# Patient Record
Sex: MALE | Race: WHITE | HISPANIC OR LATINO | Employment: OTHER | URBAN - METROPOLITAN AREA
[De-identification: names, ages, dates, MRNs, and addresses within clinical notes are randomized per-mention and may not be internally consistent; named-entity substitution may affect disease eponyms.]

---

## 2019-09-09 ENCOUNTER — HOSPITAL ENCOUNTER (OUTPATIENT)
Facility: HOSPITAL | Age: 54
Discharge: HOME | End: 2019-09-10
Attending: EMERGENCY MEDICINE | Admitting: INTERNAL MEDICINE
Payer: MEDICAID

## 2019-09-09 DIAGNOSIS — I24.9 ACUTE CORONARY SYNDROME: Primary | ICD-10-CM

## 2019-09-09 DIAGNOSIS — R07.9 CHEST PAIN: ICD-10-CM

## 2019-09-09 DIAGNOSIS — I10 HYPERTENSION, UNSPECIFIED TYPE: ICD-10-CM

## 2019-09-09 LAB
ALBUMIN SERPL BCP-MCNC: 4.4 G/DL (ref 3.5–5.2)
ALP SERPL-CCNC: 74 U/L (ref 55–135)
ALT SERPL W/O P-5'-P-CCNC: 27 U/L (ref 10–44)
ANION GAP SERPL CALC-SCNC: 6 MMOL/L (ref 8–16)
AST SERPL-CCNC: 21 U/L (ref 10–40)
BASOPHILS # BLD AUTO: 0.03 K/UL (ref 0–0.2)
BASOPHILS NFR BLD: 0.5 % (ref 0–1.9)
BILIRUB SERPL-MCNC: 0.9 MG/DL (ref 0.1–1)
BNP SERPL-MCNC: 32 PG/ML (ref 0–99)
BUN SERPL-MCNC: 10 MG/DL (ref 6–20)
CALCIUM SERPL-MCNC: 9.2 MG/DL (ref 8.7–10.5)
CHLORIDE SERPL-SCNC: 104 MMOL/L (ref 95–110)
CO2 SERPL-SCNC: 30 MMOL/L (ref 23–29)
CREAT SERPL-MCNC: 0.8 MG/DL (ref 0.5–1.4)
DIFFERENTIAL METHOD: ABNORMAL
EOSINOPHIL # BLD AUTO: 0.1 K/UL (ref 0–0.5)
EOSINOPHIL NFR BLD: 1.8 % (ref 0–8)
ERYTHROCYTE [DISTWIDTH] IN BLOOD BY AUTOMATED COUNT: 13.2 % (ref 11.5–14.5)
EST. GFR  (AFRICAN AMERICAN): >60 ML/MIN/1.73 M^2
EST. GFR  (NON AFRICAN AMERICAN): >60 ML/MIN/1.73 M^2
GLUCOSE SERPL-MCNC: 88 MG/DL (ref 70–110)
HCT VFR BLD AUTO: 40.4 % (ref 40–54)
HGB BLD-MCNC: 13.7 G/DL (ref 14–18)
IMM GRANULOCYTES # BLD AUTO: 0.04 K/UL (ref 0–0.04)
IMM GRANULOCYTES NFR BLD AUTO: 0.6 % (ref 0–0.5)
LYMPHOCYTES # BLD AUTO: 1.6 K/UL (ref 1–4.8)
LYMPHOCYTES NFR BLD: 24.1 % (ref 18–48)
MCH RBC QN AUTO: 29.7 PG (ref 27–31)
MCHC RBC AUTO-ENTMCNC: 33.9 G/DL (ref 32–36)
MCV RBC AUTO: 87 FL (ref 82–98)
MONOCYTES # BLD AUTO: 0.6 K/UL (ref 0.3–1)
MONOCYTES NFR BLD: 8.7 % (ref 4–15)
NEUTROPHILS # BLD AUTO: 4.3 K/UL (ref 1.8–7.7)
NEUTROPHILS NFR BLD: 64.3 % (ref 38–73)
NRBC BLD-RTO: 0 /100 WBC
PLATELET # BLD AUTO: 324 K/UL (ref 150–350)
PMV BLD AUTO: 10.7 FL (ref 9.2–12.9)
POTASSIUM SERPL-SCNC: 3.6 MMOL/L (ref 3.5–5.1)
PROT SERPL-MCNC: 7.7 G/DL (ref 6–8.4)
RBC # BLD AUTO: 4.62 M/UL (ref 4.6–6.2)
SODIUM SERPL-SCNC: 140 MMOL/L (ref 136–145)
TROPONIN I SERPL DL<=0.01 NG/ML-MCNC: <0.03 NG/ML (ref 0.02–0.04)
TROPONIN I SERPL DL<=0.01 NG/ML-MCNC: <0.03 NG/ML (ref 0.02–0.04)
WBC # BLD AUTO: 6.63 K/UL (ref 3.9–12.7)

## 2019-09-09 PROCEDURE — 84484 ASSAY OF TROPONIN QUANT: CPT | Mod: 91

## 2019-09-09 PROCEDURE — G0378 HOSPITAL OBSERVATION PER HR: HCPCS

## 2019-09-09 PROCEDURE — 63600175 PHARM REV CODE 636 W HCPCS: Performed by: EMERGENCY MEDICINE

## 2019-09-09 PROCEDURE — 99285 EMERGENCY DEPT VISIT HI MDM: CPT | Mod: 25

## 2019-09-09 PROCEDURE — 93005 ELECTROCARDIOGRAM TRACING: CPT

## 2019-09-09 PROCEDURE — 25000003 PHARM REV CODE 250: Performed by: EMERGENCY MEDICINE

## 2019-09-09 PROCEDURE — 25000003 PHARM REV CODE 250: Performed by: INTERNAL MEDICINE

## 2019-09-09 PROCEDURE — 25000003 PHARM REV CODE 250: Performed by: NURSE PRACTITIONER

## 2019-09-09 PROCEDURE — 25000003 PHARM REV CODE 250: Performed by: PHYSICIAN ASSISTANT

## 2019-09-09 PROCEDURE — 36415 COLL VENOUS BLD VENIPUNCTURE: CPT

## 2019-09-09 PROCEDURE — 85025 COMPLETE CBC W/AUTO DIFF WBC: CPT

## 2019-09-09 PROCEDURE — 80053 COMPREHEN METABOLIC PANEL: CPT

## 2019-09-09 PROCEDURE — 96374 THER/PROPH/DIAG INJ IV PUSH: CPT

## 2019-09-09 PROCEDURE — 83880 ASSAY OF NATRIURETIC PEPTIDE: CPT

## 2019-09-09 RX ORDER — ATORVASTATIN CALCIUM 10 MG/1
10 TABLET, FILM COATED ORAL DAILY
Status: DISCONTINUED | OUTPATIENT
Start: 2019-09-10 | End: 2019-09-10 | Stop reason: HOSPADM

## 2019-09-09 RX ORDER — ASPIRIN 325 MG
325 TABLET ORAL
Status: COMPLETED | OUTPATIENT
Start: 2019-09-09 | End: 2019-09-09

## 2019-09-09 RX ORDER — TRAMADOL HYDROCHLORIDE 50 MG/1
50 TABLET ORAL
Status: COMPLETED | OUTPATIENT
Start: 2019-09-09 | End: 2019-09-09

## 2019-09-09 RX ORDER — SODIUM CHLORIDE 0.9 % (FLUSH) 0.9 %
10 SYRINGE (ML) INJECTION
Status: DISCONTINUED | OUTPATIENT
Start: 2019-09-09 | End: 2019-09-10 | Stop reason: HOSPADM

## 2019-09-09 RX ORDER — TRAZODONE HYDROCHLORIDE 50 MG/1
100 TABLET ORAL NIGHTLY
Status: DISCONTINUED | OUTPATIENT
Start: 2019-09-09 | End: 2019-09-10 | Stop reason: HOSPADM

## 2019-09-09 RX ORDER — TRAMADOL HYDROCHLORIDE 50 MG/1
50 TABLET ORAL EVERY 6 HOURS PRN
Status: DISCONTINUED | OUTPATIENT
Start: 2019-09-09 | End: 2019-09-10 | Stop reason: HOSPADM

## 2019-09-09 RX ORDER — ACETAMINOPHEN 325 MG/1
650 TABLET ORAL EVERY 4 HOURS PRN
Status: DISCONTINUED | OUTPATIENT
Start: 2019-09-09 | End: 2019-09-10 | Stop reason: HOSPADM

## 2019-09-09 RX ORDER — OXYMETAZOLINE HCL 0.05 %
2 SPRAY, NON-AEROSOL (ML) NASAL 4 TIMES DAILY
Status: ON HOLD | COMMUNITY
End: 2019-09-10 | Stop reason: HOSPADM

## 2019-09-09 RX ORDER — CLOPIDOGREL BISULFATE 75 MG/1
75 TABLET ORAL DAILY
Status: DISCONTINUED | OUTPATIENT
Start: 2019-09-10 | End: 2019-09-10 | Stop reason: HOSPADM

## 2019-09-09 RX ORDER — CLOPIDOGREL BISULFATE 75 MG/1
75 TABLET ORAL DAILY
COMMUNITY
End: 2024-01-30 | Stop reason: SDUPTHER

## 2019-09-09 RX ORDER — METOPROLOL TARTRATE 50 MG/1
50 TABLET ORAL 2 TIMES DAILY
COMMUNITY
End: 2024-01-30 | Stop reason: SDUPTHER

## 2019-09-09 RX ORDER — TRAZODONE HYDROCHLORIDE 100 MG/1
100 TABLET ORAL NIGHTLY
COMMUNITY
End: 2023-09-27 | Stop reason: ALTCHOICE

## 2019-09-09 RX ORDER — ASPIRIN 325 MG
325 TABLET ORAL
Status: DISCONTINUED | OUTPATIENT
Start: 2019-09-09 | End: 2019-09-09

## 2019-09-09 RX ORDER — ACETAMINOPHEN 500 MG
1000 TABLET ORAL
Status: COMPLETED | OUTPATIENT
Start: 2019-09-09 | End: 2019-09-09

## 2019-09-09 RX ORDER — LOSARTAN POTASSIUM 100 MG/1
100 TABLET ORAL DAILY
COMMUNITY
End: 2023-04-10

## 2019-09-09 RX ORDER — NITROGLYCERIN 0.4 MG/1
0.4 TABLET SUBLINGUAL EVERY 5 MIN PRN
Status: DISCONTINUED | OUTPATIENT
Start: 2019-09-09 | End: 2019-09-10 | Stop reason: HOSPADM

## 2019-09-09 RX ORDER — ONDANSETRON 2 MG/ML
4 INJECTION INTRAMUSCULAR; INTRAVENOUS EVERY 8 HOURS PRN
Status: DISCONTINUED | OUTPATIENT
Start: 2019-09-09 | End: 2019-09-10 | Stop reason: HOSPADM

## 2019-09-09 RX ORDER — METOPROLOL TARTRATE 50 MG/1
50 TABLET ORAL 2 TIMES DAILY
Status: DISCONTINUED | OUTPATIENT
Start: 2019-09-09 | End: 2019-09-10 | Stop reason: HOSPADM

## 2019-09-09 RX ORDER — ASPIRIN 81 MG/1
81 TABLET ORAL DAILY
COMMUNITY

## 2019-09-09 RX ORDER — HYDROCODONE BITARTRATE AND ACETAMINOPHEN 5; 325 MG/1; MG/1
1 TABLET ORAL EVERY 6 HOURS PRN
Status: DISCONTINUED | OUTPATIENT
Start: 2019-09-09 | End: 2019-09-10 | Stop reason: HOSPADM

## 2019-09-09 RX ORDER — ASPIRIN 81 MG/1
81 TABLET ORAL DAILY
Status: DISCONTINUED | OUTPATIENT
Start: 2019-09-10 | End: 2019-09-10 | Stop reason: HOSPADM

## 2019-09-09 RX ORDER — TRAMADOL HYDROCHLORIDE 50 MG/1
50 TABLET ORAL EVERY 12 HOURS PRN
COMMUNITY
End: 2023-09-27

## 2019-09-09 RX ORDER — HYDRALAZINE HYDROCHLORIDE 20 MG/ML
10 INJECTION INTRAMUSCULAR; INTRAVENOUS
Status: COMPLETED | OUTPATIENT
Start: 2019-09-09 | End: 2019-09-09

## 2019-09-09 RX ORDER — LOSARTAN POTASSIUM 50 MG/1
100 TABLET ORAL DAILY
Status: DISCONTINUED | OUTPATIENT
Start: 2019-09-10 | End: 2019-09-10 | Stop reason: HOSPADM

## 2019-09-09 RX ADMIN — METOPROLOL TARTRATE 50 MG: 50 TABLET ORAL at 09:09

## 2019-09-09 RX ADMIN — HYDRALAZINE HYDROCHLORIDE 10 MG: 20 INJECTION INTRAMUSCULAR; INTRAVENOUS at 05:09

## 2019-09-09 RX ADMIN — TRAMADOL HYDROCHLORIDE 50 MG: 50 TABLET, FILM COATED ORAL at 06:09

## 2019-09-09 RX ADMIN — ACETAMINOPHEN 1000 MG: 500 TABLET, FILM COATED ORAL at 05:09

## 2019-09-09 RX ADMIN — ASPIRIN 325 MG ORAL TABLET 325 MG: 325 PILL ORAL at 05:09

## 2019-09-09 RX ADMIN — HYDROCODONE BITARTRATE AND ACETAMINOPHEN 1 TABLET: 5; 325 TABLET ORAL at 07:09

## 2019-09-09 RX ADMIN — NITROGLYCERIN 1 INCH: 20 OINTMENT TOPICAL at 05:09

## 2019-09-09 NOTE — ED PROVIDER NOTES
Encounter Date: 9/9/2019       History     Chief Complaint   Patient presents with    Chest Pain     off and on for 1 month     54-year-old male presented emergency department complaining of chest pain since yesterday.  Patient also noted that he was hypertensive.  Patient describes this chest pain as sharp pain at times and pressure at times and pain is worse with exertion and better with rest.  Patient took nitroglycerin this morning and currently is pain-free.  Patient denies any weakness or numbness.  Patient had previous heart attacks and patient states this pain is similar to previous chest pains with a heart.  Patient denies that the pain radiates to any place.  Patient        Review of patient's allergies indicates:   Allergen Reactions    Levaquin [levofloxacin]      Past Medical History:   Diagnosis Date    Hypertension      History reviewed. No pertinent surgical history.  History reviewed. No pertinent family history.  Social History     Tobacco Use    Smoking status: Never Smoker   Substance Use Topics    Alcohol use: Not on file    Drug use: Not on file     Review of Systems   Constitutional: Negative.    HENT: Negative.    Eyes: Negative.    Respiratory: Negative.    Cardiovascular: Positive for chest pain.   Gastrointestinal: Negative.    Endocrine: Negative.    Genitourinary: Negative.    Musculoskeletal: Negative.    Skin: Negative.    Allergic/Immunologic: Negative.    Neurological: Negative.    Hematological: Negative.    Psychiatric/Behavioral: Negative.    All other systems reviewed and are negative.      Physical Exam     Initial Vitals [09/09/19 1127]   BP Pulse Resp Temp SpO2   (!) 195/101 63 18 98.2 °F (36.8 °C) 100 %      MAP       --         Physical Exam    Nursing note and vitals reviewed.  Constitutional: He appears well-developed and well-nourished.   HENT:   Head: Normocephalic and atraumatic.   Nose: Nose normal.   Eyes: Conjunctivae and EOM are normal.   Neck: Normal range of  motion. No tracheal deviation present.   Cardiovascular: Normal rate, regular rhythm, normal heart sounds and intact distal pulses. Exam reveals no friction rub.    No murmur heard.  Pulmonary/Chest: Breath sounds normal. No respiratory distress. He has no wheezes. He has no rales.   Abdominal: Soft. He exhibits no distension. There is no tenderness.   Musculoskeletal: Normal range of motion.   Neurological: He is alert and oriented to person, place, and time. He has normal strength.   Skin: Skin is warm and dry. Capillary refill takes less than 2 seconds.   Psychiatric: He has a normal mood and affect. Thought content normal.         ED Course   Procedures  Labs Reviewed   CBC W/ AUTO DIFFERENTIAL - Abnormal; Notable for the following components:       Result Value    Hemoglobin 13.7 (*)     Immature Granulocytes 0.6 (*)     All other components within normal limits   COMPREHENSIVE METABOLIC PANEL - Abnormal; Notable for the following components:    CO2 30 (*)     Anion Gap 6 (*)     All other components within normal limits   TROPONIN I   B-TYPE NATRIURETIC PEPTIDE   TROPONIN I   D DIMER, QUANTITATIVE     EKG Readings: (Independently Interpreted)   Rhythm: Normal Sinus Rhythm. Ectopy: No Ectopy. Conduction: Normal. ST Segments: Normal ST Segments. T Waves: Normal. Clinical Impression: Normal Sinus Rhythm     ECG Results          EKG 12-lead (In process)  Result time 09/09/19 13:14:59    In process by Interface, Lab In [facility] (09/09/19 13:14:59)                 Narrative:    Test Reason : R07.9,    Vent. Rate : 056 BPM     Atrial Rate : 056 BPM     P-R Int : 174 ms          QRS Dur : 084 ms      QT Int : 430 ms       P-R-T Axes : 048 039 045 degrees     QTc Int : 414 ms    Sinus bradycardia  Otherwise normal ECG  No previous ECGs available    Referred By: [email or web address]   SELF           Confirmed By:                             Imaging Results          X-Ray Chest PA And Lateral (Final result)  Result time 09/09/19  12:54:28    Final result by Darren Jacob MD (09/09/19 12:54:28)                 Impression:      1. No acute cardiopulmonary abnormality.  2. Vertebral body compression fractures as above.      Electronically signed by: Darren Jacob MD  Date:    09/09/2019  Time:    12:54             Narrative:    EXAMINATION:  XR CHEST PA AND LATERAL    CLINICAL HISTORY:  Chest Pain;    FINDINGS:  PA and lateral chest compared with 12/24/2015 shows normal cardiomediastinal silhouette.    Lungs are clear. Pulmonary vasculature is normal. Compression fracture of vertebral body near thoracolumbar junction, more likely L1 rather than T12, shows minimal progression since 01/05/2015.  There is suggestion of or superior thoracic vertebral body compression fracture, possibly T5, which appears unchanged since 2015.                              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  Presentation consistent with unstable angina and given patient's cardiac history, patient scheduled for a stress test in 10 days and given his cardiac risk factor screening cardiac workup done and blood pressure treated and Hospital Medicine consulted for evaluation for admission and further management.  Clinical Tests:   Lab Tests: Reviewed  Radiological Study: Reviewed  Medical Tests: Reviewed                   ED Course as of Sep 09 1720   Mon Sep 09, 2019   1129 54-year-old male with past cardiac history, 3 stents patient of Dr. alanis presents to the emergency department for evaluation of chest pain. Chest pain has been intermittent and ongoing since he saw the cardiologist last.  He has a stress test scheduled for late September.  Patient takes Plavix and aspirin.  Took 81 mg aspirin this morning    [BF]       ED Course User Index  [BF] NATALIO Vargas     Clinical Impression:       ICD-10-CM ICD-9-CM   1. Acute coronary syndrome I24.9 411.1   2. Chest pain R07.9 786.50                                Gregorio Beltran MD  09/09/19 3554

## 2019-09-10 ENCOUNTER — CLINICAL SUPPORT (OUTPATIENT)
Dept: CARDIOLOGY | Facility: HOSPITAL | Age: 54
End: 2019-09-10
Attending: EMERGENCY MEDICINE
Payer: MEDICAID

## 2019-09-10 ENCOUNTER — CLINICAL SUPPORT (OUTPATIENT)
Dept: CARDIOLOGY | Facility: HOSPITAL | Age: 54
End: 2019-09-10
Attending: INTERNAL MEDICINE
Payer: MEDICAID

## 2019-09-10 ENCOUNTER — HOSPITAL ENCOUNTER (OUTPATIENT)
Dept: RADIOLOGY | Facility: HOSPITAL | Age: 54
Discharge: HOME | End: 2019-09-10
Attending: EMERGENCY MEDICINE | Admitting: INTERNAL MEDICINE
Payer: MEDICAID

## 2019-09-10 VITALS — WEIGHT: 161 LBS | BODY MASS INDEX: 27.49 KG/M2 | HEIGHT: 64 IN

## 2019-09-10 VITALS
BODY MASS INDEX: 27.59 KG/M2 | WEIGHT: 161.63 LBS | DIASTOLIC BLOOD PRESSURE: 100 MMHG | TEMPERATURE: 98 F | RESPIRATION RATE: 20 BRPM | OXYGEN SATURATION: 99 % | HEART RATE: 89 BPM | SYSTOLIC BLOOD PRESSURE: 155 MMHG | HEIGHT: 64 IN

## 2019-09-10 PROBLEM — R07.9 CHEST PAIN: Status: ACTIVE | Noted: 2019-09-10

## 2019-09-10 PROBLEM — I24.9 ACUTE CORONARY SYNDROME: Status: RESOLVED | Noted: 2019-09-09 | Resolved: 2019-09-10

## 2019-09-10 PROBLEM — R07.9 CHEST PAIN: Status: RESOLVED | Noted: 2019-09-10 | Resolved: 2019-09-10

## 2019-09-10 LAB
ALBUMIN SERPL BCP-MCNC: 4 G/DL (ref 3.5–5.2)
ALP SERPL-CCNC: 63 U/L (ref 55–135)
ALT SERPL W/O P-5'-P-CCNC: 24 U/L (ref 10–44)
ANION GAP SERPL CALC-SCNC: 9 MMOL/L (ref 8–16)
AST SERPL-CCNC: 20 U/L (ref 10–40)
BASOPHILS # BLD AUTO: 0.03 K/UL (ref 0–0.2)
BASOPHILS NFR BLD: 0.4 % (ref 0–1.9)
BILIRUB SERPL-MCNC: 0.8 MG/DL (ref 0.1–1)
BUN SERPL-MCNC: 15 MG/DL (ref 6–20)
CALCIUM SERPL-MCNC: 9 MG/DL (ref 8.7–10.5)
CHLORIDE SERPL-SCNC: 107 MMOL/L (ref 95–110)
CHOLEST SERPL-MCNC: 201 MG/DL (ref 120–199)
CHOLEST/HDLC SERPL: 5.6 {RATIO} (ref 2–5)
CO2 SERPL-SCNC: 27 MMOL/L (ref 23–29)
CREAT SERPL-MCNC: 0.9 MG/DL (ref 0.5–1.4)
DIFFERENTIAL METHOD: ABNORMAL
EOSINOPHIL # BLD AUTO: 0.2 K/UL (ref 0–0.5)
EOSINOPHIL NFR BLD: 2.7 % (ref 0–8)
ERYTHROCYTE [DISTWIDTH] IN BLOOD BY AUTOMATED COUNT: 13.2 % (ref 11.5–14.5)
EST. GFR  (AFRICAN AMERICAN): >60 ML/MIN/1.73 M^2
EST. GFR  (NON AFRICAN AMERICAN): >60 ML/MIN/1.73 M^2
GLUCOSE SERPL-MCNC: 111 MG/DL (ref 70–110)
HCT VFR BLD AUTO: 38.2 % (ref 40–54)
HDLC SERPL-MCNC: 36 MG/DL (ref 40–75)
HDLC SERPL: 17.9 % (ref 20–50)
HGB BLD-MCNC: 13 G/DL (ref 14–18)
IMM GRANULOCYTES # BLD AUTO: 0.04 K/UL (ref 0–0.04)
IMM GRANULOCYTES NFR BLD AUTO: 0.6 % (ref 0–0.5)
LDLC SERPL CALC-MCNC: 112.6 MG/DL (ref 63–159)
LYMPHOCYTES # BLD AUTO: 1.6 K/UL (ref 1–4.8)
LYMPHOCYTES NFR BLD: 22.4 % (ref 18–48)
MAGNESIUM SERPL-MCNC: 1.8 MG/DL (ref 1.6–2.6)
MCH RBC QN AUTO: 29.5 PG (ref 27–31)
MCHC RBC AUTO-ENTMCNC: 34 G/DL (ref 32–36)
MCV RBC AUTO: 87 FL (ref 82–98)
MONOCYTES # BLD AUTO: 0.8 K/UL (ref 0.3–1)
MONOCYTES NFR BLD: 11.1 % (ref 4–15)
NEUTROPHILS # BLD AUTO: 4.4 K/UL (ref 1.8–7.7)
NEUTROPHILS NFR BLD: 62.8 % (ref 38–73)
NONHDLC SERPL-MCNC: 165 MG/DL
NRBC BLD-RTO: 0 /100 WBC
PLATELET # BLD AUTO: 270 K/UL (ref 150–350)
PMV BLD AUTO: 10.6 FL (ref 9.2–12.9)
POTASSIUM SERPL-SCNC: 3.4 MMOL/L (ref 3.5–5.1)
PROT SERPL-MCNC: 7 G/DL (ref 6–8.4)
RBC # BLD AUTO: 4.41 M/UL (ref 4.6–6.2)
SODIUM SERPL-SCNC: 143 MMOL/L (ref 136–145)
TRIGL SERPL-MCNC: 262 MG/DL (ref 30–150)
TROPONIN I SERPL DL<=0.01 NG/ML-MCNC: <0.03 NG/ML (ref 0.02–0.04)
WBC # BLD AUTO: 7.05 K/UL (ref 3.9–12.7)

## 2019-09-10 PROCEDURE — 80061 LIPID PANEL: CPT

## 2019-09-10 PROCEDURE — 84484 ASSAY OF TROPONIN QUANT: CPT

## 2019-09-10 PROCEDURE — 83735 ASSAY OF MAGNESIUM: CPT

## 2019-09-10 PROCEDURE — 93306 TTE W/DOPPLER COMPLETE: CPT

## 2019-09-10 PROCEDURE — 63600175 PHARM REV CODE 636 W HCPCS: Performed by: INTERNAL MEDICINE

## 2019-09-10 PROCEDURE — 25000003 PHARM REV CODE 250: Performed by: NURSE PRACTITIONER

## 2019-09-10 PROCEDURE — 93017 CV STRESS TEST TRACING ONLY: CPT

## 2019-09-10 PROCEDURE — 93005 ELECTROCARDIOGRAM TRACING: CPT

## 2019-09-10 PROCEDURE — 25000003 PHARM REV CODE 250: Performed by: INTERNAL MEDICINE

## 2019-09-10 PROCEDURE — G0378 HOSPITAL OBSERVATION PER HR: HCPCS

## 2019-09-10 PROCEDURE — A9502 TC99M TETROFOSMIN: HCPCS

## 2019-09-10 PROCEDURE — 80053 COMPREHEN METABOLIC PANEL: CPT

## 2019-09-10 PROCEDURE — 85025 COMPLETE CBC W/AUTO DIFF WBC: CPT

## 2019-09-10 RX ORDER — AMLODIPINE BESYLATE 5 MG/1
5 TABLET ORAL DAILY
Qty: 30 TABLET | Refills: 0
Start: 2019-09-10 | End: 2019-10-10

## 2019-09-10 RX ORDER — REGADENOSON 0.08 MG/ML
0.4 INJECTION, SOLUTION INTRAVENOUS ONCE
Status: COMPLETED | OUTPATIENT
Start: 2019-09-10 | End: 2019-09-10

## 2019-09-10 RX ORDER — AMLODIPINE BESYLATE 5 MG/1
5 TABLET ORAL DAILY
Status: DISCONTINUED | OUTPATIENT
Start: 2019-09-10 | End: 2019-09-10 | Stop reason: HOSPADM

## 2019-09-10 RX ADMIN — AMLODIPINE BESYLATE 5 MG: 5 TABLET ORAL at 12:09

## 2019-09-10 RX ADMIN — HYDROCODONE BITARTRATE AND ACETAMINOPHEN 1 TABLET: 5; 325 TABLET ORAL at 03:09

## 2019-09-10 RX ADMIN — ASPIRIN 81 MG: 81 TABLET, DELAYED RELEASE ORAL at 09:09

## 2019-09-10 RX ADMIN — ACETAMINOPHEN 650 MG: 325 TABLET ORAL at 05:09

## 2019-09-10 RX ADMIN — CLOPIDOGREL BISULFATE 75 MG: 75 TABLET, FILM COATED ORAL at 09:09

## 2019-09-10 RX ADMIN — THERA TABS 1 TABLET: TAB at 09:09

## 2019-09-10 RX ADMIN — ATORVASTATIN CALCIUM 10 MG: 10 TABLET, FILM COATED ORAL at 09:09

## 2019-09-10 RX ADMIN — LOSARTAN POTASSIUM 100 MG: 50 TABLET, FILM COATED ORAL at 09:09

## 2019-09-10 RX ADMIN — HYDROCODONE BITARTRATE AND ACETAMINOPHEN 1 TABLET: 5; 325 TABLET ORAL at 09:09

## 2019-09-10 RX ADMIN — METOPROLOL TARTRATE 50 MG: 50 TABLET ORAL at 12:09

## 2019-09-10 RX ADMIN — REGADENOSON 0.4 MG: 0.08 INJECTION, SOLUTION INTRAVENOUS at 10:09

## 2019-09-10 NOTE — DISCHARGE SUMMARY
[facility] Medicine  Discharge Summary      Patient Name: Sanjeev Britt  MRN: [id number]  Admission Date: 9/9/2019  Hospital Length of Stay: 0 days  Discharge Date and Time:  09/10/2019 2:40 PM  Attending Physician: Saima Britt MD   Discharging Provider: Scott Reid MD  Primary Care Provider: Primary Doctor No      HPI:   Mr. Britt is a 54 year old male who presents to the ED with the complaint of chest pain that has been intermittent over the past 2 days. The patient describes the pain as sharp and pressure like. Pain does not radiate. He denies associated symptoms. Pain is worse with exertion. He reports history of CAD. He is followed by Dr. Florentino and reports upcoming scheduled stress test. Today, the patient reports his blood pressure was elevated at home with  and did not improve with medication. Chest pain worsened, so he came to the ED for evaluation.     * No surgery found *      Hospital Course:   Admitted with chest pain  Serial C enzymes and EKG were Normal  Patient had Lexiscan stress myoview which was normal  Later pt was D/C to Home      Consults:   Consults (From admission, onward)        Status Ordering Provider     Inpatient consult to Cardiology  Once     Provider:  Miguel Florentino MD    Acknowledged SEB SHEPPARD     Inpatient consult to Hospitalist  Once     Provider:  Saima Britt MD    Acknowledged MICHA VILLARREAL     Inpatient consult to Hospitalist  Once     Provider:  Saima Britt MD    Acknowledged MICAH VILLARREAL          No new Assessment & Plan notes have been filed under this hospital service since the last note was generated.  Service: Hospital Medicine    Final Active Diagnoses:    Diagnosis Date Noted POA    PRINCIPAL PROBLEM:  Chest pain [R07.9] 09/10/2019 Unknown      Problems Resolved During this Admission:       Discharged Condition: good    Disposition: Home or Self Care    Follow Up:  Follow-up Information     Primary  Doctor Krista In 1 week.           Miguel Florentino MD In 1 week.    Specialties:  Cardiovascular Disease, Cardiology  Contact information:  [facility]  [address]  [facility] [address] [phone]                 Patient Instructions:      Diet Cardiac       Significant Diagnostic Studies: Labs:   CMP   Recent Labs   Lab 09/09/19  1309 09/10/19  0500    143   K 3.6 3.4*    107   CO2 30* 27   GLU 88 111*   BUN 10 15   CREATININE 0.8 0.9   CALCIUM 9.2 9.0   PROT 7.7 7.0   ALBUMIN 4.4 4.0   BILITOT 0.9 0.8   ALKPHOS 74 63   AST 21 20   ALT 27 24   ANIONGAP 6* 9   ESTGFRAFRICA >60.0 >60.0   EGFRNONAA >60.0 >60.0    and CBC   Recent Labs   Lab 09/09/19  1309 09/10/19  0500   WBC 6.63 7.05   HGB 13.7* 13.0*   HCT 40.4 38.2*    270       Pending Diagnostic Studies:     Procedure Component Value Units Date/Time    D dimer, quantitative [[id number]] Collected:  09/09/19 1704    Order Status:  Sent Lab Status:  In process Updated:  09/09/19 1705    Specimen:  Blood     EKG 12-LEAD [[id number]] Collected:  09/10/19 0531    Order Status:  Sent Lab Status:  In process Updated:  09/10/19 0758    Narrative:       Test Reason :     Vent. Rate : 062 BPM     Atrial Rate : 062 BPM     P-R Int : 172 ms          QRS Dur : 086 ms      QT Int : 450 ms       P-R-T Axes : 037 019 023 degrees     QTc Int : 456 ms    Normal sinus rhythm  Normal ECG  When compared with ECG of 09-SEP-2019 17:02,  Nonspecific T wave abnormality no longer evident in Lateral leads    Referred By: [email or web address]   SELF           Confirmed By:     Echo Color Flow Doppler? Yes [[id number]]  (Abnormal) Resulted:  09/10/19 1147    Order Status:  Sent Lab Status:  In process Updated:  09/10/19 1149     BSA 1.81 m2      TDI SEPTAL 0.07 m/s      LV LATERAL E/E' RATIO 13.33 m/s      LV SEPTAL E/E' RATIO 11.43 m/s      AORTIC VALVE CUSP SEPERATION 2.27 cm      TDI LATERAL 0.06 m/s      PV PEAK VELOCITY 165.00 cm/s      LVIDD 4.63 cm      IVS 1.16 cm      PW 1.13 cm       Ao root annulus 2.91 cm      LVIDS 2.56 cm      FS 45 %      LV mass 193.72 g      LA size 4.31 cm      Left Ventricle Relative Wall Thickness 0.49 cm      AV mean gradient 5 mmHg      AV valve area 3.73 cm2      AV Velocity Ratio 103.51     AV index (prosthetic) 1.06     E/A ratio 0.88     Mean e' 0.07 m/s      E wave decelartion time 237.63 msec      LVOT diameter 2.12 cm      LVOT area 3.5 cm2      LVOT peak coleman 153.19 m/s      LVOT peak VTI 28.55 cm      Ao peak coleman 1.48 m/s      Ao VTI 26.98 cm      LVOT stroke volume 100.73 cm3      AV peak gradient 9 mmHg      E/E' ratio 12.31 m/s      MV Peak E Coleman 0.80 m/s      TR Max Coleman 2.85 m/s      MV Peak A Coleman 0.91 m/s      LV Systolic Volume 21.20 mL      LV Systolic Volume Index 11.9 mL/m2      LV Diastolic Volume 51.80 mL      LV Diastolic Volume Index 29.11 mL/m2      LV Mass Index 109 g/m2      Triscuspid Valve Regurgitation Peak Gradient 32 mmHg     Narrative:       · Concentric left ventricular remodeling.       Impression:           Troponin I [[id number]]     Order Status:  Sent Lab Status:  No result     Specimen:  Blood          Medications:  Reconciled Home Medications:      Medication List      START taking these medications    amLODIPine 5 MG tablet  Commonly known as:  NORVASC  Take 1 tablet (5 mg total) by mouth once daily.        CONTINUE taking these medications    aspirin 81 MG EC tablet  Commonly known as:  ECOTRIN  Take 81 mg by mouth once daily.     CENTRUM SILVER MEN ORAL  Take 1 tablet by mouth once daily.     clopidogrel 75 mg tablet  Commonly known as:  PLAVIX  Take 75 mg by mouth once daily.     losartan 100 MG tablet  Commonly known as:  COZAAR  Take 100 mg by mouth once daily.     metoprolol tartrate 50 MG tablet  Commonly known as:  LOPRESSOR  Take 50 mg by mouth 2 (two) times daily.     traMADol 50 mg tablet  Commonly known as:  ULTRAM  Take 50 mg by mouth every 12 (twelve) hours as needed for Pain.     traZODone 100 MG  tablet  Commonly known as:  DESYREL  Take 100 mg by mouth.        STOP taking these medications    oxymetazoline 0.05 % nasal spray  Commonly known as:  AFRIN            Indwelling Lines/Drains at time of discharge:   Lines/Drains/Airways          None          Time spent on the discharge of patient: 28  minutes  Patient was seen and examined on the date of discharge and determined to be suitable for discharge.    Amlodipine was added to patient's medication list because of high blood pressure  Patient will be seen by her regular cardiologist next week and significant changes if needed can be made at that point       Scott Reid MD  Department of Hospital Medicine  [facility]

## 2019-09-10 NOTE — PROGRESS NOTES
Patient admitted to cardiology from ED.  Patient arrives to unit via stretcher.  Ambulated to bed without assistance.  AAOX4, VSS , oriented to surroundings. Bed in lowest position. Call system within reach.  Side rails up x 2.  Telemetry notified patient is in room.

## 2019-09-10 NOTE — ASSESSMENT & PLAN NOTE
Admit to Cardiology  Consult Dr. Chadd Santiago cardiac enzymes  2-D echo  Further testing per cardiology recommendation  ASA/Plavix/BB/ARB/statin/Nitrate prn

## 2019-09-10 NOTE — ED NOTES
Given Norco po for back pain 10/10.  Pt is less upset now because he was able to reach his girlfriend and she is bringing him some food.  Continues to pace about room but agrees to stay in hospital.

## 2019-09-10 NOTE — H&P
[facility] Medicine  History & Physical    Patient Name: Sanjeev Britt  MRN: [id number]  Admission Date: 9/9/2019  Attending Physician: Saima Britt MD   Primary Care Provider: No primary care provider on file.         Patient information was obtained from patient and ER records.     Subjective:     Principal Problem:<principal problem not specified>    Chief Complaint:   Chief Complaint   Patient presents with    Chest Pain     off and on for 1 month        HPI: Mr. Britt is a 54 year old male who presents to the ED with the complaint of chest pain that has been intermittent over the past 2 days. The patient describes the pain as sharp and pressure like. Pain does not radiate. He denies associated symptoms. Pain is worse with exertion. He reports history of CAD. He is followed by Dr. Florentino and reports upcoming scheduled stress test. Today, the patient reports his blood pressure was elevated at home with  and did not improve with medication. Chest pain worsened, so he came to the ED for evaluation. Work up unremarkable with the exception of nonspecific T wave inversions in lateral leads.     Past Medical History:   Diagnosis Date    Hypertension        History reviewed. No pertinent surgical history.    Review of patient's allergies indicates:   Allergen Reactions    Levaquin [levofloxacin]        No current facility-administered medications on file prior to encounter.      Current Outpatient Medications on File Prior to Encounter   Medication Sig    aspirin (ECOTRIN) 81 MG EC tablet Take 81 mg by mouth once daily.    clopidogrel (PLAVIX) 75 mg tablet Take 75 mg by mouth once daily.    losartan (COZAAR) 100 MG tablet Take 100 mg by mouth once daily.    metoprolol tartrate (LOPRESSOR) 50 MG tablet Take 50 mg by mouth 2 (two) times daily.    multivit-min/FA/lycopen/lutein (CENTRUM SILVER MEN ORAL) Take 1 tablet by mouth once daily.    oxymetazoline (AFRIN) 0.05 %  nasal spray 2 sprays by Nasal route 4 (four) times daily.    traMADol (ULTRAM) 50 mg tablet Take 50 mg by mouth every 12 (twelve) hours as needed for Pain.    traZODone (DESYREL) 100 MG tablet Take 100 mg by mouth.     Family History     None        Tobacco Use    Smoking status: Never Smoker   Substance and Sexual Activity    Alcohol use: Not on file    Drug use: Not on file    Sexual activity: Not on file     Review of Systems   Constitutional: Negative for chills, diaphoresis, fatigue and fever.   HENT: Negative for congestion, sore throat, tinnitus, trouble swallowing and voice change.    Eyes: Negative for visual disturbance.   Respiratory: Positive for chest tightness. Negative for cough and shortness of breath.    Cardiovascular: Positive for chest pain. Negative for palpitations and leg swelling.   Gastrointestinal: Negative for abdominal pain, blood in stool, constipation, diarrhea, nausea, rectal pain and vomiting.   Genitourinary: Negative for difficulty urinating, dysuria and hematuria.   Musculoskeletal: Positive for back pain and myalgias.   Skin: Negative for rash and wound.   Neurological: Negative for dizziness, tremors, syncope, speech difficulty, weakness, light-headedness, numbness and headaches.   Psychiatric/Behavioral: Negative for confusion.     Objective:     Vital Signs (Most Recent):  Temp: 98.4 °F (36.9 °C) (09/09/19 2256)  Pulse: 65 (09/09/19 2256)  Resp: 18 (09/09/19 2256)  BP: 132/67 (09/09/19 2256)  SpO2: 97 % (09/09/19 2256) Vital Signs (24h Range):  Temp:  [98 °F (36.7 °C)-98.4 °F (36.9 °C)] 98.4 °F (36.9 °C)  Pulse:  [63-87] 65  Resp:  [12-18] 18  SpO2:  [96 %-100 %] 97 %  BP: (127-212)/() 132/67     Weight: 75.8 kg (167 lb)  Body mass index is 28.67 kg/m².    Physical Exam   Constitutional: He is oriented to person, place, and time. He appears well-developed and well-nourished.   HENT:   Head: Normocephalic and atraumatic.   Eyes: Pupils are equal, round, and reactive  to light.   Neck: Trachea normal and normal range of motion. Neck supple.   Cardiovascular: Normal rate, regular rhythm, S1 normal, S2 normal, normal heart sounds, intact distal pulses and normal pulses.   Pulmonary/Chest: Effort normal and breath sounds normal.   Abdominal: Soft. Bowel sounds are normal.   Musculoskeletal: Normal range of motion.   Neurological: He is alert and oriented to person, place, and time. GCS eye subscore is 4. GCS verbal subscore is 5. GCS motor subscore is 6.   Skin: Skin is warm, dry and intact. Capillary refill takes less than 2 seconds.   Psychiatric: He has a normal mood and affect. His speech is normal. He is agitated.         CRANIAL NERVES     CN III, IV, VI   Pupils are equal, round, and reactive to light.       Significant Labs:   CBC:   Recent Labs   Lab 09/09/19  1309   WBC 6.63   HGB 13.7*   HCT 40.4        CMP:   Recent Labs   Lab 09/09/19  1309      K 3.6      CO2 30*   GLU 88   BUN 10   CREATININE 0.8   CALCIUM 9.2   PROT 7.7   ALBUMIN 4.4   BILITOT 0.9   ALKPHOS 74   AST 21   ALT 27   ANIONGAP 6*   EGFRNONAA >60.0     Cardiac Markers:   Recent Labs   Lab 09/09/19  1309   BNP 32     Magnesium: No results for input(s): MG in the last 48 hours.  Troponin:   Recent Labs   Lab 09/09/19  1309 09/09/19  1704   TROPONINI <0.030 <0.030       Significant Imaging: I have reviewed all pertinent imaging results/findings within the past 24 hours.    Assessment/Plan:     Chest pain  Admit to Cardiology  Consult Dr. Chadd Santiago cardiac enzymes  2-D echo  Further testing per cardiology recommendation  ASA/Plavix/BB/ARB/statin/Nitrate prn      VTE Risk Mitigation (From admission, onward)        Ordered     Place sequential compression device  Until discontinued      09/09/19 1958     Place SARAH hose  Until discontinued      09/09/19 1958     IP VTE LOW RISK PATIENT  Once      09/09/19 1958             Rose Mansfield NP  Department of Hospital Medicine   [place]  [facility]

## 2019-09-10 NOTE — HOSPITAL COURSE
Admitted with chest pain  Serial C enzymes and EKG were Normal  Patient had Lexiscan stress myoview which was normal  Later pt was D/C to Home

## 2019-09-10 NOTE — PROGRESS NOTES
Myocardial perfusion rest injection Rac IV at 0712    -resting images obtained at 07:48    -patient to remain NPO status    Deyanira ludwig

## 2019-09-10 NOTE — HPI
Mr. Britt is a 54 year old male who presents to the ED with the complaint of chest pain that has been intermittent over the past 2 days. The patient describes the pain as sharp and pressure like. Pain does not radiate. He denies associated symptoms. Pain is worse with exertion. He reports history of CAD. He is followed by Dr. Florentino and reports upcoming scheduled stress test. Today, the patient reports his blood pressure was elevated at home with  and did not improve with medication. Chest pain worsened, so he came to the ED for evaluation.

## 2019-09-10 NOTE — NURSING
Discharge instructions given to pt along with prescripiton; piv removed intact and cardiac monitor removed; pt dressed and left ambulatory with family member; refused wheelchair and employee assistance

## 2019-09-10 NOTE — PROGRESS NOTES
@  10:26  PATIENT INJECTED WITH 25.8mCi Tc99m MYOVIEW IV FOR STRESS  IMAGES.    RELEASE NPO STATUS FROM NUCLEAR MEDICINE.    LENA CHUNG

## 2019-09-10 NOTE — SUBJECTIVE & OBJECTIVE
Past Medical History:   Diagnosis Date    Hypertension        History reviewed. No pertinent surgical history.    Review of patient's allergies indicates:   Allergen Reactions    Levaquin [levofloxacin]        No current facility-administered medications on file prior to encounter.      Current Outpatient Medications on File Prior to Encounter   Medication Sig    aspirin (ECOTRIN) 81 MG EC tablet Take 81 mg by mouth once daily.    clopidogrel (PLAVIX) 75 mg tablet Take 75 mg by mouth once daily.    losartan (COZAAR) 100 MG tablet Take 100 mg by mouth once daily.    metoprolol tartrate (LOPRESSOR) 50 MG tablet Take 50 mg by mouth 2 (two) times daily.    multivit-min/FA/lycopen/lutein (CENTRUM SILVER MEN ORAL) Take 1 tablet by mouth once daily.    oxymetazoline (AFRIN) 0.05 % nasal spray 2 sprays by Nasal route 4 (four) times daily.    traMADol (ULTRAM) 50 mg tablet Take 50 mg by mouth every 12 (twelve) hours as needed for Pain.    traZODone (DESYREL) 100 MG tablet Take 100 mg by mouth.     Family History     None        Tobacco Use    Smoking status: Never Smoker   Substance and Sexual Activity    Alcohol use: Not on file    Drug use: Not on file    Sexual activity: Not on file     Review of Systems   Constitutional: Negative for chills, diaphoresis, fatigue and fever.   HENT: Negative for congestion, sore throat, tinnitus, trouble swallowing and voice change.    Eyes: Negative for visual disturbance.   Respiratory: Positive for chest tightness. Negative for cough and shortness of breath.    Cardiovascular: Positive for chest pain. Negative for palpitations and leg swelling.   Gastrointestinal: Negative for abdominal pain, blood in stool, constipation, diarrhea, nausea, rectal pain and vomiting.   Genitourinary: Negative for difficulty urinating, dysuria and hematuria.   Musculoskeletal: Positive for back pain and myalgias.   Skin: Negative for rash and wound.   Neurological: Negative for dizziness,  tremors, syncope, speech difficulty, weakness, light-headedness, numbness and headaches.   Psychiatric/Behavioral: Negative for confusion.     Objective:     Vital Signs (Most Recent):  Temp: 98.4 °F (36.9 °C) (09/09/19 2256)  Pulse: 65 (09/09/19 2256)  Resp: 18 (09/09/19 2256)  BP: 132/67 (09/09/19 2256)  SpO2: 97 % (09/09/19 2256) Vital Signs (24h Range):  Temp:  [98 °F (36.7 °C)-98.4 °F (36.9 °C)] 98.4 °F (36.9 °C)  Pulse:  [63-87] 65  Resp:  [12-18] 18  SpO2:  [96 %-100 %] 97 %  BP: (127-212)/() 132/67     Weight: 75.8 kg (167 lb)  Body mass index is 28.67 kg/m².    Physical Exam   Constitutional: He is oriented to person, place, and time. He appears well-developed and well-nourished.   HENT:   Head: Normocephalic and atraumatic.   Eyes: Pupils are equal, round, and reactive to light.   Neck: Trachea normal and normal range of motion. Neck supple.   Cardiovascular: Normal rate, regular rhythm, S1 normal, S2 normal, normal heart sounds, intact distal pulses and normal pulses.   Pulmonary/Chest: Effort normal and breath sounds normal.   Abdominal: Soft. Bowel sounds are normal.   Musculoskeletal: Normal range of motion.   Neurological: He is alert and oriented to person, place, and time. GCS eye subscore is 4. GCS verbal subscore is 5. GCS motor subscore is 6.   Skin: Skin is warm, dry and intact. Capillary refill takes less than 2 seconds.   Psychiatric: He has a normal mood and affect. His speech is normal. He is agitated.         CRANIAL NERVES     CN III, IV, VI   Pupils are equal, round, and reactive to light.       Significant Labs:   CBC:   Recent Labs   Lab 09/09/19  1309   WBC 6.63   HGB 13.7*   HCT 40.4        CMP:   Recent Labs   Lab 09/09/19  1309      K 3.6      CO2 30*   GLU 88   BUN 10   CREATININE 0.8   CALCIUM 9.2   PROT 7.7   ALBUMIN 4.4   BILITOT 0.9   ALKPHOS 74   AST 21   ALT 27   ANIONGAP 6*   EGFRNONAA >60.0     Cardiac Markers:   Recent Labs   Lab 09/09/19  1304    BNP 32     Magnesium: No results for input(s): MG in the last 48 hours.  Troponin:   Recent Labs   Lab 09/09/19  1309 09/09/19  1704   TROPONINI <0.030 <0.030       Significant Imaging: I have reviewed all pertinent imaging results/findings within the past 24 hours.

## 2019-09-10 NOTE — PLAN OF CARE
Adult Inpatient Plan of Care  Goal: Plan of Care Review with patient.  LOC: alert and oriented x 4.   SKIN: Skin is warm and dry.  RESPIRATORY: Respirations are WNL, even and unlabored. Room air.   CARDIAC: NSR to SB in the 50's to 60's.   ABDOMEN: Soft and non tender to palpation. No distention noted.   URINARY: continent with urinal at bedside.  NEURO: Pt able to follow commands and is calm and cooperative with care.   Patient is free of fall/trauma/injury. Denies chest pain and shortness of breath. C/O of pain in lower back relieved with PRN pain medication.  All questions addressed. Will continue to monitor.

## 2019-09-10 NOTE — ED NOTES
Pt very angry about long wait for admission today.  C/o chronic back pain that has not been relieved by Tramadol.   Wishing to go home.  I spoke with Np  Rose and she reported that we could give pt oral Norco for pain.  Pt agrees to stay if we can attempt some pain relief.  He is restless and  Pacing about room.  Is also angry because he has tried to call his girlfriend 10 times and cannot reach her.  Reports he is hungry.  I offered him turkey sandwich tray or frozen dinner and he refused both

## 2019-09-11 LAB
AORTIC ROOT ANNULUS: 2.91 CM
AORTIC VALVE CUSP SEPERATION: 2.27 CM
AV INDEX (PROSTH): 1.06
AV MEAN GRADIENT: 5 MMHG
AV PEAK GRADIENT: 9 MMHG
AV VALVE AREA: 3.73 CM2
AV VELOCITY RATIO: 103.51
BSA FOR ECHO PROCEDURE: 1.81 M2
CV ECHO LV RWT: 0.49 CM
CV PHARM DOSE: 0.4 MG
CV STRESS BASE HR: 78 BPM
DIASTOLIC BLOOD PRESSURE: 106 MMHG
DOP CALC AO PEAK VEL: 1.48 M/S
DOP CALC AO VTI: 26.98 CM
DOP CALC LVOT AREA: 3.5 CM2
DOP CALC LVOT DIAMETER: 2.12 CM
DOP CALC LVOT PEAK VEL: 153.19 M/S
DOP CALC LVOT STROKE VOLUME: 100.73 CM3
DOP CALCLVOT PEAK VEL VTI: 28.55 CM
E WAVE DECELERATION TIME: 237.63 MSEC
E/A RATIO: 0.88
E/E' RATIO: 12.31 M/S
ECHO LV POSTERIOR WALL: 1.13 CM (ref 0.6–1.1)
FRACTIONAL SHORTENING: 45 % (ref 28–44)
INTERVENTRICULAR SEPTUM: 1.16 CM (ref 0.6–1.1)
LEFT ATRIUM SIZE: 4.31 CM
LEFT INTERNAL DIMENSION IN SYSTOLE: 2.56 CM (ref 2.1–4)
LEFT VENTRICLE DIASTOLIC VOLUME INDEX: 29.11 ML/M2
LEFT VENTRICLE DIASTOLIC VOLUME: 51.8 ML
LEFT VENTRICLE MASS INDEX: 109 G/M2
LEFT VENTRICLE SYSTOLIC VOLUME INDEX: 11.9 ML/M2
LEFT VENTRICLE SYSTOLIC VOLUME: 21.2 ML
LEFT VENTRICULAR INTERNAL DIMENSION IN DIASTOLE: 4.63 CM (ref 3.5–6)
LEFT VENTRICULAR MASS: 193.72 G
LV LATERAL E/E' RATIO: 13.33 M/S
LV SEPTAL E/E' RATIO: 11.43 M/S
MV PEAK A VEL: 0.91 M/S
MV PEAK E VEL: 0.8 M/S
OHS CV CPX 85 PERCENT MAX PREDICTED HEART RATE MALE: 139
OHS CV CPX MAX PREDICTED HEART RATE: 164
OHS CV CPX PATIENT IS FEMALE: 0
OHS CV CPX PATIENT IS MALE: 1
OHS CV CPX PEAK DIASTOLIC BLOOD PRESSURE: 99 MMHG
OHS CV CPX PEAK HEAR RATE: 105 BPM
OHS CV CPX PEAK RATE PRESSURE PRODUCT: NORMAL
OHS CV CPX PEAK SYSTOLIC BLOOD PRESSURE: 183 MMHG
OHS CV CPX PERCENT MAX PREDICTED HEART RATE ACHIEVED: 64
OHS CV CPX RATE PRESSURE PRODUCT PRESENTING: NORMAL
PISA TR MAX VEL: 2.85 M/S
PV PEAK VELOCITY: 165 CM/S
STRESS ECHO TARGET HR: 139.4 BPM
SYSTOLIC BLOOD PRESSURE: 186 MMHG
TDI LATERAL: 0.06 M/S
TDI SEPTAL: 0.07 M/S
TDI: 0.07 M/S
TR MAX PG: 32 MMHG

## 2020-06-16 DIAGNOSIS — M47.896 OTHER OSTEOARTHRITIS OF SPINE, LUMBAR REGION: ICD-10-CM

## 2020-06-16 DIAGNOSIS — Z79.891 ENCOUNTER FOR LONG-TERM METHADONE USE: ICD-10-CM

## 2020-06-16 DIAGNOSIS — G89.4 CHRONIC PAIN SYNDROME: ICD-10-CM

## 2020-06-16 DIAGNOSIS — M47.892 OTHER SPONDYLOSIS, CERVICAL REGION: ICD-10-CM

## 2020-06-16 DIAGNOSIS — S32.000A COMPRESSION OF LUMBAR VERTEBRA: Primary | ICD-10-CM

## 2020-07-02 ENCOUNTER — HOSPITAL ENCOUNTER (OUTPATIENT)
Dept: RADIOLOGY | Facility: HOSPITAL | Age: 55
Discharge: HOME | End: 2020-07-02
Attending: PHYSICAL MEDICINE & REHABILITATION
Payer: MEDICAID

## 2020-07-02 DIAGNOSIS — S32.000A COMPRESSION OF LUMBAR VERTEBRA: ICD-10-CM

## 2020-07-02 DIAGNOSIS — Z79.891 ENCOUNTER FOR LONG-TERM METHADONE USE: ICD-10-CM

## 2020-07-02 DIAGNOSIS — M47.892 OTHER SPONDYLOSIS, CERVICAL REGION: ICD-10-CM

## 2020-07-02 DIAGNOSIS — G89.4 CHRONIC PAIN SYNDROME: ICD-10-CM

## 2020-07-02 DIAGNOSIS — M47.896 OTHER OSTEOARTHRITIS OF SPINE, LUMBAR REGION: ICD-10-CM

## 2020-07-02 PROCEDURE — 72148 MRI LUMBAR SPINE W/O DYE: CPT | Mod: TC,PO

## 2021-07-26 ENCOUNTER — HOSPITAL ENCOUNTER (EMERGENCY)
Facility: HOSPITAL | Age: 56
Discharge: HOME | End: 2021-07-26
Attending: EMERGENCY MEDICINE
Payer: MEDICAID

## 2021-07-26 VITALS
RESPIRATION RATE: 16 BRPM | HEART RATE: 93 BPM | SYSTOLIC BLOOD PRESSURE: 156 MMHG | DIASTOLIC BLOOD PRESSURE: 81 MMHG | TEMPERATURE: 99 F | OXYGEN SATURATION: 96 % | HEIGHT: 64 IN | BODY MASS INDEX: 29.02 KG/M2 | WEIGHT: 170 LBS

## 2021-07-26 DIAGNOSIS — J18.9 PNEUMONIA OF LEFT LOWER LOBE DUE TO INFECTIOUS ORGANISM: Primary | ICD-10-CM

## 2021-07-26 LAB
ALBUMIN SERPL BCP-MCNC: 4.1 G/DL (ref 3.5–5.2)
ALP SERPL-CCNC: 95 U/L (ref 55–135)
ALT SERPL W/O P-5'-P-CCNC: 52 U/L (ref 10–44)
ANION GAP SERPL CALC-SCNC: 9 MMOL/L (ref 8–16)
AST SERPL-CCNC: 43 U/L (ref 10–40)
BASOPHILS # BLD AUTO: 0.04 K/UL (ref 0–0.2)
BASOPHILS NFR BLD: 0.3 % (ref 0–1.9)
BILIRUB SERPL-MCNC: 1.1 MG/DL (ref 0.1–1)
BUN SERPL-MCNC: 11 MG/DL (ref 6–20)
CALCIUM SERPL-MCNC: 9 MG/DL (ref 8.7–10.5)
CHLORIDE SERPL-SCNC: 102 MMOL/L (ref 95–110)
CO2 SERPL-SCNC: 27 MMOL/L (ref 23–29)
CREAT SERPL-MCNC: 0.8 MG/DL (ref 0.5–1.4)
DIFFERENTIAL METHOD: ABNORMAL
EOSINOPHIL # BLD AUTO: 0.2 K/UL (ref 0–0.5)
EOSINOPHIL NFR BLD: 1.9 % (ref 0–8)
ERYTHROCYTE [DISTWIDTH] IN BLOOD BY AUTOMATED COUNT: 12.6 % (ref 11.5–14.5)
EST. GFR  (AFRICAN AMERICAN): >60 ML/MIN/1.73 M^2
EST. GFR  (NON AFRICAN AMERICAN): >60 ML/MIN/1.73 M^2
GLUCOSE SERPL-MCNC: 136 MG/DL (ref 70–110)
HCT VFR BLD AUTO: 42.5 % (ref 40–54)
HGB BLD-MCNC: 14.3 G/DL (ref 14–18)
IMM GRANULOCYTES # BLD AUTO: 0.07 K/UL (ref 0–0.04)
IMM GRANULOCYTES NFR BLD AUTO: 0.5 % (ref 0–0.5)
INR PPP: 1
LACTATE SERPL-SCNC: 1.5 MMOL/L (ref 0.5–1.9)
LYMPHOCYTES # BLD AUTO: 1.1 K/UL (ref 1–4.8)
LYMPHOCYTES NFR BLD: 8.3 % (ref 18–48)
MAGNESIUM SERPL-MCNC: 2.3 MG/DL (ref 1.6–2.6)
MCH RBC QN AUTO: 29.2 PG (ref 27–31)
MCHC RBC AUTO-ENTMCNC: 33.6 G/DL (ref 32–36)
MCV RBC AUTO: 87 FL (ref 82–98)
MONOCYTES # BLD AUTO: 1.4 K/UL (ref 0.3–1)
MONOCYTES NFR BLD: 10.9 % (ref 4–15)
NEUTROPHILS # BLD AUTO: 10.1 K/UL (ref 1.8–7.7)
NEUTROPHILS NFR BLD: 78.1 % (ref 38–73)
NRBC BLD-RTO: 0 /100 WBC
PLATELET # BLD AUTO: 284 K/UL (ref 150–450)
PMV BLD AUTO: 11.1 FL (ref 9.2–12.9)
POTASSIUM SERPL-SCNC: 3.7 MMOL/L (ref 3.5–5.1)
PROT SERPL-MCNC: 7.8 G/DL (ref 6–8.4)
PROTHROMBIN TIME: 12.8 SEC (ref 11.8–14.3)
RBC # BLD AUTO: 4.9 M/UL (ref 4.6–6.2)
SARS-COV-2 RDRP RESP QL NAA+PROBE: NEGATIVE
SODIUM SERPL-SCNC: 138 MMOL/L (ref 136–145)
TROPONIN I SERPL DL<=0.01 NG/ML-MCNC: <0.03 NG/ML
TROPONIN I SERPL DL<=0.01 NG/ML-MCNC: <0.03 NG/ML
WBC # BLD AUTO: 12.95 K/UL (ref 3.9–12.7)

## 2021-07-26 PROCEDURE — 83605 ASSAY OF LACTIC ACID: CPT | Performed by: EMERGENCY MEDICINE

## 2021-07-26 PROCEDURE — 84484 ASSAY OF TROPONIN QUANT: CPT | Performed by: EMERGENCY MEDICINE

## 2021-07-26 PROCEDURE — 85610 PROTHROMBIN TIME: CPT | Performed by: EMERGENCY MEDICINE

## 2021-07-26 PROCEDURE — 25000003 PHARM REV CODE 250: Performed by: EMERGENCY MEDICINE

## 2021-07-26 PROCEDURE — 36415 COLL VENOUS BLD VENIPUNCTURE: CPT | Performed by: EMERGENCY MEDICINE

## 2021-07-26 PROCEDURE — 87040 BLOOD CULTURE FOR BACTERIA: CPT | Performed by: EMERGENCY MEDICINE

## 2021-07-26 PROCEDURE — U0002 COVID-19 LAB TEST NON-CDC: HCPCS | Performed by: EMERGENCY MEDICINE

## 2021-07-26 PROCEDURE — 63600175 PHARM REV CODE 636 W HCPCS: Performed by: EMERGENCY MEDICINE

## 2021-07-26 PROCEDURE — 96375 TX/PRO/DX INJ NEW DRUG ADDON: CPT

## 2021-07-26 PROCEDURE — 93010 ELECTROCARDIOGRAM REPORT: CPT | Mod: ,,, | Performed by: SPECIALIST

## 2021-07-26 PROCEDURE — 83735 ASSAY OF MAGNESIUM: CPT | Performed by: EMERGENCY MEDICINE

## 2021-07-26 PROCEDURE — 85025 COMPLETE CBC W/AUTO DIFF WBC: CPT | Performed by: EMERGENCY MEDICINE

## 2021-07-26 PROCEDURE — 99285 EMERGENCY DEPT VISIT HI MDM: CPT | Mod: 25

## 2021-07-26 PROCEDURE — 93005 ELECTROCARDIOGRAM TRACING: CPT | Performed by: SPECIALIST

## 2021-07-26 PROCEDURE — 96365 THER/PROPH/DIAG IV INF INIT: CPT

## 2021-07-26 PROCEDURE — 80053 COMPREHEN METABOLIC PANEL: CPT | Performed by: EMERGENCY MEDICINE

## 2021-07-26 PROCEDURE — 93010 EKG 12-LEAD: ICD-10-PCS | Mod: ,,, | Performed by: SPECIALIST

## 2021-07-26 PROCEDURE — 63700000 PHARM REV CODE 250 ALT 637 W/O HCPCS: Performed by: EMERGENCY MEDICINE

## 2021-07-26 RX ORDER — ASPIRIN 325 MG
325 TABLET ORAL
Status: COMPLETED | OUTPATIENT
Start: 2021-07-26 | End: 2021-07-26

## 2021-07-26 RX ORDER — ESCITALOPRAM OXALATE 10 MG/1
TABLET ORAL
COMMUNITY
End: 2023-09-27

## 2021-07-26 RX ORDER — METOPROLOL SUCCINATE 50 MG/1
50 TABLET, EXTENDED RELEASE ORAL DAILY
COMMUNITY
Start: 2021-03-06 | End: 2023-04-10 | Stop reason: SDUPTHER

## 2021-07-26 RX ORDER — KETOROLAC TROMETHAMINE 30 MG/ML
15 INJECTION, SOLUTION INTRAMUSCULAR; INTRAVENOUS
Status: COMPLETED | OUTPATIENT
Start: 2021-07-26 | End: 2021-07-26

## 2021-07-26 RX ORDER — HYDROCODONE BITARTRATE AND ACETAMINOPHEN 5; 325 MG/1; MG/1
1 TABLET ORAL
Status: COMPLETED | OUTPATIENT
Start: 2021-07-26 | End: 2021-07-26

## 2021-07-26 RX ORDER — ONDANSETRON 2 MG/ML
4 INJECTION INTRAMUSCULAR; INTRAVENOUS
Status: COMPLETED | OUTPATIENT
Start: 2021-07-26 | End: 2021-07-26

## 2021-07-26 RX ORDER — HYDRALAZINE HYDROCHLORIDE 20 MG/ML
10 INJECTION INTRAMUSCULAR; INTRAVENOUS
Status: COMPLETED | OUTPATIENT
Start: 2021-07-26 | End: 2021-07-26

## 2021-07-26 RX ORDER — METHOCARBAMOL 750 MG/1
TABLET, FILM COATED ORAL
COMMUNITY
End: 2023-04-10

## 2021-07-26 RX ORDER — ATORVASTATIN CALCIUM 40 MG/1
40 TABLET, FILM COATED ORAL DAILY
COMMUNITY
Start: 2021-05-13 | End: 2023-10-13 | Stop reason: SDUPTHER

## 2021-07-26 RX ORDER — SODIUM CHLORIDE 0.9 % (FLUSH) 0.9 %
10 SYRINGE (ML) INJECTION
Status: DISCONTINUED | OUTPATIENT
Start: 2021-07-26 | End: 2021-07-26 | Stop reason: HOSPADM

## 2021-07-26 RX ORDER — EZETIMIBE 10 MG/1
10 TABLET ORAL DAILY
COMMUNITY
Start: 2021-05-13 | End: 2023-08-30 | Stop reason: SDUPTHER

## 2021-07-26 RX ORDER — LISINOPRIL 10 MG/1
TABLET ORAL
COMMUNITY
End: 2023-04-10

## 2021-07-26 RX ORDER — AZITHROMYCIN 250 MG/1
250 TABLET, FILM COATED ORAL DAILY
Qty: 6 TABLET | Refills: 0 | Status: ON HOLD | OUTPATIENT
Start: 2021-07-26 | End: 2021-08-18 | Stop reason: HOSPADM

## 2021-07-26 RX ORDER — AMOXICILLIN AND CLAVULANATE POTASSIUM 875; 125 MG/1; MG/1
1 TABLET, FILM COATED ORAL 2 TIMES DAILY
Qty: 14 TABLET | Refills: 0 | Status: ON HOLD | OUTPATIENT
Start: 2021-07-26 | End: 2021-08-18 | Stop reason: HOSPADM

## 2021-07-26 RX ORDER — MORPHINE SULFATE 4 MG/ML
4 INJECTION, SOLUTION INTRAMUSCULAR; INTRAVENOUS
Status: COMPLETED | OUTPATIENT
Start: 2021-07-26 | End: 2021-07-26

## 2021-07-26 RX ORDER — HYDROCODONE BITARTRATE AND ACETAMINOPHEN 5; 325 MG/1; MG/1
1 TABLET ORAL EVERY 4 HOURS PRN
Qty: 12 TABLET | Refills: 0 | Status: SHIPPED | OUTPATIENT
Start: 2021-07-26 | End: 2023-08-30

## 2021-07-26 RX ORDER — NITROGLYCERIN 0.4 MG/1
0.4 TABLET SUBLINGUAL EVERY 5 MIN PRN
COMMUNITY
End: 2023-09-27 | Stop reason: SINTOL

## 2021-07-26 RX ORDER — AZITHROMYCIN 250 MG/1
500 TABLET, FILM COATED ORAL
Status: COMPLETED | OUTPATIENT
Start: 2021-07-26 | End: 2021-07-26

## 2021-07-26 RX ADMIN — HYDROCODONE BITARTRATE AND ACETAMINOPHEN 1 TABLET: 5; 325 TABLET ORAL at 12:07

## 2021-07-26 RX ADMIN — CEFTRIAXONE 1 G: 1 INJECTION, SOLUTION INTRAVENOUS at 10:07

## 2021-07-26 RX ADMIN — MORPHINE SULFATE 4 MG: 4 INJECTION INTRAVENOUS at 10:07

## 2021-07-26 RX ADMIN — ONDANSETRON 4 MG: 2 INJECTION INTRAMUSCULAR; INTRAVENOUS at 10:07

## 2021-07-26 RX ADMIN — HYDRALAZINE HYDROCHLORIDE 10 MG: 20 INJECTION INTRAMUSCULAR; INTRAVENOUS at 10:07

## 2021-07-26 RX ADMIN — ASPIRIN 325 MG ORAL TABLET 325 MG: 325 PILL ORAL at 09:07

## 2021-07-26 RX ADMIN — AZITHROMYCIN MONOHYDRATE 500 MG: 250 TABLET ORAL at 10:07

## 2021-07-26 RX ADMIN — KETOROLAC TROMETHAMINE 15 MG: 30 INJECTION, SOLUTION INTRAMUSCULAR; INTRAVENOUS at 12:07

## 2021-07-31 LAB
BACTERIA BLD CULT: NORMAL
BACTERIA BLD CULT: NORMAL

## 2021-08-15 ENCOUNTER — HOSPITAL ENCOUNTER (INPATIENT)
Facility: HOSPITAL | Age: 56
LOS: 3 days | Discharge: HOME | DRG: 177 | End: 2021-08-18
Attending: EMERGENCY MEDICINE | Admitting: INTERNAL MEDICINE
Payer: MEDICAID

## 2021-08-15 DIAGNOSIS — J12.82 PNEUMONIA DUE TO COVID-19 VIRUS: ICD-10-CM

## 2021-08-15 DIAGNOSIS — U07.1 PNEUMONIA DUE TO COVID-19 VIRUS: ICD-10-CM

## 2021-08-15 DIAGNOSIS — J96.01 ACUTE HYPOXEMIC RESPIRATORY FAILURE: Primary | ICD-10-CM

## 2021-08-15 DIAGNOSIS — Z20.822 SUSPECTED COVID-19 VIRUS INFECTION: ICD-10-CM

## 2021-08-15 DIAGNOSIS — R09.02 HYPOXIA: ICD-10-CM

## 2021-08-15 PROBLEM — R74.01 TRANSAMINITIS: Status: ACTIVE | Noted: 2021-08-15

## 2021-08-15 PROBLEM — I26.99 BILATERAL PULMONARY EMBOLISM: Status: ACTIVE | Noted: 2021-08-15

## 2021-08-15 PROBLEM — R50.9 FEVER: Status: ACTIVE | Noted: 2021-08-15

## 2021-08-15 PROBLEM — A08.39 GASTROENTERITIS DUE TO COVID-19 VIRUS: Status: ACTIVE | Noted: 2021-08-15

## 2021-08-15 PROBLEM — I10 ESSENTIAL HYPERTENSION: Chronic | Status: ACTIVE | Noted: 2021-08-15

## 2021-08-15 PROBLEM — E87.20 LACTIC ACID ACIDOSIS: Status: ACTIVE | Noted: 2021-08-15

## 2021-08-15 PROBLEM — J93.9 PNEUMOTHORAX: Status: ACTIVE | Noted: 2021-08-15

## 2021-08-15 PROBLEM — I25.10 CAD (CORONARY ARTERY DISEASE): Chronic | Status: ACTIVE | Noted: 2021-08-15

## 2021-08-15 LAB
ALBUMIN SERPL BCP-MCNC: 3.5 G/DL (ref 3.5–5.2)
ALP SERPL-CCNC: 114 U/L (ref 55–135)
ALT SERPL W/O P-5'-P-CCNC: 45 U/L (ref 10–44)
ANION GAP SERPL CALC-SCNC: 16 MMOL/L (ref 8–16)
AST SERPL-CCNC: 59 U/L (ref 10–40)
BACTERIA #/AREA URNS HPF: NEGATIVE /HPF
BASOPHILS # BLD AUTO: 0.03 K/UL (ref 0–0.2)
BASOPHILS NFR BLD: 0.3 % (ref 0–1.9)
BILIRUB SERPL-MCNC: 0.8 MG/DL (ref 0.1–1)
BILIRUB UR QL STRIP: NEGATIVE
BUN SERPL-MCNC: 12 MG/DL (ref 6–20)
CALCIUM SERPL-MCNC: 8.9 MG/DL (ref 8.7–10.5)
CHLORIDE SERPL-SCNC: 96 MMOL/L (ref 95–110)
CK SERPL-CCNC: 545 U/L (ref 20–200)
CLARITY UR: CLEAR
CO2 SERPL-SCNC: 26 MMOL/L (ref 23–29)
COLOR UR: ABNORMAL
CREAT SERPL-MCNC: 1 MG/DL (ref 0.5–1.4)
CRP SERPL-MCNC: 23.27 MG/DL
D DIMER PPP IA.FEU-MCNC: 1.94 UG/ML FEU
DIFFERENTIAL METHOD: ABNORMAL
EOSINOPHIL # BLD AUTO: 0 K/UL (ref 0–0.5)
EOSINOPHIL NFR BLD: 0 % (ref 0–8)
ERYTHROCYTE [DISTWIDTH] IN BLOOD BY AUTOMATED COUNT: 12.8 % (ref 11.5–14.5)
EST. GFR  (AFRICAN AMERICAN): >60 ML/MIN/1.73 M^2
EST. GFR  (NON AFRICAN AMERICAN): >60 ML/MIN/1.73 M^2
FERRITIN SERPL-MCNC: 2039 NG/ML (ref 20–300)
GLUCOSE SERPL-MCNC: 140 MG/DL (ref 70–110)
GLUCOSE UR QL STRIP: NEGATIVE
HCT VFR BLD AUTO: 43.7 % (ref 40–54)
HGB BLD-MCNC: 14.7 G/DL (ref 14–18)
HGB UR QL STRIP: ABNORMAL
HYALINE CASTS #/AREA URNS LPF: 6 /LPF
IMM GRANULOCYTES # BLD AUTO: 0.09 K/UL (ref 0–0.04)
IMM GRANULOCYTES NFR BLD AUTO: 0.8 % (ref 0–0.5)
KETONES UR QL STRIP: ABNORMAL
LACTATE SERPL-SCNC: 1.6 MMOL/L (ref 0.5–1.9)
LACTATE SERPL-SCNC: 2.1 MMOL/L (ref 0.5–1.9)
LACTATE SERPL-SCNC: 2.1 MMOL/L (ref 0.5–1.9)
LDH SERPL L TO P-CCNC: 537 U/L (ref 110–260)
LEUKOCYTE ESTERASE UR QL STRIP: NEGATIVE
LYMPHOCYTES # BLD AUTO: 0.9 K/UL (ref 1–4.8)
LYMPHOCYTES NFR BLD: 7.6 % (ref 18–48)
MAGNESIUM SERPL-MCNC: 2.3 MG/DL (ref 1.6–2.6)
MCH RBC QN AUTO: 29 PG (ref 27–31)
MCHC RBC AUTO-ENTMCNC: 33.6 G/DL (ref 32–36)
MCV RBC AUTO: 86 FL (ref 82–98)
MICROSCOPIC COMMENT: ABNORMAL
MONOCYTES # BLD AUTO: 0.2 K/UL (ref 0.3–1)
MONOCYTES NFR BLD: 1.7 % (ref 4–15)
NEUTROPHILS # BLD AUTO: 10.5 K/UL (ref 1.8–7.7)
NEUTROPHILS NFR BLD: 89.6 % (ref 38–73)
NITRITE UR QL STRIP: NEGATIVE
NRBC BLD-RTO: 0 /100 WBC
PH UR STRIP: 7 [PH] (ref 5–8)
PLATELET # BLD AUTO: 243 K/UL (ref 150–450)
PMV BLD AUTO: 11.4 FL (ref 9.2–12.9)
POTASSIUM SERPL-SCNC: 3 MMOL/L (ref 3.5–5.1)
PROT SERPL-MCNC: 8 G/DL (ref 6–8.4)
PROT UR QL STRIP: ABNORMAL
RBC # BLD AUTO: 5.07 M/UL (ref 4.6–6.2)
RBC #/AREA URNS HPF: 5 /HPF (ref 0–4)
SARS-COV-2 RDRP RESP QL NAA+PROBE: POSITIVE
SODIUM SERPL-SCNC: 138 MMOL/L (ref 136–145)
SP GR UR STRIP: 1.02 (ref 1–1.03)
SQUAMOUS #/AREA URNS HPF: 5 /HPF
TROPONIN I SERPL DL<=0.01 NG/ML-MCNC: 0.03 NG/ML
URN SPEC COLLECT METH UR: ABNORMAL
UROBILINOGEN UR STRIP-ACNC: ABNORMAL EU/DL
WBC # BLD AUTO: 11.72 K/UL (ref 3.9–12.7)
WBC #/AREA URNS HPF: 3 /HPF (ref 0–5)

## 2021-08-15 PROCEDURE — 83615 LACTATE (LD) (LDH) ENZYME: CPT | Performed by: STUDENT IN AN ORGANIZED HEALTH CARE EDUCATION/TRAINING PROGRAM

## 2021-08-15 PROCEDURE — 87040 BLOOD CULTURE FOR BACTERIA: CPT | Performed by: INTERNAL MEDICINE

## 2021-08-15 PROCEDURE — 94640 AIRWAY INHALATION TREATMENT: CPT

## 2021-08-15 PROCEDURE — 99900031 HC PATIENT EDUCATION (STAT)

## 2021-08-15 PROCEDURE — U0002 COVID-19 LAB TEST NON-CDC: HCPCS | Performed by: STUDENT IN AN ORGANIZED HEALTH CARE EDUCATION/TRAINING PROGRAM

## 2021-08-15 PROCEDURE — 93010 EKG 12-LEAD: ICD-10-PCS | Mod: ,,, | Performed by: INTERNAL MEDICINE

## 2021-08-15 PROCEDURE — 80053 COMPREHEN METABOLIC PANEL: CPT | Performed by: STUDENT IN AN ORGANIZED HEALTH CARE EDUCATION/TRAINING PROGRAM

## 2021-08-15 PROCEDURE — 82550 ASSAY OF CK (CPK): CPT | Performed by: STUDENT IN AN ORGANIZED HEALTH CARE EDUCATION/TRAINING PROGRAM

## 2021-08-15 PROCEDURE — 36415 COLL VENOUS BLD VENIPUNCTURE: CPT | Performed by: INTERNAL MEDICINE

## 2021-08-15 PROCEDURE — 96374 THER/PROPH/DIAG INJ IV PUSH: CPT

## 2021-08-15 PROCEDURE — 84484 ASSAY OF TROPONIN QUANT: CPT | Performed by: STUDENT IN AN ORGANIZED HEALTH CARE EDUCATION/TRAINING PROGRAM

## 2021-08-15 PROCEDURE — 63600175 PHARM REV CODE 636 W HCPCS: Performed by: INTERNAL MEDICINE

## 2021-08-15 PROCEDURE — 25000003 PHARM REV CODE 250: Performed by: EMERGENCY MEDICINE

## 2021-08-15 PROCEDURE — 99900035 HC TECH TIME PER 15 MIN (STAT)

## 2021-08-15 PROCEDURE — 93010 ELECTROCARDIOGRAM REPORT: CPT | Mod: ,,, | Performed by: INTERNAL MEDICINE

## 2021-08-15 PROCEDURE — 94799 UNLISTED PULMONARY SVC/PX: CPT

## 2021-08-15 PROCEDURE — 25000003 PHARM REV CODE 250: Performed by: INTERNAL MEDICINE

## 2021-08-15 PROCEDURE — 85025 COMPLETE CBC W/AUTO DIFF WBC: CPT | Performed by: STUDENT IN AN ORGANIZED HEALTH CARE EDUCATION/TRAINING PROGRAM

## 2021-08-15 PROCEDURE — 27000221 HC OXYGEN, UP TO 24 HOURS

## 2021-08-15 PROCEDURE — 83605 ASSAY OF LACTIC ACID: CPT | Mod: 91 | Performed by: INTERNAL MEDICINE

## 2021-08-15 PROCEDURE — 25500020 PHARM REV CODE 255: Performed by: EMERGENCY MEDICINE

## 2021-08-15 PROCEDURE — 82728 ASSAY OF FERRITIN: CPT | Performed by: STUDENT IN AN ORGANIZED HEALTH CARE EDUCATION/TRAINING PROGRAM

## 2021-08-15 PROCEDURE — 83605 ASSAY OF LACTIC ACID: CPT | Performed by: STUDENT IN AN ORGANIZED HEALTH CARE EDUCATION/TRAINING PROGRAM

## 2021-08-15 PROCEDURE — 36415 COLL VENOUS BLD VENIPUNCTURE: CPT | Performed by: EMERGENCY MEDICINE

## 2021-08-15 PROCEDURE — 12000002 HC ACUTE/MED SURGE SEMI-PRIVATE ROOM

## 2021-08-15 PROCEDURE — 81001 URINALYSIS AUTO W/SCOPE: CPT | Performed by: INTERNAL MEDICINE

## 2021-08-15 PROCEDURE — 86140 C-REACTIVE PROTEIN: CPT | Performed by: STUDENT IN AN ORGANIZED HEALTH CARE EDUCATION/TRAINING PROGRAM

## 2021-08-15 PROCEDURE — 63600175 PHARM REV CODE 636 W HCPCS: Performed by: EMERGENCY MEDICINE

## 2021-08-15 PROCEDURE — 93005 ELECTROCARDIOGRAM TRACING: CPT | Performed by: INTERNAL MEDICINE

## 2021-08-15 PROCEDURE — 94761 N-INVAS EAR/PLS OXIMETRY MLT: CPT

## 2021-08-15 PROCEDURE — 25000242 PHARM REV CODE 250 ALT 637 W/ HCPCS: Performed by: EMERGENCY MEDICINE

## 2021-08-15 PROCEDURE — 99285 EMERGENCY DEPT VISIT HI MDM: CPT | Mod: 25

## 2021-08-15 PROCEDURE — 83735 ASSAY OF MAGNESIUM: CPT | Performed by: EMERGENCY MEDICINE

## 2021-08-15 PROCEDURE — 85379 FIBRIN DEGRADATION QUANT: CPT | Performed by: STUDENT IN AN ORGANIZED HEALTH CARE EDUCATION/TRAINING PROGRAM

## 2021-08-15 RX ORDER — CLOPIDOGREL BISULFATE 75 MG/1
75 TABLET ORAL DAILY
Status: DISCONTINUED | OUTPATIENT
Start: 2021-08-15 | End: 2021-08-18 | Stop reason: HOSPADM

## 2021-08-15 RX ORDER — BUDESONIDE 0.5 MG/2ML
0.5 INHALANT ORAL ONCE
Status: COMPLETED | OUTPATIENT
Start: 2021-08-15 | End: 2021-08-15

## 2021-08-15 RX ORDER — METOPROLOL TARTRATE 50 MG/1
50 TABLET ORAL 2 TIMES DAILY
Status: DISCONTINUED | OUTPATIENT
Start: 2021-08-15 | End: 2021-08-18 | Stop reason: HOSPADM

## 2021-08-15 RX ORDER — POTASSIUM CHLORIDE 7.45 MG/ML
10 INJECTION INTRAVENOUS ONCE
Status: COMPLETED | OUTPATIENT
Start: 2021-08-15 | End: 2021-08-15

## 2021-08-15 RX ORDER — MAGNESIUM SULFATE HEPTAHYDRATE 40 MG/ML
4 INJECTION, SOLUTION INTRAVENOUS
Status: DISCONTINUED | OUTPATIENT
Start: 2021-08-15 | End: 2021-08-18 | Stop reason: HOSPADM

## 2021-08-15 RX ORDER — LANOLIN ALCOHOL/MO/W.PET/CERES
800 CREAM (GRAM) TOPICAL
Status: DISCONTINUED | OUTPATIENT
Start: 2021-08-15 | End: 2021-08-18 | Stop reason: HOSPADM

## 2021-08-15 RX ORDER — AMLODIPINE BESYLATE 5 MG/1
10 TABLET ORAL DAILY
Status: DISCONTINUED | OUTPATIENT
Start: 2021-08-15 | End: 2021-08-18 | Stop reason: HOSPADM

## 2021-08-15 RX ORDER — ALBUTEROL SULFATE 90 UG/1
2 AEROSOL, METERED RESPIRATORY (INHALATION) ONCE
Status: DISCONTINUED | OUTPATIENT
Start: 2021-08-15 | End: 2021-08-15

## 2021-08-15 RX ORDER — POTASSIUM CHLORIDE 7.45 MG/ML
20 INJECTION INTRAVENOUS
Status: DISCONTINUED | OUTPATIENT
Start: 2021-08-15 | End: 2021-08-18 | Stop reason: HOSPADM

## 2021-08-15 RX ORDER — DULOXETIN HYDROCHLORIDE 30 MG/1
30 CAPSULE, DELAYED RELEASE ORAL DAILY
Status: DISCONTINUED | OUTPATIENT
Start: 2021-08-15 | End: 2021-08-18 | Stop reason: HOSPADM

## 2021-08-15 RX ORDER — SODIUM CHLORIDE 0.9 % (FLUSH) 0.9 %
10 SYRINGE (ML) INJECTION
Status: DISCONTINUED | OUTPATIENT
Start: 2021-08-15 | End: 2021-08-18 | Stop reason: HOSPADM

## 2021-08-15 RX ORDER — POTASSIUM CHLORIDE 7.45 MG/ML
40 INJECTION INTRAVENOUS
Status: DISCONTINUED | OUTPATIENT
Start: 2021-08-15 | End: 2021-08-18 | Stop reason: HOSPADM

## 2021-08-15 RX ORDER — ESCITALOPRAM OXALATE 10 MG/1
10 TABLET ORAL DAILY
Status: DISCONTINUED | OUTPATIENT
Start: 2021-08-15 | End: 2021-08-18 | Stop reason: HOSPADM

## 2021-08-15 RX ORDER — MAGNESIUM SULFATE 1 G/100ML
1 INJECTION INTRAVENOUS
Status: DISCONTINUED | OUTPATIENT
Start: 2021-08-15 | End: 2021-08-18 | Stop reason: HOSPADM

## 2021-08-15 RX ORDER — ONDANSETRON 2 MG/ML
4 INJECTION INTRAMUSCULAR; INTRAVENOUS
Status: ACTIVE | OUTPATIENT
Start: 2021-08-15 | End: 2021-08-15

## 2021-08-15 RX ORDER — POTASSIUM CHLORIDE 20 MEQ/1
20 TABLET, EXTENDED RELEASE ORAL
Status: DISCONTINUED | OUTPATIENT
Start: 2021-08-15 | End: 2021-08-18 | Stop reason: HOSPADM

## 2021-08-15 RX ORDER — EZETIMIBE 10 MG/1
10 TABLET ORAL DAILY
Status: DISCONTINUED | OUTPATIENT
Start: 2021-08-15 | End: 2021-08-18 | Stop reason: HOSPADM

## 2021-08-15 RX ORDER — IBUPROFEN 400 MG/1
400 TABLET ORAL
Status: COMPLETED | OUTPATIENT
Start: 2021-08-15 | End: 2021-08-15

## 2021-08-15 RX ORDER — ONDANSETRON 2 MG/ML
4 INJECTION INTRAMUSCULAR; INTRAVENOUS EVERY 8 HOURS PRN
Status: DISCONTINUED | OUTPATIENT
Start: 2021-08-15 | End: 2021-08-18 | Stop reason: HOSPADM

## 2021-08-15 RX ORDER — ACETAMINOPHEN 325 MG/1
650 TABLET ORAL EVERY 4 HOURS PRN
Status: DISCONTINUED | OUTPATIENT
Start: 2021-08-15 | End: 2021-08-18 | Stop reason: HOSPADM

## 2021-08-15 RX ORDER — DEXAMETHASONE 2 MG/1
6 TABLET ORAL DAILY
Status: DISCONTINUED | OUTPATIENT
Start: 2021-08-16 | End: 2021-08-15

## 2021-08-15 RX ORDER — ALBUTEROL SULFATE 0.83 MG/ML
2.5 SOLUTION RESPIRATORY (INHALATION)
Status: COMPLETED | OUTPATIENT
Start: 2021-08-15 | End: 2021-08-15

## 2021-08-15 RX ORDER — MAGNESIUM SULFATE HEPTAHYDRATE 40 MG/ML
2 INJECTION, SOLUTION INTRAVENOUS
Status: DISCONTINUED | OUTPATIENT
Start: 2021-08-15 | End: 2021-08-18 | Stop reason: HOSPADM

## 2021-08-15 RX ORDER — ALBUTEROL SULFATE 90 UG/1
2 AEROSOL, METERED RESPIRATORY (INHALATION) EVERY 8 HOURS
Status: DISCONTINUED | OUTPATIENT
Start: 2021-08-15 | End: 2021-08-15

## 2021-08-15 RX ORDER — TALC
6 POWDER (GRAM) TOPICAL NIGHTLY PRN
Status: DISCONTINUED | OUTPATIENT
Start: 2021-08-15 | End: 2021-08-18 | Stop reason: HOSPADM

## 2021-08-15 RX ORDER — ALBUTEROL SULFATE 90 UG/1
2 AEROSOL, METERED RESPIRATORY (INHALATION) EVERY 6 HOURS PRN
Status: DISCONTINUED | OUTPATIENT
Start: 2021-08-15 | End: 2021-08-16

## 2021-08-15 RX ORDER — LOSARTAN POTASSIUM 25 MG/1
100 TABLET ORAL DAILY
Status: DISCONTINUED | OUTPATIENT
Start: 2021-08-15 | End: 2021-08-18 | Stop reason: HOSPADM

## 2021-08-15 RX ORDER — TRAZODONE HYDROCHLORIDE 50 MG/1
100 TABLET ORAL NIGHTLY
Status: DISCONTINUED | OUTPATIENT
Start: 2021-08-15 | End: 2021-08-18 | Stop reason: HOSPADM

## 2021-08-15 RX ORDER — ATORVASTATIN CALCIUM 20 MG/1
40 TABLET, FILM COATED ORAL DAILY
Status: DISCONTINUED | OUTPATIENT
Start: 2021-08-15 | End: 2021-08-15

## 2021-08-15 RX ORDER — DEXAMETHASONE SODIUM PHOSPHATE 4 MG/ML
6 INJECTION, SOLUTION INTRA-ARTICULAR; INTRALESIONAL; INTRAMUSCULAR; INTRAVENOUS; SOFT TISSUE
Status: COMPLETED | OUTPATIENT
Start: 2021-08-15 | End: 2021-08-15

## 2021-08-15 RX ORDER — ENOXAPARIN SODIUM 100 MG/ML
1 INJECTION SUBCUTANEOUS
Status: COMPLETED | OUTPATIENT
Start: 2021-08-15 | End: 2021-08-15

## 2021-08-15 RX ORDER — HYDROCODONE BITARTRATE AND ACETAMINOPHEN 5; 325 MG/1; MG/1
1 TABLET ORAL EVERY 4 HOURS PRN
Status: DISCONTINUED | OUTPATIENT
Start: 2021-08-15 | End: 2021-08-18 | Stop reason: HOSPADM

## 2021-08-15 RX ORDER — CODEINE PHOSPHATE AND GUAIFENESIN 10; 100 MG/5ML; MG/5ML
5 SOLUTION ORAL EVERY 4 HOURS PRN
Status: DISCONTINUED | OUTPATIENT
Start: 2021-08-15 | End: 2021-08-18 | Stop reason: HOSPADM

## 2021-08-15 RX ORDER — BENZONATATE 100 MG/1
100 CAPSULE ORAL 3 TIMES DAILY PRN
Status: DISCONTINUED | OUTPATIENT
Start: 2021-08-15 | End: 2021-08-18 | Stop reason: HOSPADM

## 2021-08-15 RX ORDER — ENOXAPARIN SODIUM 100 MG/ML
1 INJECTION SUBCUTANEOUS
Status: DISCONTINUED | OUTPATIENT
Start: 2021-08-15 | End: 2021-08-18

## 2021-08-15 RX ORDER — ASPIRIN 81 MG/1
81 TABLET ORAL DAILY
Status: DISCONTINUED | OUTPATIENT
Start: 2021-08-15 | End: 2021-08-15

## 2021-08-15 RX ORDER — POTASSIUM CHLORIDE 20 MEQ/1
40 TABLET, EXTENDED RELEASE ORAL
Status: DISCONTINUED | OUTPATIENT
Start: 2021-08-15 | End: 2021-08-18 | Stop reason: HOSPADM

## 2021-08-15 RX ORDER — MORPHINE SULFATE 2 MG/ML
2 INJECTION, SOLUTION INTRAMUSCULAR; INTRAVENOUS EVERY 6 HOURS PRN
Status: DISCONTINUED | OUTPATIENT
Start: 2021-08-15 | End: 2021-08-18 | Stop reason: HOSPADM

## 2021-08-15 RX ORDER — LOPERAMIDE HYDROCHLORIDE 2 MG/1
2 CAPSULE ORAL 2 TIMES DAILY PRN
Status: DISCONTINUED | OUTPATIENT
Start: 2021-08-15 | End: 2021-08-18 | Stop reason: HOSPADM

## 2021-08-15 RX ADMIN — ESCITALOPRAM OXALATE 10 MG: 10 TABLET ORAL at 12:08

## 2021-08-15 RX ADMIN — METOPROLOL TARTRATE 50 MG: 50 TABLET, FILM COATED ORAL at 08:08

## 2021-08-15 RX ADMIN — CLOPIDOGREL BISULFATE 75 MG: 75 TABLET, FILM COATED ORAL at 12:08

## 2021-08-15 RX ADMIN — IBUPROFEN 400 MG: 400 TABLET ORAL at 05:08

## 2021-08-15 RX ADMIN — LOSARTAN POTASSIUM 100 MG: 25 TABLET, FILM COATED ORAL at 12:08

## 2021-08-15 RX ADMIN — TRAZODONE HYDROCHLORIDE 100 MG: 50 TABLET ORAL at 08:08

## 2021-08-15 RX ADMIN — POTASSIUM BICARBONATE 50 MEQ: 977.5 TABLET, EFFERVESCENT ORAL at 09:08

## 2021-08-15 RX ADMIN — ENOXAPARIN SODIUM 70 MG: 80 INJECTION SUBCUTANEOUS at 08:08

## 2021-08-15 RX ADMIN — DEXAMETHASONE SODIUM PHOSPHATE 6 MG: 4 INJECTION, SOLUTION INTRA-ARTICULAR; INTRALESIONAL; INTRAMUSCULAR; INTRAVENOUS; SOFT TISSUE at 05:08

## 2021-08-15 RX ADMIN — EZETIMIBE 10 MG: 10 TABLET ORAL at 12:08

## 2021-08-15 RX ADMIN — METOPROLOL TARTRATE 50 MG: 50 TABLET, FILM COATED ORAL at 12:08

## 2021-08-15 RX ADMIN — ENOXAPARIN SODIUM 70 MG: 80 INJECTION SUBCUTANEOUS at 09:08

## 2021-08-15 RX ADMIN — ALBUTEROL SULFATE 2.5 MG: 2.5 SOLUTION RESPIRATORY (INHALATION) at 05:08

## 2021-08-15 RX ADMIN — IOHEXOL 100 ML: 350 INJECTION, SOLUTION INTRAVENOUS at 07:08

## 2021-08-15 RX ADMIN — DULOXETINE 30 MG: 30 CAPSULE, DELAYED RELEASE ORAL at 12:08

## 2021-08-15 RX ADMIN — BUDESONIDE 0.5 MG: 0.5 INHALANT RESPIRATORY (INHALATION) at 05:08

## 2021-08-15 RX ADMIN — AMLODIPINE BESYLATE 10 MG: 5 TABLET ORAL at 12:08

## 2021-08-15 RX ADMIN — POTASSIUM CHLORIDE 10 MEQ: 7.46 INJECTION, SOLUTION INTRAVENOUS at 09:08

## 2021-08-15 RX ADMIN — SODIUM CHLORIDE 200 MG: 9 INJECTION, SOLUTION INTRAVENOUS at 04:08

## 2021-08-16 PROBLEM — U07.1 GASTROENTERITIS DUE TO COVID-19 VIRUS: Status: RESOLVED | Noted: 2021-08-15 | Resolved: 2021-08-16

## 2021-08-16 PROBLEM — E87.20 LACTIC ACID ACIDOSIS: Status: RESOLVED | Noted: 2021-08-15 | Resolved: 2021-08-16

## 2021-08-16 PROBLEM — A08.39 GASTROENTERITIS DUE TO COVID-19 VIRUS: Status: RESOLVED | Noted: 2021-08-15 | Resolved: 2021-08-16

## 2021-08-16 PROBLEM — R50.9 FEVER: Status: RESOLVED | Noted: 2021-08-15 | Resolved: 2021-08-16

## 2021-08-16 LAB
ALBUMIN SERPL BCP-MCNC: 3.1 G/DL (ref 3.5–5.2)
ALBUMIN SERPL BCP-MCNC: 3.1 G/DL (ref 3.5–5.2)
ALP SERPL-CCNC: 103 U/L (ref 55–135)
ALP SERPL-CCNC: 103 U/L (ref 55–135)
ALT SERPL W/O P-5'-P-CCNC: 50 U/L (ref 10–44)
ALT SERPL W/O P-5'-P-CCNC: 50 U/L (ref 10–44)
ANION GAP SERPL CALC-SCNC: 12 MMOL/L (ref 8–16)
ANION GAP SERPL CALC-SCNC: 12 MMOL/L (ref 8–16)
AST SERPL-CCNC: 57 U/L (ref 10–40)
AST SERPL-CCNC: 57 U/L (ref 10–40)
BASOPHILS # BLD AUTO: 0.01 K/UL (ref 0–0.2)
BASOPHILS NFR BLD: 0.1 % (ref 0–1.9)
BILIRUB SERPL-MCNC: 0.8 MG/DL (ref 0.1–1)
BILIRUB SERPL-MCNC: 0.8 MG/DL (ref 0.1–1)
BUN SERPL-MCNC: 22 MG/DL (ref 6–20)
BUN SERPL-MCNC: 22 MG/DL (ref 6–20)
CALCIUM SERPL-MCNC: 8.9 MG/DL (ref 8.7–10.5)
CALCIUM SERPL-MCNC: 8.9 MG/DL (ref 8.7–10.5)
CHLORIDE SERPL-SCNC: 98 MMOL/L (ref 95–110)
CHLORIDE SERPL-SCNC: 98 MMOL/L (ref 95–110)
CK SERPL-CCNC: 177 U/L (ref 20–200)
CO2 SERPL-SCNC: 30 MMOL/L (ref 23–29)
CO2 SERPL-SCNC: 30 MMOL/L (ref 23–29)
CREAT SERPL-MCNC: 0.8 MG/DL (ref 0.5–1.4)
CREAT SERPL-MCNC: 0.8 MG/DL (ref 0.5–1.4)
DIFFERENTIAL METHOD: ABNORMAL
EOSINOPHIL # BLD AUTO: 0 K/UL (ref 0–0.5)
EOSINOPHIL NFR BLD: 0 % (ref 0–8)
ERYTHROCYTE [DISTWIDTH] IN BLOOD BY AUTOMATED COUNT: 13 % (ref 11.5–14.5)
EST. GFR  (AFRICAN AMERICAN): >60 ML/MIN/1.73 M^2
EST. GFR  (AFRICAN AMERICAN): >60 ML/MIN/1.73 M^2
EST. GFR  (NON AFRICAN AMERICAN): >60 ML/MIN/1.73 M^2
EST. GFR  (NON AFRICAN AMERICAN): >60 ML/MIN/1.73 M^2
GLUCOSE SERPL-MCNC: 129 MG/DL (ref 70–110)
GLUCOSE SERPL-MCNC: 129 MG/DL (ref 70–110)
HCT VFR BLD AUTO: 43.4 % (ref 40–54)
HGB BLD-MCNC: 14.2 G/DL (ref 14–18)
IMM GRANULOCYTES # BLD AUTO: 0.05 K/UL (ref 0–0.04)
IMM GRANULOCYTES NFR BLD AUTO: 0.6 % (ref 0–0.5)
LYMPHOCYTES # BLD AUTO: 0.8 K/UL (ref 1–4.8)
LYMPHOCYTES NFR BLD: 9.1 % (ref 18–48)
MAGNESIUM SERPL-MCNC: 2.5 MG/DL (ref 1.6–2.6)
MCH RBC QN AUTO: 28.2 PG (ref 27–31)
MCHC RBC AUTO-ENTMCNC: 32.7 G/DL (ref 32–36)
MCV RBC AUTO: 86 FL (ref 82–98)
MONOCYTES # BLD AUTO: 0.5 K/UL (ref 0.3–1)
MONOCYTES NFR BLD: 6.3 % (ref 4–15)
NEUTROPHILS # BLD AUTO: 7.1 K/UL (ref 1.8–7.7)
NEUTROPHILS NFR BLD: 83.9 % (ref 38–73)
NRBC BLD-RTO: 0 /100 WBC
PLATELET # BLD AUTO: 302 K/UL (ref 150–450)
PMV BLD AUTO: 11.7 FL (ref 9.2–12.9)
POTASSIUM SERPL-SCNC: 3.5 MMOL/L (ref 3.5–5.1)
POTASSIUM SERPL-SCNC: 3.5 MMOL/L (ref 3.5–5.1)
PROT SERPL-MCNC: 7.3 G/DL (ref 6–8.4)
PROT SERPL-MCNC: 7.3 G/DL (ref 6–8.4)
RBC # BLD AUTO: 5.03 M/UL (ref 4.6–6.2)
SODIUM SERPL-SCNC: 140 MMOL/L (ref 136–145)
SODIUM SERPL-SCNC: 140 MMOL/L (ref 136–145)
WBC # BLD AUTO: 8.45 K/UL (ref 3.9–12.7)

## 2021-08-16 PROCEDURE — 99900031 HC PATIENT EDUCATION (STAT)

## 2021-08-16 PROCEDURE — 83735 ASSAY OF MAGNESIUM: CPT | Performed by: INTERNAL MEDICINE

## 2021-08-16 PROCEDURE — 12000002 HC ACUTE/MED SURGE SEMI-PRIVATE ROOM

## 2021-08-16 PROCEDURE — 25000003 PHARM REV CODE 250: Performed by: INTERNAL MEDICINE

## 2021-08-16 PROCEDURE — 82550 ASSAY OF CK (CPK): CPT | Performed by: INTERNAL MEDICINE

## 2021-08-16 PROCEDURE — 80053 COMPREHEN METABOLIC PANEL: CPT | Performed by: INTERNAL MEDICINE

## 2021-08-16 PROCEDURE — 63600175 PHARM REV CODE 636 W HCPCS: Performed by: INTERNAL MEDICINE

## 2021-08-16 PROCEDURE — 36415 COLL VENOUS BLD VENIPUNCTURE: CPT | Performed by: INTERNAL MEDICINE

## 2021-08-16 PROCEDURE — 99900035 HC TECH TIME PER 15 MIN (STAT)

## 2021-08-16 PROCEDURE — 94761 N-INVAS EAR/PLS OXIMETRY MLT: CPT

## 2021-08-16 PROCEDURE — 85025 COMPLETE CBC W/AUTO DIFF WBC: CPT | Performed by: INTERNAL MEDICINE

## 2021-08-16 PROCEDURE — 27000221 HC OXYGEN, UP TO 24 HOURS

## 2021-08-16 RX ORDER — ATORVASTATIN CALCIUM 40 MG/1
40 TABLET, FILM COATED ORAL NIGHTLY
Status: DISCONTINUED | OUTPATIENT
Start: 2021-08-17 | End: 2021-08-18 | Stop reason: HOSPADM

## 2021-08-16 RX ORDER — ALBUTEROL SULFATE 90 UG/1
2 AEROSOL, METERED RESPIRATORY (INHALATION) EVERY 6 HOURS PRN
Status: DISCONTINUED | OUTPATIENT
Start: 2021-08-16 | End: 2021-08-18 | Stop reason: HOSPADM

## 2021-08-16 RX ADMIN — TRAZODONE HYDROCHLORIDE 100 MG: 50 TABLET ORAL at 08:08

## 2021-08-16 RX ADMIN — AMLODIPINE BESYLATE 10 MG: 5 TABLET ORAL at 09:08

## 2021-08-16 RX ADMIN — CLOPIDOGREL BISULFATE 75 MG: 75 TABLET, FILM COATED ORAL at 09:08

## 2021-08-16 RX ADMIN — MELATONIN 6 MG: at 09:08

## 2021-08-16 RX ADMIN — ENOXAPARIN SODIUM 70 MG: 80 INJECTION SUBCUTANEOUS at 09:08

## 2021-08-16 RX ADMIN — ENOXAPARIN SODIUM 70 MG: 80 INJECTION SUBCUTANEOUS at 08:08

## 2021-08-16 RX ADMIN — METOPROLOL TARTRATE 50 MG: 50 TABLET, FILM COATED ORAL at 08:08

## 2021-08-16 RX ADMIN — SODIUM CHLORIDE 100 MG: 9 INJECTION, SOLUTION INTRAVENOUS at 04:08

## 2021-08-16 RX ADMIN — DEXAMETHASONE 6 MG: 4 TABLET ORAL at 09:08

## 2021-08-16 RX ADMIN — ESCITALOPRAM OXALATE 10 MG: 10 TABLET ORAL at 09:08

## 2021-08-16 RX ADMIN — EZETIMIBE 10 MG: 10 TABLET ORAL at 09:08

## 2021-08-16 RX ADMIN — LOSARTAN POTASSIUM 100 MG: 25 TABLET, FILM COATED ORAL at 09:08

## 2021-08-16 RX ADMIN — METOPROLOL TARTRATE 50 MG: 50 TABLET, FILM COATED ORAL at 09:08

## 2021-08-17 LAB
ALBUMIN SERPL BCP-MCNC: 2.9 G/DL (ref 3.5–5.2)
ALBUMIN SERPL BCP-MCNC: 2.9 G/DL (ref 3.5–5.2)
ALP SERPL-CCNC: 103 U/L (ref 55–135)
ALP SERPL-CCNC: 103 U/L (ref 55–135)
ALT SERPL W/O P-5'-P-CCNC: 59 U/L (ref 10–44)
ALT SERPL W/O P-5'-P-CCNC: 59 U/L (ref 10–44)
ANION GAP SERPL CALC-SCNC: 12 MMOL/L (ref 8–16)
ANION GAP SERPL CALC-SCNC: 12 MMOL/L (ref 8–16)
AST SERPL-CCNC: 49 U/L (ref 10–40)
AST SERPL-CCNC: 49 U/L (ref 10–40)
BASOPHILS # BLD AUTO: 0.02 K/UL (ref 0–0.2)
BASOPHILS NFR BLD: 0.3 % (ref 0–1.9)
BILIRUB SERPL-MCNC: 0.8 MG/DL (ref 0.1–1)
BILIRUB SERPL-MCNC: 0.8 MG/DL (ref 0.1–1)
BUN SERPL-MCNC: 24 MG/DL (ref 6–20)
BUN SERPL-MCNC: 24 MG/DL (ref 6–20)
CALCIUM SERPL-MCNC: 9.1 MG/DL (ref 8.7–10.5)
CALCIUM SERPL-MCNC: 9.1 MG/DL (ref 8.7–10.5)
CHLORIDE SERPL-SCNC: 103 MMOL/L (ref 95–110)
CHLORIDE SERPL-SCNC: 103 MMOL/L (ref 95–110)
CO2 SERPL-SCNC: 25 MMOL/L (ref 23–29)
CO2 SERPL-SCNC: 25 MMOL/L (ref 23–29)
CREAT SERPL-MCNC: 0.8 MG/DL (ref 0.5–1.4)
CREAT SERPL-MCNC: 0.8 MG/DL (ref 0.5–1.4)
CRP SERPL-MCNC: 4.91 MG/DL
DIFFERENTIAL METHOD: ABNORMAL
EOSINOPHIL # BLD AUTO: 0 K/UL (ref 0–0.5)
EOSINOPHIL NFR BLD: 0 % (ref 0–8)
ERYTHROCYTE [DISTWIDTH] IN BLOOD BY AUTOMATED COUNT: 12.6 % (ref 11.5–14.5)
EST. GFR  (AFRICAN AMERICAN): >60 ML/MIN/1.73 M^2
EST. GFR  (AFRICAN AMERICAN): >60 ML/MIN/1.73 M^2
EST. GFR  (NON AFRICAN AMERICAN): >60 ML/MIN/1.73 M^2
EST. GFR  (NON AFRICAN AMERICAN): >60 ML/MIN/1.73 M^2
FERRITIN SERPL-MCNC: 1919 NG/ML (ref 20–300)
GLUCOSE SERPL-MCNC: 133 MG/DL (ref 70–110)
GLUCOSE SERPL-MCNC: 133 MG/DL (ref 70–110)
HCT VFR BLD AUTO: 40.5 % (ref 40–54)
HGB BLD-MCNC: 13.7 G/DL (ref 14–18)
IMM GRANULOCYTES # BLD AUTO: 0.07 K/UL (ref 0–0.04)
IMM GRANULOCYTES NFR BLD AUTO: 0.9 % (ref 0–0.5)
LYMPHOCYTES # BLD AUTO: 1 K/UL (ref 1–4.8)
LYMPHOCYTES NFR BLD: 12.7 % (ref 18–48)
MAGNESIUM SERPL-MCNC: 2.5 MG/DL (ref 1.6–2.6)
MCH RBC QN AUTO: 28.8 PG (ref 27–31)
MCHC RBC AUTO-ENTMCNC: 33.8 G/DL (ref 32–36)
MCV RBC AUTO: 85 FL (ref 82–98)
MONOCYTES # BLD AUTO: 0.7 K/UL (ref 0.3–1)
MONOCYTES NFR BLD: 9.5 % (ref 4–15)
NEUTROPHILS # BLD AUTO: 5.9 K/UL (ref 1.8–7.7)
NEUTROPHILS NFR BLD: 76.6 % (ref 38–73)
NRBC BLD-RTO: 0 /100 WBC
PLATELET # BLD AUTO: 354 K/UL (ref 150–450)
PLATELET BLD QL SMEAR: ABNORMAL
PMV BLD AUTO: 11.5 FL (ref 9.2–12.9)
POTASSIUM SERPL-SCNC: 3.4 MMOL/L (ref 3.5–5.1)
POTASSIUM SERPL-SCNC: 3.4 MMOL/L (ref 3.5–5.1)
PROT SERPL-MCNC: 6.8 G/DL (ref 6–8.4)
PROT SERPL-MCNC: 6.8 G/DL (ref 6–8.4)
RBC # BLD AUTO: 4.75 M/UL (ref 4.6–6.2)
SODIUM SERPL-SCNC: 140 MMOL/L (ref 136–145)
SODIUM SERPL-SCNC: 140 MMOL/L (ref 136–145)
WBC # BLD AUTO: 7.65 K/UL (ref 3.9–12.7)

## 2021-08-17 PROCEDURE — 82728 ASSAY OF FERRITIN: CPT | Performed by: INTERNAL MEDICINE

## 2021-08-17 PROCEDURE — 99900031 HC PATIENT EDUCATION (STAT)

## 2021-08-17 PROCEDURE — 80053 COMPREHEN METABOLIC PANEL: CPT | Performed by: INTERNAL MEDICINE

## 2021-08-17 PROCEDURE — 63600175 PHARM REV CODE 636 W HCPCS: Performed by: INTERNAL MEDICINE

## 2021-08-17 PROCEDURE — 83735 ASSAY OF MAGNESIUM: CPT | Performed by: INTERNAL MEDICINE

## 2021-08-17 PROCEDURE — 99900035 HC TECH TIME PER 15 MIN (STAT)

## 2021-08-17 PROCEDURE — 36415 COLL VENOUS BLD VENIPUNCTURE: CPT | Performed by: INTERNAL MEDICINE

## 2021-08-17 PROCEDURE — 25000003 PHARM REV CODE 250: Performed by: INTERNAL MEDICINE

## 2021-08-17 PROCEDURE — 27000221 HC OXYGEN, UP TO 24 HOURS

## 2021-08-17 PROCEDURE — 12000002 HC ACUTE/MED SURGE SEMI-PRIVATE ROOM

## 2021-08-17 PROCEDURE — 94761 N-INVAS EAR/PLS OXIMETRY MLT: CPT

## 2021-08-17 PROCEDURE — 85025 COMPLETE CBC W/AUTO DIFF WBC: CPT | Performed by: INTERNAL MEDICINE

## 2021-08-17 PROCEDURE — 86140 C-REACTIVE PROTEIN: CPT | Performed by: INTERNAL MEDICINE

## 2021-08-17 RX ADMIN — ATORVASTATIN CALCIUM 40 MG: 40 TABLET, FILM COATED ORAL at 08:08

## 2021-08-17 RX ADMIN — METOPROLOL TARTRATE 50 MG: 50 TABLET, FILM COATED ORAL at 08:08

## 2021-08-17 RX ADMIN — AMLODIPINE BESYLATE 10 MG: 5 TABLET ORAL at 08:08

## 2021-08-17 RX ADMIN — EZETIMIBE 10 MG: 10 TABLET ORAL at 08:08

## 2021-08-17 RX ADMIN — CLOPIDOGREL BISULFATE 75 MG: 75 TABLET, FILM COATED ORAL at 08:08

## 2021-08-17 RX ADMIN — LOSARTAN POTASSIUM 100 MG: 25 TABLET, FILM COATED ORAL at 08:08

## 2021-08-17 RX ADMIN — ENOXAPARIN SODIUM 70 MG: 80 INJECTION SUBCUTANEOUS at 08:08

## 2021-08-17 RX ADMIN — DEXAMETHASONE 6 MG: 4 TABLET ORAL at 08:08

## 2021-08-17 RX ADMIN — DULOXETINE 30 MG: 30 CAPSULE, DELAYED RELEASE ORAL at 08:08

## 2021-08-17 RX ADMIN — SODIUM CHLORIDE 100 MG: 9 INJECTION, SOLUTION INTRAVENOUS at 04:08

## 2021-08-17 RX ADMIN — ESCITALOPRAM OXALATE 10 MG: 10 TABLET ORAL at 08:08

## 2021-08-18 VITALS
TEMPERATURE: 98 F | HEIGHT: 66 IN | WEIGHT: 162.94 LBS | OXYGEN SATURATION: 94 % | RESPIRATION RATE: 18 BRPM | HEART RATE: 77 BPM | DIASTOLIC BLOOD PRESSURE: 74 MMHG | BODY MASS INDEX: 26.19 KG/M2 | SYSTOLIC BLOOD PRESSURE: 128 MMHG

## 2021-08-18 LAB
ALBUMIN SERPL BCP-MCNC: 3 G/DL (ref 3.5–5.2)
ALP SERPL-CCNC: 104 U/L (ref 55–135)
ALT SERPL W/O P-5'-P-CCNC: 101 U/L (ref 10–44)
ANION GAP SERPL CALC-SCNC: 11 MMOL/L (ref 8–16)
AST SERPL-CCNC: 67 U/L (ref 10–40)
BILIRUB SERPL-MCNC: 0.9 MG/DL (ref 0.1–1)
BUN SERPL-MCNC: 25 MG/DL (ref 6–20)
CALCIUM SERPL-MCNC: 9 MG/DL (ref 8.7–10.5)
CHLORIDE SERPL-SCNC: 105 MMOL/L (ref 95–110)
CO2 SERPL-SCNC: 27 MMOL/L (ref 23–29)
CREAT SERPL-MCNC: 0.8 MG/DL (ref 0.5–1.4)
CRP SERPL-MCNC: 2.29 MG/DL
EST. GFR  (AFRICAN AMERICAN): >60 ML/MIN/1.73 M^2
EST. GFR  (NON AFRICAN AMERICAN): >60 ML/MIN/1.73 M^2
FERRITIN SERPL-MCNC: 1603 NG/ML (ref 20–300)
GLUCOSE SERPL-MCNC: 123 MG/DL (ref 70–110)
POTASSIUM SERPL-SCNC: 3.4 MMOL/L (ref 3.5–5.1)
PROT SERPL-MCNC: 6.6 G/DL (ref 6–8.4)
SODIUM SERPL-SCNC: 143 MMOL/L (ref 136–145)

## 2021-08-18 PROCEDURE — 25000003 PHARM REV CODE 250: Performed by: INTERNAL MEDICINE

## 2021-08-18 PROCEDURE — 63600175 PHARM REV CODE 636 W HCPCS: Performed by: INTERNAL MEDICINE

## 2021-08-18 PROCEDURE — 36415 COLL VENOUS BLD VENIPUNCTURE: CPT | Performed by: INTERNAL MEDICINE

## 2021-08-18 PROCEDURE — 86140 C-REACTIVE PROTEIN: CPT | Performed by: INTERNAL MEDICINE

## 2021-08-18 PROCEDURE — 80053 COMPREHEN METABOLIC PANEL: CPT | Performed by: INTERNAL MEDICINE

## 2021-08-18 PROCEDURE — 99900035 HC TECH TIME PER 15 MIN (STAT)

## 2021-08-18 PROCEDURE — 99900031 HC PATIENT EDUCATION (STAT)

## 2021-08-18 PROCEDURE — 82728 ASSAY OF FERRITIN: CPT | Performed by: INTERNAL MEDICINE

## 2021-08-18 PROCEDURE — 27000221 HC OXYGEN, UP TO 24 HOURS

## 2021-08-18 PROCEDURE — 94761 N-INVAS EAR/PLS OXIMETRY MLT: CPT

## 2021-08-18 RX ORDER — DEXAMETHASONE 6 MG/1
6 TABLET ORAL DAILY
Qty: 7 TABLET | Refills: 0 | Status: SHIPPED | OUTPATIENT
Start: 2021-08-19 | End: 2021-08-26

## 2021-08-18 RX ORDER — BENZONATATE 100 MG/1
200 CAPSULE ORAL 3 TIMES DAILY PRN
Qty: 30 CAPSULE | Refills: 0 | Status: SHIPPED | OUTPATIENT
Start: 2021-08-18 | End: 2021-08-28

## 2021-08-18 RX ORDER — ALBUTEROL SULFATE 90 UG/1
2 AEROSOL, METERED RESPIRATORY (INHALATION) EVERY 4 HOURS PRN
Qty: 18 G | Refills: 0 | Status: SHIPPED | OUTPATIENT
Start: 2021-08-18 | End: 2023-09-27

## 2021-08-18 RX ORDER — CODEINE PHOSPHATE AND GUAIFENESIN 10; 100 MG/5ML; MG/5ML
5 SOLUTION ORAL EVERY 4 HOURS PRN
Qty: 118 ML | Refills: 0 | Status: SHIPPED | OUTPATIENT
Start: 2021-08-18 | End: 2021-08-28

## 2021-08-18 RX ADMIN — ESCITALOPRAM OXALATE 10 MG: 10 TABLET ORAL at 09:08

## 2021-08-18 RX ADMIN — AMLODIPINE BESYLATE 10 MG: 5 TABLET ORAL at 09:08

## 2021-08-18 RX ADMIN — METOPROLOL TARTRATE 50 MG: 50 TABLET, FILM COATED ORAL at 09:08

## 2021-08-18 RX ADMIN — APIXABAN 10 MG: 5 TABLET, FILM COATED ORAL at 09:08

## 2021-08-18 RX ADMIN — SODIUM CHLORIDE 100 MG: 9 INJECTION, SOLUTION INTRAVENOUS at 04:08

## 2021-08-18 RX ADMIN — CLOPIDOGREL BISULFATE 75 MG: 75 TABLET, FILM COATED ORAL at 09:08

## 2021-08-18 RX ADMIN — POTASSIUM CHLORIDE 20 MEQ: 20 TABLET, EXTENDED RELEASE ORAL at 09:08

## 2021-08-18 RX ADMIN — LOSARTAN POTASSIUM 100 MG: 25 TABLET, FILM COATED ORAL at 09:08

## 2021-08-18 RX ADMIN — EZETIMIBE 10 MG: 10 TABLET ORAL at 09:08

## 2021-08-18 RX ADMIN — DULOXETINE 30 MG: 30 CAPSULE, DELAYED RELEASE ORAL at 09:08

## 2021-08-18 RX ADMIN — DEXAMETHASONE 6 MG: 4 TABLET ORAL at 09:08

## 2021-08-20 LAB — BACTERIA BLD CULT: NORMAL

## 2021-09-15 DIAGNOSIS — U07.1 CLINICAL DIAGNOSIS OF COVID-19: Primary | ICD-10-CM

## 2021-09-17 ENCOUNTER — HOSPITAL ENCOUNTER (OUTPATIENT)
Dept: RADIOLOGY | Facility: HOSPITAL | Age: 56
Discharge: HOME | End: 2021-09-17
Attending: NURSE PRACTITIONER
Payer: MEDICAID

## 2021-09-17 DIAGNOSIS — U07.1 CLINICAL DIAGNOSIS OF COVID-19: ICD-10-CM

## 2021-09-17 PROCEDURE — 71046 X-RAY EXAM CHEST 2 VIEWS: CPT | Mod: TC,PO

## 2022-03-07 ENCOUNTER — TELEPHONE (OUTPATIENT)
Dept: CARDIOLOGY | Facility: CLINIC | Age: 57
End: 2022-03-07

## 2022-06-16 DIAGNOSIS — M79.641 PAIN IN RIGHT HAND: Primary | ICD-10-CM

## 2022-06-16 DIAGNOSIS — M25.512 PAIN IN LEFT SHOULDER: ICD-10-CM

## 2022-06-17 DIAGNOSIS — M20.10 HALLUX VALGUS: Primary | ICD-10-CM

## 2022-06-17 DIAGNOSIS — M20.11 HALLUX VALGUS (ACQUIRED), RIGHT FOOT: ICD-10-CM

## 2022-06-17 DIAGNOSIS — M20.12 HALLUX VALGUS (ACQUIRED), LEFT FOOT: ICD-10-CM

## 2023-01-20 DIAGNOSIS — M79.641 RIGHT HAND PAIN: Primary | ICD-10-CM

## 2023-01-20 DIAGNOSIS — M79.642 LEFT HAND PAIN: ICD-10-CM

## 2023-02-15 ENCOUNTER — HOSPITAL ENCOUNTER (OUTPATIENT)
Dept: RADIOLOGY | Facility: HOSPITAL | Age: 58
Discharge: HOME | End: 2023-02-15
Payer: MEDICAID

## 2023-02-15 DIAGNOSIS — M79.641 RIGHT HAND PAIN: ICD-10-CM

## 2023-02-15 DIAGNOSIS — M79.642 LEFT HAND PAIN: ICD-10-CM

## 2023-02-15 PROCEDURE — 73130 X-RAY EXAM OF HAND: CPT | Mod: TC,50,PO

## 2023-02-16 ENCOUNTER — TELEPHONE (OUTPATIENT)
Dept: CARDIOLOGY | Facility: CLINIC | Age: 58
End: 2023-02-16
Payer: MEDICAID

## 2023-02-16 NOTE — TELEPHONE ENCOUNTER
----- Message from Dannydiana Kalani sent at 2/16/2023 11:06 AM CST -----  Contact: pt at  [phone]  Type:  Sooner Appointment Request    Caller is requesting a sooner appointment.  Caller declined first available appointment listed below.  Caller will not accept being placed on the waitlist and is requesting a message be sent to doctor.    Name of Caller:  pt  When is the first available appointment?  N/A  Symptoms:  Chest pains  Best Call Back Number:  [phone]  Additional Information:  pt needs to be seen asap. Please call back to advise.

## 2023-02-22 DIAGNOSIS — M54.2 CERVICALGIA: ICD-10-CM

## 2023-02-22 DIAGNOSIS — M48.56XD COLLAPSED VERTEBRA, NOT ELSEWHERE CLASSIFIED, LUMBAR REGION, SUBSEQUENT ENCOUNTER FOR FRACTURE WITH ROUTINE HEALING: Primary | ICD-10-CM

## 2023-02-28 DIAGNOSIS — E11.9 DIABETES MELLITUS TYPE II, CONTROLLED, WITH NO COMPLICATIONS: Primary | ICD-10-CM

## 2023-03-03 ENCOUNTER — HOSPITAL ENCOUNTER (OUTPATIENT)
Dept: RADIOLOGY | Facility: HOSPITAL | Age: 58
Discharge: HOME | End: 2023-03-03
Payer: MEDICAID

## 2023-03-03 DIAGNOSIS — M48.56XD COLLAPSED VERTEBRA, NOT ELSEWHERE CLASSIFIED, LUMBAR REGION, SUBSEQUENT ENCOUNTER FOR FRACTURE WITH ROUTINE HEALING: ICD-10-CM

## 2023-03-03 PROCEDURE — 72148 MRI LUMBAR SPINE W/O DYE: CPT | Mod: TC

## 2023-04-04 ENCOUNTER — TELEPHONE (OUTPATIENT)
Dept: CARDIOLOGY | Facility: CLINIC | Age: 58
End: 2023-04-04
Payer: MEDICAID

## 2023-04-04 NOTE — TELEPHONE ENCOUNTER
----- Message from Hayley Hilliard sent at 4/4/2023 12:26 PM CDT -----  Contact: patient  Type:  Sooner Appointment Request    Caller is requesting a sooner appointment.  Caller declined first available appointment listed below.  Caller will not accept being placed on the waitlist and is requesting a message be sent to doctor.    Name of Caller:  patient  When is the first available appointment?  N/a  Symptoms:  chest pains  Best Call Back Number:  [phone] (home)   Additional Information:  patient has seen dr alanis

## 2023-04-10 ENCOUNTER — OFFICE VISIT (OUTPATIENT)
Dept: CARDIOLOGY | Facility: CLINIC | Age: 58
End: 2023-04-10
Payer: MEDICAID

## 2023-04-10 VITALS
WEIGHT: 175 LBS | BODY MASS INDEX: 28.12 KG/M2 | HEIGHT: 66 IN | OXYGEN SATURATION: 98 % | DIASTOLIC BLOOD PRESSURE: 90 MMHG | SYSTOLIC BLOOD PRESSURE: 154 MMHG

## 2023-04-10 DIAGNOSIS — G47.30 SLEEP APNEA, UNSPECIFIED TYPE: ICD-10-CM

## 2023-04-10 DIAGNOSIS — R07.9 CHEST PAIN, UNSPECIFIED TYPE: Primary | ICD-10-CM

## 2023-04-10 DIAGNOSIS — I10 ESSENTIAL HYPERTENSION: Chronic | ICD-10-CM

## 2023-04-10 DIAGNOSIS — I25.10 CORONARY ARTERY DISEASE, UNSPECIFIED VESSEL OR LESION TYPE, UNSPECIFIED WHETHER ANGINA PRESENT, UNSPECIFIED WHETHER NATIVE OR TRANSPLANTED HEART: Chronic | ICD-10-CM

## 2023-04-10 PROCEDURE — 1159F PR MEDICATION LIST DOCUMENTED IN MEDICAL RECORD: ICD-10-PCS | Mod: CPTII,,, | Performed by: NURSE PRACTITIONER

## 2023-04-10 PROCEDURE — 99213 OFFICE O/P EST LOW 20 MIN: CPT | Mod: S$PBB,,, | Performed by: NURSE PRACTITIONER

## 2023-04-10 PROCEDURE — 3008F PR BODY MASS INDEX (BMI) DOCUMENTED: ICD-10-PCS | Mod: CPTII,,, | Performed by: NURSE PRACTITIONER

## 2023-04-10 PROCEDURE — 3077F PR MOST RECENT SYSTOLIC BLOOD PRESSURE >= 140 MM HG: ICD-10-PCS | Mod: CPTII,,, | Performed by: NURSE PRACTITIONER

## 2023-04-10 PROCEDURE — 3080F DIAST BP >= 90 MM HG: CPT | Mod: CPTII,,, | Performed by: NURSE PRACTITIONER

## 2023-04-10 PROCEDURE — 99213 OFFICE O/P EST LOW 20 MIN: CPT | Mod: PBBFAC,PN | Performed by: NURSE PRACTITIONER

## 2023-04-10 PROCEDURE — 1160F PR REVIEW ALL MEDS BY PRESCRIBER/CLIN PHARMACIST DOCUMENTED: ICD-10-PCS | Mod: CPTII,,, | Performed by: NURSE PRACTITIONER

## 2023-04-10 PROCEDURE — 99999 PR PBB SHADOW E&M-EST. PATIENT-LVL III: CPT | Mod: PBBFAC,,, | Performed by: NURSE PRACTITIONER

## 2023-04-10 PROCEDURE — 99999 PR PBB SHADOW E&M-EST. PATIENT-LVL III: ICD-10-PCS | Mod: PBBFAC,,, | Performed by: NURSE PRACTITIONER

## 2023-04-10 PROCEDURE — 99213 PR OFFICE/OUTPT VISIT, EST, LEVL III, 20-29 MIN: ICD-10-PCS | Mod: S$PBB,,, | Performed by: NURSE PRACTITIONER

## 2023-04-10 PROCEDURE — 3008F BODY MASS INDEX DOCD: CPT | Mod: CPTII,,, | Performed by: NURSE PRACTITIONER

## 2023-04-10 PROCEDURE — 1160F RVW MEDS BY RX/DR IN RCRD: CPT | Mod: CPTII,,, | Performed by: NURSE PRACTITIONER

## 2023-04-10 PROCEDURE — 3080F PR MOST RECENT DIASTOLIC BLOOD PRESSURE >= 90 MM HG: ICD-10-PCS | Mod: CPTII,,, | Performed by: NURSE PRACTITIONER

## 2023-04-10 PROCEDURE — 1159F MED LIST DOCD IN RCRD: CPT | Mod: CPTII,,, | Performed by: NURSE PRACTITIONER

## 2023-04-10 PROCEDURE — 3077F SYST BP >= 140 MM HG: CPT | Mod: CPTII,,, | Performed by: NURSE PRACTITIONER

## 2023-04-10 RX ORDER — METFORMIN HYDROCHLORIDE 500 MG/1
500 TABLET ORAL
COMMUNITY

## 2023-04-10 NOTE — ASSESSMENT & PLAN NOTE
Patient states he has a CPAP machine but he is noncompliant due to chronic sinus issues.  Advised patient on benefits of wearing CPAP and long-term effects on heart lungs and brain.

## 2023-04-10 NOTE — ASSESSMENT & PLAN NOTE
Patient reports intermittent episodes of chest pain ongoing since last summer.  I have ordered a Lexiscan and echocardiogram today.  Patient advised to continue aspirin and Plavix and resume Lipitor and Zetia  Patient advised to go to the nearest ER for unrelieved chest pain

## 2023-04-10 NOTE — ASSESSMENT & PLAN NOTE
Blood pressure was elevated in the office.  He states he has white coat syndrome and anxiety and chronic back pain and his blood pressure is generally 120s to 130s over 70s to 80s at home.  Instructed to start taking his blood pressure daily and record in a log and bring with him at this visit.  Patient is to continue Norvasc 10 mg p.o. daily for now.  This may be adjusted after stress test.  He states he has intermittent swelling in ankles but none is noted today on exam.  Patient is also to continue Lopressor 50 mg p.o. b.i.d..  Recommend low-sodium diet\

## 2023-04-10 NOTE — PROGRESS NOTES
Subjective:    Patient ID:  Sanjeev Britt is a 57 y.o. male patient here for evaluation Follow-up and Shortness of Breath      History of Present Illness:  Patient is in the clinic today with complaints of shortness of breath and chest pain ongoing since last summer.  He denies cough or congestion.  Patient states chest pain occurs midsternum without radiation to the arm neck or back.  He does have concurrent lightheadedness and occasional vomiting.  He states the symptoms come on after getting overheated in an attic for work.  He does not have chest pain now.  He does not experience chest pain at rest or when he is not in the heat.  Patient's blood pressure is slightly elevated today but prior trend showed normal blood pressure.  He states he gets white coat syndrome and has some anxiety and low back pain which contributes to his pressure being elevated at times.  He takes his blood pressure at home randomly and has noticed readings around 120 systolic and as high as 160 systolic      Patient has a history of coronary stents and states compliance with Plavix and aspirin and denies any spontaneous bleeding.  He does notice prolonged clotting after cutting himself with shaving but nothing severe.  Patient states he has been noncompliant with cholesterol regimen.  He states he takes it sometimes but not regularly.  Patient reports a history of sleep apnea but states he is unable to tolerate CPAP due to sinus issues.  Patient also states he has occasional fluid retention in bilateral lower extremities.  He was switched off of lisinopril due to chronic cough and started on amlodipine 10 mg p.o. daily.  He does wear compression socks on occasion.  Patient states he is also now diabetic and takes his blood sugar in the morning.  His fasting blood sugar are generally 102-105 now.          Review of patient's allergies indicates:   Allergen Reactions    Levaquin [levofloxacin]        Past Medical History:   Diagnosis  Date    Acute coronary syndrome 9/9/2019    Coronary artery disease     Hypertension      Past Surgical History:   Procedure Laterality Date    VASCULAR SURGERY       Social History     Tobacco Use    Smoking status: Never    Smokeless tobacco: Current     Types: Snuff   Substance Use Topics    Alcohol use: Not Currently    Drug use: Never        REVIEW OF SYSTEMS: As noted in HPI   CARDIOVASCULAR recent chest pain, palpitations, arm, neck, or jaw pain  RESPIRATORY:  Shortness of breath as per HPI No recent fever, cough chills,  or congestion  : No blood in the urine  GI:  Intermittent vomiting No Nausea, constipation, diarrhea, blood, or reflux.  MUSCULOSKELETAL: No myalgias  NEURO: No lightheadedness or dizziness  EYES: No Double vision, blurry, vision or headache        Objective        Vitals:    04/10/23 1136   BP: (!) 154/90       LIPIDS - LAST 2   Lab Results   Component Value Date    CHOL 201 (H) 09/10/2019    HDL 36 (L) 09/10/2019    LDLCALC 112.6 09/10/2019    TRIG 262 (H) 09/10/2019    CHOLHDL 17.9 (L) 09/10/2019       CBC - LAST 2  Lab Results   Component Value Date    WBC 7.65 08/17/2021    WBC 8.45 08/16/2021    RBC 4.75 08/17/2021    RBC 5.03 08/16/2021    HGB 13.7 (L) 08/17/2021    HGB 14.2 08/16/2021    HCT 40.5 08/17/2021    HCT 43.4 08/16/2021    MCV 85 08/17/2021    MCV 86 08/16/2021    MCH 28.8 08/17/2021    MCH 28.2 08/16/2021    MCHC 33.8 08/17/2021    MCHC 32.7 08/16/2021    RDW 12.6 08/17/2021    RDW 13.0 08/16/2021     08/17/2021     08/16/2021    MPV 11.5 08/17/2021    MPV 11.7 08/16/2021    GRAN 5.9 08/17/2021    GRAN 76.6 (H) 08/17/2021    LYMPH 1.0 08/17/2021    LYMPH 12.7 (L) 08/17/2021    MONO 0.7 08/17/2021    MONO 9.5 08/17/2021    BASO 0.02 08/17/2021    BASO 0.01 08/16/2021    NRBC 0 08/17/2021    NRBC 0 08/16/2021       CHEMISTRY & LIVER FUNCTION - LAST 2  Lab Results   Component Value Date     08/18/2021     08/17/2021     08/17/2021    K 3.4  (L) 08/18/2021    K 3.4 (L) 08/17/2021    K 3.4 (L) 08/17/2021     08/18/2021     08/17/2021     08/17/2021    CO2 27 08/18/2021    CO2 25 08/17/2021    CO2 25 08/17/2021    ANIONGAP 11 08/18/2021    ANIONGAP 12 08/17/2021    ANIONGAP 12 08/17/2021    BUN 25 (H) 08/18/2021    BUN 24 (H) 08/17/2021    BUN 24 (H) 08/17/2021    CREATININE 0.8 08/18/2021    CREATININE 0.8 08/17/2021    CREATININE 0.8 08/17/2021     (H) 08/18/2021     (H) 08/17/2021     (H) 08/17/2021    CALCIUM 9.0 08/18/2021    CALCIUM 9.1 08/17/2021    CALCIUM 9.1 08/17/2021    MG 2.5 08/17/2021    MG 2.5 08/16/2021    ALBUMIN 3.0 (L) 08/18/2021    ALBUMIN 2.9 (L) 08/17/2021    ALBUMIN 2.9 (L) 08/17/2021    PROT 6.6 08/18/2021    PROT 6.8 08/17/2021    PROT 6.8 08/17/2021    ALKPHOS 104 08/18/2021    ALKPHOS 103 08/17/2021    ALKPHOS 103 08/17/2021     (H) 08/18/2021    ALT 59 (H) 08/17/2021    ALT 59 (H) 08/17/2021    AST 67 (H) 08/18/2021    AST 49 (H) 08/17/2021    AST 49 (H) 08/17/2021    BILITOT 0.9 08/18/2021    BILITOT 0.8 08/17/2021    BILITOT 0.8 08/17/2021        CARDIAC PROFILE - LAST 2  Lab Results   Component Value Date    BNP 32 09/09/2019     08/16/2021     (H) 08/15/2021     (H) 08/15/2021    TROPONINI 0.033 08/15/2021    TROPONINI <0.030 07/26/2021        COAGULATION - LAST 2  Lab Results   Component Value Date    LABPT 12.8 07/26/2021    INR 1.0 07/26/2021       ENDOCRINE & PSA - LAST 2  No results found for: HGBA1C, MICROALBUR, TSH, PROCAL, PSA     ECHOCARDIOGRAM RESULTS  Results for orders placed during the hospital encounter of 09/09/19    Echo Color Flow Doppler? Yes    Interpretation Summary  · Concentric left ventricular remodeling.  · Increased (hyperdynamic) left ventricular systolic function. The estimated ejection fraction is 83%  · Normal LV diastolic function.  · Normal right ventricular systolic function.  · Mild left atrial  enlargement.      CURRENT/PREVIOUS VISIT EKG  Results for orders placed or performed during the hospital encounter of 08/15/21   EKG 12-lead    Collection Time: 08/15/21  4:52 AM    Narrative    Test Reason : Z20.822,    Vent. Rate : 120 BPM     Atrial Rate : 120 BPM     P-R Int : 142 ms          QRS Dur : 086 ms      QT Int : 342 ms       P-R-T Axes : 040 007 010 degrees     QTc Int : 483 ms    Sinus tachycardia  Inferior infarct ,age undetermined  Abnormal ECG  When compared with ECG of 26-JUL-2021 08:46,  Inferior infarct is now Present  Inverted T waves have replaced nonspecific T wave abnormality in Inferior  leads  Confirmed by Damon Ambrose MD ([id number]) on 8/16/2021 2:15:09 PM    Referred By: MARVA   SELF           Confirmed By:Damon Ambrose MD     No valid procedures specified.   Results for orders placed during the hospital encounter of 09/09/19    Treadmill Stress Test    Interpretation Summary    ECG Stress Nuclear portion of this study will be reported separately.    The EKG portion of this study is negative for ischemia.    The patient reported no chest pain during the stress test.    No valid procedures specified.    PHYSICAL EXAM  CONSTITUTIONAL: Well built, well nourished middle-aged male breathing comfortably in no apparent distress  NECK: no carotid bruit, no JVD  LUNGS: CTA, no rhonchi, crackles, wheezing, coughing.  CHEST WALL: no tenderness  HEART: regular rate and rhythm, S1, S2 normal, no murmur, click, rub or gallop   ABDOMEN: soft, non-tender; bowel sounds normal; no masses,  no organomegaly  EXTREMITIES: Extremities normal, no edema, no calf tenderness noted  NEURO: AAO X 3    I HAVE REVIEWED :    The vital signs, nurses notes, and all the pertinent radiology and labs.    Current Outpatient Medications   Medication Instructions    albuterol (PROVENTIL/VENTOLIN HFA) 90 mcg/actuation inhaler 2 puffs, Inhalation, Every 4 hours PRN, Rescue    amLODIPine (NORVASC) 10 mg, Oral, Daily     aspirin (ECOTRIN) 81 mg, Oral, Daily    atorvastatin (LIPITOR) 40 mg, Oral, Daily    clopidogreL (PLAVIX) 75 mg, Oral, Daily    DULoxetine (CYMBALTA) 30 mg, Oral, Daily    EScitalopram oxalate (LEXAPRO) 10 MG tablet escitalopram 10 mg tablet    ezetimibe (ZETIA) 10 mg, Oral, Daily    HYDROcodone-acetaminophen (NORCO) 5-325 mg per tablet 1 tablet, Oral, Every 4 hours PRN    metFORMIN (GLUCOPHAGE) 500 mg, Oral, 2 times daily    metoprolol tartrate (LOPRESSOR) 50 mg, Oral, 2 times daily    multivit-min/FA/lycopen/lutein (CENTRUM SILVER MEN ORAL) 1 tablet, Oral, Daily    nitroGLYCERIN (NITROSTAT) 0.4 mg, Sublingual, Every 5 min PRN    potassium chloride (MICRO-K) 10 MEQ CpSR 10 mEq, Oral, Daily    traMADoL (ULTRAM) 50 mg, Oral, Every 12 hours PRN    traZODone (DESYREL) 100 mg, Oral, Nightly        Assessment & Plan     CAD (coronary artery disease) status post PCI  Ordered a Lexiscan today due to complaints of chest pain.  Patient was unable to walk the treadmill due to chronic low back pain  Patient advised to continue aspirin 81 mg daily and Plavix 75 mg daily.  Patient states he has not been consistently taking Lipitor.  Advised patient to start Lipitor 40 mg p.o. q.h.s. along with Zetia 10 mg p.o. q.h.s. and adhere to this regimen  Recommend low-cholesterol diet    Essential hypertension  Blood pressure was elevated in the office.  He states he has white coat syndrome and anxiety and chronic back pain and his blood pressure is generally 120s to 130s over 70s to 80s at home.  Instructed to start taking his blood pressure daily and record in a log and bring with him at this visit.  Patient is to continue Norvasc 10 mg p.o. daily for now.  This may be adjusted after stress test.  He states he has intermittent swelling in ankles but none is noted today on exam.  Patient is also to continue Lopressor 50 mg p.o. b.i.d..  Recommend low-sodium diet\      Chest pain  Patient reports intermittent episodes of chest pain  ongoing since last summer.  I have ordered a Lexiscan and echocardiogram today.  Patient advised to continue aspirin and Plavix and resume Lipitor and Zetia  Patient advised to go to the nearest ER for unrelieved chest pain    BMI 28.0-28.9,adult  Recommend heart healthy diet.  Patient is performing exercise daily and advised to continue the same as tolerated    Sleep apnea  Patient states he has a CPAP machine but he is noncompliant due to chronic sinus issues.  Advised patient on benefits of wearing CPAP and long-term effects on heart lungs and brain.          No follow-ups on file.

## 2023-04-10 NOTE — ASSESSMENT & PLAN NOTE
Ordered a Lexiscan today due to complaints of chest pain.  Patient was unable to walk the treadmill due to chronic low back pain  Patient advised to continue aspirin 81 mg daily and Plavix 75 mg daily.  Patient states he has not been consistently taking Lipitor.  Advised patient to start Lipitor 40 mg p.o. q.h.s. along with Zetia 10 mg p.o. q.h.s. and adhere to this regimen  Recommend low-cholesterol diet

## 2023-04-10 NOTE — ASSESSMENT & PLAN NOTE
Recommend heart healthy diet.  Patient is performing exercise daily and advised to continue the same as tolerated

## 2023-04-12 DIAGNOSIS — M48.56XD COLLAPSED VERTEBRA, NOT ELSEWHERE CLASSIFIED, LUMBAR REGION, SUBSEQUENT ENCOUNTER FOR FRACTURE WITH ROUTINE HEALING: Primary | ICD-10-CM

## 2023-04-18 ENCOUNTER — HOSPITAL ENCOUNTER (OUTPATIENT)
Dept: RADIOLOGY | Facility: HOSPITAL | Age: 58
Discharge: HOME | End: 2023-04-18
Payer: MEDICAID

## 2023-04-18 DIAGNOSIS — M54.2 CERVICALGIA: ICD-10-CM

## 2023-04-18 PROCEDURE — 72040 X-RAY EXAM NECK SPINE 2-3 VW: CPT | Mod: TC,PO

## 2023-04-19 ENCOUNTER — TELEPHONE (OUTPATIENT)
Dept: CARDIOLOGY | Facility: HOSPITAL | Age: 58
End: 2023-04-19

## 2023-04-19 NOTE — TELEPHONE ENCOUNTER
Patient advised, [facility] [facility]   Will need to register on the first floor at the main entrance.   Patient advised that arrival time is 7:30am.  Patient advised that he may be here about 3.5-4 hours, and may want to bring something to occupy their time, as there will be periods of waiting.    Patient advised, may take his medications prior to testing if you need to.  Patient should HOLD Nitroglycerin. Advised if he needs to eat to take his medications, please keep it light, like toast and juice.    Patient advised to avoid all caffeine 12 hours prior to testing.  This includes decaf tea and coffee.    Will provide peanut butter crackers for a snack after stress test.  If patient would prefer something else, please bring a snack from home.    Wear comfortable clothing.   No lotions, oils, or powders to the upper chest area. May wear deodorant.    No metal jewelry, buttons, or zippers to the upper body.  Patient verbalizes understanding of instructions.

## 2023-04-20 ENCOUNTER — HOSPITAL ENCOUNTER (OUTPATIENT)
Dept: RADIOLOGY | Facility: HOSPITAL | Age: 58
Discharge: HOME | End: 2023-04-20
Attending: NURSE PRACTITIONER
Payer: MEDICAID

## 2023-04-20 ENCOUNTER — HOSPITAL ENCOUNTER (OUTPATIENT)
Dept: CARDIOLOGY | Facility: HOSPITAL | Age: 58
Discharge: HOME | End: 2023-04-20
Attending: NURSE PRACTITIONER
Payer: MEDICAID

## 2023-04-20 DIAGNOSIS — R07.9 CHEST PAIN, UNSPECIFIED TYPE: ICD-10-CM

## 2023-04-20 PROCEDURE — 78452 NUCLEAR STRESS - CARDIOLOGY INTERPRETED (CUPID ONLY): ICD-10-PCS | Mod: 26,,, | Performed by: SPECIALIST

## 2023-04-20 PROCEDURE — 93016 NUCLEAR STRESS - CARDIOLOGY INTERPRETED (CUPID ONLY): ICD-10-PCS | Mod: ,,, | Performed by: NURSE PRACTITIONER

## 2023-04-20 PROCEDURE — 93018 NUCLEAR STRESS - CARDIOLOGY INTERPRETED (CUPID ONLY): ICD-10-PCS | Mod: ,,, | Performed by: SPECIALIST

## 2023-04-20 PROCEDURE — 78452 HT MUSCLE IMAGE SPECT MULT: CPT

## 2023-04-20 PROCEDURE — 93016 CV STRESS TEST SUPVJ ONLY: CPT | Mod: ,,, | Performed by: NURSE PRACTITIONER

## 2023-04-20 PROCEDURE — A9502 TC99M TETROFOSMIN: HCPCS

## 2023-04-20 PROCEDURE — 78452 HT MUSCLE IMAGE SPECT MULT: CPT | Mod: 26,,, | Performed by: SPECIALIST

## 2023-04-20 PROCEDURE — 93018 CV STRESS TEST I&R ONLY: CPT | Mod: ,,, | Performed by: SPECIALIST

## 2023-04-20 RX ORDER — REGADENOSON 0.08 MG/ML
0.4 INJECTION, SOLUTION INTRAVENOUS ONCE
Status: COMPLETED | OUTPATIENT
Start: 2023-04-20 | End: 2023-04-20

## 2023-04-20 RX ADMIN — REGADENOSON 0.4 MG: 0.08 INJECTION, SOLUTION INTRAVENOUS at 10:04

## 2023-04-21 LAB
CV PHARM DOSE: 0.4 MG
CV STRESS BASE HR: 53 BPM
DIASTOLIC BLOOD PRESSURE: 90 MMHG
EJECTION FRACTION- HIGH: 65 %
END DIASTOLIC INDEX-HIGH: 153 ML/M2
END DIASTOLIC INDEX-LOW: 93 ML/M2
END SYSTOLIC INDEX-HIGH: 71 ML/M2
END SYSTOLIC INDEX-LOW: 31 ML/M2
NUC REST DIASTOLIC VOLUME INDEX: 121
NUC REST EJECTION FRACTION: 69
NUC REST SYSTOLIC VOLUME INDEX: 37
NUC STRESS DIASTOLIC VOLUME INDEX: 126
NUC STRESS EJECTION FRACTION: 71 %
NUC STRESS SYSTOLIC VOLUME INDEX: 37
OHS CV CPX 1 MINUTE RECOVERY HEART RATE: 73 BPM
OHS CV CPX 85 PERCENT MAX PREDICTED HEART RATE MALE: 137
OHS CV CPX MAX PREDICTED HEART RATE: 161
OHS CV CPX PATIENT IS FEMALE: 0
OHS CV CPX PATIENT IS MALE: 1
OHS CV CPX PEAK DIASTOLIC BLOOD PRESSURE: 84 MMHG
OHS CV CPX PEAK HEAR RATE: 73 BPM
OHS CV CPX PEAK RATE PRESSURE PRODUCT: NORMAL
OHS CV CPX PEAK SYSTOLIC BLOOD PRESSURE: 140 MMHG
OHS CV CPX PERCENT MAX PREDICTED HEART RATE ACHIEVED: 45
OHS CV CPX RATE PRESSURE PRODUCT PRESENTING: 6890
RETIRED EF AND QEF - SEE NOTES: 53 %
SYSTOLIC BLOOD PRESSURE: 130 MMHG

## 2023-05-30 DIAGNOSIS — M54.12 CERVICAL RADICULOPATHY: Primary | ICD-10-CM

## 2023-06-05 ENCOUNTER — HOSPITAL ENCOUNTER (OUTPATIENT)
Dept: RADIOLOGY | Facility: HOSPITAL | Age: 58
Discharge: HOME | End: 2023-06-05
Payer: MEDICAID

## 2023-06-05 DIAGNOSIS — M54.12 CERVICAL RADICULOPATHY: ICD-10-CM

## 2023-06-05 PROCEDURE — 72141 MRI NECK SPINE W/O DYE: CPT | Mod: TC,PO

## 2023-06-13 ENCOUNTER — TELEPHONE (OUTPATIENT)
Dept: CARDIOLOGY | Facility: CLINIC | Age: 58
End: 2023-06-13
Payer: MEDICAID

## 2023-06-13 NOTE — TELEPHONE ENCOUNTER
----- Message from Jorge Valverde sent at 6/13/2023  2:38 PM CDT -----  Regarding: Return Call  Contact: patient  Type:  Patient Returning Call    Who Called:Patient  Who Left Message for Patient:office staff  Does the patient know what this is regarding?:stress test results  Would the patient rather a call back or a response via [facility]? call  Best Call Back Number:[phone]  Additional Information: Please call patient to advise.

## 2023-06-14 ENCOUNTER — TELEPHONE (OUTPATIENT)
Dept: CARDIOLOGY | Facility: CLINIC | Age: 58
End: 2023-06-14
Payer: MEDICAID

## 2023-06-14 NOTE — TELEPHONE ENCOUNTER
----- Message from Yoselin Reilly, Patient Care Assistant sent at 6/14/2023  1:21 PM CDT -----  Regarding: results  Contact: pt  Type:  Test Results    Who Called:  pt     Name of Test (Lab/Mammo/Etc):  stress test  Date of Test:  4/20/23   Ordering Provider:  Dominique   Where the test was performed:  [facility]   Best Call Back Number:  [phone] (home)     Additional Information:  please call pt to advise. Thanks!

## 2023-06-21 ENCOUNTER — OFFICE VISIT (OUTPATIENT)
Dept: CARDIOLOGY | Facility: CLINIC | Age: 58
End: 2023-06-21
Payer: MEDICAID

## 2023-06-21 VITALS
DIASTOLIC BLOOD PRESSURE: 80 MMHG | WEIGHT: 156.5 LBS | OXYGEN SATURATION: 97 % | HEART RATE: 71 BPM | SYSTOLIC BLOOD PRESSURE: 136 MMHG | HEIGHT: 66 IN | BODY MASS INDEX: 25.15 KG/M2

## 2023-06-21 DIAGNOSIS — I25.10 CORONARY ARTERY DISEASE, UNSPECIFIED VESSEL OR LESION TYPE, UNSPECIFIED WHETHER ANGINA PRESENT, UNSPECIFIED WHETHER NATIVE OR TRANSPLANTED HEART: Chronic | ICD-10-CM

## 2023-06-21 DIAGNOSIS — G47.30 SLEEP APNEA, UNSPECIFIED TYPE: ICD-10-CM

## 2023-06-21 DIAGNOSIS — R06.02 SOB (SHORTNESS OF BREATH): ICD-10-CM

## 2023-06-21 DIAGNOSIS — I10 ESSENTIAL HYPERTENSION: Chronic | ICD-10-CM

## 2023-06-21 DIAGNOSIS — R07.9 CHEST PAIN, UNSPECIFIED TYPE: Primary | ICD-10-CM

## 2023-06-21 DIAGNOSIS — R53.83 FATIGUE, UNSPECIFIED TYPE: ICD-10-CM

## 2023-06-21 PROCEDURE — 1160F PR REVIEW ALL MEDS BY PRESCRIBER/CLIN PHARMACIST DOCUMENTED: ICD-10-PCS | Mod: CPTII,,,

## 2023-06-21 PROCEDURE — 3008F PR BODY MASS INDEX (BMI) DOCUMENTED: ICD-10-PCS | Mod: CPTII,,,

## 2023-06-21 PROCEDURE — 99214 OFFICE O/P EST MOD 30 MIN: CPT | Mod: S$PBB,,,

## 2023-06-21 PROCEDURE — 3079F PR MOST RECENT DIASTOLIC BLOOD PRESSURE 80-89 MM HG: ICD-10-PCS | Mod: CPTII,,,

## 2023-06-21 PROCEDURE — 99214 PR OFFICE/OUTPT VISIT, EST, LEVL IV, 30-39 MIN: ICD-10-PCS | Mod: S$PBB,,,

## 2023-06-21 PROCEDURE — 3079F DIAST BP 80-89 MM HG: CPT | Mod: CPTII,,,

## 2023-06-21 PROCEDURE — 93005 ELECTROCARDIOGRAM TRACING: CPT | Mod: PBBFAC,PN | Performed by: INTERNAL MEDICINE

## 2023-06-21 PROCEDURE — 1159F MED LIST DOCD IN RCRD: CPT | Mod: CPTII,,,

## 2023-06-21 PROCEDURE — 99999 PR PBB SHADOW E&M-EST. PATIENT-LVL IV: CPT | Mod: PBBFAC,,,

## 2023-06-21 PROCEDURE — 3075F PR MOST RECENT SYSTOLIC BLOOD PRESS GE 130-139MM HG: ICD-10-PCS | Mod: CPTII,,,

## 2023-06-21 PROCEDURE — 1160F RVW MEDS BY RX/DR IN RCRD: CPT | Mod: CPTII,,,

## 2023-06-21 PROCEDURE — 1159F PR MEDICATION LIST DOCUMENTED IN MEDICAL RECORD: ICD-10-PCS | Mod: CPTII,,,

## 2023-06-21 PROCEDURE — 93010 ELECTROCARDIOGRAM REPORT: CPT | Mod: S$PBB,,, | Performed by: INTERNAL MEDICINE

## 2023-06-21 PROCEDURE — 3075F SYST BP GE 130 - 139MM HG: CPT | Mod: CPTII,,,

## 2023-06-21 PROCEDURE — 93010 EKG 12-LEAD: ICD-10-PCS | Mod: S$PBB,,, | Performed by: INTERNAL MEDICINE

## 2023-06-21 PROCEDURE — 3008F BODY MASS INDEX DOCD: CPT | Mod: CPTII,,,

## 2023-06-21 PROCEDURE — 99214 OFFICE O/P EST MOD 30 MIN: CPT | Mod: PBBFAC,PN

## 2023-06-21 PROCEDURE — 99999 PR PBB SHADOW E&M-EST. PATIENT-LVL IV: ICD-10-PCS | Mod: PBBFAC,,,

## 2023-06-21 NOTE — ASSESSMENT & PLAN NOTE
Occasional CP with exertion.  Relieved with rest.  Nitro occasionally improved CP.  SUGGS.  Repeat echo.  Normal EKG today in office.  No acute ST-T wave changes.

## 2023-06-21 NOTE — ASSESSMENT & PLAN NOTE
Noncompliant. Patient cannot tolerate mask due to feeling of smothering.  May be a good candidate for Inspire.

## 2023-06-21 NOTE — PROGRESS NOTES
Subjective:    Patient ID:  Sanjeev Britt is a 57 y.o. male patient here for evaluation Chest Pain (Patient is still having some chest pain , sob, and vomiting . )      History of Present Illness:       Patient is here for a check up.  He c/o occasional CP and shortness of breath with exertion.  Recent stress test negative for reversible ischemia.  No recent ECHO or labs.  EKG today in office is NSR normal EKG.  History of CAD with 1 stent in 2013.  He suffers from chronic back pain and is having an evaluation with a neurosurgeon in July for possible surgical intervention.       STRESS TEST 4/20/23      Normal myocardial perfusion scan. There is no evidence of myocardial ischemia or infarction.    There is trivial apical thinning which is a normal variant.    The gated perfusion images showed an ejection fraction of 69% at rest. The gated perfusion images showed an ejection fraction of 71% post stress. Normal ejection fraction is greater than 53%.    There is normal wall motion at rest and post stress.    LV cavity size is normal at rest and normal at stress.    The ECG portion of the study is negative for ischemia.    The patient reported no chest pain during the stress test.    There were no arrhythmias during stress.             Review of patient's allergies indicates:   Allergen Reactions    Levaquin [levofloxacin]        Past Medical History:   Diagnosis Date    Acute coronary syndrome 9/9/2019    Coronary artery disease     Hypertension      Past Surgical History:   Procedure Laterality Date    VASCULAR SURGERY       Social History     Tobacco Use    Smoking status: Never    Smokeless tobacco: Current     Types: Snuff   Substance Use Topics    Alcohol use: Not Currently    Drug use: Never        Review of Systems:    As noted in HPI in addition      REVIEW OF SYSTEMS  CARDIOVASCULAR: No recent chest pain, palpitations, arm, neck, or jaw pain  RESPIRATORY: No recent fever, cough chills, or congestion  +  SUGGS  : No blood in the urine  GI: No Nausea, vomiting, constipation, diarrhea, blood, or reflux.  MUSCULOSKELETAL: No myalgias  NEURO: No lightheadedness or dizziness  EYES: No Double vision, blurry, vision or headache              Objective        Vitals:    06/21/23 0828   BP: 136/80   Pulse: 71       LIPIDS - LAST 2   Lab Results   Component Value Date    CHOL 201 (H) 09/10/2019    HDL 36 (L) 09/10/2019    LDLCALC 112.6 09/10/2019    TRIG 262 (H) 09/10/2019    CHOLHDL 17.9 (L) 09/10/2019       CBC - LAST 2  Lab Results   Component Value Date    WBC 7.65 08/17/2021    WBC 8.45 08/16/2021    RBC 4.75 08/17/2021    RBC 5.03 08/16/2021    HGB 13.7 (L) 08/17/2021    HGB 14.2 08/16/2021    HCT 40.5 08/17/2021    HCT 43.4 08/16/2021    MCV 85 08/17/2021    MCV 86 08/16/2021    MCH 28.8 08/17/2021    MCH 28.2 08/16/2021    MCHC 33.8 08/17/2021    MCHC 32.7 08/16/2021    RDW 12.6 08/17/2021    RDW 13.0 08/16/2021     08/17/2021     08/16/2021    MPV 11.5 08/17/2021    MPV 11.7 08/16/2021    GRAN 5.9 08/17/2021    GRAN 76.6 (H) 08/17/2021    LYMPH 1.0 08/17/2021    LYMPH 12.7 (L) 08/17/2021    MONO 0.7 08/17/2021    MONO 9.5 08/17/2021    BASO 0.02 08/17/2021    BASO 0.01 08/16/2021    NRBC 0 08/17/2021    NRBC 0 08/16/2021       CHEMISTRY & LIVER FUNCTION - LAST 2  Lab Results   Component Value Date     08/18/2021     08/17/2021     08/17/2021    K 3.4 (L) 08/18/2021    K 3.4 (L) 08/17/2021    K 3.4 (L) 08/17/2021     08/18/2021     08/17/2021     08/17/2021    CO2 27 08/18/2021    CO2 25 08/17/2021    CO2 25 08/17/2021    ANIONGAP 11 08/18/2021    ANIONGAP 12 08/17/2021    ANIONGAP 12 08/17/2021    BUN 25 (H) 08/18/2021    BUN 24 (H) 08/17/2021    BUN 24 (H) 08/17/2021    CREATININE 0.8 08/18/2021    CREATININE 0.8 08/17/2021    CREATININE 0.8 08/17/2021     (H) 08/18/2021     (H) 08/17/2021     (H) 08/17/2021    CALCIUM 9.0 08/18/2021    CALCIUM  9.1 08/17/2021    CALCIUM 9.1 08/17/2021    MG 2.5 08/17/2021    MG 2.5 08/16/2021    ALBUMIN 3.0 (L) 08/18/2021    ALBUMIN 2.9 (L) 08/17/2021    ALBUMIN 2.9 (L) 08/17/2021    PROT 6.6 08/18/2021    PROT 6.8 08/17/2021    PROT 6.8 08/17/2021    ALKPHOS 104 08/18/2021    ALKPHOS 103 08/17/2021    ALKPHOS 103 08/17/2021     (H) 08/18/2021    ALT 59 (H) 08/17/2021    ALT 59 (H) 08/17/2021    AST 67 (H) 08/18/2021    AST 49 (H) 08/17/2021    AST 49 (H) 08/17/2021    BILITOT 0.9 08/18/2021    BILITOT 0.8 08/17/2021    BILITOT 0.8 08/17/2021        CARDIAC PROFILE - LAST 2  Lab Results   Component Value Date    BNP 32 09/09/2019     08/16/2021     (H) 08/15/2021     (H) 08/15/2021    TROPONINI 0.033 08/15/2021    TROPONINI <0.030 07/26/2021        COAGULATION - LAST 2  Lab Results   Component Value Date    LABPT 12.8 07/26/2021    INR 1.0 07/26/2021       ENDOCRINE & PSA - LAST 2  No results found for: HGBA1C, MICROALBUR, TSH, PROCAL, PSA     ECHOCARDIOGRAM RESULTS  Results for orders placed during the hospital encounter of 09/09/19    Echo Color Flow Doppler? Yes    Interpretation Summary  · Concentric left ventricular remodeling.  · Increased (hyperdynamic) left ventricular systolic function. The estimated ejection fraction is 83%  · Normal LV diastolic function.  · Normal right ventricular systolic function.  · Mild left atrial enlargement.      CURRENT/PREVIOUS VISIT EKG  Results for orders placed or performed during the hospital encounter of 08/15/21   EKG 12-lead    Collection Time: 08/15/21  4:52 AM    Narrative    Test Reason : Z20.822,    Vent. Rate : 120 BPM     Atrial Rate : 120 BPM     P-R Int : 142 ms          QRS Dur : 086 ms      QT Int : 342 ms       P-R-T Axes : 040 007 010 degrees     QTc Int : 483 ms    Sinus tachycardia  Inferior infarct ,age undetermined  Abnormal ECG  When compared with ECG of 26-JUL-2021 08:46,  Inferior infarct is now Present  Inverted T waves have  replaced nonspecific T wave abnormality in Inferior  leads  Confirmed by Damon Ambrose MD ([id number]) on 8/16/2021 2:15:09 PM    Referred By: [email or web address]   SELF           Confirmed By:Damon Ambrose MD     No valid procedures specified.   Results for orders placed during the hospital encounter of 04/20/23    Nuclear Stress - Cardiology Interpreted    Interpretation Summary    Normal myocardial perfusion scan. There is no evidence of myocardial ischemia or infarction.    There is trivial apical thinning which is a normal variant.    The gated perfusion images showed an ejection fraction of 69% at rest. The gated perfusion images showed an ejection fraction of 71% post stress. Normal ejection fraction is greater than 53%.    There is normal wall motion at rest and post stress.    LV cavity size is normal at rest and normal at stress.    The ECG portion of the study is negative for ischemia.    The patient reported no chest pain during the stress test.    There were no arrhythmias during stress.    No valid procedures specified.    PHYSICAL EXAM  CONSTITUTIONAL: Well built, well nourished in no apparent distress  NECK: no carotid bruit, no JVD  LUNGS: CTA  CHEST WALL: no tenderness  HEART: regular rate and rhythm, S1, S2 normal  ABDOMEN: soft, non-tender; bowel sounds normal  EXTREMITIES: Extremities normal, no edema  NEURO: AAO X 3    I HAVE REVIEWED :    The vital signs, nurses notes, and all the pertinent radiology and labs.        Current Outpatient Medications   Medication Instructions    albuterol (PROVENTIL/VENTOLIN HFA) 90 mcg/actuation inhaler 2 puffs, Inhalation, Every 4 hours PRN, Rescue    amLODIPine (NORVASC) 10 mg, Oral, Daily    aspirin (ECOTRIN) 81 mg, Oral, Daily    atorvastatin (LIPITOR) 40 mg, Oral, Daily    clopidogreL (PLAVIX) 75 mg, Oral, Daily    DULoxetine (CYMBALTA) 30 mg, Oral, Daily    EScitalopram oxalate (LEXAPRO) 10 MG tablet escitalopram 10 mg tablet    ezetimibe (ZETIA) 10 mg, Oral, Daily     HYDROcodone-acetaminophen (NORCO) 5-325 mg per tablet 1 tablet, Oral, Every 4 hours PRN    metFORMIN (GLUCOPHAGE) 500 mg, Oral, 2 times daily    metoprolol tartrate (LOPRESSOR) 50 mg, Oral, 2 times daily    multivit-min/FA/lycopen/lutein (CENTRUM SILVER MEN ORAL) 1 tablet, Oral, Daily    nitroGLYCERIN (NITROSTAT) 0.4 mg, Sublingual, Every 5 min PRN    potassium chloride (MICRO-K) 10 MEQ CpSR 10 mEq, Oral, Daily    traMADoL (ULTRAM) 50 mg, Oral, Every 12 hours PRN    traZODone (DESYREL) 100 mg, Oral, Nightly          Assessment & Plan     Chest pain  Occasional CP with exertion.  Relieved with rest.  Nitro occasionally improved CP.  SUGGS.  Repeat echo.  Normal EKG today in office.  No acute ST-T wave changes.    CAD (coronary artery disease) status post PCI  Continue aspirin 81 mg and Plavix 75mg.  Continue statin therapy. Continue Zetia.  Will repeat routine labs including lipid panel.  Low sodium heart healthy diet.      Essential hypertension  /80.  Continue amlodipine 10 mg daily.  Continue metoprolol tartrate 50 mg BID.  Continue low sodium heart healthy diet.  2g low sodium heart healthy diet.      Sleep apnea  Noncompliant. Patient cannot tolerate mask due to feeling of smothering.  May be a good candidate for Inspire.     Fatigue  Recommend compliance with CPAP.  Routine labs including A1C and TSH.           No follow-ups on file.

## 2023-06-21 NOTE — ASSESSMENT & PLAN NOTE
/80.  Continue amlodipine 10 mg daily.  Continue metoprolol tartrate 50 mg BID.  Continue low sodium heart healthy diet.  2g low sodium heart healthy diet.

## 2023-06-21 NOTE — ASSESSMENT & PLAN NOTE
Continue aspirin 81 mg and Plavix 75mg.  Continue statin therapy. Continue Zetia.  Will repeat routine labs including lipid panel.  Low sodium heart healthy diet.

## 2023-06-23 DIAGNOSIS — G47.30 SLEEP APNEA, UNSPECIFIED TYPE: Primary | ICD-10-CM

## 2023-06-28 ENCOUNTER — TELEPHONE (OUTPATIENT)
Dept: CARDIOLOGY | Facility: HOSPITAL | Age: 58
End: 2023-06-28

## 2023-07-11 ENCOUNTER — TELEPHONE (OUTPATIENT)
Dept: CARDIOLOGY | Facility: CLINIC | Age: 58
End: 2023-07-11
Payer: MEDICAID

## 2023-07-11 NOTE — TELEPHONE ENCOUNTER
----- Message from Mirna Bhandari sent at 7/11/2023  9:19 AM CDT -----  Type: Needs Medical Advice  Who Called:  pt  Symptoms (please be specific):  pt wanted to know if his labs have been sent over to [facility]--please call and advise  Best Call Back Number: [phone] (home)     Additional Information: thank you

## 2023-08-14 ENCOUNTER — HOSPITAL ENCOUNTER (OUTPATIENT)
Dept: CARDIOLOGY | Facility: HOSPITAL | Age: 58
Discharge: HOME | End: 2023-08-14
Payer: MEDICAID

## 2023-08-14 VITALS — HEIGHT: 66 IN | WEIGHT: 156 LBS | BODY MASS INDEX: 25.07 KG/M2

## 2023-08-14 DIAGNOSIS — I25.10 CORONARY ARTERY DISEASE, UNSPECIFIED VESSEL OR LESION TYPE, UNSPECIFIED WHETHER ANGINA PRESENT, UNSPECIFIED WHETHER NATIVE OR TRANSPLANTED HEART: Chronic | ICD-10-CM

## 2023-08-14 DIAGNOSIS — R06.02 SOB (SHORTNESS OF BREATH): ICD-10-CM

## 2023-08-14 PROCEDURE — 93306 TTE W/DOPPLER COMPLETE: CPT | Mod: 26,,, | Performed by: INTERNAL MEDICINE

## 2023-08-14 PROCEDURE — 93306 ECHO (CUPID ONLY): ICD-10-PCS | Mod: 26,,, | Performed by: INTERNAL MEDICINE

## 2023-08-14 PROCEDURE — 93306 TTE W/DOPPLER COMPLETE: CPT

## 2023-08-15 LAB
AORTIC ROOT ANNULUS: 3.4 CM
AORTIC VALVE CUSP SEPERATION: 2 CM
AV INDEX (PROSTH): 0.74
AV MEAN GRADIENT: 7 MMHG
AV PEAK GRADIENT: 13 MMHG
AV VALVE AREA BY VELOCITY RATIO: 2.21 CM²
AV VALVE AREA: 2.57 CM²
AV VELOCITY RATIO: 0.64
BSA FOR ECHO PROCEDURE: 1.82 M2
CV ECHO LV RWT: 0.46 CM
DOP CALC AO PEAK VEL: 1.83 M/S
DOP CALC AO VTI: 38.7 CM
DOP CALC LVOT AREA: 3.5 CM2
DOP CALC LVOT DIAMETER: 2.1 CM
DOP CALC LVOT PEAK VEL: 1.17 M/S
DOP CALC LVOT STROKE VOLUME: 99.36 CM3
DOP CALCLVOT PEAK VEL VTI: 28.7 CM
E WAVE DECELERATION TIME: 148 MSEC
E/A RATIO: 1.29
E/E' RATIO: 8.8 M/S
ECHO LV POSTERIOR WALL: 1.06 CM (ref 0.6–1.1)
EJECTION FRACTION: 65 %
FRACTIONAL SHORTENING: 38 % (ref 28–44)
INTERVENTRICULAR SEPTUM: 1.3 CM (ref 0.6–1.1)
IVRT: 106 MSEC
LEFT ATRIUM SIZE: 4.3 CM
LEFT ATRIUM VOLUME INDEX MOD: 27.6 ML/M2
LEFT ATRIUM VOLUME MOD: 49.7 CM3
LEFT INTERNAL DIMENSION IN SYSTOLE: 2.85 CM (ref 2.1–4)
LEFT VENTRICLE DIASTOLIC VOLUME INDEX: 54.33 ML/M2
LEFT VENTRICLE DIASTOLIC VOLUME: 97.8 ML
LEFT VENTRICLE MASS INDEX: 112 G/M2
LEFT VENTRICLE SYSTOLIC VOLUME INDEX: 17.2 ML/M2
LEFT VENTRICLE SYSTOLIC VOLUME: 30.9 ML
LEFT VENTRICULAR INTERNAL DIMENSION IN DIASTOLE: 4.61 CM (ref 3.5–6)
LEFT VENTRICULAR MASS: 200.81 G
LV LATERAL E/E' RATIO: 7.33 M/S
LV SEPTAL E/E' RATIO: 11 M/S
LVOT MG: 3 MMHG
LVOT MV: 0.73 CM/S
MV PEAK A VEL: 0.68 M/S
MV PEAK E VEL: 0.88 M/S
MV STENOSIS PRESSURE HALF TIME: 53 MS
MV VALVE AREA P 1/2 METHOD: 4.15 CM2
PISA TR MAX VEL: 1.96 M/S
PV MV: 0.82 M/S
PV PEAK GRADIENT: 6 MMHG
PV PEAK VELOCITY: 1.19 M/S
RA PRESSURE ESTIMATED: 3 MMHG
RIGHT VENTRICULAR END-DIASTOLIC DIMENSION: 3.07 CM
RV TB RVSP: 5 MMHG
RV TISSUE DOPPLER FREE WALL SYSTOLIC VELOCITY 1 (APICAL 4 CHAMBER VIEW): 12.9 CM/S
TDI LATERAL: 0.12 M/S
TDI SEPTAL: 0.08 M/S
TDI: 0.1 M/S
TR MAX PG: 15 MMHG
TV REST PULMONARY ARTERY PRESSURE: 18 MMHG
Z-SCORE OF LEFT VENTRICULAR DIMENSION IN END DIASTOLE: -0.77
Z-SCORE OF LEFT VENTRICULAR DIMENSION IN END SYSTOLE: -0.6

## 2023-08-30 ENCOUNTER — OFFICE VISIT (OUTPATIENT)
Dept: CARDIOLOGY | Facility: CLINIC | Age: 58
End: 2023-08-30
Payer: MEDICAID

## 2023-08-30 VITALS
SYSTOLIC BLOOD PRESSURE: 136 MMHG | BODY MASS INDEX: 27.6 KG/M2 | DIASTOLIC BLOOD PRESSURE: 82 MMHG | OXYGEN SATURATION: 97 % | WEIGHT: 171 LBS | HEART RATE: 57 BPM

## 2023-08-30 DIAGNOSIS — G47.30 SLEEP APNEA, UNSPECIFIED TYPE: Primary | ICD-10-CM

## 2023-08-30 DIAGNOSIS — I25.10 CORONARY ARTERY DISEASE INVOLVING NATIVE HEART, UNSPECIFIED VESSEL OR LESION TYPE, UNSPECIFIED WHETHER ANGINA PRESENT: Chronic | ICD-10-CM

## 2023-08-30 DIAGNOSIS — I10 ESSENTIAL HYPERTENSION: Chronic | ICD-10-CM

## 2023-08-30 PROCEDURE — 99214 OFFICE O/P EST MOD 30 MIN: CPT | Mod: S$PBB,,,

## 2023-08-30 PROCEDURE — 99999 PR PBB SHADOW E&M-EST. PATIENT-LVL IV: ICD-10-PCS | Mod: PBBFAC,,,

## 2023-08-30 PROCEDURE — 3008F PR BODY MASS INDEX (BMI) DOCUMENTED: ICD-10-PCS | Mod: CPTII,,,

## 2023-08-30 PROCEDURE — 99214 OFFICE O/P EST MOD 30 MIN: CPT | Mod: PBBFAC,PN

## 2023-08-30 PROCEDURE — 3075F SYST BP GE 130 - 139MM HG: CPT | Mod: CPTII,,,

## 2023-08-30 PROCEDURE — 1160F RVW MEDS BY RX/DR IN RCRD: CPT | Mod: CPTII,,,

## 2023-08-30 PROCEDURE — 1159F PR MEDICATION LIST DOCUMENTED IN MEDICAL RECORD: ICD-10-PCS | Mod: CPTII,,,

## 2023-08-30 PROCEDURE — 99214 PR OFFICE/OUTPT VISIT, EST, LEVL IV, 30-39 MIN: ICD-10-PCS | Mod: S$PBB,,,

## 2023-08-30 PROCEDURE — 99999 PR PBB SHADOW E&M-EST. PATIENT-LVL IV: CPT | Mod: PBBFAC,,,

## 2023-08-30 PROCEDURE — 3008F BODY MASS INDEX DOCD: CPT | Mod: CPTII,,,

## 2023-08-30 PROCEDURE — 1160F PR REVIEW ALL MEDS BY PRESCRIBER/CLIN PHARMACIST DOCUMENTED: ICD-10-PCS | Mod: CPTII,,,

## 2023-08-30 PROCEDURE — 3079F PR MOST RECENT DIASTOLIC BLOOD PRESSURE 80-89 MM HG: ICD-10-PCS | Mod: CPTII,,,

## 2023-08-30 PROCEDURE — 1159F MED LIST DOCD IN RCRD: CPT | Mod: CPTII,,,

## 2023-08-30 PROCEDURE — 3079F DIAST BP 80-89 MM HG: CPT | Mod: CPTII,,,

## 2023-08-30 PROCEDURE — 3075F PR MOST RECENT SYSTOLIC BLOOD PRESS GE 130-139MM HG: ICD-10-PCS | Mod: CPTII,,,

## 2023-08-30 RX ORDER — RANOLAZINE 500 MG/1
500 TABLET, EXTENDED RELEASE ORAL 2 TIMES DAILY
Qty: 60 TABLET | Refills: 11 | Status: SHIPPED | OUTPATIENT
Start: 2023-08-30 | End: 2024-01-30

## 2023-08-30 RX ORDER — EZETIMIBE 10 MG/1
10 TABLET ORAL DAILY
Qty: 90 TABLET | Refills: 3 | Status: SHIPPED | OUTPATIENT
Start: 2023-08-30 | End: 2024-01-30 | Stop reason: SDUPTHER

## 2023-08-30 RX ORDER — ISOSORBIDE MONONITRATE 30 MG/1
30 TABLET, EXTENDED RELEASE ORAL DAILY
Qty: 30 TABLET | Refills: 11 | Status: SHIPPED | OUTPATIENT
Start: 2023-08-30 | End: 2023-08-30 | Stop reason: SINTOL

## 2023-08-30 NOTE — PROGRESS NOTES
Subjective:    Patient ID:  Sanjeev Britt is a 58 y.o. male patient here for evaluation Results (Echo/stress)      History of Present Illness:     Patient is here for a checkup.  Denies chest pain, shortness of breath, lightheadedness, dizziness, jaw neck or arm pain, edema or bleeding.  Patient does state that he does have occasional dyspnea on exertion and palpitations with exertion.  Regular rate and rhythm today in office.  Echocardiogram reviewed.  Normal systolic and diastolic function.  Patient is taking his statin therapy but has not been taking Zetia.  Last LDL in February was 186.         Review of patient's allergies indicates:   Allergen Reactions    Levaquin [levofloxacin]        Past Medical History:   Diagnosis Date    Acute coronary syndrome 9/9/2019    Coronary artery disease     Hypertension      Past Surgical History:   Procedure Laterality Date    VASCULAR SURGERY       Social History     Tobacco Use    Smoking status: Never    Smokeless tobacco: Current     Types: Snuff   Substance Use Topics    Alcohol use: Not Currently    Drug use: Never        Review of Systems:    As noted in HPI in addition      REVIEW OF SYSTEMS  CARDIOVASCULAR: No recent chest pain, palpitations, arm, neck, or jaw pain  RESPIRATORY: No recent fever, cough chills, SOB or congestion  : No blood in the urine  GI: No Nausea, vomiting, constipation, diarrhea, blood, or reflux.  MUSCULOSKELETAL: No myalgias  NEURO: No lightheadedness or dizziness  EYES: No Double vision, blurry, vision or headache              Objective        Vitals:    08/30/23 1028   BP: 136/82   Pulse: (!) 57       LIPIDS - LAST 2   Lab Results   Component Value Date    CHOL 201 (H) 09/10/2019    HDL 36 (L) 09/10/2019    LDLCALC 112.6 09/10/2019    TRIG 262 (H) 09/10/2019    CHOLHDL 17.9 (L) 09/10/2019       CBC - LAST 2  Lab Results   Component Value Date    WBC 7.65 08/17/2021    WBC 8.45 08/16/2021    RBC 4.75 08/17/2021    RBC 5.03 08/16/2021     HGB 13.7 (L) 08/17/2021    HGB 14.2 08/16/2021    HCT 40.5 08/17/2021    HCT 43.4 08/16/2021    MCV 85 08/17/2021    MCV 86 08/16/2021    MCH 28.8 08/17/2021    MCH 28.2 08/16/2021    MCHC 33.8 08/17/2021    MCHC 32.7 08/16/2021    RDW 12.6 08/17/2021    RDW 13.0 08/16/2021     08/17/2021     08/16/2021    MPV 11.5 08/17/2021    MPV 11.7 08/16/2021    GRAN 5.9 08/17/2021    GRAN 76.6 (H) 08/17/2021    LYMPH 1.0 08/17/2021    LYMPH 12.7 (L) 08/17/2021    MONO 0.7 08/17/2021    MONO 9.5 08/17/2021    BASO 0.02 08/17/2021    BASO 0.01 08/16/2021    NRBC 0 08/17/2021    NRBC 0 08/16/2021       CHEMISTRY & LIVER FUNCTION - LAST 2  Lab Results   Component Value Date     08/18/2021     08/17/2021     08/17/2021    K 3.4 (L) 08/18/2021    K 3.4 (L) 08/17/2021    K 3.4 (L) 08/17/2021     08/18/2021     08/17/2021     08/17/2021    CO2 27 08/18/2021    CO2 25 08/17/2021    CO2 25 08/17/2021    ANIONGAP 11 08/18/2021    ANIONGAP 12 08/17/2021    ANIONGAP 12 08/17/2021    BUN 25 (H) 08/18/2021    BUN 24 (H) 08/17/2021    BUN 24 (H) 08/17/2021    CREATININE 0.8 08/18/2021    CREATININE 0.8 08/17/2021    CREATININE 0.8 08/17/2021     (H) 08/18/2021     (H) 08/17/2021     (H) 08/17/2021    CALCIUM 9.0 08/18/2021    CALCIUM 9.1 08/17/2021    CALCIUM 9.1 08/17/2021    MG 2.5 08/17/2021    MG 2.5 08/16/2021    ALBUMIN 3.0 (L) 08/18/2021    ALBUMIN 2.9 (L) 08/17/2021    ALBUMIN 2.9 (L) 08/17/2021    PROT 6.6 08/18/2021    PROT 6.8 08/17/2021    PROT 6.8 08/17/2021    ALKPHOS 104 08/18/2021    ALKPHOS 103 08/17/2021    ALKPHOS 103 08/17/2021     (H) 08/18/2021    ALT 59 (H) 08/17/2021    ALT 59 (H) 08/17/2021    AST 67 (H) 08/18/2021    AST 49 (H) 08/17/2021    AST 49 (H) 08/17/2021    BILITOT 0.9 08/18/2021    BILITOT 0.8 08/17/2021    BILITOT 0.8 08/17/2021        CARDIAC PROFILE - LAST 2  Lab Results   Component Value Date    BNP 32 09/09/2019      "08/16/2021     (H) 08/15/2021     (H) 08/15/2021    TROPONINI 0.033 08/15/2021    TROPONINI <0.030 07/26/2021        COAGULATION - LAST 2  Lab Results   Component Value Date    LABPT 12.8 07/26/2021    INR 1.0 07/26/2021       ENDOCRINE & PSA - LAST 2  No results found for: "HGBA1C", "MICROALBUR", "TSH", "PROCAL", "PSA"     ECHOCARDIOGRAM RESULTS  Results for orders placed during the hospital encounter of 08/14/23    Echo    Interpretation Summary    Left Ventricle: The left ventricle is normal in size. Mildly increased wall thickness. There is concentric remodeling. Normal wall motion. There is normal systolic function. Ejection fraction by visual approximation is 65%. There is normal diastolic function.    Left Atrium: Left atrium is mildly dilated.    Right Ventricle: Normal right ventricular cavity size. Wall thickness is normal. Right ventricle wall motion  is normal. Systolic function is normal.    IVC/SVC: Normal venous pressure at 3 mmHg.      CURRENT/PREVIOUS VISIT EKG  Results for orders placed or performed in visit on 06/21/23   IN OFFICE EKG 12-LEAD (to [facility])    Collection Time: 06/21/23  8:30 AM    Narrative    Test Reason : R07.9,R06.02,    Vent. Rate : 061 BPM     Atrial Rate : 061 BPM     P-R Int : 170 ms          QRS Dur : 090 ms      QT Int : 444 ms       P-R-T Axes : 054 062 010 degrees     QTc Int : 446 ms    Normal sinus rhythm  Normal ECG    Confirmed by Gurmeet STAFFORD, Mo Contreras ([id number]) on 7/17/2023 11:06:51 AM    Referred By:             Confirmed By:Mo Levi MD     No valid procedures specified.   Results for orders placed during the hospital encounter of 04/20/23    Nuclear Stress - Cardiology Interpreted    Interpretation Summary    Normal myocardial perfusion scan. There is no evidence of myocardial ischemia or infarction.    There is trivial apical thinning which is a normal variant.    The gated perfusion images showed an ejection fraction of 69% at rest. The gated " perfusion images showed an ejection fraction of 71% post stress. Normal ejection fraction is greater than 53%.    There is normal wall motion at rest and post stress.    LV cavity size is normal at rest and normal at stress.    The ECG portion of the study is negative for ischemia.    The patient reported no chest pain during the stress test.    There were no arrhythmias during stress.    No valid procedures specified.    PHYSICAL EXAM  CONSTITUTIONAL: Well built, well nourished in no apparent distress  NECK: no carotid bruit, no JVD  LUNGS: CTA  CHEST WALL: no tenderness  HEART: regular rate and rhythm, S1, S2 normal, no murmur, click, rub or gallop   ABDOMEN: soft, non-tender; bowel sounds normal  EXTREMITIES: Extremities normal, no edema  NEURO: AAO X 3    I HAVE REVIEWED :    The vital signs, nurses notes, and all the pertinent radiology and labs.        Current Outpatient Medications   Medication Instructions    albuterol (PROVENTIL/VENTOLIN HFA) 90 mcg/actuation inhaler 2 puffs, Inhalation, Every 4 hours PRN, Rescue    amLODIPine (NORVASC) 10 mg, Oral, Daily    aspirin (ECOTRIN) 81 mg, Oral, Daily    atorvastatin (LIPITOR) 40 mg, Oral, Daily    clopidogreL (PLAVIX) 75 mg, Oral, Daily    DULoxetine (CYMBALTA) 30 mg, Oral, Daily    EScitalopram oxalate (LEXAPRO) 10 MG tablet escitalopram 10 mg tablet    ezetimibe (ZETIA) 10 mg, Oral, Daily    metFORMIN (GLUCOPHAGE) 500 mg, Oral, 2 times daily    metoprolol tartrate (LOPRESSOR) 50 mg, Oral, 2 times daily    multivit-min/FA/lycopen/lutein (CENTRUM SILVER MEN ORAL) 1 tablet, Oral, Daily    nitroGLYCERIN (NITROSTAT) 0.4 mg, Sublingual, Every 5 min PRN    potassium chloride (MICRO-K) 10 MEQ CpSR 10 mEq, Oral, Daily    ranolazine (RANEXA) 500 mg, Oral, 2 times daily    traMADoL (ULTRAM) 50 mg, Oral, Every 12 hours PRN    traZODone (DESYREL) 100 mg, Oral, Nightly          Assessment & Plan     CAD (coronary artery disease) status post PCI  Continue aspirin Plavix and  statin therapy.  Please restart Zetia 10 mg daily.  We will repeat a lipid panel in 3 months.  Recent nuclear stress test was negative for reversible ischemia and echocardiogram showed normal systolic and diastolic function.  We will add Ranexa 500 mg b.i.d..    Patient to follow up in 1 month.  If symptoms persist, recommend further evaluation with cardiac catheterization.    Essential hypertension  Blood pressure 136/82.  Continue current medication regimen.  Continue amlodipine 10 mg daily, metoprolol tartrate 50 mg b.i.d., and closely monitor blood pressure at home.  Goal blood pressure <130/80.  Recommend low-sodium heart healthy diet.    Sleep apnea  Patient is noncompliant with CPAP.  Patient was interested in inspire device.  Referral to ENT submitted again as he never heard from their office.    BMI 28.0-28.9,adult  BMI is 27.6.  Continue calorie restricted diet.  Increase activity as tolerated.  Recommend weight loss.          Follow up in about 1 month (around 9/30/2023).

## 2023-08-30 NOTE — ASSESSMENT & PLAN NOTE
Continue aspirin Plavix and statin therapy.  Please restart Zetia 10 mg daily.  We will repeat a lipid panel in 3 months.  Recent nuclear stress test was negative for reversible ischemia and echocardiogram showed normal systolic and diastolic function.  We will add Ranexa 500 mg b.i.d..    Patient to follow up in 1 month.  If symptoms persist, recommend further evaluation with cardiac catheterization.

## 2023-08-30 NOTE — ASSESSMENT & PLAN NOTE
Patient is noncompliant with CPAP.  Patient was interested in inspire device.  Referral to ENT submitted again as he never heard from their office.

## 2023-08-30 NOTE — ASSESSMENT & PLAN NOTE
BMI is 27.6.  Continue calorie restricted diet.  Increase activity as tolerated.  Recommend weight loss.

## 2023-08-30 NOTE — ASSESSMENT & PLAN NOTE
Blood pressure 136/82.  Continue current medication regimen.  Continue amlodipine 10 mg daily, metoprolol tartrate 50 mg b.i.d., and closely monitor blood pressure at home.  Goal blood pressure <130/80.  Recommend low-sodium heart healthy diet.

## 2023-09-14 DIAGNOSIS — E11.9 DIABETES MELLITUS WITHOUT COMPLICATION: Primary | ICD-10-CM

## 2023-09-27 ENCOUNTER — OFFICE VISIT (OUTPATIENT)
Dept: CARDIOLOGY | Facility: CLINIC | Age: 58
End: 2023-09-27
Payer: MEDICAID

## 2023-09-27 VITALS
HEART RATE: 59 BPM | SYSTOLIC BLOOD PRESSURE: 146 MMHG | BODY MASS INDEX: 27.16 KG/M2 | DIASTOLIC BLOOD PRESSURE: 80 MMHG | WEIGHT: 169 LBS | OXYGEN SATURATION: 98 % | HEIGHT: 66 IN

## 2023-09-27 DIAGNOSIS — R07.9 CHEST PAIN, UNSPECIFIED TYPE: ICD-10-CM

## 2023-09-27 DIAGNOSIS — I10 ESSENTIAL HYPERTENSION: Chronic | ICD-10-CM

## 2023-09-27 DIAGNOSIS — I25.10 CORONARY ARTERY DISEASE INVOLVING NATIVE CORONARY ARTERY OF NATIVE HEART WITHOUT ANGINA PECTORIS: Chronic | ICD-10-CM

## 2023-09-27 DIAGNOSIS — G47.30 SLEEP APNEA, UNSPECIFIED TYPE: ICD-10-CM

## 2023-09-27 DIAGNOSIS — N52.9 ERECTILE DYSFUNCTION, UNSPECIFIED ERECTILE DYSFUNCTION TYPE: Primary | ICD-10-CM

## 2023-09-27 PROCEDURE — 99999 PR PBB SHADOW E&M-EST. PATIENT-LVL IV: ICD-10-PCS | Mod: PBBFAC,,,

## 2023-09-27 PROCEDURE — 3079F DIAST BP 80-89 MM HG: CPT | Mod: CPTII,,,

## 2023-09-27 PROCEDURE — 99214 OFFICE O/P EST MOD 30 MIN: CPT | Mod: S$PBB,,,

## 2023-09-27 PROCEDURE — 3008F BODY MASS INDEX DOCD: CPT | Mod: CPTII,,,

## 2023-09-27 PROCEDURE — 3079F PR MOST RECENT DIASTOLIC BLOOD PRESSURE 80-89 MM HG: ICD-10-PCS | Mod: CPTII,,,

## 2023-09-27 PROCEDURE — 99999 PR PBB SHADOW E&M-EST. PATIENT-LVL IV: CPT | Mod: PBBFAC,,,

## 2023-09-27 PROCEDURE — 99214 OFFICE O/P EST MOD 30 MIN: CPT | Mod: PBBFAC,PN

## 2023-09-27 PROCEDURE — 3077F PR MOST RECENT SYSTOLIC BLOOD PRESSURE >= 140 MM HG: ICD-10-PCS | Mod: CPTII,,,

## 2023-09-27 PROCEDURE — 99214 PR OFFICE/OUTPT VISIT, EST, LEVL IV, 30-39 MIN: ICD-10-PCS | Mod: S$PBB,,,

## 2023-09-27 PROCEDURE — 1159F PR MEDICATION LIST DOCUMENTED IN MEDICAL RECORD: ICD-10-PCS | Mod: CPTII,,,

## 2023-09-27 PROCEDURE — 1160F RVW MEDS BY RX/DR IN RCRD: CPT | Mod: CPTII,,,

## 2023-09-27 PROCEDURE — 4010F ACE/ARB THERAPY RXD/TAKEN: CPT | Mod: CPTII,,,

## 2023-09-27 PROCEDURE — 1160F PR REVIEW ALL MEDS BY PRESCRIBER/CLIN PHARMACIST DOCUMENTED: ICD-10-PCS | Mod: CPTII,,,

## 2023-09-27 PROCEDURE — 3008F PR BODY MASS INDEX (BMI) DOCUMENTED: ICD-10-PCS | Mod: CPTII,,,

## 2023-09-27 PROCEDURE — 3077F SYST BP >= 140 MM HG: CPT | Mod: CPTII,,,

## 2023-09-27 PROCEDURE — 4010F PR ACE/ARB THEARPY RXD/TAKEN: ICD-10-PCS | Mod: CPTII,,,

## 2023-09-27 PROCEDURE — 1159F MED LIST DOCD IN RCRD: CPT | Mod: CPTII,,,

## 2023-09-27 RX ORDER — TADALAFIL 5 MG/1
5 TABLET ORAL
Qty: 15 TABLET | Refills: 3 | Status: SHIPPED | OUTPATIENT
Start: 2023-09-27 | End: 2024-02-28 | Stop reason: SDUPTHER

## 2023-09-27 RX ORDER — LOSARTAN POTASSIUM 25 MG/1
25 TABLET ORAL DAILY
Qty: 90 TABLET | Refills: 3 | Status: SHIPPED | OUTPATIENT
Start: 2023-09-27 | End: 2024-01-30 | Stop reason: SDUPTHER

## 2023-09-27 NOTE — PROGRESS NOTES
Subjective:    Patient ID:  Sanjeev Britt is a 58 y.o. male patient here for evaluation Follow-up      History of Present Illness:       Patient was here for a 1 month follow up.  Last appointment he was started on Ranexa for intermittent chest pain with exertion.  Patient denies any further chest discomfort.  Denies shortness of breath, lightheadedness, dizziness, jaw neck or arm pain, edema or bleeding.  Blood pressure mildly elevated today in office 146/80.  Patient was taking his medications as prescribed and watches his sodium intake.  He was very active and is also trying to watch his diet and exercise her weight loss.      Review of patient's allergies indicates:   Allergen Reactions    Levaquin [levofloxacin]        Past Medical History:   Diagnosis Date    Acute coronary syndrome 9/9/2019    Coronary artery disease     Hypertension      Past Surgical History:   Procedure Laterality Date    VASCULAR SURGERY       Social History     Tobacco Use    Smoking status: Never    Smokeless tobacco: Current     Types: Snuff   Substance Use Topics    Alcohol use: Not Currently    Drug use: Never        Review of Systems:    As noted in HPI in addition      REVIEW OF SYSTEMS  CARDIOVASCULAR: No recent chest pain, palpitations, arm, neck, or jaw pain  RESPIRATORY: No recent fever, cough chills, SOB or congestion  : No blood in the urine  GI: No Nausea, vomiting, constipation, diarrhea, blood, or reflux.  MUSCULOSKELETAL: No myalgias  NEURO: No lightheadedness or dizziness  EYES: No Double vision, blurry, vision or headache              Objective        Vitals:    09/27/23 0903   BP: (!) 146/80   Pulse: (!) 59       LIPIDS - LAST 2   Lab Results   Component Value Date    CHOL 201 (H) 09/10/2019    HDL 36 (L) 09/10/2019    LDLCALC 112.6 09/10/2019    TRIG 262 (H) 09/10/2019    CHOLHDL 17.9 (L) 09/10/2019       CBC - LAST 2  Lab Results   Component Value Date    WBC 7.65 08/17/2021    WBC 8.45 08/16/2021    RBC 4.75  08/17/2021    RBC 5.03 08/16/2021    HGB 13.7 (L) 08/17/2021    HGB 14.2 08/16/2021    HCT 40.5 08/17/2021    HCT 43.4 08/16/2021    MCV 85 08/17/2021    MCV 86 08/16/2021    MCH 28.8 08/17/2021    MCH 28.2 08/16/2021    MCHC 33.8 08/17/2021    MCHC 32.7 08/16/2021    RDW 12.6 08/17/2021    RDW 13.0 08/16/2021     08/17/2021     08/16/2021    MPV 11.5 08/17/2021    MPV 11.7 08/16/2021    GRAN 5.9 08/17/2021    GRAN 76.6 (H) 08/17/2021    LYMPH 1.0 08/17/2021    LYMPH 12.7 (L) 08/17/2021    MONO 0.7 08/17/2021    MONO 9.5 08/17/2021    BASO 0.02 08/17/2021    BASO 0.01 08/16/2021    NRBC 0 08/17/2021    NRBC 0 08/16/2021       CHEMISTRY & LIVER FUNCTION - LAST 2  Lab Results   Component Value Date     08/18/2021     08/17/2021     08/17/2021    K 3.4 (L) 08/18/2021    K 3.4 (L) 08/17/2021    K 3.4 (L) 08/17/2021     08/18/2021     08/17/2021     08/17/2021    CO2 27 08/18/2021    CO2 25 08/17/2021    CO2 25 08/17/2021    ANIONGAP 11 08/18/2021    ANIONGAP 12 08/17/2021    ANIONGAP 12 08/17/2021    BUN 25 (H) 08/18/2021    BUN 24 (H) 08/17/2021    BUN 24 (H) 08/17/2021    CREATININE 0.8 08/18/2021    CREATININE 0.8 08/17/2021    CREATININE 0.8 08/17/2021     (H) 08/18/2021     (H) 08/17/2021     (H) 08/17/2021    CALCIUM 9.0 08/18/2021    CALCIUM 9.1 08/17/2021    CALCIUM 9.1 08/17/2021    MG 2.5 08/17/2021    MG 2.5 08/16/2021    ALBUMIN 3.0 (L) 08/18/2021    ALBUMIN 2.9 (L) 08/17/2021    ALBUMIN 2.9 (L) 08/17/2021    PROT 6.6 08/18/2021    PROT 6.8 08/17/2021    PROT 6.8 08/17/2021    ALKPHOS 104 08/18/2021    ALKPHOS 103 08/17/2021    ALKPHOS 103 08/17/2021     (H) 08/18/2021    ALT 59 (H) 08/17/2021    ALT 59 (H) 08/17/2021    AST 67 (H) 08/18/2021    AST 49 (H) 08/17/2021    AST 49 (H) 08/17/2021    BILITOT 0.9 08/18/2021    BILITOT 0.8 08/17/2021    BILITOT 0.8 08/17/2021        CARDIAC PROFILE - LAST 2  Lab Results   Component Value  "Date    BNP 32 09/09/2019     08/16/2021     (H) 08/15/2021     (H) 08/15/2021    TROPONINI 0.033 08/15/2021    TROPONINI <0.030 07/26/2021        COAGULATION - LAST 2  Lab Results   Component Value Date    LABPT 12.8 07/26/2021    INR 1.0 07/26/2021       ENDOCRINE & PSA - LAST 2  No results found for: "HGBA1C", "MICROALBUR", "TSH", "PROCAL", "PSA"     ECHOCARDIOGRAM RESULTS  Results for orders placed during the hospital encounter of 08/14/23    Echo    Interpretation Summary    Left Ventricle: The left ventricle is normal in size. Mildly increased wall thickness. There is concentric remodeling. Normal wall motion. There is normal systolic function. Ejection fraction by visual approximation is 65%. There is normal diastolic function.    Left Atrium: Left atrium is mildly dilated.    Right Ventricle: Normal right ventricular cavity size. Wall thickness is normal. Right ventricle wall motion  is normal. Systolic function is normal.    IVC/SVC: Normal venous pressure at 3 mmHg.      CURRENT/PREVIOUS VISIT EKG  Results for orders placed or performed in visit on 06/21/23   IN OFFICE EKG 12-LEAD (to [place])    Collection Time: 06/21/23  8:30 AM    Narrative    Test Reason : R07.9,R06.02,    Vent. Rate : 061 BPM     Atrial Rate : 061 BPM     P-R Int : 170 ms          QRS Dur : 090 ms      QT Int : 444 ms       P-R-T Axes : 054 062 010 degrees     QTc Int : 446 ms    Normal sinus rhythm  Normal ECG    Confirmed by Mo Levi MD ([id number]) on 7/17/2023 11:06:51 AM    Referred By:             Confirmed By:Mo Levi MD     No valid procedures specified.   Results for orders placed during the hospital encounter of 04/20/23    Nuclear Stress - Cardiology Interpreted    Interpretation Summary    Normal myocardial perfusion scan. There is no evidence of myocardial ischemia or infarction.    There is trivial apical thinning which is a normal variant.    The gated perfusion images showed an " ejection fraction of 69% at rest. The gated perfusion images showed an ejection fraction of 71% post stress. Normal ejection fraction is greater than 53%.    There is normal wall motion at rest and post stress.    LV cavity size is normal at rest and normal at stress.    The ECG portion of the study is negative for ischemia.    The patient reported no chest pain during the stress test.    There were no arrhythmias during stress.    No valid procedures specified.    PHYSICAL EXAM  CONSTITUTIONAL: Well built, well nourished in no apparent distress  NECK: no carotid bruit, no JVD  LUNGS: CTA  CHEST WALL: no tenderness  HEART: regular rate and rhythm, S1, S2 normal, no murmur, click, rub or gallop   ABDOMEN: soft, non-tender; bowel sounds normal;   EXTREMITIES: Extremities normal, no edema,  NEURO: AAO X 3    I HAVE REVIEWED :    The vital signs, nurses notes, and all the pertinent radiology and labs.        Current Outpatient Medications   Medication Instructions    amLODIPine (NORVASC) 10 mg, Oral, Daily    aspirin (ECOTRIN) 81 mg, Oral, Daily    atorvastatin (LIPITOR) 40 mg, Oral, Daily    clopidogreL (PLAVIX) 75 mg, Oral, Daily    ezetimibe (ZETIA) 10 mg, Oral, Daily    losartan (COZAAR) 25 mg, Oral, Daily    metFORMIN (GLUCOPHAGE) 500 mg, Oral, 2 times daily    metoprolol tartrate (LOPRESSOR) 50 mg, Oral, 2 times daily    multivit-min/FA/lycopen/lutein (CENTRUM SILVER MEN ORAL) 1 tablet, Oral, Daily    potassium chloride (MICRO-K) 10 MEQ CpSR 10 mEq, Oral, Daily    ranolazine (RANEXA) 500 mg, Oral, 2 times daily    tadalafiL (CIALIS) 5 mg, Oral, Every 48 hours PRN          Assessment & Plan     CAD (coronary artery disease) status post PCI  Continue aspirin Plavix and statin therapy.  Continue Zetia 10 mg daily. Recent nuclear stress test was negative for reversible ischemia and echocardiogram showed normal systolic and diastolic function.  Cont Ranexa 500 mg b.i.d.. No further CP since adding Ranexa.    Essential  hypertension  /80. Add losartan 25 mg daily.  Continue current medication regimen.  Continue amlodipine 10 mg daily, metoprolol tartrate 50 mg b.i.d., and closely monitor blood pressure at home.  Goal blood pressure <130/80.  Recommend low-sodium heart healthy diet.    Chest pain  Denies.  Continue current medication regimen.  Continue risk factor modification.  Continue aspirin, Plavix, statin therapy.  Low-sodium heart healthy diet.    Sleep apnea  Patient has an appointment set up with ENT in the next few weeks to discuss inspire implant.    BMI 28.0-28.9,adult  BMI 27.28.  Continue diet and exercise.  Calorie restricted diet.  Increase activity as tolerated.    Erectile dysfunction  Patient complains of ED.  We will add low dose Cialis 5 mg p.r.n. discussed with patient that he was not take any nitroglycerin if he is on this medication.  If he should have any CP, he should seek further evaluation in the ED.           No follow-ups on file.

## 2023-09-28 NOTE — ASSESSMENT & PLAN NOTE
Denies.  Continue current medication regimen.  Continue risk factor modification.  Continue aspirin, Plavix, statin therapy.  Low-sodium heart healthy diet.

## 2023-09-28 NOTE — ASSESSMENT & PLAN NOTE
/80. Add losartan 25 mg daily.  Continue current medication regimen.  Continue amlodipine 10 mg daily, metoprolol tartrate 50 mg b.i.d., and closely monitor blood pressure at home.  Goal blood pressure <130/80.  Recommend low-sodium heart healthy diet.

## 2023-09-28 NOTE — ASSESSMENT & PLAN NOTE
Continue aspirin Plavix and statin therapy.  Continue Zetia 10 mg daily. Recent nuclear stress test was negative for reversible ischemia and echocardiogram showed normal systolic and diastolic function.  Cont Ranexa 500 mg b.i.d.. No further CP since adding Ranexa.   Eucrisa Counseling: Patient may experience a mild burning sensation during topical application. Eucrisa is not approved in children less than 2 years of age.

## 2023-09-28 NOTE — ASSESSMENT & PLAN NOTE
BMI 27.28.  Continue diet and exercise.  Calorie restricted diet.  Increase activity as tolerated.

## 2023-09-28 NOTE — ASSESSMENT & PLAN NOTE
Patient complains of ED.  We will add low dose Cialis 5 mg p.r.n. discussed with patient that he was not take any nitroglycerin if he is on this medication.  If he should have any CP, he should seek further evaluation in the ED.

## 2023-09-29 ENCOUNTER — TELEPHONE (OUTPATIENT)
Dept: CARDIOLOGY | Facility: CLINIC | Age: 58
End: 2023-09-29
Payer: MEDICAID

## 2023-09-29 NOTE — TELEPHONE ENCOUNTER
----- Message from Jose Russell sent at 9/29/2023  2:29 PM CDT -----  Contact: pt at [phone]  Type:  Patient Returning Call    Who Called:  pt  Who Left Message for Patient:  Katt  Does the patient know what this is regarding?:  yes  Best Call Back Number:  [phone]  Additional Information:  pt is calling the office to return a call back to Katt.

## 2023-09-29 NOTE — TELEPHONE ENCOUNTER
----- Message from Yoselin Reilly, Patient Care Assistant sent at 9/29/2023 12:47 PM CDT -----  Regarding: advice  Contact: pt  Type: Needs Medical Advice    Who Called:  pt     Best Call Back Number: [phone] (home)     Additional Information: pt states she would like a callback regarding an referral. Please call pt to advise. Thanks!

## 2023-09-29 NOTE — TELEPHONE ENCOUNTER
Patient stated that the office called him to schedule appt and he needed to call back later to schedule they gave him a number and when he called back they stated that they do not get the referral

## 2023-10-13 DIAGNOSIS — I25.10 CORONARY ARTERY DISEASE INVOLVING NATIVE HEART, UNSPECIFIED VESSEL OR LESION TYPE, UNSPECIFIED WHETHER ANGINA PRESENT: Primary | ICD-10-CM

## 2023-10-13 DIAGNOSIS — I25.10 CORONARY ARTERY DISEASE, UNSPECIFIED VESSEL OR LESION TYPE, UNSPECIFIED WHETHER ANGINA PRESENT, UNSPECIFIED WHETHER NATIVE OR TRANSPLANTED HEART: ICD-10-CM

## 2023-10-13 NOTE — TELEPHONE ENCOUNTER
----- Message from Yoselin Reilly, Patient Care Assistant sent at 10/13/2023  1:49 PM CDT -----  Regarding: refill  Contact: pt  Type:  RX Refill Request    Who Called:  pt   Refill or New Rx:  refill   RX Name and Strength:  atorvastatin (LIPITOR) 40 MG tablet  potassium chloride (MICRO-K) 10 MEQ CpSR    How is the patient currently taking it? 1xday   Is this a 30 day or 90 day RX:  90    Preferred Pharmacy with phone number:    [id number] - [place] - [address]  [address] [address]  Phone: [phone] Fax: [phone]    Local or Mail Order:  local   Ordering Provider:  Miguel Duron Call Back Number:  [phone] (home)     Additional Information:  please call pt to advise. Thanks!

## 2023-10-16 DIAGNOSIS — J32.9 CHRONIC SINUSITIS, UNSPECIFIED: ICD-10-CM

## 2023-10-16 DIAGNOSIS — J32.9 CHRONIC INFECTION OF SINUS: Primary | ICD-10-CM

## 2023-10-17 RX ORDER — POTASSIUM CHLORIDE 750 MG/1
10 CAPSULE, EXTENDED RELEASE ORAL DAILY
Qty: 30 CAPSULE | Refills: 6 | Status: SHIPPED | OUTPATIENT
Start: 2023-10-17

## 2023-10-17 RX ORDER — ATORVASTATIN CALCIUM 40 MG/1
40 TABLET, FILM COATED ORAL DAILY
Qty: 90 TABLET | Refills: 3 | Status: SHIPPED | OUTPATIENT
Start: 2023-10-17 | End: 2024-01-30 | Stop reason: SDUPTHER

## 2023-10-27 ENCOUNTER — HOSPITAL ENCOUNTER (OUTPATIENT)
Dept: RADIOLOGY | Facility: HOSPITAL | Age: 58
Discharge: HOME | End: 2023-10-27
Attending: OTOLARYNGOLOGY
Payer: MEDICAID

## 2023-10-27 DIAGNOSIS — J32.9 CHRONIC SINUSITIS, UNSPECIFIED: ICD-10-CM

## 2023-10-27 PROCEDURE — 70486 CT MAXILLOFACIAL W/O DYE: CPT | Mod: TC,PO

## 2024-01-22 ENCOUNTER — LAB VISIT (OUTPATIENT)
Dept: LAB | Facility: HOSPITAL | Age: 59
End: 2024-01-22
Payer: MEDICAID

## 2024-01-22 DIAGNOSIS — I25.10 CORONARY ARTERY DISEASE, UNSPECIFIED VESSEL OR LESION TYPE, UNSPECIFIED WHETHER ANGINA PRESENT, UNSPECIFIED WHETHER NATIVE OR TRANSPLANTED HEART: Chronic | ICD-10-CM

## 2024-01-22 DIAGNOSIS — R53.83 FATIGUE, UNSPECIFIED TYPE: ICD-10-CM

## 2024-01-22 LAB
ALBUMIN SERPL BCP-MCNC: 4.6 G/DL (ref 3.5–5.2)
ALP SERPL-CCNC: 64 U/L (ref 55–135)
ALT SERPL W/O P-5'-P-CCNC: 14 U/L (ref 10–44)
ANION GAP SERPL CALC-SCNC: 9 MMOL/L (ref 8–16)
AST SERPL-CCNC: 10 U/L (ref 10–40)
BASOPHILS # BLD AUTO: 0.05 K/UL (ref 0–0.2)
BASOPHILS NFR BLD: 0.7 % (ref 0–1.9)
BILIRUB SERPL-MCNC: 0.7 MG/DL (ref 0.1–1)
BUN SERPL-MCNC: 15 MG/DL (ref 6–20)
CALCIUM SERPL-MCNC: 9.2 MG/DL (ref 8.7–10.5)
CHLORIDE SERPL-SCNC: 107 MMOL/L (ref 95–110)
CHOLEST SERPL-MCNC: 164 MG/DL (ref 120–199)
CHOLEST/HDLC SERPL: 3 {RATIO} (ref 2–5)
CO2 SERPL-SCNC: 27 MMOL/L (ref 23–29)
CREAT SERPL-MCNC: 0.8 MG/DL (ref 0.5–1.4)
DIFFERENTIAL METHOD BLD: ABNORMAL
EOSINOPHIL # BLD AUTO: 0.2 K/UL (ref 0–0.5)
EOSINOPHIL NFR BLD: 2.3 % (ref 0–8)
ERYTHROCYTE [DISTWIDTH] IN BLOOD BY AUTOMATED COUNT: 13.5 % (ref 11.5–14.5)
EST. GFR  (NO RACE VARIABLE): >60 ML/MIN/1.73 M^2
ESTIMATED AVG GLUCOSE: 128 MG/DL (ref 68–131)
GLUCOSE SERPL-MCNC: 144 MG/DL (ref 70–110)
HBA1C MFR BLD: 6.1 % (ref 4.5–6.2)
HCT VFR BLD AUTO: 45.3 % (ref 40–54)
HDLC SERPL-MCNC: 55 MG/DL (ref 40–75)
HDLC SERPL: 33.5 % (ref 20–50)
HGB BLD-MCNC: 15.2 G/DL (ref 14–18)
IMM GRANULOCYTES # BLD AUTO: 0.06 K/UL (ref 0–0.04)
IMM GRANULOCYTES NFR BLD AUTO: 0.8 % (ref 0–0.5)
LDLC SERPL CALC-MCNC: 98.2 MG/DL (ref 63–159)
LYMPHOCYTES # BLD AUTO: 2 K/UL (ref 1–4.8)
LYMPHOCYTES NFR BLD: 27.3 % (ref 18–48)
MAGNESIUM SERPL-MCNC: 2 MG/DL (ref 1.6–2.6)
MCH RBC QN AUTO: 29.6 PG (ref 27–31)
MCHC RBC AUTO-ENTMCNC: 33.6 G/DL (ref 32–36)
MCV RBC AUTO: 88 FL (ref 82–98)
MONOCYTES # BLD AUTO: 0.8 K/UL (ref 0.3–1)
MONOCYTES NFR BLD: 10.9 % (ref 4–15)
NEUTROPHILS # BLD AUTO: 4.3 K/UL (ref 1.8–7.7)
NEUTROPHILS NFR BLD: 58 % (ref 38–73)
NONHDLC SERPL-MCNC: 109 MG/DL
NRBC BLD-RTO: 0 /100 WBC
PLATELET # BLD AUTO: 275 K/UL (ref 150–450)
PMV BLD AUTO: 10.7 FL (ref 9.2–12.9)
POTASSIUM SERPL-SCNC: 3.5 MMOL/L (ref 3.5–5.1)
PROT SERPL-MCNC: 6.9 G/DL (ref 6–8.4)
RBC # BLD AUTO: 5.14 M/UL (ref 4.6–6.2)
SODIUM SERPL-SCNC: 143 MMOL/L (ref 136–145)
T4 FREE SERPL-MCNC: 0.9 NG/DL (ref 0.71–1.51)
TRIGL SERPL-MCNC: 54 MG/DL (ref 30–150)
TSH SERPL DL<=0.005 MIU/L-ACNC: 1.9 UIU/ML (ref 0.34–5.6)
WBC # BLD AUTO: 7.4 K/UL (ref 3.9–12.7)

## 2024-01-22 PROCEDURE — 36415 COLL VENOUS BLD VENIPUNCTURE: CPT

## 2024-01-22 PROCEDURE — 84439 ASSAY OF FREE THYROXINE: CPT

## 2024-01-22 PROCEDURE — 80053 COMPREHEN METABOLIC PANEL: CPT

## 2024-01-22 PROCEDURE — 80061 LIPID PANEL: CPT

## 2024-01-22 PROCEDURE — 83735 ASSAY OF MAGNESIUM: CPT

## 2024-01-22 PROCEDURE — 84443 ASSAY THYROID STIM HORMONE: CPT

## 2024-01-22 PROCEDURE — 85025 COMPLETE CBC W/AUTO DIFF WBC: CPT

## 2024-01-22 PROCEDURE — 83036 HEMOGLOBIN GLYCOSYLATED A1C: CPT

## 2024-01-30 ENCOUNTER — OFFICE VISIT (OUTPATIENT)
Dept: CARDIOLOGY | Facility: CLINIC | Age: 59
End: 2024-01-30
Payer: MEDICAID

## 2024-01-30 VITALS
HEART RATE: 64 BPM | OXYGEN SATURATION: 98 % | WEIGHT: 161.19 LBS | SYSTOLIC BLOOD PRESSURE: 126 MMHG | BODY MASS INDEX: 26.01 KG/M2 | DIASTOLIC BLOOD PRESSURE: 85 MMHG

## 2024-01-30 DIAGNOSIS — G47.33 OSA (OBSTRUCTIVE SLEEP APNEA): ICD-10-CM

## 2024-01-30 DIAGNOSIS — R73.03 PREDIABETES: ICD-10-CM

## 2024-01-30 DIAGNOSIS — J34.2 DEVIATED SEPTUM: ICD-10-CM

## 2024-01-30 DIAGNOSIS — R06.09 DOE (DYSPNEA ON EXERTION): ICD-10-CM

## 2024-01-30 DIAGNOSIS — I25.10 CORONARY ARTERY DISEASE INVOLVING NATIVE HEART, UNSPECIFIED VESSEL OR LESION TYPE, UNSPECIFIED WHETHER ANGINA PRESENT: Primary | ICD-10-CM

## 2024-01-30 DIAGNOSIS — R53.83 FATIGUE, UNSPECIFIED TYPE: ICD-10-CM

## 2024-01-30 DIAGNOSIS — R79.89 LOW TESTOSTERONE: ICD-10-CM

## 2024-01-30 DIAGNOSIS — Z01.818 PRE-OPERATIVE CLEARANCE: ICD-10-CM

## 2024-01-30 DIAGNOSIS — N52.9 ERECTILE DYSFUNCTION, UNSPECIFIED ERECTILE DYSFUNCTION TYPE: ICD-10-CM

## 2024-01-30 PROCEDURE — 1159F MED LIST DOCD IN RCRD: CPT | Mod: CPTII,,, | Performed by: GENERAL PRACTICE

## 2024-01-30 PROCEDURE — 99213 OFFICE O/P EST LOW 20 MIN: CPT | Mod: PBBFAC,PN | Performed by: GENERAL PRACTICE

## 2024-01-30 PROCEDURE — 3044F HG A1C LEVEL LT 7.0%: CPT | Mod: CPTII,,, | Performed by: GENERAL PRACTICE

## 2024-01-30 PROCEDURE — 3074F SYST BP LT 130 MM HG: CPT | Mod: CPTII,,, | Performed by: GENERAL PRACTICE

## 2024-01-30 PROCEDURE — 99999 PR PBB SHADOW E&M-EST. PATIENT-LVL III: CPT | Mod: PBBFAC,,, | Performed by: GENERAL PRACTICE

## 2024-01-30 PROCEDURE — 99214 OFFICE O/P EST MOD 30 MIN: CPT | Mod: S$PBB,,, | Performed by: GENERAL PRACTICE

## 2024-01-30 PROCEDURE — 4010F ACE/ARB THERAPY RXD/TAKEN: CPT | Mod: CPTII,,, | Performed by: GENERAL PRACTICE

## 2024-01-30 PROCEDURE — 3008F BODY MASS INDEX DOCD: CPT | Mod: CPTII,,, | Performed by: GENERAL PRACTICE

## 2024-01-30 PROCEDURE — 3079F DIAST BP 80-89 MM HG: CPT | Mod: CPTII,,, | Performed by: GENERAL PRACTICE

## 2024-01-30 RX ORDER — ATORVASTATIN CALCIUM 40 MG/1
40 TABLET, FILM COATED ORAL DAILY
Qty: 90 TABLET | Refills: 3 | Status: SHIPPED | OUTPATIENT
Start: 2024-01-30 | End: 2024-01-30 | Stop reason: SDUPTHER

## 2024-01-30 RX ORDER — GABAPENTIN 300 MG/1
1 CAPSULE ORAL 3 TIMES DAILY
COMMUNITY
Start: 2023-07-17

## 2024-01-30 RX ORDER — LOSARTAN POTASSIUM 25 MG/1
25 TABLET ORAL DAILY
Qty: 90 TABLET | Refills: 3 | Status: SHIPPED | OUTPATIENT
Start: 2024-01-30 | End: 2025-01-29

## 2024-01-30 RX ORDER — AZELASTINE 1 MG/ML
2 SPRAY, METERED NASAL 2 TIMES DAILY
COMMUNITY

## 2024-01-30 RX ORDER — CLOPIDOGREL BISULFATE 75 MG/1
75 TABLET ORAL DAILY
Qty: 90 TABLET | Refills: 3 | Status: SHIPPED | OUTPATIENT
Start: 2024-01-30

## 2024-01-30 RX ORDER — ATORVASTATIN CALCIUM 40 MG/1
80 TABLET, FILM COATED ORAL DAILY
Qty: 90 TABLET | Refills: 3 | Status: SHIPPED | OUTPATIENT
Start: 2024-01-30 | End: 2025-01-29

## 2024-01-30 RX ORDER — EZETIMIBE 10 MG/1
10 TABLET ORAL DAILY
Qty: 90 TABLET | Refills: 3 | Status: SHIPPED | OUTPATIENT
Start: 2024-01-30

## 2024-01-30 RX ORDER — AMLODIPINE BESYLATE 10 MG/1
10 TABLET ORAL DAILY
Qty: 90 TABLET | Refills: 3 | Status: SHIPPED | OUTPATIENT
Start: 2024-01-30

## 2024-01-30 RX ORDER — HYDROXYZINE HYDROCHLORIDE 50 MG/1
50 TABLET, FILM COATED ORAL DAILY PRN
COMMUNITY
Start: 2024-01-02

## 2024-01-30 RX ORDER — ALBUTEROL SULFATE 90 UG/1
AEROSOL, METERED RESPIRATORY (INHALATION)
COMMUNITY

## 2024-01-30 RX ORDER — METOPROLOL TARTRATE 50 MG/1
50 TABLET ORAL 2 TIMES DAILY
Qty: 90 TABLET | Refills: 3 | Status: SHIPPED | OUTPATIENT
Start: 2024-01-30

## 2024-01-30 NOTE — PROGRESS NOTES
Subjective:    Patient ID:  Sanjeev Britt is a 58 y.o. male who presents for follow-up of   Chief Complaint   Patient presents with    Coronary Artery Disease    Hypertension       HPI:      Six-month follow-up.  He is still working long hours he has not have any significant chest pain or shortness of breath.  He can walk indefinitely.  He is complaining of fatigue goes to bed early in the day.  He has sleep apnea and can not tolerate the mask.  He has a deviated septum is most likely going to have septal surgery by ENT.  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  He is out of his sleeping pills but is not sure which 1 he is taking.  His blood pressure is well regulated.  His medications were reviewed  His blood sugar was 144 on the lab test last week.  His LDL cholesterol is 98.  He is very strict with his diet except occasionally.  He did lose weight, 20 lb 3 months      9/27/23    Patient was here for a 1 month follow up.  Last appointment he was started on Ranexa for intermittent chest pain with exertion.  Patient denies any further chest discomfort.  Denies shortness of breath, lightheadedness, dizziness, jaw neck or arm pain, edema or bleeding.  Blood pressure mildly elevated today in office 146/80.  Patient was taking his medications as prescribed and watches his sodium intake.  He was very active and is also trying to watch his diet and exercise her weight loss.      CAD (coronary artery disease) status post PCI  Continue aspirin Plavix and statin therapy.  Continue Zetia 10 mg daily. Recent nuclear stress test was negative for reversible ischemia and echocardiogram showed normal systolic and diastolic function.  Cont Ranexa 500 mg b.i.d.. No further CP since adding Ranexa.     Essential hypertension  /80. Add losartan 25 mg daily.  Continue current medication regimen.  Continue amlodipine 10 mg daily, metoprolol tartrate 50 mg b.i.d., and closely monitor blood  pressure at home.  Goal blood pressure <130/80.  Recommend low-sodium heart healthy diet.     Chest pain  Denies.  Continue current medication regimen.  Continue risk factor modification.  Continue aspirin, Plavix, statin therapy.  Low-sodium heart healthy diet.     Sleep apnea  Patient has an appointment set up with ENT in the next few weeks to discuss inspire implant.     BMI 28.0-28.9,adult  BMI 27.28.  Continue diet and exercise.  Calorie restricted diet.  Increase activity as tolerated.     Erectile dysfunction  Patient complains of ED.  We will add low dose Cialis 5 mg p.r.n. discussed with patient that he was not take any nitroglycerin if he is on this medication.  If he should have any CP, he should seek further evaluation in the ED.     Review of patient's allergies indicates:   Allergen Reactions    Levaquin [levofloxacin]        Past Medical History:   Diagnosis Date    Acute coronary syndrome 9/9/2019    Coronary artery disease     Hypertension      Past Surgical History:   Procedure Laterality Date    VASCULAR SURGERY       Social History     Tobacco Use    Smoking status: Never    Smokeless tobacco: Current     Types: Snuff   Substance Use Topics    Alcohol use: Not Currently    Drug use: Never     Family History   Problem Relation Age of Onset    Cancer Father         Review of Systems:   Constitution: Negative for diaphoresis and fever.   HEENT: Negative for nosebleeds.    Cardiovascular: Negative for chest pain       No dyspnea on exertion       No leg swelling        No palpitations  Respiratory: Negative for shortness of breath and wheezing.    Hematologic/Lymphatic: Negative for bleeding problem. Does not bruise/bleed easily.   Skin: Negative for color change and rash.   Musculoskeletal: Negative for falls and myalgias.   Gastrointestinal: Negative for hematemesis and hematochezia.   Genitourinary: Negative for hematuria.   Neurological: Negative for dizziness and light-headedness.    Psychiatric/Behavioral: Negative for altered mental status and memory loss.          Objective:        Vitals:    01/30/24 1333   BP: 126/85   Pulse: 64       Lab Results   Component Value Date    WBC 7.40 01/22/2024    HGB 15.2 01/22/2024    HCT 45.3 01/22/2024     01/22/2024    CHOL 164 01/22/2024    TRIG 54 01/22/2024    HDL 55 01/22/2024    ALT 14 01/22/2024    AST 10 01/22/2024     01/22/2024    K 3.5 01/22/2024     01/22/2024    CREATININE 0.8 01/22/2024    BUN 15 01/22/2024    CO2 27 01/22/2024    TSH 1.899 01/22/2024    INR 1.0 07/26/2021    HGBA1C 6.1 01/22/2024        ECHOCARDIOGRAM RESULTS  Results for orders placed during the hospital encounter of 08/14/23    Echo    Interpretation Summary    Left Ventricle: The left ventricle is normal in size. Mildly increased wall thickness. There is concentric remodeling. Normal wall motion. There is normal systolic function. Ejection fraction by visual approximation is 65%. There is normal diastolic function.    Left Atrium: Left atrium is mildly dilated.    Right Ventricle: Normal right ventricular cavity size. Wall thickness is normal. Right ventricle wall motion  is normal. Systolic function is normal.    IVC/SVC: Normal venous pressure at 3 mmHg.        CURRENT/PREVIOUS VISIT EKG  Results for orders placed or performed in visit on 06/21/23   IN OFFICE EKG 12-LEAD (to [facility])    Collection Time: 06/21/23  8:30 AM    Narrative    Test Reason : R07.9,R06.02,    Vent. Rate : 061 BPM     Atrial Rate : 061 BPM     P-R Int : 170 ms          QRS Dur : 090 ms      QT Int : 444 ms       P-R-T Axes : 054 062 010 degrees     QTc Int : 446 ms    Normal sinus rhythm  Normal ECG    Confirmed by Mo Levi MD ([id number]) on 7/17/2023 11:06:51 AM    Referred By:             Confirmed By:Mo Levi MD     No valid procedures specified.   Results for orders placed during the hospital encounter of 04/20/23    Nuclear Stress - Cardiology  Interpreted    Interpretation Summary    Normal myocardial perfusion scan. There is no evidence of myocardial ischemia or infarction.    There is trivial apical thinning which is a normal variant.    The gated perfusion images showed an ejection fraction of 69% at rest. The gated perfusion images showed an ejection fraction of 71% post stress. Normal ejection fraction is greater than 53%.    There is normal wall motion at rest and post stress.    LV cavity size is normal at rest and normal at stress.    The ECG portion of the study is negative for ischemia.    The patient reported no chest pain during the stress test.    There were no arrhythmias during stress.      Physical Exam:  CONSTITUTIONAL: No fever, no chills  HEENT: Normocephalic, atraumatic,pupils reactive to light                 NECK:  No JVD no carotid bruit  CVS: S1S2+, RRR, no murmurs,   LUNGS: Clear  ABDOMEN: Soft, NT, BS+  EXTREMITIES: No cyanosis, edema  : No ricks catheter  NEURO: AAO X 3  PSY: Normal affect      Medication List with Changes/Refills   Current Medications    ALBUTEROL (PROVENTIL/VENTOLIN HFA) 90 MCG/ACTUATION INHALER    Inhale 2 puffs every 4 hours by inhalation route.    ASPIRIN (ECOTRIN) 81 MG EC TABLET    Take 81 mg by mouth once daily.    AZELASTINE (ASTELIN) 137 MCG (0.1 %) NASAL SPRAY    2 sprays by Each Nostril route 2 (two) times daily.    GABAPENTIN (NEURONTIN) 300 MG CAPSULE    Take 1 capsule by mouth 3 (three) times daily.    HYDROXYZINE (ATARAX) 50 MG TABLET    Take 50 mg by mouth daily as needed.    METFORMIN (GLUCOPHAGE) 500 MG TABLET    Take 500 mg by mouth daily with breakfast.    MULTIVIT-MIN/FA/LYCOPEN/LUTEIN (CENTRUM SILVER MEN ORAL)    Take 1 tablet by mouth once daily.    POTASSIUM CHLORIDE (MICRO-K) 10 MEQ CPSR    Take 1 capsule (10 mEq total) by mouth once daily.    TADALAFIL (CIALIS) 5 MG TABLET    Take 1 tablet (5 mg total) by mouth every 48 hours as needed for Erectile Dysfunction.   Changed and/or  4 y/o F w/ PMH of dementia, paroxysmal afib, HLD, anxiety, smoking history / COPD on 02, JOSHUA chest mass/ hilar mass, left supraclavicular node: biopsy small cell cancer, received  a trial of therapy/ can start CARBO ETOPOSIDE x 1 on 10/1 and neuopogen, was readmitted after O2 company failed to deliver o2 on time and her portable o2 was getting low she pannicked and return; she had no cp, sob, cough    General Appearance: cooperative, no acute distress,     HEENT: PERRL, conjunctiva clear, EOM's intact, non injected pharynx, no exudate, TM     normal    Neck: Supple, , no adenopathy, thyroid: not enlarged, no carotid bruit or JVD    Back: Symmetric, no  tenderness,no soft tissue tenderness    Lungs: hyperresonnat to percussion, mild end exp wheeze, scattered lower lobe rhonchi    Heart: Regular rate and rhythm, S1, S2 normal, no murmur, rub or gallop    Abdomen: Soft, non-tender, bowel sounds active , no hepatosplenomegaly    Extremities: no cyanosis or edema, no joint swelling    Skin: Skin color, texture normal, no rashes     Neurologic: Alert and oriented X3 , cranial nerves intact, sensory and motor normal,    1) Small Cell Lung Cancer    2) COPD (Severe, non-compliant with outpatient visits)    3) Pleural Effusion    4) Abnormal CT Chest    5)post obstructive pneumonia    6)No PE on CTA / on AC for Afib    7)s/p left sided IR thoracentesis, 160 cc removed, PF analysis noted        there were no active med issues for her admission all set up now; I will rx augmentin no steroids- no wheezing and she will fup as outpt with Dr. Tay Refilled Medications    Modified Medication Previous Medication    AMLODIPINE (NORVASC) 10 MG TABLET amLODIPine (NORVASC) 10 MG tablet       Take 1 tablet (10 mg total) by mouth once daily.    Take 1 tablet (10 mg total) by mouth once daily.    ATORVASTATIN (LIPITOR) 40 MG TABLET atorvastatin (LIPITOR) 40 MG tablet       Take 2 tablets (80 mg total) by mouth once daily.    Take 1 tablet (40 mg total) by mouth once daily.    CLOPIDOGREL (PLAVIX) 75 MG TABLET clopidogrel (PLAVIX) 75 mg tablet       Take 1 tablet (75 mg total) by mouth once daily.    Take 75 mg by mouth once daily.    EZETIMIBE (ZETIA) 10 MG TABLET ezetimibe (ZETIA) 10 mg tablet       Take 1 tablet (10 mg total) by mouth once daily.    Take 1 tablet (10 mg total) by mouth once daily.    LOSARTAN (COZAAR) 25 MG TABLET losartan (COZAAR) 25 MG tablet       Take 1 tablet (25 mg total) by mouth once daily.    Take 1 tablet (25 mg total) by mouth once daily.    METOPROLOL TARTRATE (LOPRESSOR) 50 MG TABLET metoprolol tartrate (LOPRESSOR) 50 MG tablet       Take 1 tablet (50 mg total) by mouth 2 (two) times daily.    Take 50 mg by mouth 2 (two) times daily.   Discontinued Medications    RANOLAZINE (RANEXA) 500 MG TB12    Take 1 tablet (500 mg total) by mouth 2 (two) times daily.             Assessment:       1. Coronary artery disease involving native heart, unspecified vessel or lesion type, unspecified whether angina present    2. Fatigue, unspecified type    3. Deviated septum    4. JUDY (obstructive sleep apnea)    5. SUGGS (dyspnea on exertion)    6. Low testosterone    7. Erectile dysfunction, unspecified erectile dysfunction type    8. Pre-operative clearance    9. Prediabetes         Plan:     Problem List Items Addressed This Visit          Cardiac/Vascular    CAD (coronary artery disease) status post PCI - Primary (Chronic)    Relevant Medications    atorvastatin (LIPITOR) 40 MG tablet    Other Relevant Orders    TESTOSTERONE PANEL       Renal/     Erectile dysfunction       Other    Fatigue     Other Visit Diagnoses       Deviated septum        JUDY (obstructive sleep apnea)        Relevant Orders    TESTOSTERONE PANEL    SUGGS (dyspnea on exertion)        Relevant Orders    TESTOSTERONE PANEL    Low testosterone        Pre-operative clearance        Prediabetes                  He is okay for the of possible surgery of the deviated septum.  His symptoms no seem to be changed by the Ranexa and he is seems to be functionally at his baseline and so I would do see the Ranexa  His LDL cholesterol is mildly above the goal of 55 so I will increase the Lipitor.  Testosterone panel was ordered because of fatigue.  Probably much of his fatigue comes from the sleep apnea currently untreated and hopefully will get better with the septal surgery.      Follow up in about 6 months (around 7/30/2024).    The patients questions were answered, they verbalized understanding, and agreed with the treatment plan.     MANDY MARTIN MD  [facility]

## 2024-02-07 ENCOUNTER — PATIENT MESSAGE (OUTPATIENT)
Dept: CARDIOLOGY | Facility: CLINIC | Age: 59
End: 2024-02-07
Payer: MEDICAID

## 2024-02-23 ENCOUNTER — TELEPHONE (OUTPATIENT)
Dept: CARDIOLOGY | Facility: CLINIC | Age: 59
End: 2024-02-23
Payer: MEDICAID

## 2024-02-28 DIAGNOSIS — N52.01 ERECTILE DYSFUNCTION DUE TO ARTERIAL INSUFFICIENCY: Primary | ICD-10-CM

## 2024-02-28 RX ORDER — TADALAFIL 5 MG/1
5 TABLET ORAL
Qty: 15 TABLET | Refills: 3 | Status: SHIPPED | OUTPATIENT
Start: 2024-02-28 | End: 2025-02-27

## 2024-02-28 NOTE — TELEPHONE ENCOUNTER
----- Message from Berenice Quick sent at 2/28/2024  3:20 PM CST -----  Contact: Self  Type:  RX Refill Request    Who Called:  Patient  Refill or New Rx:  New rx  RX Name and Strength:  tadalafiL (CIALIS) 5 MG tablet  How is the patient currently taking it? (ex. 1XDay):  AS Directed  Is this a 30 day or 90 day RX:  90  Preferred Pharmacy with phone number:    [id number] - [address] [facility] [address]  [address]  [place] [address]  Phone: [phone] Fax: [phone]  Local or Mail Order:  Local  Ordering Provider:  Rachell Rivera NP  Best Call Back Number:  [phone]  Additional Information:  Thank You

## 2024-03-08 ENCOUNTER — TELEPHONE (OUTPATIENT)
Dept: CARDIOLOGY | Facility: CLINIC | Age: 59
End: 2024-03-08
Payer: MEDICAID

## 2024-03-12 ENCOUNTER — TELEPHONE (OUTPATIENT)
Dept: CARDIOLOGY | Facility: CLINIC | Age: 59
End: 2024-03-12
Payer: MEDICAID

## 2024-04-29 DIAGNOSIS — E11.9 TYPE 2 DIABETES MELLITUS WITHOUT COMPLICATIONS: Primary | ICD-10-CM

## 2024-06-05 DIAGNOSIS — Z01.810 PREOP CARDIOVASCULAR EXAM: Primary | ICD-10-CM

## 2024-06-06 ENCOUNTER — HOSPITAL ENCOUNTER (OUTPATIENT)
Dept: CARDIOLOGY | Facility: HOSPITAL | Age: 59
Discharge: HOME | End: 2024-06-06
Attending: ANESTHESIOLOGY
Payer: MEDICAID

## 2024-06-06 ENCOUNTER — ANESTHESIA EVENT (OUTPATIENT)
Dept: SURGERY | Facility: HOSPITAL | Age: 59
End: 2024-06-06
Payer: MEDICAID

## 2024-06-06 DIAGNOSIS — Z01.810 PREOP CARDIOVASCULAR EXAM: ICD-10-CM

## 2024-06-06 PROCEDURE — 93005 ELECTROCARDIOGRAM TRACING: CPT

## 2024-06-06 PROCEDURE — 93010 ELECTROCARDIOGRAM REPORT: CPT | Mod: ,,, | Performed by: GENERAL PRACTICE

## 2024-06-07 ENCOUNTER — HOSPITAL ENCOUNTER (OUTPATIENT)
Facility: HOSPITAL | Age: 59
Discharge: HOME | End: 2024-06-07
Attending: OTOLARYNGOLOGY | Admitting: OTOLARYNGOLOGY
Payer: MEDICAID

## 2024-06-07 ENCOUNTER — ANESTHESIA (OUTPATIENT)
Dept: SURGERY | Facility: HOSPITAL | Age: 59
End: 2024-06-07
Payer: MEDICAID

## 2024-06-07 DIAGNOSIS — J32.4 CHRONIC PANSINUSITIS: ICD-10-CM

## 2024-06-07 DIAGNOSIS — J34.2 DEVIATED NASAL SEPTUM: Chronic | ICD-10-CM

## 2024-06-07 DIAGNOSIS — G47.33 OBSTRUCTIVE SLEEP APNEA SYNDROME: ICD-10-CM

## 2024-06-07 DIAGNOSIS — J34.3 HYPERTROPHY OF NASAL TURBINATES: Chronic | ICD-10-CM

## 2024-06-07 DIAGNOSIS — J34.89 NASAL OBSTRUCTION: Primary | Chronic | ICD-10-CM

## 2024-06-07 DIAGNOSIS — J01.41 ACUTE RECURRENT PANSINUSITIS: ICD-10-CM

## 2024-06-07 PROBLEM — J01.40 ACUTE PANSINUSITIS: Chronic | Status: ACTIVE | Noted: 2024-06-07

## 2024-06-07 LAB — POCT GLUCOSE: 132 MG/DL (ref 70–110)

## 2024-06-07 PROCEDURE — 36000711: Performed by: OTOLARYNGOLOGY

## 2024-06-07 PROCEDURE — 25000003 PHARM REV CODE 250: Performed by: OTOLARYNGOLOGY

## 2024-06-07 PROCEDURE — 63600175 PHARM REV CODE 636 W HCPCS: Performed by: OTOLARYNGOLOGY

## 2024-06-07 PROCEDURE — 63600175 PHARM REV CODE 636 W HCPCS: Performed by: ANESTHESIOLOGY

## 2024-06-07 PROCEDURE — D9220A PRA ANESTHESIA: Mod: ANES,,, | Performed by: ANESTHESIOLOGY

## 2024-06-07 PROCEDURE — 71000033 HC RECOVERY, INTIAL HOUR: Performed by: OTOLARYNGOLOGY

## 2024-06-07 PROCEDURE — 37000008 HC ANESTHESIA 1ST 15 MINUTES: Performed by: OTOLARYNGOLOGY

## 2024-06-07 PROCEDURE — 71000039 HC RECOVERY, EACH ADD'L HOUR: Performed by: OTOLARYNGOLOGY

## 2024-06-07 PROCEDURE — 25000003 PHARM REV CODE 250: Performed by: NURSE ANESTHETIST, CERTIFIED REGISTERED

## 2024-06-07 PROCEDURE — 36000710: Performed by: OTOLARYNGOLOGY

## 2024-06-07 PROCEDURE — 25000003 PHARM REV CODE 250: Performed by: ANESTHESIOLOGY

## 2024-06-07 PROCEDURE — 63600175 PHARM REV CODE 636 W HCPCS: Performed by: NURSE ANESTHETIST, CERTIFIED REGISTERED

## 2024-06-07 PROCEDURE — 37000009 HC ANESTHESIA EA ADD 15 MINS: Performed by: OTOLARYNGOLOGY

## 2024-06-07 PROCEDURE — D9220A PRA ANESTHESIA: Mod: CRNA,,, | Performed by: NURSE ANESTHETIST, CERTIFIED REGISTERED

## 2024-06-07 RX ORDER — LIDOCAINE HYDROCHLORIDE 10 MG/ML
1 INJECTION, SOLUTION EPIDURAL; INFILTRATION; INTRACAUDAL; PERINEURAL ONCE
Status: DISCONTINUED | OUTPATIENT
Start: 2024-06-07 | End: 2024-06-07 | Stop reason: HOSPADM

## 2024-06-07 RX ORDER — PROPOFOL 10 MG/ML
VIAL (ML) INTRAVENOUS
Status: DISCONTINUED | OUTPATIENT
Start: 2024-06-07 | End: 2024-06-07

## 2024-06-07 RX ORDER — HYDROCODONE BITARTRATE AND ACETAMINOPHEN 5; 325 MG/1; MG/1
1 TABLET ORAL EVERY 4 HOURS PRN
Status: DISCONTINUED | OUTPATIENT
Start: 2024-06-07 | End: 2024-06-07 | Stop reason: HOSPADM

## 2024-06-07 RX ORDER — FENTANYL CITRATE 50 UG/ML
INJECTION, SOLUTION INTRAMUSCULAR; INTRAVENOUS
Status: DISCONTINUED | OUTPATIENT
Start: 2024-06-07 | End: 2024-06-07

## 2024-06-07 RX ORDER — EPINEPHRINE 1 MG/ML
INJECTION, SOLUTION, CONCENTRATE INTRAVENOUS
Status: DISCONTINUED | OUTPATIENT
Start: 2024-06-07 | End: 2024-06-07 | Stop reason: HOSPADM

## 2024-06-07 RX ORDER — SUCCINYLCHOLINE CHLORIDE 20 MG/ML
INJECTION INTRAMUSCULAR; INTRAVENOUS
Status: DISCONTINUED | OUTPATIENT
Start: 2024-06-07 | End: 2024-06-07

## 2024-06-07 RX ORDER — FENTANYL CITRATE 50 UG/ML
25 INJECTION, SOLUTION INTRAMUSCULAR; INTRAVENOUS EVERY 5 MIN PRN
Status: DISCONTINUED | OUTPATIENT
Start: 2024-06-07 | End: 2024-06-07 | Stop reason: HOSPADM

## 2024-06-07 RX ORDER — EPHEDRINE SULFATE 50 MG/ML
INJECTION, SOLUTION INTRAVENOUS
Status: DISCONTINUED | OUTPATIENT
Start: 2024-06-07 | End: 2024-06-07

## 2024-06-07 RX ORDER — MIDAZOLAM HYDROCHLORIDE 1 MG/ML
INJECTION INTRAMUSCULAR; INTRAVENOUS
Status: DISCONTINUED | OUTPATIENT
Start: 2024-06-07 | End: 2024-06-07

## 2024-06-07 RX ORDER — SODIUM CHLORIDE, SODIUM LACTATE, POTASSIUM CHLORIDE, CALCIUM CHLORIDE 600; 310; 30; 20 MG/100ML; MG/100ML; MG/100ML; MG/100ML
INJECTION, SOLUTION INTRAVENOUS CONTINUOUS
Status: DISCONTINUED | OUTPATIENT
Start: 2024-06-07 | End: 2024-06-07 | Stop reason: HOSPADM

## 2024-06-07 RX ORDER — ROCURONIUM BROMIDE 10 MG/ML
INJECTION, SOLUTION INTRAVENOUS
Status: DISCONTINUED | OUTPATIENT
Start: 2024-06-07 | End: 2024-06-07

## 2024-06-07 RX ORDER — DEXAMETHASONE SODIUM PHOSPHATE 4 MG/ML
INJECTION, SOLUTION INTRA-ARTICULAR; INTRALESIONAL; INTRAMUSCULAR; INTRAVENOUS; SOFT TISSUE
Status: DISCONTINUED | OUTPATIENT
Start: 2024-06-07 | End: 2024-06-07

## 2024-06-07 RX ORDER — ONDANSETRON HYDROCHLORIDE 2 MG/ML
4 INJECTION, SOLUTION INTRAVENOUS ONCE AS NEEDED
Status: DISCONTINUED | OUTPATIENT
Start: 2024-06-07 | End: 2024-06-07 | Stop reason: HOSPADM

## 2024-06-07 RX ORDER — ONDANSETRON HYDROCHLORIDE 2 MG/ML
INJECTION, SOLUTION INTRAMUSCULAR; INTRAVENOUS
Status: DISCONTINUED | OUTPATIENT
Start: 2024-06-07 | End: 2024-06-07

## 2024-06-07 RX ORDER — DEXMEDETOMIDINE HYDROCHLORIDE 100 UG/ML
INJECTION, SOLUTION INTRAVENOUS
Status: DISCONTINUED | OUTPATIENT
Start: 2024-06-07 | End: 2024-06-07

## 2024-06-07 RX ORDER — SODIUM CHLORIDE, SODIUM LACTATE, POTASSIUM CHLORIDE, CALCIUM CHLORIDE 600; 310; 30; 20 MG/100ML; MG/100ML; MG/100ML; MG/100ML
125 INJECTION, SOLUTION INTRAVENOUS CONTINUOUS
Status: DISCONTINUED | OUTPATIENT
Start: 2024-06-07 | End: 2024-06-07 | Stop reason: HOSPADM

## 2024-06-07 RX ORDER — ACETAMINOPHEN 10 MG/ML
INJECTION, SOLUTION INTRAVENOUS
Status: DISCONTINUED | OUTPATIENT
Start: 2024-06-07 | End: 2024-06-07

## 2024-06-07 RX ORDER — LIDOCAINE HYDROCHLORIDE 20 MG/ML
INJECTION INTRAVENOUS
Status: DISCONTINUED | OUTPATIENT
Start: 2024-06-07 | End: 2024-06-07

## 2024-06-07 RX ORDER — OXYCODONE HYDROCHLORIDE 5 MG/1
5 TABLET ORAL ONCE AS NEEDED
Status: COMPLETED | OUTPATIENT
Start: 2024-06-07 | End: 2024-06-07

## 2024-06-07 RX ADMIN — ROCURONIUM BROMIDE 5 MG: 10 INJECTION, SOLUTION INTRAVENOUS at 08:06

## 2024-06-07 RX ADMIN — MIDAZOLAM HYDROCHLORIDE 2 MG: 1 INJECTION, SOLUTION INTRAMUSCULAR; INTRAVENOUS at 08:06

## 2024-06-07 RX ADMIN — SODIUM CHLORIDE, POTASSIUM CHLORIDE, SODIUM LACTATE AND CALCIUM CHLORIDE: 600; 310; 30; 20 INJECTION, SOLUTION INTRAVENOUS at 08:06

## 2024-06-07 RX ADMIN — SUCCINYLCHOLINE CHLORIDE 160 MG: 20 INJECTION, SOLUTION INTRAMUSCULAR; INTRAVENOUS; PARENTERAL at 08:06

## 2024-06-07 RX ADMIN — DEXMEDETOMIDINE HYDROCHLORIDE 20 MCG: 100 INJECTION, SOLUTION, CONCENTRATE INTRAVENOUS at 08:06

## 2024-06-07 RX ADMIN — OXYCODONE 5 MG: 5 TABLET ORAL at 11:06

## 2024-06-07 RX ADMIN — GLYCOPYRROLATE 0.2 MG: 0.2 INJECTION, SOLUTION INTRAMUSCULAR; INTRAVITREAL at 08:06

## 2024-06-07 RX ADMIN — ONDANSETRON 4 MG: 2 INJECTION INTRAMUSCULAR; INTRAVENOUS at 10:06

## 2024-06-07 RX ADMIN — ACETAMINOPHEN 1000 MG: 10 INJECTION, SOLUTION INTRAVENOUS at 08:06

## 2024-06-07 RX ADMIN — ONDANSETRON 4 MG: 2 INJECTION INTRAMUSCULAR; INTRAVENOUS at 08:06

## 2024-06-07 RX ADMIN — PROPOFOL 160 MG: 10 INJECTION, EMULSION INTRAVENOUS at 08:06

## 2024-06-07 RX ADMIN — LIDOCAINE HYDROCHLORIDE 100 MG: 20 INJECTION, SOLUTION INTRAVENOUS at 08:06

## 2024-06-07 RX ADMIN — DEXAMETHASONE SODIUM PHOSPHATE 4 MG: 4 INJECTION, SOLUTION INTRAMUSCULAR; INTRAVENOUS at 08:06

## 2024-06-07 RX ADMIN — FENTANYL CITRATE 100 MCG: 0.05 INJECTION, SOLUTION INTRAMUSCULAR; INTRAVENOUS at 08:06

## 2024-06-07 RX ADMIN — EPHEDRINE SULFATE 10 MG: 50 INJECTION, SOLUTION INTRAMUSCULAR; INTRAVENOUS; SUBCUTANEOUS at 08:06

## 2024-06-07 RX ADMIN — DEXMEDETOMIDINE HYDROCHLORIDE 5 MCG: 100 INJECTION, SOLUTION, CONCENTRATE INTRAVENOUS at 09:06

## 2024-06-07 RX ADMIN — SODIUM CHLORIDE, POTASSIUM CHLORIDE, SODIUM LACTATE AND CALCIUM CHLORIDE: 600; 310; 30; 20 INJECTION, SOLUTION INTRAVENOUS at 07:06

## 2024-06-07 RX ADMIN — DEXMEDETOMIDINE HYDROCHLORIDE 5 MCG: 100 INJECTION, SOLUTION, CONCENTRATE INTRAVENOUS at 10:06

## 2024-06-07 RX ADMIN — EPHEDRINE SULFATE 10 MG: 50 INJECTION, SOLUTION INTRAMUSCULAR; INTRAVENOUS; SUBCUTANEOUS at 09:06

## 2024-06-07 NOTE — ANESTHESIA POSTPROCEDURE EVALUATION
Anesthesia Post Evaluation    Patient: Sanjeev Britt    Procedure(s) Performed: Procedure(s) (LRB):  FESS, USING COMPUTER-ASSISTED NAVIGATION, WITH NASAL TURBINATE REDUCTION/ disc loaded (N/A)  SEPTOPLASTY, NOSE (N/A)  REDUCTION, NASAL TURBINATE Inferior Turbinate Out Fracture (Bilateral)    Final Anesthesia Type: general      Patient location during evaluation: PACU  Patient participation: Yes- Able to Participate  Level of consciousness: awake and alert  Post-procedure vital signs: reviewed and stable  Pain management: adequate  Airway patency: patent    PONV status at discharge: No PONV  Anesthetic complications: no      Cardiovascular status: blood pressure returned to baseline  Respiratory status: unassisted  Hydration status: euvolemic  Follow-up not needed.              Vitals Value Taken Time   /63 06/07/24 1129   Temp 37.1 °C (98.8 °F) 06/07/24 1115   Pulse 78 06/07/24 1129   Resp 18 06/07/24 1125   SpO2 97 % 06/07/24 1129   Vitals shown include unfiled device data.      No case tracking events are documented in the log.      Pain/Teresa Score: Teresa Score: 10 (6/7/2024 11:15 AM)

## 2024-06-07 NOTE — ANESTHESIA PROCEDURE NOTES
Intubation    Date/Time: 6/7/2024 8:22 AM    Performed by: Florence Meyers CRNA  Authorized by: Titus Velazquez MD    Intubation:     Induction:  Intravenous    Intubated:  Postinduction    Mask Ventilation:  Easy mask    Attempts:  1    Attempted By:  CRNA    Method of Intubation:  Video laryngoscopy    Blade:  Toribio 3    Laryngeal View Grade: Grade I - full view of cords      Difficult Airway Encountered?: No      Complications:  None    Airway Device:  Oral endotracheal tube    Airway Device Size:  7.5    Style/Cuff Inflation:  Cuffed (inflated to minimal occlusive pressure)    Tube secured:  22    Secured at:  The lips    Placement Verified By:  Capnometry    Complicating Factors:  None    Findings Post-Intubation:  BS equal bilateral and atraumatic/condition of teeth unchanged

## 2024-06-07 NOTE — DISCHARGE SUMMARY
[facility] - Periop Services  Discharge Note  Short Stay    Procedure(s) (LRB):  FESS, USING COMPUTER-ASSISTED NAVIGATION, WITH NASAL TURBINATE REDUCTION/ disc loaded (N/A)  SEPTOPLASTY, NOSE (N/A)  REDUCTION, NASAL TURBINATE Inferior Turbinate Out Fracture (Bilateral)      OUTCOME: Patient tolerated treatment/procedure well without complication and is now ready for discharge.    DISPOSITION: Home or Self Care    FINAL DIAGNOSIS:  Nasal obstruction    FOLLOWUP: In clinic    DISCHARGE INSTRUCTIONS:    Discharge Procedure Orders   Diet general     Other restrictions (specify):   Order Comments: Patient has the following activity restrictions:  Head of bed 45-60 degree elevation.  Activity AD GRANT, no strenuous activity for 2 weeks.  No nose blowing, sneezing or bending over for 2 weeks.  May apply drip pad to nose if needed for bleeding (call MD if more than 3 drip pads are needed per hour)  Follow up appointment scheduled with Dr. Abraham.  If not made, please schedule.  Any questions or problems, patient is to call [phone].    May use CPAP if needed for sleep        TIME SPENT ON DISCHARGE: 15 minutes

## 2024-06-07 NOTE — OP NOTE
ENT OPERATIVE NOTE    PATIENT:  Sanjeev Britt  [id number]  1965  PCP: Chidi Vidal Jr., MD  CSN: [id number]    DATE OF SURGERY: 06/07/2024    ATTENDING SURGEON:  Andres Abraham MD    ANESTHESIA:  General endotracheal anesthesia.    PREOPERATIVE DIAGNOSIS:   Chronic/Recurrent bilateral maxillary sinusitis.  Chronic/Recurrent bilateral frontal sinusitis.  Chronic/Recurrent bilateral ethmoid sinusitis.  Chronic/Recurrent bilateral sphenoid sinusitis.  Chronic/Recurrent pan sinusitis  Bilateral inferior turbinate hypertrophy.  Chronic nasal congestion.  Nasal septal deviation.    POSTOPERATIVE DIAGNOSIS:   Same    PROCEDURE:   Use of image guidance.  Endoscopic bilateral maxillary sinusotomy with removal of contents.  Endoscopic bilateral total ethmoidectomy.  Endoscopic bilateral sphenoidotomy with removal of contents  Endoscopic bilateral frontal sinus ostium sinusotomy  Bilateral inferior turbinate out fractures  Bilateral submucous resection of inferior turbinates  Nasal septoplasty    INDICATIONS:  This is a 58 y.o. male with a history of the above diagnosis.  The CT scan revealed significant disease unresponsive to appropriate therapy. Recommendations were made for sinus surgery.  The risks, benefits, and alternatives to sinus surgery were discussed with the patient who wished to proceed.    OPERATIVE FINDINGS:  -right septum deviation, BITH, generalized inflamed and polypoid mucosal changes throughout sinuses.    ANESTHETIC AND POSITIONING:  The patient was taken to the operating room and placed in a supine position where general endotracheal anesthesia was established without difficulty.  The patient was placed in a beach chair position and the table was turned 90 degrees.  The eyes were taped shut laterally so that the medial eye could be viewed as needed and the patient was adequately padded.    IMAGE GUIDANCE:  A preoperative image guidance compatible scan was obtained due to the indication of  Frontal sinus disease and Sphenoid sinus disease and was used to plan the surgery.   The system was registered and verified with an acceptable degree of accuracy.  The system was used for navigation throughout the case.    NASAL CAVITY PREPARATION:  Decongestion:  Epinephrine 1:8000 applied topically on pledgets.  Injection:   Lidocaine 1% with epinephrine 1:100,000 was injected in the operative field for hemostasis.    NASAL ENDOSCOPY:  The nasopharynx, inferior nasal cavity, middle meatus, olfactory region, and sphenoethmoidal recess were examined bilaterally with nasal endoscopy.  The remainder of the case was performed via endoscopes working off a video monitor.     DESCRIPTION OF PROCEDURE:    Maxillary Sinusotomy:  Right:  Uncinate removed.  Primary ostium cannulated and opened widely into the posterior fontanelle. Residual disease removed and antrostomy widened.   Findings:  Polypoid tissue  Left: Uncinate removed.  Primary ostium cannulated and opened widely into the posterior fontanelle. Residual disease removed and antrostomy widened.    Findings: Polypoid tissue    Anterior Ethmoidectomy:  Right: Bulla entered in inferomedial fashion with the anterior face removed.  Remaining septae removed to open the anterior ethmoid cavity. Residual cells removed.  Findings: Polypoid tissue  Left: Bulla entered in inferomedial fashion with the anterior face removed.  Remaining septae removed to open the anterior ethmoid cavity. Residual cells removed.  Findings: Polypoid tissue      Posterior Ethmoidectomy:  Right: The basal lamella was opened in the inferiomedial aspect to enter the posterior ethmoid cells.  The remainder of the septae were removed to open the posterior ethmoid cavity. Residual cells removed.  Findings: Polypoid tissue   Left: The basal lamella was opened in the inferiomedial aspect to enter the posterior ethmoid cells.  The remainder of the septae were removed to open the posterior ethmoid cavity.  Residual cells removed.  Findings: Polypoid tissue     Sphenoid Sinusotomy:  Right: Cannulated with image guided probe and bluntly widened.  Hosemann mushroom punch and rongeurs used to open widely in all directions. Findings: Polypoid tissue      Left:  Cannulated with image guided probe and bluntly widened.  Hosemann mushroom punch and rongeurs used to open widely in all directions. Findings: Polypoid tissue        Frontal Sinusotomy:  Right: Cannulated with image guided probe and bluntly widened. Curette, Giraffe through cuts, and Hosemann mushroom punch.  Findings:  Polypoid tissue  Left:  Cannulated with image guided probe and bluntly widened. Curette, Giraffe through cuts, and Hosemann mushroom punch.  Findings: Polypoid tissue      Septoplasty:  A right hemitransfixion incision was performed.  The mucoperichondrial flap was elevated past the bony cartilaginous junction. Taking care to preserve an adequate dorsal caudal strut, caudal and dorsal cartilaginous incisions were made taking care to preserve the contralateral mucoperichondrial flap. The deviated portion of the cartilaginous and bony septum was removed, modified, and replaced.  The hemitransfixion incision was closed with 4-0 fast gut simple, interrupted sutures.  The septal flaps were coapted with a 4-0 plain gut suture on a Artur needle.  Findings: right septal deviation    Inferior Submucous Resection:  Right:  An incision was made on the anterior aspect of the inferior turbinate.  The turbinate blade was used to remove submucosal soft tissue in the anterior half of the turbinate on the medial and inferior aspects.  The turbinate was out-fractured.   Findings: Hypertrophied inferior turbinate.  Left: An incision was made on the anterior aspect of the inferior turbinate.  The turbinate blade was used to remove submucosal soft tissue in the anterior half of the turbinate on the medial and inferior aspects.  The turbinate was out-fractured.    Findings: Hypertrophied inferior turbinate.    IMPLANTS:  * No implants in log *    HEMOSTASIS:  Epinephrine-soaked pledgets which were removed.      PROCEDURE TERMINATION:  Counts correct.  Eyes checked for bruising.  Stomach suctioned with orogastric tube.  Face cleaned.  Patient extubated and taken to the recovery room in stable condition.    COMPLICATIONS:  None    EBL: < 100 ml.    SPECIMENS SENT:  * No specimens in log *  Pathology: sinuses.  Cultures: none.    DISPOSITION: Pt will be discharged home and f/u in one week.   Surgery Related Medications:    Antibiotics, steroids, anti-nausea and pain medications with normal saline irrigations.

## 2024-06-07 NOTE — ANESTHESIA PREPROCEDURE EVALUATION
06/07/2024  Sanjeev Britt is a 58 y.o., male.      Pre-op Assessment    I have reviewed the Patient Summary Reports.     I have reviewed the Nursing Notes. I have reviewed the NPO Status.      Review of Systems  Anesthesia Hx:             Denies Family Hx of Anesthesia complications.   Personal Hx of Anesthesia complications, Post-Operative Nausea/Vomiting, with every anesthetic, treatment not known                    EENT/Dental:   pansinusitis          Cardiovascular:     Hypertension   CAD  asymptomatic CABG/stent           ECG has been reviewed. MI, stent 11 years ago, concentric remodeling, normal LV function                         Pulmonary:  Pneumonia      Sleep Apnea                Endocrine:  Diabetes, type 2               Physical Exam  General: Cooperative, Alert, Oriented and Well nourished    Airway:  Mallampati: II   Mouth Opening: Normal  TM Distance: Normal  Tongue: Normal  Neck ROM: Normal ROM    Dental:  Dentures    Chest/Lungs:  Normal Respiratory Rate    Heart:  Rate: Normal  Rhythm: Regular Rhythm        Anesthesia Plan  Type of Anesthesia, risks & benefits discussed:    Anesthesia Type: Gen ETT  Intra-op Monitoring Plan: Standard ASA Monitors  Post Op Pain Control Plan: multimodal analgesia  Induction:  IV  Airway Plan: Video  Informed Consent: Informed consent signed with the Patient and all parties understand the risks and agree with anesthesia plan.  All questions answered.   ASA Score: 3    Ready For Surgery From Anesthesia Perspective.     .

## 2024-06-07 NOTE — TRANSFER OF CARE
"Anesthesia Transfer of Care Note    Patient: Sanjeev Britt    Procedure(s) Performed: Procedure(s) (LRB):  FESS, USING COMPUTER-ASSISTED NAVIGATION, WITH NASAL TURBINATE REDUCTION/ disc loaded (N/A)  SEPTOPLASTY, NOSE (N/A)  REDUCTION, NASAL TURBINATE Inferior Turbinate Out Fracture (Bilateral)    Patient location: PACU    Anesthesia Type: general    Transport from OR: Transported from OR on room air with adequate spontaneous ventilation    Post pain: adequate analgesia    Post assessment: no apparent anesthetic complications and tolerated procedure well    Post vital signs: stable    Level of consciousness: awake, alert and oriented    Nausea/Vomiting: no nausea/vomiting    Complications: none    Transfer of care protocol was followed      Last vitals: Visit Vitals  BP (!) 174/84   Pulse (!) 56   Temp 36.8 °C (98.2 °F)   Resp 18   Ht 5' 6" (1.676 m)   Wt 69.9 kg (154 lb)   SpO2 95%   BMI 24.86 kg/m²     "

## 2024-06-08 LAB
OHS QRS DURATION: 94 MS
OHS QTC CALCULATION: 431 MS

## 2024-06-10 VITALS
HEIGHT: 66 IN | SYSTOLIC BLOOD PRESSURE: 139 MMHG | BODY MASS INDEX: 24.75 KG/M2 | HEART RATE: 71 BPM | DIASTOLIC BLOOD PRESSURE: 62 MMHG | OXYGEN SATURATION: 97 % | RESPIRATION RATE: 18 BRPM | WEIGHT: 154 LBS | TEMPERATURE: 99 F

## 2024-08-01 RX ORDER — ISOSORBIDE MONONITRATE 30 MG/1
30 TABLET, EXTENDED RELEASE ORAL
Qty: 30 TABLET | Refills: 5 | Status: SHIPPED | OUTPATIENT
Start: 2024-08-01

## 2024-08-14 ENCOUNTER — TELEPHONE (OUTPATIENT)
Dept: CARDIOLOGY | Facility: CLINIC | Age: 59
End: 2024-08-14
Payer: MEDICAID

## 2024-08-15 DIAGNOSIS — N52.01 ERECTILE DYSFUNCTION DUE TO ARTERIAL INSUFFICIENCY: ICD-10-CM

## 2024-08-15 RX ORDER — TADALAFIL 5 MG/1
TABLET ORAL
Qty: 15 TABLET | Refills: 3 | Status: SHIPPED | OUTPATIENT
Start: 2024-08-15

## 2024-08-28 ENCOUNTER — TELEPHONE (OUTPATIENT)
Dept: CARDIOLOGY | Facility: CLINIC | Age: 59
End: 2024-08-28
Payer: MEDICAID

## 2024-09-09 PROBLEM — J01.41 ACUTE RECURRENT PANSINUSITIS: Status: RESOLVED | Noted: 2024-06-07 | Resolved: 2024-09-09

## 2024-09-24 RX ORDER — LOSARTAN POTASSIUM 25 MG/1
25 TABLET ORAL
Qty: 90 TABLET | Refills: 3 | Status: SHIPPED | OUTPATIENT
Start: 2024-09-24

## 2024-11-04 DIAGNOSIS — I25.10 CORONARY ARTERY DISEASE INVOLVING NATIVE HEART, UNSPECIFIED VESSEL OR LESION TYPE, UNSPECIFIED WHETHER ANGINA PRESENT: ICD-10-CM

## 2024-11-04 RX ORDER — ATORVASTATIN CALCIUM 40 MG/1
40 TABLET, FILM COATED ORAL
Qty: 90 TABLET | Refills: 3 | Status: SHIPPED | OUTPATIENT
Start: 2024-11-04

## 2024-12-10 DIAGNOSIS — N52.01 ERECTILE DYSFUNCTION DUE TO ARTERIAL INSUFFICIENCY: ICD-10-CM

## 2024-12-10 RX ORDER — TADALAFIL 5 MG/1
5 TABLET ORAL DAILY PRN
Qty: 15 TABLET | Refills: 3 | Status: SHIPPED | OUTPATIENT
Start: 2024-12-10

## 2025-01-07 DIAGNOSIS — M54.16 LUMBAR RADICULOPATHY: Primary | ICD-10-CM

## 2025-01-07 DIAGNOSIS — M54.12 RADICULOPATHY, CERVICAL: Primary | ICD-10-CM

## 2025-01-28 ENCOUNTER — HOSPITAL ENCOUNTER (OUTPATIENT)
Dept: RADIOLOGY | Facility: HOSPITAL | Age: 60
Discharge: HOME | End: 2025-01-28
Payer: MEDICAID

## 2025-01-28 DIAGNOSIS — M54.12 RADICULOPATHY, CERVICAL: ICD-10-CM

## 2025-01-28 DIAGNOSIS — M54.16 LUMBAR RADICULOPATHY: ICD-10-CM

## 2025-01-28 PROCEDURE — 72148 MRI LUMBAR SPINE W/O DYE: CPT | Mod: TC,PO

## 2025-01-28 PROCEDURE — 72141 MRI NECK SPINE W/O DYE: CPT | Mod: TC,PO

## 2025-01-28 PROCEDURE — 72141 MRI NECK SPINE W/O DYE: CPT | Mod: 26,,, | Performed by: RADIOLOGY

## 2025-01-28 PROCEDURE — 72148 MRI LUMBAR SPINE W/O DYE: CPT | Mod: 26,,, | Performed by: RADIOLOGY

## 2025-02-07 ENCOUNTER — TELEPHONE (OUTPATIENT)
Dept: CARDIOLOGY | Facility: CLINIC | Age: 60
End: 2025-02-07
Payer: MEDICAID

## 2025-02-07 ENCOUNTER — HOSPITAL ENCOUNTER (INPATIENT)
Facility: HOSPITAL | Age: 60
LOS: 4 days | Discharge: HOME | DRG: 321 | End: 2025-02-12
Attending: INTERNAL MEDICINE | Admitting: INTERNAL MEDICINE
Payer: MEDICAID

## 2025-02-07 DIAGNOSIS — R07.9 CHEST PAIN: ICD-10-CM

## 2025-02-07 DIAGNOSIS — I21.11 ST ELEVATION MYOCARDIAL INFARCTION INVOLVING RIGHT CORONARY ARTERY: ICD-10-CM

## 2025-02-07 DIAGNOSIS — I25.10 CAD (CORONARY ARTERY DISEASE): ICD-10-CM

## 2025-02-07 DIAGNOSIS — I21.3 STEMI (ST ELEVATION MYOCARDIAL INFARCTION): ICD-10-CM

## 2025-02-07 DIAGNOSIS — E87.6 HYPOKALEMIA: Primary | ICD-10-CM

## 2025-02-07 LAB
BASOPHILS # BLD AUTO: 0.1 K/UL (ref 0–0.2)
BASOPHILS NFR BLD: 0.9 % (ref 0–1.9)
DIFFERENTIAL METHOD BLD: ABNORMAL
EOSINOPHIL # BLD AUTO: 0.1 K/UL (ref 0–0.5)
EOSINOPHIL NFR BLD: 1.1 % (ref 0–8)
ERYTHROCYTE [DISTWIDTH] IN BLOOD BY AUTOMATED COUNT: 13.2 % (ref 11.5–14.5)
HCT VFR BLD AUTO: 45.2 % (ref 40–54)
HGB BLD-MCNC: 15.6 G/DL (ref 14–18)
IMM GRANULOCYTES # BLD AUTO: 0.5 K/UL (ref 0–0.04)
IMM GRANULOCYTES NFR BLD AUTO: 4.3 % (ref 0–0.5)
LYMPHOCYTES # BLD AUTO: 3.1 K/UL (ref 1–4.8)
LYMPHOCYTES NFR BLD: 26.9 % (ref 18–48)
MCH RBC QN AUTO: 29.8 PG (ref 27–31)
MCHC RBC AUTO-ENTMCNC: 34.5 G/DL (ref 32–36)
MCV RBC AUTO: 86 FL (ref 82–98)
MONOCYTES # BLD AUTO: 1.3 K/UL (ref 0.3–1)
MONOCYTES NFR BLD: 10.8 % (ref 4–15)
NEUTROPHILS # BLD AUTO: 6.5 K/UL (ref 1.8–7.7)
NEUTROPHILS NFR BLD: 56 % (ref 38–73)
NRBC BLD-RTO: 1 /100 WBC
PLATELET # BLD AUTO: 234 K/UL (ref 150–450)
PMV BLD AUTO: 10.8 FL (ref 9.2–12.9)
POCT GLUCOSE: 170 MG/DL (ref 70–110)
RBC # BLD AUTO: 5.23 M/UL (ref 4.6–6.2)
WBC # BLD AUTO: 11.56 K/UL (ref 3.9–12.7)

## 2025-02-07 PROCEDURE — 99285 EMERGENCY DEPT VISIT HI MDM: CPT | Mod: 25

## 2025-02-07 PROCEDURE — 93010 ELECTROCARDIOGRAM REPORT: CPT | Mod: ,,, | Performed by: INTERNAL MEDICINE

## 2025-02-07 PROCEDURE — 25000003 PHARM REV CODE 250: Performed by: STUDENT IN AN ORGANIZED HEALTH CARE EDUCATION/TRAINING PROGRAM

## 2025-02-07 PROCEDURE — 84484 ASSAY OF TROPONIN QUANT: CPT | Performed by: STUDENT IN AN ORGANIZED HEALTH CARE EDUCATION/TRAINING PROGRAM

## 2025-02-07 PROCEDURE — 93005 ELECTROCARDIOGRAM TRACING: CPT | Performed by: INTERNAL MEDICINE

## 2025-02-07 PROCEDURE — 80053 COMPREHEN METABOLIC PANEL: CPT | Performed by: STUDENT IN AN ORGANIZED HEALTH CARE EDUCATION/TRAINING PROGRAM

## 2025-02-07 PROCEDURE — 82962 GLUCOSE BLOOD TEST: CPT

## 2025-02-07 PROCEDURE — 83880 ASSAY OF NATRIURETIC PEPTIDE: CPT | Performed by: STUDENT IN AN ORGANIZED HEALTH CARE EDUCATION/TRAINING PROGRAM

## 2025-02-07 PROCEDURE — 83735 ASSAY OF MAGNESIUM: CPT | Performed by: STUDENT IN AN ORGANIZED HEALTH CARE EDUCATION/TRAINING PROGRAM

## 2025-02-07 PROCEDURE — 85025 COMPLETE CBC W/AUTO DIFF WBC: CPT | Performed by: STUDENT IN AN ORGANIZED HEALTH CARE EDUCATION/TRAINING PROGRAM

## 2025-02-07 RX ORDER — ASPIRIN 325 MG
325 TABLET ORAL
Status: COMPLETED | OUTPATIENT
Start: 2025-02-07 | End: 2025-02-07

## 2025-02-07 RX ORDER — HYDROCODONE BITARTRATE AND ACETAMINOPHEN 5; 325 MG/1; MG/1
1 TABLET ORAL
Status: COMPLETED | OUTPATIENT
Start: 2025-02-08 | End: 2025-02-07

## 2025-02-07 RX ADMIN — HYDROCODONE BITARTRATE AND ACETAMINOPHEN 1 TABLET: 5; 325 TABLET ORAL at 11:02

## 2025-02-07 RX ADMIN — ASPIRIN 325 MG ORAL TABLET 325 MG: 325 PILL ORAL at 11:02

## 2025-02-07 NOTE — Clinical Note
The catheter was repositioned into the ostium   left main. Hemodynamics were performed.  An angiography was performed of the left coronary arteries. Multiple views were taken. The angiography was performed via power injection. The injected amount was 8 mL contrast at 4 mL/s.

## 2025-02-08 ENCOUNTER — CLINICAL SUPPORT (OUTPATIENT)
Dept: CARDIOLOGY | Facility: HOSPITAL | Age: 60
DRG: 321 | End: 2025-02-08
Attending: INTERNAL MEDICINE
Payer: MEDICAID

## 2025-02-08 VITALS — HEIGHT: 66 IN | BODY MASS INDEX: 27.17 KG/M2 | WEIGHT: 169.06 LBS

## 2025-02-08 PROBLEM — I21.3 STEMI (ST ELEVATION MYOCARDIAL INFARCTION): Status: ACTIVE | Noted: 2025-02-08

## 2025-02-08 PROBLEM — I46.9 CARDIAC ARREST: Status: ACTIVE | Noted: 2025-02-08

## 2025-02-08 PROBLEM — E11.9 DMII (DIABETES MELLITUS, TYPE 2): Status: ACTIVE | Noted: 2025-02-08

## 2025-02-08 LAB
ALBUMIN SERPL BCP-MCNC: 4.3 G/DL (ref 3.5–5.2)
ALBUMIN SERPL BCP-MCNC: 4.6 G/DL (ref 3.5–5.2)
ALLENS TEST: ABNORMAL
ALP SERPL-CCNC: 73 U/L (ref 55–135)
ALP SERPL-CCNC: 94 U/L (ref 55–135)
ALT SERPL W/O P-5'-P-CCNC: 26 U/L (ref 10–44)
ALT SERPL W/O P-5'-P-CCNC: 59 U/L (ref 10–44)
ANION GAP SERPL CALC-SCNC: 12 MMOL/L (ref 8–16)
ANION GAP SERPL CALC-SCNC: 20 MMOL/L (ref 8–16)
ANION GAP SERPL CALC-SCNC: 5 MMOL/L (ref 8–16)
AORTIC ROOT ANNULUS: 3.6 CM
AORTIC VALVE CUSP SEPERATION: 2 CM
APICAL FOUR CHAMBER EJECTION FRACTION: 43 %
APICAL TWO CHAMBER EJECTION FRACTION: 59 %
AST SERPL-CCNC: 144 U/L (ref 10–40)
AST SERPL-CCNC: 28 U/L (ref 10–40)
AV INDEX (PROSTH): 0.79
AV MEAN GRADIENT: 2 MMHG
AV PEAK GRADIENT: 5 MMHG
AV VALVE AREA BY VELOCITY RATIO: 2.6 CM²
AV VALVE AREA: 2.5 CM²
AV VELOCITY RATIO: 0.82
BASOPHILS # BLD AUTO: 0.06 K/UL (ref 0–0.2)
BASOPHILS NFR BLD: 0.4 % (ref 0–1.9)
BILIRUB SERPL-MCNC: 0.7 MG/DL (ref 0.1–1)
BILIRUB SERPL-MCNC: 0.9 MG/DL (ref 0.1–1)
BNP SERPL-MCNC: 300 PG/ML (ref 0–99)
BSA FOR ECHO PROCEDURE: 1.89 M2
BUN SERPL-MCNC: 16 MG/DL (ref 8–23)
BUN SERPL-MCNC: 17 MG/DL (ref 8–23)
BUN SERPL-MCNC: 19 MG/DL (ref 8–23)
CALCIUM SERPL-MCNC: 7.8 MG/DL (ref 8.7–10.5)
CALCIUM SERPL-MCNC: 8.6 MG/DL (ref 8.7–10.5)
CALCIUM SERPL-MCNC: 9.3 MG/DL (ref 8.7–10.5)
CHLORIDE SERPL-SCNC: 102 MMOL/L (ref 95–110)
CHLORIDE SERPL-SCNC: 110 MMOL/L (ref 95–110)
CHLORIDE SERPL-SCNC: 99 MMOL/L (ref 95–110)
CHOLEST SERPL-MCNC: 180 MG/DL (ref 120–199)
CHOLEST/HDLC SERPL: 2.9 {RATIO} (ref 2–5)
CO2 SERPL-SCNC: 20 MMOL/L (ref 23–29)
CO2 SERPL-SCNC: 21 MMOL/L (ref 23–29)
CO2 SERPL-SCNC: 25 MMOL/L (ref 23–29)
CREAT SERPL-MCNC: 0.8 MG/DL (ref 0.5–1.4)
CREAT SERPL-MCNC: 0.8 MG/DL (ref 0.5–1.4)
CREAT SERPL-MCNC: 1.1 MG/DL (ref 0.5–1.4)
CV ECHO LV RWT: 0.35 CM
DELSYS: ABNORMAL
DIFFERENTIAL METHOD BLD: ABNORMAL
DOP CALC AO PEAK VEL: 1.1 M/S
DOP CALC AO VTI: 20.6 CM
DOP CALC LVOT AREA: 3.1 CM2
DOP CALC LVOT DIAMETER: 2 CM
DOP CALC LVOT PEAK VEL: 0.9 M/S
DOP CALC LVOT STROKE VOLUME: 50.9 CM3
DOP CALC MV VTI: 27.1 CM
DOP CALCLVOT PEAK VEL VTI: 16.2 CM
E WAVE DECELERATION TIME: 197 MSEC
E/A RATIO: 0.82
E/E' RATIO: 9 M/S
ECHO LV POSTERIOR WALL: 0.9 CM (ref 0.6–1.1)
EOSINOPHIL # BLD AUTO: 0 K/UL (ref 0–0.5)
EOSINOPHIL NFR BLD: 0.1 % (ref 0–8)
ERYTHROCYTE [DISTWIDTH] IN BLOOD BY AUTOMATED COUNT: 13.2 % (ref 11.5–14.5)
EST. GFR  (NO RACE VARIABLE): >60 ML/MIN/1.73 M^2
ESTIMATED AVG GLUCOSE: 120 MG/DL (ref 68–131)
FRACTIONAL SHORTENING: 26.9 % (ref 28–44)
GLUCOSE SERPL-MCNC: 117 MG/DL (ref 70–110)
GLUCOSE SERPL-MCNC: 139 MG/DL (ref 70–110)
GLUCOSE SERPL-MCNC: 163 MG/DL (ref 70–110)
GLUCOSE SERPL-MCNC: 184 MG/DL (ref 70–110)
HBA1C MFR BLD: 5.8 % (ref 4.5–6.2)
HCO3 UR-SCNC: 23.3 MMOL/L (ref 24–28)
HCT VFR BLD AUTO: 45.6 % (ref 40–54)
HCT VFR BLD CALC: 47 %PCV (ref 36–54)
HDLC SERPL-MCNC: 63 MG/DL (ref 40–75)
HDLC SERPL: 35 % (ref 20–50)
HGB BLD-MCNC: 15 G/DL (ref 14–18)
IMM GRANULOCYTES # BLD AUTO: 0.26 K/UL (ref 0–0.04)
IMM GRANULOCYTES NFR BLD AUTO: 1.8 % (ref 0–0.5)
INTERVENTRICULAR SEPTUM: 0.9 CM (ref 0.6–1.1)
IVC DIAMETER: 2.3 CM
LDLC SERPL CALC-MCNC: 103.6 MG/DL (ref 63–159)
LEFT ATRIUM AREA SYSTOLIC (APICAL 4 CHAMBER): 18.9 CM2
LEFT ATRIUM SIZE: 3 CM
LEFT INTERNAL DIMENSION IN SYSTOLE: 3.8 CM (ref 2.1–4)
LEFT VENTRICLE DIASTOLIC VOLUME INDEX: 69.89 ML/M2
LEFT VENTRICLE DIASTOLIC VOLUME: 130 ML
LEFT VENTRICLE END DIASTOLIC VOLUME APICAL 2 CHAMBER: 80.8 ML
LEFT VENTRICLE END DIASTOLIC VOLUME APICAL 4 CHAMBER: 91.1 ML
LEFT VENTRICLE END SYSTOLIC VOLUME APICAL 4 CHAMBER: 52.3 ML
LEFT VENTRICLE MASS INDEX: 90.9 G/M2
LEFT VENTRICLE SYSTOLIC VOLUME INDEX: 33.3 ML/M2
LEFT VENTRICLE SYSTOLIC VOLUME: 62 ML
LEFT VENTRICULAR INTERNAL DIMENSION IN DIASTOLE: 5.2 CM (ref 3.5–6)
LEFT VENTRICULAR MASS: 169 G
LV LATERAL E/E' RATIO: 8 M/S
LV SEPTAL E/E' RATIO: 10.7 M/S
LVED V (TEICH): 130 ML
LVES V (TEICH): 62 ML
LVOT MG: 1 MMHG
LVOT MV: 0.55 CM/S
LYMPHOCYTES # BLD AUTO: 0.5 K/UL (ref 1–4.8)
LYMPHOCYTES NFR BLD: 3.5 % (ref 18–48)
MAGNESIUM SERPL-MCNC: 2 MG/DL (ref 1.6–2.6)
MAGNESIUM SERPL-MCNC: 2.6 MG/DL (ref 1.6–2.6)
MCH RBC QN AUTO: 29 PG (ref 27–31)
MCHC RBC AUTO-ENTMCNC: 32.9 G/DL (ref 32–36)
MCV RBC AUTO: 88 FL (ref 82–98)
MODE: ABNORMAL
MONOCYTES # BLD AUTO: 1.1 K/UL (ref 0.3–1)
MONOCYTES NFR BLD: 7.2 % (ref 4–15)
MV MEAN GRADIENT: 1 MMHG
MV PEAK A VEL: 0.78 M/S
MV PEAK E VEL: 0.64 M/S
MV PEAK GRADIENT: 3 MMHG
MV STENOSIS PRESSURE HALF TIME: 77 MS
MV VALVE AREA BY CONTINUITY EQUATION: 1.88 CM2
MV VALVE AREA P 1/2 METHOD: 2.86 CM2
NEUTROPHILS # BLD AUTO: 12.8 K/UL (ref 1.8–7.7)
NEUTROPHILS NFR BLD: 87 % (ref 38–73)
NONHDLC SERPL-MCNC: 117 MG/DL
NRBC BLD-RTO: 0 /100 WBC
OHS CV RV/LV RATIO: 0.37 CM
OHS LV EJECTION FRACTION SIMPSONS BIPLANE MOD: 49 %
OHS QRS DURATION: 100 MS
OHS QRS DURATION: 102 MS
OHS QTC CALCULATION: 417 MS
OHS QTC CALCULATION: 544 MS
PCO2 BLDA: 30.9 MMHG (ref 35–45)
PH SMN: 7.49 [PH] (ref 7.35–7.45)
PISA TR MAX VEL: 1.9 M/S
PLATELET # BLD AUTO: 234 K/UL (ref 150–450)
PMV BLD AUTO: 11.4 FL (ref 9.2–12.9)
PO2 BLDA: 25 MMHG (ref 40–60)
POC ACTIVATED CLOTTING TIME K: 256 SEC (ref 74–137)
POC ACTIVATED CLOTTING TIME K: 256 SEC (ref 74–137)
POC BE: 0 MMOL/L
POC IONIZED CALCIUM: 1.02 MMOL/L (ref 1.06–1.42)
POC SATURATED O2: 51 % (ref 95–100)
POC TCO2: 24 MMOL/L (ref 24–29)
POTASSIUM BLD-SCNC: 4.3 MMOL/L (ref 3.5–5.1)
POTASSIUM SERPL-SCNC: 2.5 MMOL/L (ref 3.5–5.1)
POTASSIUM SERPL-SCNC: 2.9 MMOL/L (ref 3.5–5.1)
POTASSIUM SERPL-SCNC: 4.8 MMOL/L (ref 3.5–5.1)
POTASSIUM SERPL-SCNC: 5 MMOL/L (ref 3.5–5.1)
PROT SERPL-MCNC: 7.1 G/DL (ref 6–8.4)
PROT SERPL-MCNC: 7.6 G/DL (ref 6–8.4)
PV MV: 0.57 M/S
PV PEAK GRADIENT: 3 MMHG
PV PEAK VELOCITY: 0.83 M/S
RA PRESSURE ESTIMATED: 8 MMHG
RBC # BLD AUTO: 5.17 M/UL (ref 4.6–6.2)
RIGHT ATRIUM VOLUME AREA LENGTH APICAL 4 CHAMBER: 42.2 ML
RIGHT VENTRICLE DIASTOLIC BASEL DIMENSION: 1.9 CM
RIGHT VENTRICULAR END-DIASTOLIC DIMENSION: 1.9 CM
RV TB RVSP: 10 MMHG
RV TISSUE DOPPLER FREE WALL SYSTOLIC VELOCITY 1 (APICAL 4 CHAMBER VIEW): 17 CM/S
SAMPLE: ABNORMAL
SITE: ABNORMAL
SODIUM BLD-SCNC: 136 MMOL/L (ref 136–145)
SODIUM SERPL-SCNC: 136 MMOL/L (ref 136–145)
SODIUM SERPL-SCNC: 139 MMOL/L (ref 136–145)
SODIUM SERPL-SCNC: 139 MMOL/L (ref 136–145)
TDI LATERAL: 0.08 M/S
TDI SEPTAL: 0.06 M/S
TDI: 0.07 M/S
TR MAX PG: 14 MMHG
TRICUSPID ANNULAR PLANE SYSTOLIC EXCURSION: 2.15 CM
TRIGL SERPL-MCNC: 67 MG/DL (ref 30–150)
TROPONIN I SERPL HS-MCNC: 53.1 PG/ML (ref 0–14.9)
TROPONIN I SERPL HS-MCNC: ABNORMAL PG/ML (ref 0–14.9)
TV REST PULMONARY ARTERY PRESSURE: 22 MMHG
WBC # BLD AUTO: 14.75 K/UL (ref 3.9–12.7)
Z-SCORE OF LEFT VENTRICULAR DIMENSION IN END DIASTOLE: 0.05
Z-SCORE OF LEFT VENTRICULAR DIMENSION IN END SYSTOLE: 1.38

## 2025-02-08 PROCEDURE — 80061 LIPID PANEL: CPT | Performed by: INTERNAL MEDICINE

## 2025-02-08 PROCEDURE — 83735 ASSAY OF MAGNESIUM: CPT | Performed by: INTERNAL MEDICINE

## 2025-02-08 PROCEDURE — C1725 CATH, TRANSLUMIN NON-LASER: HCPCS | Performed by: INTERNAL MEDICINE

## 2025-02-08 PROCEDURE — 25000003 PHARM REV CODE 250: Performed by: INTERNAL MEDICINE

## 2025-02-08 PROCEDURE — 93010 ELECTROCARDIOGRAM REPORT: CPT | Mod: ,,, | Performed by: INTERNAL MEDICINE

## 2025-02-08 PROCEDURE — C1753 CATH, INTRAVAS ULTRASOUND: HCPCS | Performed by: INTERNAL MEDICINE

## 2025-02-08 PROCEDURE — 84132 ASSAY OF SERUM POTASSIUM: CPT | Performed by: INTERNAL MEDICINE

## 2025-02-08 PROCEDURE — 63600175 PHARM REV CODE 636 W HCPCS: Performed by: INTERNAL MEDICINE

## 2025-02-08 PROCEDURE — 93458 L HRT ARTERY/VENTRICLE ANGIO: CPT | Mod: 59 | Performed by: INTERNAL MEDICINE

## 2025-02-08 PROCEDURE — 99152 MOD SED SAME PHYS/QHP 5/>YRS: CPT | Mod: ,,, | Performed by: INTERNAL MEDICINE

## 2025-02-08 PROCEDURE — 36415 COLL VENOUS BLD VENIPUNCTURE: CPT | Performed by: INTERNAL MEDICINE

## 2025-02-08 PROCEDURE — 27000221 HC OXYGEN, UP TO 24 HOURS

## 2025-02-08 PROCEDURE — 94799 UNLISTED PULMONARY SVC/PX: CPT

## 2025-02-08 PROCEDURE — 25500020 PHARM REV CODE 255: Performed by: INTERNAL MEDICINE

## 2025-02-08 PROCEDURE — 99900031 HC PATIENT EDUCATION (STAT)

## 2025-02-08 PROCEDURE — C1874 STENT, COATED/COV W/DEL SYS: HCPCS | Performed by: INTERNAL MEDICINE

## 2025-02-08 PROCEDURE — 93306 TTE W/DOPPLER COMPLETE: CPT

## 2025-02-08 PROCEDURE — 63600175 PHARM REV CODE 636 W HCPCS: Performed by: STUDENT IN AN ORGANIZED HEALTH CARE EDUCATION/TRAINING PROGRAM

## 2025-02-08 PROCEDURE — 99152 MOD SED SAME PHYS/QHP 5/>YRS: CPT | Performed by: INTERNAL MEDICINE

## 2025-02-08 PROCEDURE — 99900035 HC TECH TIME PER 15 MIN (STAT)

## 2025-02-08 PROCEDURE — 93005 ELECTROCARDIOGRAM TRACING: CPT | Performed by: INTERNAL MEDICINE

## 2025-02-08 PROCEDURE — 36415 COLL VENOUS BLD VENIPUNCTURE: CPT

## 2025-02-08 PROCEDURE — 63600175 PHARM REV CODE 636 W HCPCS

## 2025-02-08 PROCEDURE — 93306 TTE W/DOPPLER COMPLETE: CPT | Mod: 26,,, | Performed by: INTERNAL MEDICINE

## 2025-02-08 PROCEDURE — 85025 COMPLETE CBC W/AUTO DIFF WBC: CPT | Performed by: INTERNAL MEDICINE

## 2025-02-08 PROCEDURE — 92978 ENDOLUMINL IVUS OCT C 1ST: CPT | Mod: 26,RC,, | Performed by: INTERNAL MEDICINE

## 2025-02-08 PROCEDURE — 93458 L HRT ARTERY/VENTRICLE ANGIO: CPT | Mod: 26,51,59, | Performed by: INTERNAL MEDICINE

## 2025-02-08 PROCEDURE — 92978 ENDOLUMINL IVUS OCT C 1ST: CPT | Mod: RC | Performed by: INTERNAL MEDICINE

## 2025-02-08 PROCEDURE — 99223 1ST HOSP IP/OBS HIGH 75: CPT | Mod: 25,,, | Performed by: INTERNAL MEDICINE

## 2025-02-08 PROCEDURE — 20000000 HC ICU ROOM

## 2025-02-08 PROCEDURE — 83036 HEMOGLOBIN GLYCOSYLATED A1C: CPT | Performed by: INTERNAL MEDICINE

## 2025-02-08 PROCEDURE — 84484 ASSAY OF TROPONIN QUANT: CPT | Performed by: INTERNAL MEDICINE

## 2025-02-08 PROCEDURE — 80053 COMPREHEN METABOLIC PANEL: CPT | Performed by: INTERNAL MEDICINE

## 2025-02-08 PROCEDURE — C1887 CATHETER, GUIDING: HCPCS | Performed by: INTERNAL MEDICINE

## 2025-02-08 PROCEDURE — 92941 PRQ TRLML REVSC TOT OCCL AMI: CPT | Mod: RC,,, | Performed by: INTERNAL MEDICINE

## 2025-02-08 PROCEDURE — C1894 INTRO/SHEATH, NON-LASER: HCPCS | Performed by: INTERNAL MEDICINE

## 2025-02-08 PROCEDURE — C1769 GUIDE WIRE: HCPCS | Performed by: INTERNAL MEDICINE

## 2025-02-08 PROCEDURE — C9606 PERC D-E COR REVASC W AMI S: HCPCS | Performed by: INTERNAL MEDICINE

## 2025-02-08 PROCEDURE — 80048 BASIC METABOLIC PNL TOTAL CA: CPT

## 2025-02-08 PROCEDURE — 94761 N-INVAS EAR/PLS OXIMETRY MLT: CPT

## 2025-02-08 PROCEDURE — 99153 MOD SED SAME PHYS/QHP EA: CPT | Performed by: INTERNAL MEDICINE

## 2025-02-08 PROCEDURE — 99233 SBSQ HOSP IP/OBS HIGH 50: CPT | Mod: 25,,, | Performed by: INTERNAL MEDICINE

## 2025-02-08 DEVICE — EVEROLIMUS-ELUTING PLATINUM CHROMIUM CORONARY STENT SYSTEM
Type: IMPLANTABLE DEVICE | Site: CORONARY | Status: FUNCTIONAL
Brand: SYNERGY™ XD

## 2025-02-08 RX ORDER — METOPROLOL TARTRATE 25 MG/1
25 TABLET, FILM COATED ORAL 2 TIMES DAILY
Status: DISCONTINUED | OUTPATIENT
Start: 2025-02-08 | End: 2025-02-12

## 2025-02-08 RX ORDER — ONDANSETRON HYDROCHLORIDE 2 MG/ML
INJECTION, SOLUTION INTRAVENOUS
Status: COMPLETED
Start: 2025-02-08 | End: 2025-02-08

## 2025-02-08 RX ORDER — TIROFIBAN HYDROCHLORIDE 50 UG/ML
INJECTION INTRAVENOUS
Status: DISCONTINUED | OUTPATIENT
Start: 2025-02-08 | End: 2025-02-12 | Stop reason: HOSPADM

## 2025-02-08 RX ORDER — ASPIRIN 81 MG/1
81 TABLET ORAL DAILY
Status: DISCONTINUED | OUTPATIENT
Start: 2025-02-08 | End: 2025-02-12 | Stop reason: HOSPADM

## 2025-02-08 RX ORDER — TALC
6 POWDER (GRAM) TOPICAL NIGHTLY PRN
Status: DISCONTINUED | OUTPATIENT
Start: 2025-02-08 | End: 2025-02-12 | Stop reason: HOSPADM

## 2025-02-08 RX ORDER — MIDAZOLAM HYDROCHLORIDE 2 MG/2ML
INJECTION, SOLUTION INTRAMUSCULAR; INTRAVENOUS
Status: DISCONTINUED | OUTPATIENT
Start: 2025-02-08 | End: 2025-02-08 | Stop reason: HOSPADM

## 2025-02-08 RX ORDER — ONDANSETRON HYDROCHLORIDE 2 MG/ML
4 INJECTION, SOLUTION INTRAVENOUS
Status: COMPLETED | OUTPATIENT
Start: 2025-02-08 | End: 2025-02-08

## 2025-02-08 RX ORDER — ACETAMINOPHEN 325 MG/1
650 TABLET ORAL EVERY 8 HOURS PRN
Status: DISCONTINUED | OUTPATIENT
Start: 2025-02-08 | End: 2025-02-12 | Stop reason: HOSPADM

## 2025-02-08 RX ORDER — SODIUM,POTASSIUM PHOSPHATES 280-250MG
2 POWDER IN PACKET (EA) ORAL
Status: DISCONTINUED | OUTPATIENT
Start: 2025-02-08 | End: 2025-02-12 | Stop reason: HOSPADM

## 2025-02-08 RX ORDER — MORPHINE SULFATE 2 MG/ML
2 INJECTION, SOLUTION INTRAMUSCULAR; INTRAVENOUS EVERY 30 MIN PRN
Status: DISCONTINUED | OUTPATIENT
Start: 2025-02-08 | End: 2025-02-09

## 2025-02-08 RX ORDER — FAMOTIDINE 10 MG/ML
20 INJECTION INTRAVENOUS 2 TIMES DAILY
Status: DISCONTINUED | OUTPATIENT
Start: 2025-02-08 | End: 2025-02-12

## 2025-02-08 RX ORDER — AMLODIPINE BESYLATE 5 MG/1
5 TABLET ORAL DAILY
Status: DISCONTINUED | OUTPATIENT
Start: 2025-02-08 | End: 2025-02-08

## 2025-02-08 RX ORDER — POTASSIUM CHLORIDE 7.45 MG/ML
10 INJECTION INTRAVENOUS
Status: DISCONTINUED | OUTPATIENT
Start: 2025-02-08 | End: 2025-02-08

## 2025-02-08 RX ORDER — AMOXICILLIN 250 MG
1 CAPSULE ORAL 2 TIMES DAILY
Status: DISCONTINUED | OUTPATIENT
Start: 2025-02-08 | End: 2025-02-12 | Stop reason: HOSPADM

## 2025-02-08 RX ORDER — IBUPROFEN 200 MG
16 TABLET ORAL
Status: DISCONTINUED | OUTPATIENT
Start: 2025-02-08 | End: 2025-02-12 | Stop reason: HOSPADM

## 2025-02-08 RX ORDER — SODIUM CHLORIDE 0.9 % (FLUSH) 0.9 %
3 SYRINGE (ML) INJECTION EVERY 12 HOURS PRN
Status: DISCONTINUED | OUTPATIENT
Start: 2025-02-08 | End: 2025-02-12 | Stop reason: HOSPADM

## 2025-02-08 RX ORDER — EMPAGLIFLOZIN 10 MG/1
10 TABLET, FILM COATED ORAL DAILY
COMMUNITY

## 2025-02-08 RX ORDER — MORPHINE SULFATE 4 MG/ML
4 INJECTION, SOLUTION INTRAMUSCULAR; INTRAVENOUS ONCE
Status: COMPLETED | OUTPATIENT
Start: 2025-02-08 | End: 2025-02-08

## 2025-02-08 RX ORDER — ALUMINUM HYDROXIDE, MAGNESIUM HYDROXIDE, AND SIMETHICONE 1200; 120; 1200 MG/30ML; MG/30ML; MG/30ML
30 SUSPENSION ORAL 4 TIMES DAILY PRN
Status: DISCONTINUED | OUTPATIENT
Start: 2025-02-08 | End: 2025-02-12 | Stop reason: HOSPADM

## 2025-02-08 RX ORDER — ATORVASTATIN CALCIUM 40 MG/1
80 TABLET, FILM COATED ORAL DAILY
Status: DISCONTINUED | OUTPATIENT
Start: 2025-02-08 | End: 2025-02-12 | Stop reason: HOSPADM

## 2025-02-08 RX ORDER — ATORVASTATIN CALCIUM 40 MG/1
80 TABLET, FILM COATED ORAL DAILY
Status: DISCONTINUED | OUTPATIENT
Start: 2025-02-08 | End: 2025-02-08

## 2025-02-08 RX ORDER — TIROFIBAN HYDROCHLORIDE 50 UG/ML
0.15 INJECTION INTRAVENOUS CONTINUOUS
Status: ACTIVE | OUTPATIENT
Start: 2025-02-08 | End: 2025-02-08

## 2025-02-08 RX ORDER — LANOLIN ALCOHOL/MO/W.PET/CERES
800 CREAM (GRAM) TOPICAL
Status: DISCONTINUED | OUTPATIENT
Start: 2025-02-08 | End: 2025-02-12 | Stop reason: HOSPADM

## 2025-02-08 RX ORDER — NAPROXEN SODIUM 220 MG/1
81 TABLET, FILM COATED ORAL DAILY
Status: DISCONTINUED | OUTPATIENT
Start: 2025-02-08 | End: 2025-02-08

## 2025-02-08 RX ORDER — HEPARIN SODIUM 10000 [USP'U]/ML
INJECTION, SOLUTION INTRAVENOUS; SUBCUTANEOUS
Status: DISCONTINUED | OUTPATIENT
Start: 2025-02-08 | End: 2025-02-08 | Stop reason: HOSPADM

## 2025-02-08 RX ORDER — AMLODIPINE BESYLATE 5 MG/1
10 TABLET ORAL DAILY
Status: DISCONTINUED | OUTPATIENT
Start: 2025-02-08 | End: 2025-02-12 | Stop reason: HOSPADM

## 2025-02-08 RX ORDER — NALOXONE HCL 0.4 MG/ML
0.02 VIAL (ML) INJECTION
Status: DISCONTINUED | OUTPATIENT
Start: 2025-02-08 | End: 2025-02-12 | Stop reason: HOSPADM

## 2025-02-08 RX ORDER — MELOXICAM 15 MG/1
1 TABLET ORAL DAILY PRN
Status: ON HOLD | COMMUNITY
End: 2025-02-12 | Stop reason: HOSPADM

## 2025-02-08 RX ORDER — HEPARIN SODIUM,PORCINE/D5W 25000/250
0-40 INTRAVENOUS SOLUTION INTRAVENOUS CONTINUOUS
Status: CANCELLED | OUTPATIENT
Start: 2025-02-07

## 2025-02-08 RX ORDER — AMIODARONE HYDROCHLORIDE 150 MG/3ML
300 INJECTION, SOLUTION INTRAVENOUS
Status: COMPLETED | OUTPATIENT
Start: 2025-02-08 | End: 2025-02-08

## 2025-02-08 RX ORDER — ACETAMINOPHEN 325 MG/1
650 TABLET ORAL EVERY 4 HOURS PRN
Status: DISCONTINUED | OUTPATIENT
Start: 2025-02-08 | End: 2025-02-12 | Stop reason: HOSPADM

## 2025-02-08 RX ORDER — IODIXANOL 320 MG/ML
INJECTION, SOLUTION INTRAVASCULAR
Status: DISCONTINUED | OUTPATIENT
Start: 2025-02-08 | End: 2025-02-11 | Stop reason: HOSPADM

## 2025-02-08 RX ORDER — FENTANYL CITRATE 50 UG/ML
INJECTION, SOLUTION INTRAMUSCULAR; INTRAVENOUS
Status: DISCONTINUED | OUTPATIENT
Start: 2025-02-08 | End: 2025-02-08 | Stop reason: HOSPADM

## 2025-02-08 RX ORDER — IBUPROFEN 200 MG
24 TABLET ORAL
Status: DISCONTINUED | OUTPATIENT
Start: 2025-02-08 | End: 2025-02-12 | Stop reason: HOSPADM

## 2025-02-08 RX ORDER — TRAZODONE HYDROCHLORIDE 150 MG/1
1 TABLET ORAL DAILY
COMMUNITY

## 2025-02-08 RX ORDER — LOSARTAN POTASSIUM 25 MG/1
25 TABLET ORAL DAILY
Status: DISCONTINUED | OUTPATIENT
Start: 2025-02-08 | End: 2025-02-10

## 2025-02-08 RX ORDER — PREGABALIN 75 MG/1
1 CAPSULE ORAL 2 TIMES DAILY
COMMUNITY
Start: 2025-01-27

## 2025-02-08 RX ORDER — SODIUM CHLORIDE 9 MG/ML
INJECTION, SOLUTION INTRAVENOUS CONTINUOUS
Status: DISCONTINUED | OUTPATIENT
Start: 2025-02-08 | End: 2025-02-08

## 2025-02-08 RX ORDER — GABAPENTIN 300 MG/1
300 CAPSULE ORAL 3 TIMES DAILY
Status: DISCONTINUED | OUTPATIENT
Start: 2025-02-08 | End: 2025-02-12 | Stop reason: HOSPADM

## 2025-02-08 RX ORDER — ADENOSINE 3 MG/ML
INJECTION, SOLUTION INTRAVENOUS
Status: DISCONTINUED | OUTPATIENT
Start: 2025-02-08 | End: 2025-02-08 | Stop reason: HOSPADM

## 2025-02-08 RX ORDER — POTASSIUM CHLORIDE 7.45 MG/ML
10 INJECTION INTRAVENOUS ONCE
Status: COMPLETED | OUTPATIENT
Start: 2025-02-08 | End: 2025-02-08

## 2025-02-08 RX ORDER — POTASSIUM CHLORIDE 20 MEQ/1
40 TABLET, EXTENDED RELEASE ORAL ONCE
Status: COMPLETED | OUTPATIENT
Start: 2025-02-08 | End: 2025-02-08

## 2025-02-08 RX ORDER — GLUCAGON 1 MG
1 KIT INJECTION
Status: DISCONTINUED | OUTPATIENT
Start: 2025-02-08 | End: 2025-02-12 | Stop reason: HOSPADM

## 2025-02-08 RX ORDER — MUPIROCIN 20 MG/G
OINTMENT TOPICAL 2 TIMES DAILY
Status: DISCONTINUED | OUTPATIENT
Start: 2025-02-08 | End: 2025-02-12 | Stop reason: HOSPADM

## 2025-02-08 RX ORDER — ISOSORBIDE MONONITRATE 30 MG/1
30 TABLET, EXTENDED RELEASE ORAL DAILY
Status: DISCONTINUED | OUTPATIENT
Start: 2025-02-08 | End: 2025-02-09

## 2025-02-08 RX ORDER — ONDANSETRON HYDROCHLORIDE 2 MG/ML
4 INJECTION, SOLUTION INTRAVENOUS EVERY 6 HOURS PRN
Status: DISCONTINUED | OUTPATIENT
Start: 2025-02-08 | End: 2025-02-12 | Stop reason: HOSPADM

## 2025-02-08 RX ADMIN — SODIUM CHLORIDE: 9 INJECTION, SOLUTION INTRAVENOUS at 02:02

## 2025-02-08 RX ADMIN — GABAPENTIN 300 MG: 300 CAPSULE ORAL at 02:02

## 2025-02-08 RX ADMIN — METOPROLOL TARTRATE 25 MG: 25 TABLET, FILM COATED ORAL at 02:02

## 2025-02-08 RX ADMIN — POTASSIUM CHLORIDE 10 MEQ: 7.46 INJECTION, SOLUTION INTRAVENOUS at 03:02

## 2025-02-08 RX ADMIN — MORPHINE SULFATE 4 MG: 4 INJECTION, SOLUTION INTRAMUSCULAR; INTRAVENOUS at 03:02

## 2025-02-08 RX ADMIN — TICAGRELOR 90 MG: 90 TABLET ORAL at 10:02

## 2025-02-08 RX ADMIN — TICAGRELOR 90 MG: 90 TABLET ORAL at 09:02

## 2025-02-08 RX ADMIN — FAMOTIDINE 20 MG: 10 INJECTION, SOLUTION INTRAVENOUS at 09:02

## 2025-02-08 RX ADMIN — LOSARTAN POTASSIUM 25 MG: 25 TABLET, FILM COATED ORAL at 09:02

## 2025-02-08 RX ADMIN — METOPROLOL TARTRATE 25 MG: 25 TABLET, FILM COATED ORAL at 09:02

## 2025-02-08 RX ADMIN — FAMOTIDINE 20 MG: 10 INJECTION, SOLUTION INTRAVENOUS at 02:02

## 2025-02-08 RX ADMIN — GABAPENTIN 300 MG: 300 CAPSULE ORAL at 09:02

## 2025-02-08 RX ADMIN — ISOSORBIDE MONONITRATE 30 MG: 30 TABLET, EXTENDED RELEASE ORAL at 09:02

## 2025-02-08 RX ADMIN — AMIODARONE HYDROCHLORIDE 300 MG: 50 INJECTION, SOLUTION INTRAVENOUS at 12:02

## 2025-02-08 RX ADMIN — MUPIROCIN 1 G: 20 OINTMENT TOPICAL at 09:02

## 2025-02-08 RX ADMIN — SENNOSIDES AND DOCUSATE SODIUM 1 TABLET: 8.6; 5 TABLET ORAL at 09:02

## 2025-02-08 RX ADMIN — POTASSIUM CHLORIDE: 2 INJECTION, SOLUTION, CONCENTRATE INTRAVENOUS at 11:02

## 2025-02-08 RX ADMIN — ONDANSETRON HYDROCHLORIDE 4 MG: 2 INJECTION, SOLUTION INTRAVENOUS at 12:02

## 2025-02-08 RX ADMIN — MORPHINE SULFATE 2 MG: 2 INJECTION, SOLUTION INTRAMUSCULAR; INTRAVENOUS at 05:02

## 2025-02-08 RX ADMIN — ATORVASTATIN CALCIUM 80 MG: 40 TABLET, FILM COATED ORAL at 09:02

## 2025-02-08 RX ADMIN — ONDANSETRON 4 MG: 2 INJECTION INTRAMUSCULAR; INTRAVENOUS at 12:02

## 2025-02-08 RX ADMIN — AMLODIPINE BESYLATE 10 MG: 5 TABLET ORAL at 09:02

## 2025-02-08 RX ADMIN — POTASSIUM CHLORIDE: 2 INJECTION, SOLUTION, CONCENTRATE INTRAVENOUS at 03:02

## 2025-02-08 RX ADMIN — POTASSIUM CHLORIDE 40 MEQ: 1500 TABLET, EXTENDED RELEASE ORAL at 02:02

## 2025-02-08 RX ADMIN — ASPIRIN 81 MG: 81 TABLET, COATED ORAL at 09:02

## 2025-02-08 NOTE — H&P
Formerly [facility] Medicine  History & Physical    Patient Name: Sanjeev Britt  MRN: [id number]  Patient Class: IP- Inpatient  Admission Date: 2/7/2025  Attending Physician: Gabino Broderick MD  Primary Care Provider: Chidi Vidal Jr., MD         Patient information was obtained from patient, spouse/SO, and ER records.     Subjective:     Principal Problem:STEMI (ST elevation myocardial infarction)    Chief Complaint:   Chief Complaint   Patient presents with    Chest Pain     Chest pain, hx stents        HPI: 59-year-old male with a past medical history of CAD status post stent placement\11 years ago ,  diabetes mellitus not on insulin ,  hypertension presents to the emergency department post ROSC while at triage.  Patient coded at triage and was defibrillated once.  On arrival to the emergency department patient was confused though was able to tell me that he has been experiencing chest pain presently 20 minutes prior to arrival.  Patient states that he was having intercourse with his partner and shortly afterwards began to experience chest pain.  Per patient's partner he did utilize sildenafil.  Patient also states he experienced diaphoresis and nausea without any episodes of vomiting.      NON Smoker  Non drinker  Here with wife or GF       His wife convinced him to come to ER    He did not want ambulance     So she drove him     In the parking lot he passed out and hit his head above right eye     But he went into V FIB arrest and was coded    They did CPR and shocked him twice     For about 2-3 minutes total       Then he was taken into ER    Potasium was 2.5     Mag was good over 2       EKG showed STEMI      Then dr Levi took him to cath lab         Distal % thrombotic occlusion. Proximal RCA 90% stenosis. fixed both proximal and distal RCA.     Has residual disease in LAD and circumflex diagonal which we can manage as a staged procedures         We are admitting him to ICU      Past Medical History:   Diagnosis Date    Acute coronary syndrome 09/09/2019    Coronary artery disease     Diabetes mellitus     Hypertension     Sleep apnea        Past Surgical History:   Procedure Laterality Date    FESS, USING COMPUTER-ASSISTED NAVIGATION, WITH NASAL TURBINATE REDUCTION N/A 6/7/2024    Procedure: FESS, USING COMPUTER-ASSISTED NAVIGATION, WITH NASAL TURBINATE REDUCTION;  Surgeon: Andres Abraham MD;  Location: [facility] OR;  Service: ENT;  Laterality: N/A;    NASAL SEPTOPLASTY N/A 6/7/2024    Procedure: SEPTOPLASTY, NOSE;  Surgeon: Andres Abraham MD;  Location: [facility] OR;  Service: ENT;  Laterality: N/A;    NASAL TURBINATE REDUCTION Bilateral 6/7/2024    Procedure: REDUCTION, NASAL TURBINATE;  Surgeon: nAdres Abraham MD;  Location: [facility] OR;  Service: ENT;  Laterality: Bilateral;    VASCULAR SURGERY         Review of patient's allergies indicates:   Allergen Reactions    Levaquin [levofloxacin]        No current facility-administered medications on file prior to encounter.     Current Outpatient Medications on File Prior to Encounter   Medication Sig    albuterol (PROVENTIL/VENTOLIN HFA) 90 mcg/actuation inhaler Inhale 2 puffs every 4 hours by inhalation route.    amLODIPine (NORVASC) 10 MG tablet Take 1 tablet (10 mg total) by mouth once daily.    aspirin (ECOTRIN) 81 MG EC tablet Take 81 mg by mouth once daily.    atorvastatin (LIPITOR) 40 MG tablet TAKE 1 TABLET(40 MG) BY MOUTH EVERY DAY    azelastine (ASTELIN) 137 mcg (0.1 %) nasal spray 2 sprays by Each Nostril route 2 (two) times daily.    clopidogreL (PLAVIX) 75 mg tablet Take 1 tablet (75 mg total) by mouth once daily.    ezetimibe (ZETIA) 10 mg tablet Take 1 tablet (10 mg total) by mouth once daily.    gabapentin (NEURONTIN) 300 MG capsule Take 1 capsule by mouth 3 (three) times daily.    hydrOXYzine (ATARAX) 50 MG tablet Take 50 mg by mouth daily as needed.    isosorbide mononitrate (IMDUR) 30 MG 24 hr tablet TAKE  ONE TABLET BY MOUTH ONCE DAILY    losartan (COZAAR) 25 MG tablet TAKE 1 TABLET(25 MG) BY MOUTH EVERY DAY    metFORMIN (GLUCOPHAGE) 500 MG tablet Take 500 mg by mouth daily with breakfast.    metoprolol tartrate (LOPRESSOR) 50 MG tablet Take 1 tablet (50 mg total) by mouth 2 (two) times daily.    multivit-min/FA/lycopen/lutein (CENTRUM SILVER MEN ORAL) Take 1 tablet by mouth once daily.    potassium chloride (MICRO-K) 10 MEQ CpSR Take 1 capsule (10 mEq total) by mouth once daily.    tadalafiL (CIALIS) 5 MG tablet Take 1 tablet (5 mg total) by mouth daily as needed for Erectile Dysfunction.     Family History       Problem Relation (Age of Onset)    Cancer Father          Tobacco Use    Smoking status: Never    Smokeless tobacco: Current     Types: Snuff   Substance and Sexual Activity    Alcohol use: Not Currently    Drug use: Never    Sexual activity: Yes     Partners: Female     Review of Systems   All other systems reviewed and are negative.    Objective:     Vital Signs (Most Recent):  Temp: 98.1 °F (36.7 °C) (02/08/25 0701)  Pulse: 68 (02/08/25 0741)  Resp: 14 (02/08/25 0741)  BP: 129/67 (02/08/25 0701)  SpO2: 98 % (02/08/25 0741) Vital Signs (24h Range):  Temp:  [98.1 °F (36.7 °C)-98.2 °F (36.8 °C)] 98.1 °F (36.7 °C)  Pulse:  [60-70] 68  Resp:  [10-21] 14  SpO2:  [92 %-99 %] 98 %  BP: (121-135)/(64-78) 129/67     Weight: 76.7 kg (169 lb)  Body mass index is 27.28 kg/m².     Physical Exam  Vitals and nursing note reviewed. Exam conducted with a chaperone present.   Constitutional:       Appearance: Normal appearance.   HENT:      Head: Normocephalic.      Nose: Nose normal.      Mouth/Throat:      Mouth: Mucous membranes are moist.   Eyes:      Pupils: Pupils are equal, round, and reactive to light.   Cardiovascular:      Rate and Rhythm: Normal rate and regular rhythm.      Pulses: Normal pulses.      Heart sounds: Normal heart sounds.   Pulmonary:      Effort: Pulmonary effort is normal.      Breath sounds:  "Normal breath sounds.   Abdominal:      General: Abdomen is flat. Bowel sounds are normal.      Palpations: Abdomen is soft.   Musculoskeletal:         General: Normal range of motion.      Cervical back: Normal range of motion.   Skin:     General: Skin is warm.      Capillary Refill: Capillary refill takes less than 2 seconds.   Neurological:      General: No focal deficit present.      Mental Status: He is alert.   Psychiatric:         Mood and Affect: Mood normal.              CRANIAL NERVES     CN III, IV, VI   Pupils are equal, round, and reactive to light.       Significant Labs: All pertinent labs within the past 24 hours have been reviewed.  CBC:   Recent Labs   Lab 02/07/25 2342 02/08/25  0000 02/08/25 0311   WBC 11.56  --  14.75*   HGB 15.6  --  15.0   HCT 45.2 47 45.6     --  234     CMP:   Recent Labs   Lab 02/07/25 2342 02/08/25 0311    139   K 2.5* 2.9*   CL 99 102   CO2 20* 25   * 139*   BUN 19 16   CREATININE 1.1 0.8   CALCIUM 9.3 8.6*   PROT 7.6 7.1   ALBUMIN 4.6 4.3   BILITOT 0.7 0.9   ALKPHOS 94 73   AST 28 144*   ALT 26 59*   ANIONGAP 20* 12     Cardiac Markers:   Recent Labs   Lab 02/07/25 2342   *     Coagulation: No results for input(s): "PT", "INR", "APTT" in the last 48 hours.  Lactic Acid: No results for input(s): "LACTATE" in the last 48 hours.  Magnesium:   Recent Labs   Lab 02/07/25 2342 02/08/25 0311   MG 2.0 2.6     Respiratory Culture: No results for input(s): "GSRESP", "RESPIRATORYC" in the last 48 hours.  Troponin:   Recent Labs   Lab 02/07/25 2342 02/08/25 0311   TROPONINIHS 53.1* 88807.4*     TSH: No results for input(s): "TSH" in the last 4320 hours.  Urine Studies: No results for input(s): "COLORU", "APPEARANCEUA", "PHUR", "SPECGRAV", "PROTEINUA", "GLUCUA", "KETONESU", "BILIRUBINUA", "OCCULTUA", "NITRITE", "UROBILINOGEN", "LEUKOCYTESUR", "RBCUA", "WBCUA", "BACTERIA", "SQUAMEPITHEL", "HYALINECASTS" in the last 48 hours.    Invalid input(s): " ""WRIGHTSUR"    Significant Imaging: I have reviewed all pertinent imaging results/findings within the past 24 hours.  I have reviewed and interpreted all pertinent imaging results/findings within the past 24 hours.  Assessment/Plan:     * STEMI (ST elevation myocardial infarction)      Distal % thrombotic occlusion. Proximal RCA 90% stenosis. fixed both proximal and distal RCA.     Has residual disease in LAD and circumflex diagonal which we can manage as a staged procedures     - per DR HOLLOWAY       PLAN    - Aggrastat x 12 hours    - asa 81 mg    - Brilinta     - Beta blocker started PO    - I increased his statin from home to 80 mg po daily     - cont BP control meds from home including IMDUR  And ARB    - regular diet    - PEPCID IV BID    Cards on consult     ECHO 2D       Keep mag over 2 and K over 4     DMII (diabetes mellitus, type 2)  Not on insulin     Check A1C      VTE Risk Mitigation (From admission, onward)           Ordered     IP VTE HIGH RISK PATIENT  Once         02/08/25 0001     Place sequential compression device  Until discontinued         02/08/25 0001     Reason for No Pharmacological VTE Prophylaxis  Once        Question:  Reasons:  Answer:  Patient is Ambulatory    02/08/25 0001                  Critical care time spent on the evaluation and treatment of severe organ dysfunction, review of pertinent labs and imaging studies, discussions with consulting providers and discussions with patient/family: 60 minutes.                  Gabino Broderick MD  Department of Hospital Medicine  formerly [facility]          "

## 2025-02-08 NOTE — RESPIRATORY THERAPY
02/08/25 0319   Patient Assessment/Suction   Level of Consciousness (AVPU) alert   Respiratory Effort Normal;Unlabored   Expansion/Accessory Muscles/Retractions no use of accessory muscles   Rhythm/Pattern, Respiratory unlabored;pattern regular;depth regular;no shortness of breath reported   PRE-TX-O2   Device (Oxygen Therapy) nasal cannula   Flow (L/min) (Oxygen Therapy) 3   SpO2 95 %   Pulse Oximetry Type Continuous   $ Pulse Oximetry - Multiple Charge Pulse Oximetry - Multiple   Pulse 63   Resp 10   Education   $ Education 15 min;Oxygen;Other (see comment)   Respiratory Evaluation   $ Care Plan Tech Time 15 min   $ Respiratory Evaluation Complete   Evaluation For New Orders   Admitting Diagnosis chest pain, stemi   Cardiac Diagnosis CAD, HTN   Pulmonary Diagnosis sleep apnea   Home Oxygen   Has Home Oxygen? No   Home Aerosol, MDI, DPI, and Other Treatments/Therapies   Home Respiratory Therapy Per Patient/Review of Chart Yes   MDI Home Meds/Freq albuterol prn   Oxygen Care Plan   Oxygen Care Plan Per Protocol   SPO2 Goal (%) 95% cardiac   Rationale SpO2 is <95% (Cardiac Pt.)   Bronchodilator Care Plan   Bronchodilator Care Plan Aerosol   Aerosol Meds w/ frequency Albuterol - Ipratropium (DUO-NEB) 0.5mg-3mg(2.5ml) 3ml Nebulizer Solution2.5mg PRN   Rationale Maintain home respiratory medicine   Atelectasis Care Plan   Atelectasis Care Plan Other   Rationale No Rational Found   Airway Clearance Care Plan   Airway Clearance Care Plan Other   Rationale No rationale found

## 2025-02-08 NOTE — PLAN OF CARE
[facility]  Initial Discharge Assessment       Primary Care Provider: Chidi Vidal Jr., MD    Admission Diagnosis: Chest pain [R07.9]    Admission Date: 2/7/2025  Expected Discharge Date: Unknown  Pt is a 59-year-old male who arrived from home with Myocardial infarction problem. Information verified as correct on facesheet. The assessment was completed at the patient's bedside. Pt lives with vandana cage (Significant other) [phone] (Mobile). Pt does not have a Living Will or Advance Directive. The Pt is oriented to person, places, and times. PCP last seen more than 30 days ago.  Pt denies Coumadin, dialysis, DME, HH, and has not been readmitted into the hospital in the last thirty days.  Pt is capable of performing ADLs without assistance. Pt drives to and from medical appointments. Pt reports he takes medication as prescribed; pharmacy uses [facility] on [address].  Pt verbalized plan to discharge home via family transport. Pt has no other needs to be addressed at this time. CM reviewed the chart and will continue to monitor.           Payor: MEDICAID / Plan: [facility] COMMUNITY PLAN [facility] ([place] MEDICAID) / Product Type: Managed Medicaid /     Extended Emergency Contact Information  Primary Emergency Contact: vandana cage  Mobile Phone: [phone]  Relation: Significant other   needed? No    Discharge Plan A: Home with family  Discharge Plan B: Home with family      [id number] - [place] - [address]  [address] [address]  Phone: [phone] Fax: [phone]    [id number] - [place] - [address]  [address] [address]  Phone: [phone] Fax: [phone]      Initial Assessment (most recent)       Adult Discharge Assessment - 02/08/25 1019          Discharge Assessment    Assessment Type Discharge Planning Assessment      Confirmed/corrected address, phone number and insurance Yes     Confirmed Demographics Correct on Facesheet     Source of Information patient     When was your last doctors appointment? --   Pt said he does not remember the date but he said it has been more than 30 days.    Does patient/caregiver understand observation status Yes     Reason For Admission STEMI (ST elevation myocardial infarction)     People in Home significant other     Do you expect to return to your current living situation? Yes     Do you have help at home or someone to help you manage your care at home? Yes     Who are your caregiver(s) and their phone number(s)? vandana cage (Significant other)  [phone] (Mobile)     Prior to hospitilization cognitive status: Alert/Oriented     Current cognitive status: Alert/Oriented     Walking or Climbing Stairs Difficulty no     Dressing/Bathing Difficulty no     Home Accessibility wheelchair accessible     Home Layout Able to live on 1st floor     Equipment Currently Used at Home none     Readmission within 30 days? No     Patient currently being followed by outpatient case management? No     Do you currently have service(s) that help you manage your care at home? No     Do you take prescription medications? Yes     Do you have prescription coverage? Yes     Coverage MEDICAID - [facility] COMMUNITY PLAN [facility] ([place] MEDICA[place])     Do you have any problems affording any of your prescribed medications? No     Is the patient taking medications as prescribed? yes     Who is going to help you get home at discharge? vandana cage (Significant other)  [phone] (Mobile)     How do you get to doctors appointments? car, drives self;family or friend will provide     Are you on dialysis? No     Do you take coumadin? No     Discharge Plan A Home with family     Discharge Plan B Home with family     DME Needed Upon Discharge  none

## 2025-02-08 NOTE — SUBJECTIVE & OBJECTIVE
Past Medical History:   Diagnosis Date    Acute coronary syndrome 09/09/2019    Coronary artery disease     Diabetes mellitus     Hypertension     Sleep apnea        Past Surgical History:   Procedure Laterality Date    FESS, USING COMPUTER-ASSISTED NAVIGATION, WITH NASAL TURBINATE REDUCTION N/A 6/7/2024    Procedure: FESS, USING COMPUTER-ASSISTED NAVIGATION, WITH NASAL TURBINATE REDUCTION;  Surgeon: Andres Abraham MD;  Location: [facility] OR;  Service: ENT;  Laterality: N/A;    NASAL SEPTOPLASTY N/A 6/7/2024    Procedure: SEPTOPLASTY, NOSE;  Surgeon: Andres Abraham MD;  Location: [facility] OR;  Service: ENT;  Laterality: N/A;    NASAL TURBINATE REDUCTION Bilateral 6/7/2024    Procedure: REDUCTION, NASAL TURBINATE;  Surgeon: Andres Abraham MD;  Location: [facility] OR;  Service: ENT;  Laterality: Bilateral;    VASCULAR SURGERY         Review of patient's allergies indicates:   Allergen Reactions    Levaquin [levofloxacin]        No current facility-administered medications on file prior to encounter.     Current Outpatient Medications on File Prior to Encounter   Medication Sig    albuterol (PROVENTIL/VENTOLIN HFA) 90 mcg/actuation inhaler Inhale 2 puffs every 4 hours by inhalation route.    amLODIPine (NORVASC) 10 MG tablet Take 1 tablet (10 mg total) by mouth once daily.    aspirin (ECOTRIN) 81 MG EC tablet Take 81 mg by mouth once daily.    atorvastatin (LIPITOR) 40 MG tablet TAKE 1 TABLET(40 MG) BY MOUTH EVERY DAY    azelastine (ASTELIN) 137 mcg (0.1 %) nasal spray 2 sprays by Each Nostril route 2 (two) times daily.    clopidogreL (PLAVIX) 75 mg tablet Take 1 tablet (75 mg total) by mouth once daily.    ezetimibe (ZETIA) 10 mg tablet Take 1 tablet (10 mg total) by mouth once daily.    gabapentin (NEURONTIN) 300 MG capsule Take 1 capsule by mouth 3 (three) times daily.    hydrOXYzine (ATARAX) 50 MG tablet Take 50 mg by mouth daily as needed.    isosorbide mononitrate (IMDUR) 30 MG 24 hr tablet TAKE ONE  TABLET BY MOUTH ONCE DAILY    losartan (COZAAR) 25 MG tablet TAKE 1 TABLET(25 MG) BY MOUTH EVERY DAY    metFORMIN (GLUCOPHAGE) 500 MG tablet Take 500 mg by mouth daily with breakfast.    metoprolol tartrate (LOPRESSOR) 50 MG tablet Take 1 tablet (50 mg total) by mouth 2 (two) times daily.    multivit-min/FA/lycopen/lutein (CENTRUM SILVER MEN ORAL) Take 1 tablet by mouth once daily.    potassium chloride (MICRO-K) 10 MEQ CpSR Take 1 capsule (10 mEq total) by mouth once daily.    tadalafiL (CIALIS) 5 MG tablet Take 1 tablet (5 mg total) by mouth daily as needed for Erectile Dysfunction.     Family History       Problem Relation (Age of Onset)    Cancer Father          Tobacco Use    Smoking status: Never    Smokeless tobacco: Current     Types: Snuff   Substance and Sexual Activity    Alcohol use: Not Currently    Drug use: Never    Sexual activity: Yes     Partners: Female     Review of Systems   All other systems reviewed and are negative.    Objective:     Vital Signs (Most Recent):  Temp: 98.1 °F (36.7 °C) (02/08/25 0701)  Pulse: 68 (02/08/25 0741)  Resp: 14 (02/08/25 0741)  BP: 129/67 (02/08/25 0701)  SpO2: 98 % (02/08/25 0741) Vital Signs (24h Range):  Temp:  [98.1 °F (36.7 °C)-98.2 °F (36.8 °C)] 98.1 °F (36.7 °C)  Pulse:  [60-70] 68  Resp:  [10-21] 14  SpO2:  [92 %-99 %] 98 %  BP: (121-135)/(64-78) 129/67     Weight: 76.7 kg (169 lb)  Body mass index is 27.28 kg/m².     Physical Exam  Vitals and nursing note reviewed. Exam conducted with a chaperone present.   Constitutional:       Appearance: Normal appearance.   HENT:      Head: Normocephalic.      Nose: Nose normal.      Mouth/Throat:      Mouth: Mucous membranes are moist.   Eyes:      Pupils: Pupils are equal, round, and reactive to light.   Cardiovascular:      Rate and Rhythm: Normal rate and regular rhythm.      Pulses: Normal pulses.      Heart sounds: Normal heart sounds.   Pulmonary:      Effort: Pulmonary effort is normal.      Breath sounds: Normal  "breath sounds.   Abdominal:      General: Abdomen is flat. Bowel sounds are normal.      Palpations: Abdomen is soft.   Musculoskeletal:         General: Normal range of motion.      Cervical back: Normal range of motion.   Skin:     General: Skin is warm.      Capillary Refill: Capillary refill takes less than 2 seconds.   Neurological:      General: No focal deficit present.      Mental Status: He is alert.   Psychiatric:         Mood and Affect: Mood normal.              CRANIAL NERVES     CN III, IV, VI   Pupils are equal, round, and reactive to light.       Significant Labs: All pertinent labs within the past 24 hours have been reviewed.  CBC:   Recent Labs   Lab 02/07/25 2342 02/08/25  0000 02/08/25 0311   WBC 11.56  --  14.75*   HGB 15.6  --  15.0   HCT 45.2 47 45.6     --  234     CMP:   Recent Labs   Lab 02/07/25 2342 02/08/25 0311    139   K 2.5* 2.9*   CL 99 102   CO2 20* 25   * 139*   BUN 19 16   CREATININE 1.1 0.8   CALCIUM 9.3 8.6*   PROT 7.6 7.1   ALBUMIN 4.6 4.3   BILITOT 0.7 0.9   ALKPHOS 94 73   AST 28 144*   ALT 26 59*   ANIONGAP 20* 12     Cardiac Markers:   Recent Labs   Lab 02/07/25 2342   *     Coagulation: No results for input(s): "PT", "INR", "APTT" in the last 48 hours.  Lactic Acid: No results for input(s): "LACTATE" in the last 48 hours.  Magnesium:   Recent Labs   Lab 02/07/25 2342 02/08/25 0311   MG 2.0 2.6     Respiratory Culture: No results for input(s): "GSRESP", "RESPIRATORYC" in the last 48 hours.  Troponin:   Recent Labs   Lab 02/07/25 2342 02/08/25 0311   TROPONINIHS 53.1* 37063.4*     TSH: No results for input(s): "TSH" in the last 4320 hours.  Urine Studies: No results for input(s): "COLORU", "APPEARANCEUA", "PHUR", "SPECGRAV", "PROTEINUA", "GLUCUA", "KETONESU", "BILIRUBINUA", "OCCULTUA", "NITRITE", "UROBILINOGEN", "LEUKOCYTESUR", "RBCUA", "WBCUA", "BACTERIA", "SQUAMEPITHEL", "HYALINECASTS" in the last 48 hours.    Invalid input(s): " ""WRIGHTSUR"    Significant Imaging: I have reviewed all pertinent imaging results/findings within the past 24 hours.  I have reviewed and interpreted all pertinent imaging results/findings within the past 24 hours.  "

## 2025-02-08 NOTE — HOSPITAL COURSE
59-year-old male with a past medical history of CAD status post PCI several years ago, diabetes, hypertension presented with chest pain had Vfib and cardiac arrest in the ER twice status post resuscitation.  EKG showed inferior ST elevation. Patient did not take Plavix in the last couple of days.  Code STEMI was activated, patient underwent emergency coronary angiogram showing distal % thrombotic occlusion and proximal RCA 90% stenosis and underwent ELIDA placement.  Patient was continued on aspirin, Brilinta and was started on beta blocker as tolerated.  Statin increased to high-dose.  Aggrastat was continued for 12 hours postprocedure.  2D echocardiogram done and showed moderate hypokinesis of the inferior wall, mildly reduced EF 40-45%, normal diastolic function.  Cardiology closely followed the patient.  Adjusted GDM T as tolerated.  Zetia added for LDL of 103 for a goal less than 60.  Patient underwent repeat left heart catheterization during which received left circumflex coronary artery stent placement.  Patient tolerated procedure well.  Subsequently patient noted to have nonsustained asymptomatic occasional ventricular tachycardia.

## 2025-02-08 NOTE — ASSESSMENT & PLAN NOTE
A1c 5.8  POCT glucose  Can start insulin sliding scale as needed, currently well controlled blood glucose

## 2025-02-08 NOTE — CARE UPDATE
Patient is seen and examined this morning.  Reviewed history and physical from Dr. Broderick, agree with the assessment and plan.    Patient mentioned he feels tired and has soreness along the rib margins from the chest compressions last night.  Otherwise no chest pain or heaviness or no other anginal equivalents.  Physical exam is stable.  Cardiology closely following.  Repeat chest x-ray ordered and does not show any rib fractures, mild atelectasis seen.  Encouraged to use incentive spirometry.  2D echo done this morning and shows mildly reduced systolic function, moderate hypokinesis of the inferior wall, EF 45-50%, normal diastolic function.    Updated patient and family at bedside, they verbalized understanding.

## 2025-02-08 NOTE — HPI
59-year-old male with a past medical history of CAD status post stent placement\11 years ago ,  diabetes mellitus not on insulin ,  hypertension presents to the emergency department post ROSC while at triage.  Patient coded at triage and was defibrillated once.  On arrival to the emergency department patient was confused though was able to tell me that he has been experiencing chest pain presently 20 minutes prior to arrival.  Patient states that he was having intercourse with his partner and shortly afterwards began to experience chest pain.  Per patient's partner he did utilize sildenafil.  Patient also states he experienced diaphoresis and nausea without any episodes of vomiting.      NON Smoker  Non drinker  Here with wife or GF       His wife convinced him to come to ER    He did not want ambulance     So she drove him     In the parking lot he passed out and hit his head above right eye     But he went into V FIB arrest and was coded    They did CPR and shocked him twice     For about 2-3 minutes total       Then he was taken into ER    Potasium was 2.5     Mag was good over 2       EKG showed STEMI      Then dr Levi took him to cath lab         Distal % thrombotic occlusion. Proximal RCA 90% stenosis. fixed both proximal and distal RCA.     Has residual disease in LAD and circumflex diagonal which we can manage as a staged procedures         We are admitting him to ICU

## 2025-02-08 NOTE — ASSESSMENT & PLAN NOTE
Distal % thrombotic occlusion. Proximal RCA 90% stenosis. fixed both proximal and distal RCA.   Has residual disease in LAD and circumflex diagonal which we can manage as a staged procedures   Completed Aggrastat x 12 hours, stopped 2/8  Continue asa 81 mg  Continue Brilinta b.i.d.  Beta blocker as tolerated  Avoid nitrates given he took Cialis before presentation (in the last 72 hours)  Continue high-dose atorvastatin, and zetia added ( )  Continue regular diet  Cardiology closely following  2D echocardiogram done and showed moderate hypokinesis of the inferior wall, mildly reduced EF 40-45%, normal diastolic function.   Staged PCI planned for Tuesday Okay to transfer to step-down as per Cardiology

## 2025-02-08 NOTE — CONSULTS
[facility]  Department of Cardiology  Consult Note      PATIENT NAME: Sanjeev Britt    MRN: [id number]  TODAY'S DATE: 02/08/2025  ADMIT DATE: 2/7/2025                          CONSULT REQUESTED BY: Gabino Broderick MD    SUBJECTIVE     PRINCIPAL PROBLEM: <principal problem not specified>      REASON FOR CONSULT:  STEMI      HPI:  59-year-old male with a past medical history of CAD status post PCI several years ago, diabetes, hypertension presented with chest pain had Vfib and cardiac arrest in the ER twice status post resuscitation.  EKG showed inferior ST elevation.  STEMI code was activated.  Patient was transferred to cath lab for emergent coronary angiogram.  Patient did not take Plavix in the last couple of days.        Review of patient's allergies indicates:   Allergen Reactions    Levaquin [levofloxacin]        Past Medical History:   Diagnosis Date    Acute coronary syndrome 09/09/2019    Coronary artery disease     Diabetes mellitus     Hypertension     Sleep apnea      Past Surgical History:   Procedure Laterality Date    FESS, USING COMPUTER-ASSISTED NAVIGATION, WITH NASAL TURBINATE REDUCTION N/A 6/7/2024    Procedure: FESS, USING COMPUTER-ASSISTED NAVIGATION, WITH NASAL TURBINATE REDUCTION;  Surgeon: Andres Abraham MD;  Location: [facility] OR;  Service: ENT;  Laterality: N/A;    NASAL SEPTOPLASTY N/A 6/7/2024    Procedure: SEPTOPLASTY, NOSE;  Surgeon: Andres Abraham MD;  Location: [facility] OR;  Service: ENT;  Laterality: N/A;    NASAL TURBINATE REDUCTION Bilateral 6/7/2024    Procedure: REDUCTION, NASAL TURBINATE;  Surgeon: Andres Abraham MD;  Location: [facility] OR;  Service: ENT;  Laterality: Bilateral;    VASCULAR SURGERY       Social History     Tobacco Use    Smoking status: Never    Smokeless tobacco: Current     Types: Snuff   Substance Use Topics    Alcohol use: Not Currently    Drug use: Never        REVIEW OF SYSTEMS  All other systems negative except as mentioned in  HPI.     OBJECTIVE     VITAL SIGNS (Most Recent)  Resp: 19 (02/07/25 2355)    VENTILATION STATUS  Resp: 19 (02/07/25 2355)              I & O (Last 24H):No intake or output data in the 24 hours ending 02/08/25 0211    WEIGHTS  Wt Readings from Last 1 Encounters:   02/08/25 0200 76.9 kg (169 lb 8.5 oz)       PHYSICAL EXAM  GENERAL:  NAD  HEENT: Normocephalic  NECK: No JVD  CARDIAC: Regular rate and rhythm. S1 is normal.S2 is normal.  No murmurs.   LUNGS:  CTA b/l  ABDOMEN:  Nondistended  EXTREMITIES:  No edema/cyanosis.    CNS:  AAO x3  SKIN:  No rash.    PSYCH: normal affect    HOME MEDICATIONS:  No current facility-administered medications on file prior to encounter.     Current Outpatient Medications on File Prior to Encounter   Medication Sig Dispense Refill    albuterol (PROVENTIL/VENTOLIN HFA) 90 mcg/actuation inhaler Inhale 2 puffs every 4 hours by inhalation route.      amLODIPine (NORVASC) 10 MG tablet Take 1 tablet (10 mg total) by mouth once daily. 90 tablet 3    aspirin (ECOTRIN) 81 MG EC tablet Take 81 mg by mouth once daily.      atorvastatin (LIPITOR) 40 MG tablet TAKE 1 TABLET(40 MG) BY MOUTH EVERY DAY 90 tablet 3    azelastine (ASTELIN) 137 mcg (0.1 %) nasal spray 2 sprays by Each Nostril route 2 (two) times daily.      clopidogreL (PLAVIX) 75 mg tablet Take 1 tablet (75 mg total) by mouth once daily. 90 tablet 3    ezetimibe (ZETIA) 10 mg tablet Take 1 tablet (10 mg total) by mouth once daily. 90 tablet 3    gabapentin (NEURONTIN) 300 MG capsule Take 1 capsule by mouth 3 (three) times daily.      hydrOXYzine (ATARAX) 50 MG tablet Take 50 mg by mouth daily as needed.      isosorbide mononitrate (IMDUR) 30 MG 24 hr tablet TAKE ONE TABLET BY MOUTH ONCE DAILY 30 tablet 5    losartan (COZAAR) 25 MG tablet TAKE 1 TABLET(25 MG) BY MOUTH EVERY DAY 90 tablet 3    metFORMIN (GLUCOPHAGE) 500 MG tablet Take 500 mg by mouth daily with breakfast.      metoprolol tartrate (LOPRESSOR) 50 MG tablet Take 1 tablet (50  mg total) by mouth 2 (two) times daily. 90 tablet 3    multivit-min/FA/lycopen/lutein (CENTRUM SILVER MEN ORAL) Take 1 tablet by mouth once daily.      potassium chloride (MICRO-K) 10 MEQ CpSR Take 1 capsule (10 mEq total) by mouth once daily. 30 capsule 6    tadalafiL (CIALIS) 5 MG tablet Take 1 tablet (5 mg total) by mouth daily as needed for Erectile Dysfunction. 15 tablet 3       SCHEDULED MEDS:   famotidine (PF)  20 mg Intravenous BID    potassium chloride  10 mEq Intravenous Q1H    potassium chloride  10 mEq Intravenous Once    potassium chloride  40 mEq Oral Once    senna-docusate 8.6-50 mg  1 tablet Oral BID       CONTINUOUS INFUSIONS:   0.9% NaCl   Intravenous Continuous        amiodarone in dextrose 5%  1 mg/min Intravenous Continuous        potassium chloride 40 mEq in 0.9% NaCl 1,020 mL infusion   Intravenous Continuous        tirofiban-0.9% sodium chloride 12.5 mg/250ml    Continuous PRN 13.8 mL/hr at 02/08/25 0210 0.15 mcg/kg/min at 02/08/25 0210       PRN MEDS:  Current Facility-Administered Medications:     acetaminophen, 650 mg, Oral, Q8H PRN    acetaminophen, 650 mg, Oral, Q4H PRN    adenosine, , , PRN    aluminum-magnesium hydroxide-simethicone, 30 mL, Oral, QID PRN    dextrose 50%, 12.5 g, Intravenous, PRN    dextrose 50%, 25 g, Intravenous, PRN    fentaNYL, , , PRN    glucagon (human recombinant), 1 mg, Intramuscular, PRN    glucose, 16 g, Oral, PRN    glucose, 24 g, Oral, PRN    heparin (porcine), , , PRN    magnesium oxide, 800 mg, Oral, PRN    magnesium oxide, 800 mg, Oral, PRN    melatonin, 6 mg, Oral, Nightly PRN    midazolam, , , PRN    naloxone, 0.02 mg, Intravenous, PRN    ondansetron, 4 mg, Intravenous, Q6H PRN    potassium bicarbonate, 35 mEq, Oral, PRN    potassium bicarbonate, 50 mEq, Oral, PRN    potassium bicarbonate, 60 mEq, Oral, PRN    potassium, sodium phosphates, 2 packet, Oral, PRN    potassium, sodium phosphates, 2 packet, Oral, PRN    potassium, sodium phosphates, 2 packet,  "Oral, PRN    sodium chloride 0.9%, 3 mL, Intravenous, Q12H PRN    ticagrelor, , , PRN    tirofiban-0.9% sodium chloride 12.5 mg/250ml, , , PRN    tirofiban-0.9% sodium chloride 12.5 mg/250ml, , , Continuous PRN    LABS AND DIAGNOSTICS     CBC LAST 3 DAYS  Recent Labs   Lab 02/07/25 2342 02/08/25  0000   WBC 11.56  --    RBC 5.23  --    HGB 15.6  --    HCT 45.2 47   MCV 86  --    MCH 29.8  --    MCHC 34.5  --    RDW 13.2  --      --    MPV 10.8  --    GRAN 56.0  6.5  --    LYMPH 26.9  3.1  --    MONO 10.8  1.3*  --    BASO 0.10  --    NRBC 1*  --        COAGULATION LAST 3 DAYS  No results for input(s): "LABPT", "INR", "APTT" in the last 168 hours.    CHEMISTRY LAST 3 DAYS  Recent Labs   Lab 02/07/25 2342 02/08/25  0000     --    K 2.5*  --    CL 99  --    CO2 20*  --    ANIONGAP 20*  --    BUN 19  --    CREATININE 1.1  --    *  --    CALCIUM 9.3  --    PH  --  7.485*   MG 2.0  --    ALBUMIN 4.6  --    PROT 7.6  --    ALKPHOS 94  --    ALT 26  --    AST 28  --    BILITOT 0.7  --        CARDIAC PROFILE LAST 3 DAYS  Recent Labs   Lab 02/07/25 2342   *   TROPONINIHS 53.1*       ENDOCRINE LAST 3 DAYS  No results for input(s): "TSH", "PROCAL" in the last 168 hours.    LAST ARTERIAL BLOOD GAS  ABG  Recent Labs   Lab 02/08/25  0000   PH 7.485*   PO2 25*   PCO2 30.9*   HCO3 23.3*   BE 0       LAST 7 DAYS MICROBIOLOGY   Microbiology Results (last 7 days)       ** No results found for the last 168 hours. **            MOST RECENT IMAGING  X-Ray Chest AP Portable  Narrative: EXAMINATION:  XR CHEST AP PORTABLE    CLINICAL HISTORY:  Chest Pain;    TECHNIQUE:  Single frontal view of the chest was performed.    COMPARISON:  09/17/2021    FINDINGS:  Cardiac monitoring leads overlie the chest.  Cardiomediastinal silhouette is unchanged in size and configuration.  The lungs are symmetrically expanded with slight increased interstitial and parenchymal attenuation which can be seen with interstitial edema " possibly atypical infectious process.  Of no large region of confluent airspace consolidation, substantial volume of pleural fluid or pneumothorax identified.  Osseous structures are intact.  Impression: Please see above.    Electronically signed by: Mary Gonzalez MD  Date:    02/08/2025  Time:    00:46      ECHOCARDIOGRAM RESULTS (last 5)  Results for orders placed during the hospital encounter of 08/14/23    Echo    Interpretation Summary    Left Ventricle: The left ventricle is normal in size. Mildly increased wall thickness. There is concentric remodeling. Normal wall motion. There is normal systolic function. Ejection fraction by visual approximation is 65%. There is normal diastolic function.    Left Atrium: Left atrium is mildly dilated.    Right Ventricle: Normal right ventricular cavity size. Wall thickness is normal. Right ventricle wall motion  is normal. Systolic function is normal.    IVC/SVC: Normal venous pressure at 3 mmHg.      Results for orders placed during the hospital encounter of 09/09/19    Echo Color Flow Doppler? Yes    Interpretation Summary  · Concentric left ventricular remodeling.  · Increased (hyperdynamic) left ventricular systolic function. The estimated ejection fraction is 83%  · Normal LV diastolic function.  · Normal right ventricular systolic function.  · Mild left atrial enlargement.      CURRENT/PREVIOUS VISIT EKG  Results for orders placed or performed during the hospital encounter of 06/06/24   EKG 12-lead    Collection Time: 06/06/24  8:52 AM   Result Value Ref Range    QRS Duration 94 ms    [facility] QTC Calculation 431 ms    Narrative    Test Reason : Z01.810,    Vent. Rate : 053 BPM     Atrial Rate : 053 BPM     P-R Int : 170 ms          QRS Dur : 094 ms      QT Int : 460 ms       P-R-T Axes : 055 075 044 degrees     QTc Int : 431 ms    Sinus bradycardia  Otherwise normal ECG  When compared with ECG of 21-JUN-2023 08:30,  T wave inversion no longer evident in Inferior  leads  Confirmed by Shayla STAFFORD, Onofre FLORES ([id number]) on 6/8/2024 8:07:14 AM    Referred By: JARVIS GARCIA           Confirmed By:Onofre Mcguire MD         ASSESSMENT/PLAN:     There are no active hospital problems to display for this patient.      Assessment:  STEMI/ acute inferior wall MI  Ventricular fibrillation/  Cardiac arrest x 2 in the ER status post resuscitation  History of CAD status post PCI several years ago  Diabetes  Hypertension  Hypokalemia    Plan:    Status post emergent coronary angiogram via right radial access.    Significant triple-vessel coronary artery disease on angiogram.     Patent left main  Diffuse 70% stenosis in mid circumflex  70% InStent restenosis in D1  Short segment  in distal LAD with bridging collaterals  Discrete 90% stenosis in proximal RCA and 100% thrombotic occlusion in distal RCA (culprit lesion).       Status post successful IVUS guided PCI of distal RCA (2.5 x 38 stent post dilated with 2.75 noncompliant balloon) and PCI of proximal RCA (3.0 x 12 stent post dilated with a 3.5 noncompliant balloon).    Balloon angioplasty was performed at the ostium of RPDA with re-establishment of flow through the branch.  RPDA is of small-caliber with residual thrombus which will be managed medically with Aggrastat.    LVEDP postprocedure 17 mmHg      Chest pain resolved after the intervention.  He only has rib pain from chest compressions.  Dual antiplatelet therapy with aspirin and Brilinta  High-intensity statin and beta-blocker  Replete potassium  ICU monitoring  Echo  Lipids, HbA1c  Aggrastat infusion for 12 hours  Staged intervention will be considered for residual CAD          Mo Levi MD  [facility]  Department of Cardiology  Date of Service: 02/08/2025  2:11 AM

## 2025-02-08 NOTE — PROGRESS NOTES
[facility]  Department of Cardiology  Consult Note      PATIENT NAME: Sanjeev Britt    MRN: [id number]  TODAY'S DATE: 02/08/2025  ADMIT DATE: 2/7/2025                          CONSULT REQUESTED BY: Miroslava Wade, *    SUBJECTIVE     PRINCIPAL PROBLEM: STEMI (ST elevation myocardial infarction)      REASON FOR CONSULT:  STEMI    INTERVAL HISTORY:  02/08/2025    Patient resting in bed during examination.  Sinus rhythm on the monitor.  Heart rate 59.  Blood pressure 129/80.  Remains on IV fluids 25 mL per hour patient is Aggrastat 0.15 mcg per kg per minute. K  2.8 this am.  Repleted. Ecchymosis in right orbital edema.      HPI:  59-year-old male with a past medical history of CAD status post PCI several years ago, diabetes, hypertension presented with chest pain had Vfib and cardiac arrest in the ER twice status post resuscitation.  EKG showed inferior ST elevation.  STEMI code was activated.  Patient was transferred to cath lab for emergent coronary angiogram.  Patient did not take Plavix in the last couple of days.        Review of patient's allergies indicates:   Allergen Reactions    Levaquin [levofloxacin]        Past Medical History:   Diagnosis Date    Acute coronary syndrome 09/09/2019    Coronary artery disease     Diabetes mellitus     Hypertension     Sleep apnea      Past Surgical History:   Procedure Laterality Date    FESS, USING COMPUTER-ASSISTED NAVIGATION, WITH NASAL TURBINATE REDUCTION N/A 6/7/2024    Procedure: FESS, USING COMPUTER-ASSISTED NAVIGATION, WITH NASAL TURBINATE REDUCTION;  Surgeon: Andres Abraham MD;  Location: [facility] OR;  Service: ENT;  Laterality: N/A;    NASAL SEPTOPLASTY N/A 6/7/2024    Procedure: SEPTOPLASTY, NOSE;  Surgeon: Andres Abraham MD;  Location: [facility] OR;  Service: ENT;  Laterality: N/A;    NASAL TURBINATE REDUCTION Bilateral 6/7/2024    Procedure: REDUCTION, NASAL TURBINATE;  Surgeon: Andres Abraham MD;  Location: [facility] OR;   Service: ENT;  Laterality: Bilateral;    VASCULAR SURGERY       Social History     Tobacco Use    Smoking status: Never    Smokeless tobacco: Current     Types: Snuff   Substance Use Topics    Alcohol use: Not Currently    Drug use: Never        REVIEW OF SYSTEMS  All other systems negative except as mentioned in HPI.     OBJECTIVE     VITAL SIGNS (Most Recent)  Temp: 98.1 °F (36.7 °C) (02/08/25 0701)  Pulse: 68 (02/08/25 0741)  Resp: 14 (02/08/25 0741)  BP: 129/67 (02/08/25 0701)  SpO2: 98 % (02/08/25 0741)    VENTILATION STATUS  Resp: 14 (02/08/25 0741)  SpO2: 98 % (02/08/25 0741)           I & O (Last 24H):  Intake/Output Summary (Last 24 hours) at 2/8/2025 0851  Last data filed at 2/8/2025 0604  Gross per 24 hour   Intake 892.8 ml   Output 900 ml   Net -7.2 ml       WEIGHTS  Wt Readings from Last 1 Encounters:   02/08/25 0501 76.7 kg (169 lb)   02/08/25 0200 76.9 kg (169 lb 8.5 oz)       PHYSICAL EXAM  GENERAL:  NAD  HEENT: Normocephalic R orbital edema/ecchymosis  NECK: No JVD  Chest wall TTP  CARDIAC: Regular rate and rhythm. S1 is normal.S2 is normal.  No murmurs.   LUNGS:  CTA b/l  ABDOMEN:  Nondistended  EXTREMITIES:  No edema/cyanosis.    CNS:  AAO x3  SKIN:  No rash.    PSYCH: normal affect    HOME MEDICATIONS:  No current facility-administered medications on file prior to encounter.     Current Outpatient Medications on File Prior to Encounter   Medication Sig Dispense Refill    albuterol (PROVENTIL/VENTOLIN HFA) 90 mcg/actuation inhaler Inhale 2 puffs every 4 hours by inhalation route.      amLODIPine (NORVASC) 10 MG tablet Take 1 tablet (10 mg total) by mouth once daily. 90 tablet 3    aspirin (ECOTRIN) 81 MG EC tablet Take 81 mg by mouth once daily.      atorvastatin (LIPITOR) 40 MG tablet TAKE 1 TABLET(40 MG) BY MOUTH EVERY DAY 90 tablet 3    azelastine (ASTELIN) 137 mcg (0.1 %) nasal spray 2 sprays by Each Nostril route 2 (two) times daily.      clopidogreL (PLAVIX) 75 mg tablet Take 1 tablet (75 mg  total) by mouth once daily. 90 tablet 3    ezetimibe (ZETIA) 10 mg tablet Take 1 tablet (10 mg total) by mouth once daily. 90 tablet 3    gabapentin (NEURONTIN) 300 MG capsule Take 1 capsule by mouth 3 (three) times daily.      hydrOXYzine (ATARAX) 50 MG tablet Take 50 mg by mouth daily as needed.      isosorbide mononitrate (IMDUR) 30 MG 24 hr tablet TAKE ONE TABLET BY MOUTH ONCE DAILY 30 tablet 5    losartan (COZAAR) 25 MG tablet TAKE 1 TABLET(25 MG) BY MOUTH EVERY DAY 90 tablet 3    metFORMIN (GLUCOPHAGE) 500 MG tablet Take 500 mg by mouth daily with breakfast.      metoprolol tartrate (LOPRESSOR) 50 MG tablet Take 1 tablet (50 mg total) by mouth 2 (two) times daily. 90 tablet 3    multivit-min/FA/lycopen/lutein (CENTRUM SILVER MEN ORAL) Take 1 tablet by mouth once daily.      potassium chloride (MICRO-K) 10 MEQ CpSR Take 1 capsule (10 mEq total) by mouth once daily. 30 capsule 6    tadalafiL (CIALIS) 5 MG tablet Take 1 tablet (5 mg total) by mouth daily as needed for Erectile Dysfunction. 15 tablet 3       SCHEDULED MEDS:   amLODIPine  10 mg Oral Daily    aspirin  81 mg Oral Daily    atorvastatin  80 mg Oral Daily    famotidine (PF)  20 mg Intravenous BID    gabapentin  300 mg Oral TID    isosorbide mononitrate  30 mg Oral Daily    losartan  25 mg Oral Daily    metoprolol tartrate  25 mg Oral BID    mupirocin   Nasal BID    senna-docusate 8.6-50 mg  1 tablet Oral BID    ticagrelor  90 mg Oral BID       CONTINUOUS INFUSIONS:   potassium chloride 40 mEq in 0.9% NaCl 1,020 mL infusion   Intravenous Continuous 125 mL/hr at 02/08/25 0604 Rate Verify at 02/08/25 0604    tirofiban-0.9% sodium chloride 12.5 mg/250ml    Continuous PRN 13.8 mL/hr at 02/08/25 0210 0.15 mcg/kg/min at 02/08/25 0210    tirofiban-0.9% sodium chloride 12.5 mg/250ml  0.15 mcg/kg/min Intravenous Continuous 13.8 mL/hr at 02/08/25 0604 0.15 mcg/kg/min at 02/08/25 0604       PRN MEDS:  Current Facility-Administered Medications:     acetaminophen,  "650 mg, Oral, Q8H PRN    acetaminophen, 650 mg, Oral, Q4H PRN    aluminum-magnesium hydroxide-simethicone, 30 mL, Oral, QID PRN    dextrose 50%, 12.5 g, Intravenous, PRN    dextrose 50%, 25 g, Intravenous, PRN    glucagon (human recombinant), 1 mg, Intramuscular, PRN    glucose, 16 g, Oral, PRN    glucose, 24 g, Oral, PRN    iodixanoL, , , PRN    magnesium oxide, 800 mg, Oral, PRN    magnesium oxide, 800 mg, Oral, PRN    melatonin, 6 mg, Oral, Nightly PRN    morphine, 2 mg, Intravenous, Q30 Min PRN    naloxone, 0.02 mg, Intravenous, PRN    ondansetron, 4 mg, Intravenous, Q6H PRN    potassium bicarbonate, 35 mEq, Oral, PRN    potassium bicarbonate, 50 mEq, Oral, PRN    potassium bicarbonate, 60 mEq, Oral, PRN    potassium, sodium phosphates, 2 packet, Oral, PRN    potassium, sodium phosphates, 2 packet, Oral, PRN    potassium, sodium phosphates, 2 packet, Oral, PRN    sodium chloride 0.9%, 3 mL, Intravenous, Q12H PRN    tirofiban-0.9% sodium chloride 12.5 mg/250ml, , , Continuous PRN    LABS AND DIAGNOSTICS     CBC LAST 3 DAYS  Recent Labs   Lab 02/07/25 2342 02/08/25  0000 02/08/25  0311   WBC 11.56  --  14.75*   RBC 5.23  --  5.17   HGB 15.6  --  15.0   HCT 45.2 47 45.6   MCV 86  --  88   MCH 29.8  --  29.0   MCHC 34.5  --  32.9   RDW 13.2  --  13.2     --  234   MPV 10.8  --  11.4   GRAN 56.0  6.5  --  87.0*  12.8*   LYMPH 26.9  3.1  --  3.5*  0.5*   MONO 10.8  1.3*  --  7.2  1.1*   BASO 0.10  --  0.06   NRBC 1*  --  0       COAGULATION LAST 3 DAYS  No results for input(s): "LABPT", "INR", "APTT" in the last 168 hours.    CHEMISTRY LAST 3 DAYS  Recent Labs   Lab 02/07/25 2342 02/08/25  0000 02/08/25  0311     --  139   K 2.5*  --  2.9*   CL 99  --  102   CO2 20*  --  25   ANIONGAP 20*  --  12   BUN 19  --  16   CREATININE 1.1  --  0.8   *  --  139*   CALCIUM 9.3  --  8.6*   PH  --  7.485*  --    MG 2.0  --  2.6   ALBUMIN 4.6  --  4.3   PROT 7.6  --  7.1   ALKPHOS 94  --  73   ALT 26  -- " " 59*   AST 28  --  144*   BILITOT 0.7  --  0.9       CARDIAC PROFILE LAST 3 DAYS  Recent Labs   Lab 02/07/25  2342 02/08/25  0311   *  --    TROPONINIHS 53.1* 42749.4*       ENDOCRINE LAST 3 DAYS  No results for input(s): "TSH", "PROCAL" in the last 168 hours.    LAST ARTERIAL BLOOD GAS  ABG  Recent Labs   Lab 02/08/25  0000   PH 7.485*   PO2 25*   PCO2 30.9*   HCO3 23.3*   BE 0       LAST 7 DAYS MICROBIOLOGY   Microbiology Results (last 7 days)       ** No results found for the last 168 hours. **            MOST RECENT IMAGING  X-Ray Chest AP Portable  Narrative: EXAMINATION:  XR CHEST AP PORTABLE    CLINICAL HISTORY:  Chest Pain;    TECHNIQUE:  Single frontal view of the chest was performed.    COMPARISON:  09/17/2021    FINDINGS:  Cardiac monitoring leads overlie the chest.  Cardiomediastinal silhouette is unchanged in size and configuration.  The lungs are symmetrically expanded with slight increased interstitial and parenchymal attenuation which can be seen with interstitial edema possibly atypical infectious process.  Of no large region of confluent airspace consolidation, substantial volume of pleural fluid or pneumothorax identified.  Osseous structures are intact.  Impression: Please see above.    Electronically signed by: Mary Gonzalez MD  Date:    02/08/2025  Time:    00:46      ECHOCARDIOGRAM RESULTS (last 5)  Results for orders placed during the hospital encounter of 08/14/23    Echo    Interpretation Summary    Left Ventricle: The left ventricle is normal in size. Mildly increased wall thickness. There is concentric remodeling. Normal wall motion. There is normal systolic function. Ejection fraction by visual approximation is 65%. There is normal diastolic function.    Left Atrium: Left atrium is mildly dilated.    Right Ventricle: Normal right ventricular cavity size. Wall thickness is normal. Right ventricle wall motion  is normal. Systolic function is normal.    IVC/SVC: Normal venous pressure " at 3 mmHg.      Results for orders placed during the hospital encounter of 09/09/19    Echo Color Flow Doppler? Yes    Interpretation Summary  · Concentric left ventricular remodeling.  · Increased (hyperdynamic) left ventricular systolic function. The estimated ejection fraction is 83%  · Normal LV diastolic function.  · Normal right ventricular systolic function.  · Mild left atrial enlargement.    Status post emergent coronary angiogram via right radial access.    Significant triple-vessel coronary artery disease on angiogram.     Patent left main  Diffuse 70% stenosis in mid circumflex  70% InStent restenosis in D1  Short segment  in distal LAD with bridging collaterals  Discrete 90% stenosis in proximal RCA and 100% thrombotic occlusion in distal RCA (culprit lesion).     CURRENT/PREVIOUS VISIT EKG  Results for orders placed or performed during the hospital encounter of 02/07/25   EKG 12-LEAD on arrival to floor    Collection Time: 02/08/25  2:31 AM   Result Value Ref Range    QRS Duration 100 ms    [facility] QTC Calculation 544 ms    Narrative    Test Reason : I21.3,    Vent. Rate :  65 BPM     Atrial Rate :  65 BPM     P-R Int : 204 ms          QRS Dur : 100 ms      QT Int : 524 ms       P-R-T Axes :  52   4  29 degrees    QTcB Int : 544 ms    Sinus rhythm with occasional Premature ventricular complexes with  ventricular escape complexes  Inferior infarct , possibly acute  Prolonged QT    ACUTE MI / STEMI    Consider right ventricular involvement in acute inferior infarct  Abnormal ECG  No previous ECGs available    Referred By: AAAREFERRAL SELF           Confirmed By:          ASSESSMENT/PLAN:     Active Hospital Problems    Diagnosis    *STEMI (ST elevation myocardial infarction)    DMII (diabetes mellitus, type 2)    Chest pain       Assessment:  STEMI/ acute inferior wall MI  Ventricular fibrillation/  Cardiac arrest x 2 in the ER status post resuscitation  History of CAD status post PCI several years  ago  Diabetes  Hypertension  Hypokalemia    Plan:      Status post successful IVUS guided PCI of distal RCA (2.5 x 38 stent post dilated with 2.75 noncompliant balloon) and PCI of proximal RCA (3.0 x 12 stent post dilated with a 3.5 noncompliant balloon).    Balloon angioplasty was performed at the ostium of RPDA with re-establishment of flow through the branch.  RPDA is of small-caliber with residual thrombus which will be managed medically with Aggrastat.    LVEDP postprocedure 17 mmHg      Chest pain resolved after the intervention.  He only has rib pain from chest compressions.    Dual antiplatelet therapy with aspirin and Brilinta    High-intensity statin and beta-blocker    Recheck potassium.  Concern for discrepancy.  Replete per protocol.    ICU monitoring  Aggressive lipid management.  .  Intensity statin therapy recommended     Aggrastat infusion for 12 hours    Staged intervention will be considered for residual CAD- mid circ.      As above  Patient is resting well post intervention.  He has a large area of ecchymosis and contusion on his right periorbital area and upper eyelid.  Clinically no anginal symptoms are noted  Angiographic findings noted and patient clinically remains pain-free  Continue on Aggrastat infusion, dual antiplatelet therapy with aspirin and Brilinta  Aggressive risk factor modification add Zetia 10 mg to his regimen to achieve the LDL goal of 60 or less  Discussed with the patient and family at bedside  Ice pack for right facial trauma  Will gradually increase physical activity.    Nate Santana MD  [facility]  Department of Cardiology  Date of Service: 02/08/2025

## 2025-02-08 NOTE — ASSESSMENT & PLAN NOTE
Distal % thrombotic occlusion. Proximal RCA 90% stenosis. fixed both proximal and distal RCA.     Has residual disease in LAD and circumflex diagonal which we can manage as a staged procedures     - per DR HOLLOWAY       PLAN    - Aggrastat x 12 hours    - asa 81 mg    - Brilinta     - Beta blocker started PO    - I increased his statin from home to 80 mg po daily     - cont BP control meds from home including IMDUR  And ARB    - regular diet    - PEPCID IV BID    Cards on consult     ECHO 2D       Keep mag over 2 and K over 4

## 2025-02-08 NOTE — CARE UPDATE
02/08/25 0741   Patient Assessment/Suction   Level of Consciousness (AVPU) alert   Respiratory Effort Normal;Unlabored   Expansion/Accessory Muscles/Retractions no use of accessory muscles   All Lung Fields Breath Sounds clear   Rhythm/Pattern, Respiratory unlabored   Cough Frequency no cough   PRE-TX-O2   Device (Oxygen Therapy) nasal cannula   $ Is the patient on Low Flow Oxygen? Yes   Flow (L/min) (Oxygen Therapy) 3   SpO2 98 %   Pulse Oximetry Type Continuous   $ Pulse Oximetry - Multiple Charge Pulse Oximetry - Multiple   Pulse 68   Resp 14   Education   $ Education Oxygen;15 min

## 2025-02-08 NOTE — ED PROVIDER NOTES
Encounter Date: 2/7/2025       History     Chief Complaint   Patient presents with    Chest Pain     Chest pain, hx stents     HPI    59-year-old male with a past medical history of CAD status post stent placement, diabetes mellitus, hypertension presents to the emergency department post ROSC while at triage.  Patient coded at triage and was defibrillated once.  On arrival to the emergency department patient was confused though was able to tell me that he has been experiencing chest pain presently 20 minutes prior to arrival.  Patient states that he was having intercourse with his partner and shortly afterwards began to experience chest pain.  Per patient's partner he did utilize sildenafil.  Patient also states he experienced diaphoresis and nausea without any episodes of vomiting.        Review of patient's allergies indicates:   Allergen Reactions    Levaquin [levofloxacin]      Past Medical History:   Diagnosis Date    Acute coronary syndrome 09/09/2019    Coronary artery disease     Diabetes mellitus     Hypertension     Sleep apnea      Past Surgical History:   Procedure Laterality Date    FESS, USING COMPUTER-ASSISTED NAVIGATION, WITH NASAL TURBINATE REDUCTION N/A 6/7/2024    Procedure: FESS, USING COMPUTER-ASSISTED NAVIGATION, WITH NASAL TURBINATE REDUCTION;  Surgeon: Andres Abraham MD;  Location: [facility] OR;  Service: ENT;  Laterality: N/A;    NASAL SEPTOPLASTY N/A 6/7/2024    Procedure: SEPTOPLASTY, NOSE;  Surgeon: Andres Abraham MD;  Location: [facility] OR;  Service: ENT;  Laterality: N/A;    NASAL TURBINATE REDUCTION Bilateral 6/7/2024    Procedure: REDUCTION, NASAL TURBINATE;  Surgeon: Andres Abraham MD;  Location: [facility] OR;  Service: ENT;  Laterality: Bilateral;    VASCULAR SURGERY       Family History   Problem Relation Name Age of Onset    Cancer Father       Social History     Tobacco Use    Smoking status: Never    Smokeless tobacco: Current     Types: Snuff   Substance Use Topics     Alcohol use: Not Currently    Drug use: Never     Review of Systems   Constitutional:  Positive for diaphoresis.   HENT:  Negative for sore throat.    Respiratory:  Negative for shortness of breath.    Cardiovascular:  Positive for chest pain.   Gastrointestinal:  Positive for nausea.   Genitourinary:  Negative for dysuria.       Physical Exam     Initial Vitals   BP Pulse Resp Temp SpO2   -- -- -- -- --      MAP       --         Physical Exam    Nursing note and vitals reviewed.  Constitutional: He appears well-developed and well-nourished.  Non-toxic appearance.   HENT:   Head: Normocephalic and atraumatic.   Eyes: EOM are normal. Pupils are equal, round, and reactive to light.   Neck: Neck supple.   Normal range of motion.  Cardiovascular:            Wide complex arrhythmia noted on monitor, intermittently would have viral sinus beats that appeared to have ST-elevation  On bedside ultrasound patient with an EF that appeared to be greater than 55%   Pulmonary/Chest: Breath sounds normal. No respiratory distress.   A lines bilaterally on bedside ultrasound   Abdominal: Abdomen is soft. There is no abdominal tenderness.   Musculoskeletal:      Cervical back: Normal range of motion and neck supple.     Neurological: He is alert and oriented to person, place, and time.   Skin: Skin is warm and dry.         ED Course   Procedures  Labs Reviewed   POCT GLUCOSE - Abnormal       Result Value    POCT Glucose 170 (*)    MAGNESIUM   CBC W/ AUTO DIFFERENTIAL   COMPREHENSIVE METABOLIC PANEL   TROPONIN I HIGH SENSITIVITY   B-TYPE NATRIURETIC PEPTIDE          Imaging Results    None          Medications   aspirin tablet 325 mg (325 mg Oral Given 2/7/25 9470)     Medical Decision Making  Risk  Prescription drug management.  Decision regarding hospitalization.    In brief, 59-year-old male with CAD status post PCI placement, diabetes, hypertension presents to the emergency department post ROSC after VFib arrest requiring 1  "shock.  Endorse chest pain was alert and oriented though minor early confused.  Initial vital signs within normal limits.  Physical examination as stated above.    Collateral information:  Collateral information obtained by patient's partner who states that he did take sildenafil today    /73 (BP Location: Left arm, Patient Position: Lying)   Pulse 60   Temp 98.2 °F (36.8 °C) (Oral)   Resp 17   Ht 5' 6" (1.676 m)   Wt 76.7 kg (169 lb)   SpO2 99%   BMI 27.28 kg/m²     Differentials:  ACS, arrhythmia, electrolyte abnormality, metabolic derangement, among others.    Orders:  CBC, CMP, magnesium, troponin, BNP, point of care glucose, EKG, chest x-ray     EKG:  On my independent interpretation of patient's EKG returned with a left bundle-branch block, sinus rhythm at a rate of 74 beats per minute.  Left bundle branch appears to be new when compared to his prior.  Patient also noted to have ST elevations in his inferior leads with reciprocal depressions in leads 1 and aVL.  Also with anterior ST elevations in V1 and V2.    Chest x-ray:  On my independent interpretation patient's chest x-ray no pneumothorax, no focal consolidation concerning for pneumonia, patient does have some opacities concerning for possible pulmonary edema.  No pleural effusion noted.    Results:  CBC returned within normal limits.  CMP returned with potassium of 2.5, bicarb of 20.  Point of care glucose of 170.  Magnesium within normal limits.  Venous blood gas with pH of 7.48, CO2 of 30.9 and bicarb of 23.3    Interventions:  Aspirin, 2nd fibrillation, magnesium, potassium    Plan:  Patient was activated as a STEMI alert.  Discussed with Cardiology starting heparin and they recommended only loading with aspirin as they plan to take him to the cath lab emergently.  While patient was being monitored he went into an 2nd episode of arrest, CPR was started for approximately 2 minutes and was noted to be in VFib with concern of a possible " torsades noted on the monitor.  Patient was defibrillated at 200 joules with return of spontaneous circulation.  Patient was alert after ROSC was achieved and magnesium was started.  Given this was his 2nd episode of cardiac arrest we did plan to intubate for airway protection though the cath lab presented and patient was to be taken up emergently.  Lab work returned and patient was hypokalemic , multiple K riders were ordered.  Patient was ultimately taken to the cath lab due to concern for STEMI.  Case was discussed with Dr. Emery.      This text was transcribed using voice software.    MDM MATRIX SUMMARY    Total Critical Care Time spent by me on this patient's direct and individual: 60 minutes exclusive of separately billable procedures.  Cardiac arrest likely secondary to acute coronary syndrome, STEMI activation    I was personally present and immediately available for the duration of critical care time as documented. Critical care time included direct bedside care, consultation with specialists and family members, ordering and reviewing test results and documentation and research of the patient's medical record.    Jorge L Baker MD  PGY-4 [facility]  5:42 AM 2/8/2025                                    Clinical Impression:  Final diagnoses:  [R07.9] Chest pain                 Jorge L Baker MD  Resident  02/08/25 1918

## 2025-02-09 LAB
ALBUMIN SERPL BCP-MCNC: 3.8 G/DL (ref 3.5–5.2)
ALP SERPL-CCNC: 61 U/L (ref 55–135)
ALT SERPL W/O P-5'-P-CCNC: 63 U/L (ref 10–44)
ANION GAP SERPL CALC-SCNC: 5 MMOL/L (ref 8–16)
AST SERPL-CCNC: 170 U/L (ref 10–40)
BASOPHILS # BLD AUTO: 0.02 K/UL (ref 0–0.2)
BASOPHILS NFR BLD: 0.3 % (ref 0–1.9)
BILIRUB SERPL-MCNC: 0.7 MG/DL (ref 0.1–1)
BUN SERPL-MCNC: 17 MG/DL (ref 8–23)
CALCIUM SERPL-MCNC: 8.1 MG/DL (ref 8.7–10.5)
CHLORIDE SERPL-SCNC: 110 MMOL/L (ref 95–110)
CO2 SERPL-SCNC: 23 MMOL/L (ref 23–29)
CREAT SERPL-MCNC: 0.8 MG/DL (ref 0.5–1.4)
DIFFERENTIAL METHOD BLD: ABNORMAL
EOSINOPHIL # BLD AUTO: 0.1 K/UL (ref 0–0.5)
EOSINOPHIL NFR BLD: 1 % (ref 0–8)
ERYTHROCYTE [DISTWIDTH] IN BLOOD BY AUTOMATED COUNT: 14.1 % (ref 11.5–14.5)
EST. GFR  (NO RACE VARIABLE): >60 ML/MIN/1.73 M^2
GLUCOSE SERPL-MCNC: 100 MG/DL (ref 70–110)
HCT VFR BLD AUTO: 38 % (ref 40–54)
HGB BLD-MCNC: 12.4 G/DL (ref 14–18)
IMM GRANULOCYTES # BLD AUTO: 0.04 K/UL (ref 0–0.04)
IMM GRANULOCYTES NFR BLD AUTO: 0.6 % (ref 0–0.5)
LYMPHOCYTES # BLD AUTO: 1.2 K/UL (ref 1–4.8)
LYMPHOCYTES NFR BLD: 16.7 % (ref 18–48)
MAGNESIUM SERPL-MCNC: 2.4 MG/DL (ref 1.6–2.6)
MCH RBC QN AUTO: 29.5 PG (ref 27–31)
MCHC RBC AUTO-ENTMCNC: 32.6 G/DL (ref 32–36)
MCV RBC AUTO: 91 FL (ref 82–98)
MONOCYTES # BLD AUTO: 1 K/UL (ref 0.3–1)
MONOCYTES NFR BLD: 13.5 % (ref 4–15)
NEUTROPHILS # BLD AUTO: 4.8 K/UL (ref 1.8–7.7)
NEUTROPHILS NFR BLD: 67.9 % (ref 38–73)
NRBC BLD-RTO: 0 /100 WBC
PLATELET # BLD AUTO: 167 K/UL (ref 150–450)
PMV BLD AUTO: 11.1 FL (ref 9.2–12.9)
POTASSIUM SERPL-SCNC: 4.4 MMOL/L (ref 3.5–5.1)
PROT SERPL-MCNC: 6.1 G/DL (ref 6–8.4)
RBC # BLD AUTO: 4.2 M/UL (ref 4.6–6.2)
SODIUM SERPL-SCNC: 138 MMOL/L (ref 136–145)
WBC # BLD AUTO: 7.02 K/UL (ref 3.9–12.7)

## 2025-02-09 PROCEDURE — 25000003 PHARM REV CODE 250: Performed by: INTERNAL MEDICINE

## 2025-02-09 PROCEDURE — 94761 N-INVAS EAR/PLS OXIMETRY MLT: CPT

## 2025-02-09 PROCEDURE — 27000221 HC OXYGEN, UP TO 24 HOURS

## 2025-02-09 PROCEDURE — 94799 UNLISTED PULMONARY SVC/PX: CPT | Mod: XB

## 2025-02-09 PROCEDURE — 63600175 PHARM REV CODE 636 W HCPCS: Performed by: INTERNAL MEDICINE

## 2025-02-09 PROCEDURE — 36415 COLL VENOUS BLD VENIPUNCTURE: CPT | Performed by: INTERNAL MEDICINE

## 2025-02-09 PROCEDURE — 80053 COMPREHEN METABOLIC PANEL: CPT | Performed by: INTERNAL MEDICINE

## 2025-02-09 PROCEDURE — 85025 COMPLETE CBC W/AUTO DIFF WBC: CPT | Performed by: INTERNAL MEDICINE

## 2025-02-09 PROCEDURE — S4991 NICOTINE PATCH NONLEGEND: HCPCS

## 2025-02-09 PROCEDURE — 99233 SBSQ HOSP IP/OBS HIGH 50: CPT | Mod: ,,, | Performed by: INTERNAL MEDICINE

## 2025-02-09 PROCEDURE — 25000003 PHARM REV CODE 250

## 2025-02-09 PROCEDURE — 21000000 HC CCU ICU ROOM CHARGE

## 2025-02-09 PROCEDURE — 99900031 HC PATIENT EDUCATION (STAT)

## 2025-02-09 PROCEDURE — 83735 ASSAY OF MAGNESIUM: CPT | Performed by: INTERNAL MEDICINE

## 2025-02-09 RX ORDER — MORPHINE SULFATE 2 MG/ML
1 INJECTION, SOLUTION INTRAMUSCULAR; INTRAVENOUS EVERY 8 HOURS PRN
Status: DISCONTINUED | OUTPATIENT
Start: 2025-02-09 | End: 2025-02-12 | Stop reason: HOSPADM

## 2025-02-09 RX ORDER — HYDROCODONE BITARTRATE AND ACETAMINOPHEN 5; 325 MG/1; MG/1
1 TABLET ORAL EVERY 6 HOURS PRN
Status: DISCONTINUED | OUTPATIENT
Start: 2025-02-09 | End: 2025-02-12 | Stop reason: HOSPADM

## 2025-02-09 RX ORDER — IBUPROFEN 200 MG
1 TABLET ORAL DAILY
Status: DISCONTINUED | OUTPATIENT
Start: 2025-02-09 | End: 2025-02-12 | Stop reason: HOSPADM

## 2025-02-09 RX ORDER — DIPHENHYDRAMINE HCL 25 MG
50 CAPSULE ORAL
OUTPATIENT
Start: 2025-02-09

## 2025-02-09 RX ORDER — SODIUM CHLORIDE 0.9 % (FLUSH) 0.9 %
2 SYRINGE (ML) INJECTION
Status: DISCONTINUED | OUTPATIENT
Start: 2025-02-09 | End: 2025-02-12 | Stop reason: HOSPADM

## 2025-02-09 RX ORDER — SODIUM CHLORIDE 9 MG/ML
INJECTION, SOLUTION INTRAVENOUS ONCE
OUTPATIENT
Start: 2025-02-11

## 2025-02-09 RX ADMIN — ATORVASTATIN CALCIUM 80 MG: 40 TABLET, FILM COATED ORAL at 10:02

## 2025-02-09 RX ADMIN — MORPHINE SULFATE 2 MG: 2 INJECTION, SOLUTION INTRAMUSCULAR; INTRAVENOUS at 03:02

## 2025-02-09 RX ADMIN — ASPIRIN 81 MG: 81 TABLET, COATED ORAL at 09:02

## 2025-02-09 RX ADMIN — MUPIROCIN 1 G: 20 OINTMENT TOPICAL at 10:02

## 2025-02-09 RX ADMIN — FAMOTIDINE 20 MG: 10 INJECTION, SOLUTION INTRAVENOUS at 09:02

## 2025-02-09 RX ADMIN — GABAPENTIN 300 MG: 300 CAPSULE ORAL at 02:02

## 2025-02-09 RX ADMIN — ISOSORBIDE MONONITRATE 30 MG: 30 TABLET, EXTENDED RELEASE ORAL at 09:02

## 2025-02-09 RX ADMIN — MUPIROCIN 2 G: 20 OINTMENT TOPICAL at 09:02

## 2025-02-09 RX ADMIN — METOPROLOL TARTRATE 25 MG: 25 TABLET, FILM COATED ORAL at 10:02

## 2025-02-09 RX ADMIN — MORPHINE SULFATE 2 MG: 2 INJECTION, SOLUTION INTRAMUSCULAR; INTRAVENOUS at 09:02

## 2025-02-09 RX ADMIN — GABAPENTIN 300 MG: 300 CAPSULE ORAL at 09:02

## 2025-02-09 RX ADMIN — HYDROCODONE BITARTRATE AND ACETAMINOPHEN 1 TABLET: 5; 325 TABLET ORAL at 12:02

## 2025-02-09 RX ADMIN — TICAGRELOR 90 MG: 90 TABLET ORAL at 10:02

## 2025-02-09 RX ADMIN — SENNOSIDES AND DOCUSATE SODIUM 1 TABLET: 8.6; 5 TABLET ORAL at 10:02

## 2025-02-09 RX ADMIN — NICOTINE 1 PATCH: 21 PATCH, EXTENDED RELEASE TRANSDERMAL at 02:02

## 2025-02-09 RX ADMIN — AMLODIPINE BESYLATE 10 MG: 5 TABLET ORAL at 09:02

## 2025-02-09 RX ADMIN — SENNOSIDES AND DOCUSATE SODIUM 1 TABLET: 8.6; 5 TABLET ORAL at 09:02

## 2025-02-09 RX ADMIN — METOPROLOL TARTRATE 25 MG: 25 TABLET, FILM COATED ORAL at 09:02

## 2025-02-09 RX ADMIN — FAMOTIDINE 20 MG: 10 INJECTION, SOLUTION INTRAVENOUS at 10:02

## 2025-02-09 RX ADMIN — LOSARTAN POTASSIUM 25 MG: 25 TABLET, FILM COATED ORAL at 09:02

## 2025-02-09 RX ADMIN — TICAGRELOR 90 MG: 90 TABLET ORAL at 09:02

## 2025-02-09 RX ADMIN — GABAPENTIN 300 MG: 300 CAPSULE ORAL at 10:02

## 2025-02-09 NOTE — ASSESSMENT & PLAN NOTE
Post chest compressions, had chest soreness generalized  Repeat chest x-ray negative for any rib fractures  Incentive spirometry  Improved

## 2025-02-09 NOTE — SUBJECTIVE & OBJECTIVE
Interval History:  Patient is seen and examined.  No acute overnight events.    No chest pain, no palpitations, x-ray rib series negative for fracture. Zetia added for .  Aggrastat infusion stopped yesterday after 12 hours. WBC normal. Stopped imdur given he took Cialis before presentation. Cardiology closely following. See full hospital course.     Review of Systems   All other systems reviewed and are negative.    Objective:     Vital Signs (Most Recent):  Temp: 98.2 °F (36.8 °C) (02/09/25 0701)  Pulse: 67 (02/09/25 0731)  Resp: 16 (02/09/25 0731)  BP: (!) 141/78 (02/09/25 0731)  SpO2: (!) 93 % (02/09/25 0731) Vital Signs (24h Range):  Temp:  [97.9 °F (36.6 °C)-98.6 °F (37 °C)] 98.2 °F (36.8 °C)  Pulse:  [52-69] 67  Resp:  [14-19] 16  SpO2:  [91 %-99 %] 93 %  BP: (110-141)/(58-81) 141/78     Weight: 76.7 kg (169 lb)  Body mass index is 27.28 kg/m².    Intake/Output Summary (Last 24 hours) at 2/9/2025 0902  Last data filed at 2/9/2025 0655  Gross per 24 hour   Intake 2106.91 ml   Output 2075 ml   Net 31.91 ml         Physical Exam  Vitals and nursing note reviewed. Exam conducted with a chaperone present.   Constitutional:       Appearance: Normal appearance.   HENT:      Head: Normocephalic. Abrasion and contusion present.        Comments: Eye lid contusion significantly improving, no ophthalmoplegia, no change in vision.     Nose: Nose normal.      Mouth/Throat:      Mouth: Mucous membranes are moist.   Eyes:      Pupils: Pupils are equal, round, and reactive to light.   Cardiovascular:      Rate and Rhythm: Normal rate and regular rhythm.      Pulses: Normal pulses.      Heart sounds: Normal heart sounds.   Pulmonary:      Effort: Pulmonary effort is normal.      Breath sounds: Normal breath sounds.   Abdominal:      General: Abdomen is flat. Bowel sounds are normal.      Palpations: Abdomen is soft.   Musculoskeletal:         General: Normal range of motion.      Cervical back: Normal range of motion.    Skin:     General: Skin is warm.      Capillary Refill: Capillary refill takes less than 2 seconds.   Neurological:      General: No focal deficit present.      Mental Status: He is alert.   Psychiatric:         Mood and Affect: Mood normal.             Significant Labs: All pertinent labs within the past 24 hours have been reviewed.    Significant Imaging: I have reviewed all pertinent imaging results/findings within the past 24 hours.

## 2025-02-09 NOTE — PROGRESS NOTES
[facility]  Progress Note    Patient Name: Sanjeev Britt  MRN: [id number]  Patient Class: IP- Inpatient   Admission Date: 2/7/2025  Length of Stay: 1 days  Attending Physician: Miroslava Wade, *  Primary Care Provider: Chidi Vidal Jr., MD        Subjective     Principal Problem:STEMI (ST elevation myocardial infarction)        HPI:  59-year-old male with a past medical history of CAD status post stent placement\11 years ago ,  diabetes mellitus not on insulin ,  hypertension presents to the emergency department post ROSC while at triage.  Patient coded at triage and was defibrillated once.  On arrival to the emergency department patient was confused though was able to tell me that he has been experiencing chest pain presently 20 minutes prior to arrival.  Patient states that he was having intercourse with his partner and shortly afterwards began to experience chest pain.  Per patient's partner he did utilize sildenafil.  Patient also states he experienced diaphoresis and nausea without any episodes of vomiting.      NON Smoker  Non drinker  Here with wife or GF       His wife convinced him to come to ER    He did not want ambulance     So she drove him     In the parking lot he passed out and hit his head above right eye     But he went into V FIB arrest and was coded    They did CPR and shocked him twice     For about 2-3 minutes total       Then he was taken into ER    Potasium was 2.5     Mag was good over 2       EKG showed STEMI      Then dr Levi took him to cath lab         Distal % thrombotic occlusion. Proximal RCA 90% stenosis. fixed both proximal and distal RCA.     Has residual disease in LAD and circumflex diagonal which we can manage as a staged procedures         We are admitting him to ICU     Overview/Hospital Course:  59-year-old male with a past medical history of CAD status post PCI several years ago, diabetes, hypertension presented with  chest pain had Vfib and cardiac arrest in the ER twice status post resuscitation.  EKG showed inferior ST elevation. Patient did not take Plavix in the last couple of days.  Code STEMI was activated, patient underwent emergency coronary angiogram showing distal % thrombotic occlusion and proximal RCA 90% stenosis and underwent ELIDA placement.  Patient was continued on aspirin, Brilinta and was started on beta blocker as tolerated.  Statin increased to high-dose.  Aggrastat was continued for 12 hours postprocedure.  2D echocardiogram done and showed moderate hypokinesis of the inferior wall, mildly reduced EF 40-45%, normal diastolic function.  Cardiology closely followed the patient.  CT added for LDL of 103 for a goal less than 60.    Interval History:  Patient is seen and examined.  No acute overnight events.    No chest pain, no palpitations, x-ray rib series negative for fracture. Zetia added for .  Aggrastat infusion stopped yesterday after 12 hours. WBC normal. Stopped imdur given he took Cialis before presentation. Cardiology closely following. See full hospital course.     Review of Systems   All other systems reviewed and are negative.    Objective:     Vital Signs (Most Recent):  Temp: 98.2 °F (36.8 °C) (02/09/25 0701)  Pulse: 67 (02/09/25 0731)  Resp: 16 (02/09/25 0731)  BP: (!) 141/78 (02/09/25 0731)  SpO2: (!) 93 % (02/09/25 0731) Vital Signs (24h Range):  Temp:  [97.9 °F (36.6 °C)-98.6 °F (37 °C)] 98.2 °F (36.8 °C)  Pulse:  [52-69] 67  Resp:  [14-19] 16  SpO2:  [91 %-99 %] 93 %  BP: (110-141)/(58-81) 141/78     Weight: 76.7 kg (169 lb)  Body mass index is 27.28 kg/m².    Intake/Output Summary (Last 24 hours) at 2/9/2025 0902  Last data filed at 2/9/2025 0655  Gross per 24 hour   Intake 2106.91 ml   Output 2075 ml   Net 31.91 ml         Physical Exam  Vitals and nursing note reviewed. Exam conducted with a chaperone present.   Constitutional:       Appearance: Normal appearance.   HENT:       Head: Normocephalic. Abrasion and contusion present.        Comments: Eye lid contusion significantly improving, no ophthalmoplegia, no change in vision.     Nose: Nose normal.      Mouth/Throat:      Mouth: Mucous membranes are moist.   Eyes:      Pupils: Pupils are equal, round, and reactive to light.   Cardiovascular:      Rate and Rhythm: Normal rate and regular rhythm.      Pulses: Normal pulses.      Heart sounds: Normal heart sounds.   Pulmonary:      Effort: Pulmonary effort is normal.      Breath sounds: Normal breath sounds.   Abdominal:      General: Abdomen is flat. Bowel sounds are normal.      Palpations: Abdomen is soft.   Musculoskeletal:         General: Normal range of motion.      Cervical back: Normal range of motion.   Skin:     General: Skin is warm.      Capillary Refill: Capillary refill takes less than 2 seconds.   Neurological:      General: No focal deficit present.      Mental Status: He is alert.   Psychiatric:         Mood and Affect: Mood normal.             Significant Labs: All pertinent labs within the past 24 hours have been reviewed.    Significant Imaging: I have reviewed all pertinent imaging results/findings within the past 24 hours.    Assessment and Plan     * STEMI (ST elevation myocardial infarction)  Distal % thrombotic occlusion. Proximal RCA 90% stenosis. fixed both proximal and distal RCA.   Has residual disease in LAD and circumflex diagonal which we can manage as a staged procedures   Completed Aggrastat x 12 hours, stopped 2/8  Continue asa 81 mg  Continue Brilinta b.i.d.  Beta blocker as tolerated  Avoid nitrates given he took Cialis before presentation (in the last 72 hours)  Continue high-dose atorvastatin, and zetia added ( )  Continue regular diet  Cardiology closely following  2D echocardiogram done and showed moderate hypokinesis of the inferior wall, mildly reduced EF 40-45%, normal diastolic function. Losartan added.  Adjust GDM T as  tolerated.  Staged PCI planned for Tuesday Okay to transfer to step-down as per Cardiology    Cardiac arrest  VFib arrest post inferior STEMI  See above      DMII (diabetes mellitus, type 2)  A1c 5.8  POCT glucose  Can start insulin sliding scale as needed, currently well controlled blood glucose    Chest soreness  Post chest compressions  Repeat chest x-ray negative for any rib fractures  Incentive spirometry  Improved        VTE Risk Mitigation (From admission, onward)           Ordered     IP VTE HIGH RISK PATIENT  Once         02/08/25 0001     Place sequential compression device  Until discontinued         02/08/25 0001     Reason for No Pharmacological VTE Prophylaxis  Once        Question:  Reasons:  Answer:  Patient is Ambulatory    02/08/25 0001                    Discharge Planning   BARRON:      Code Status: Full Code   Medical Readiness for Discharge Date:   Discharge Plan A: Home with family            Critical care time spent on the evaluation and treatment of severe organ dysfunction, review of pertinent labs and imaging studies, discussions with consulting providers and discussions with patient/family: 40 minutes.            Miroslava Wade MD  Department of Hospital Medicine   [facility]

## 2025-02-09 NOTE — PROGRESS NOTES
[facility]  Department of Cardiology  Consult Note      PATIENT NAME: Sanjeev Britt    MRN: [id number]  TODAY'S DATE: 02/09/2025  ADMIT DATE: 2/7/2025                          CONSULT REQUESTED BY: Miroslava aWde, *    SUBJECTIVE     PRINCIPAL PROBLEM: STEMI (ST elevation myocardial infarction)      REASON FOR CONSULT:  STEMI    INTERVAL HISTORY:    2/9/25    Resting in bed.  No acute distress. SR on tele.  RA.  R eye edema is improving.  Reports chest tenderness.  Plans for staged PCI tues with Dr. Levi .       02/08/2025    Patient resting in bed during examination.  Sinus rhythm on the monitor.  Heart rate 59.  Blood pressure 129/80.  Remains on IV fluids 25 mL per hour patient is Aggrastat 0.15 mcg per kg per minute. K  2.8 this am.  Repleted. Ecchymosis in right orbital edema.      HPI:  59-year-old male with a past medical history of CAD status post PCI several years ago, diabetes, hypertension presented with chest pain had Vfib and cardiac arrest in the ER twice status post resuscitation.  EKG showed inferior ST elevation.  STEMI code was activated.  Patient was transferred to cath lab for emergent coronary angiogram.  Patient did not take Plavix in the last couple of days.        Review of patient's allergies indicates:   Allergen Reactions    Levaquin [levofloxacin]        Past Medical History:   Diagnosis Date    Acute coronary syndrome 09/09/2019    Coronary artery disease     Diabetes mellitus     Hypertension     Sleep apnea      Past Surgical History:   Procedure Laterality Date    FESS, USING COMPUTER-ASSISTED NAVIGATION, WITH NASAL TURBINATE REDUCTION N/A 6/7/2024    Procedure: FESS, USING COMPUTER-ASSISTED NAVIGATION, WITH NASAL TURBINATE REDUCTION;  Surgeon: Andres Abraham MD;  Location: [facility] OR;  Service: ENT;  Laterality: N/A;    NASAL SEPTOPLASTY N/A 6/7/2024    Procedure: SEPTOPLASTY, NOSE;  Surgeon: Andres Abraham MD;  Location: [facility] OR;  Service:  ENT;  Laterality: N/A;    NASAL TURBINATE REDUCTION Bilateral 6/7/2024    Procedure: REDUCTION, NASAL TURBINATE;  Surgeon: Andres Abraham MD;  Location: [facility] OR;  Service: ENT;  Laterality: Bilateral;    VASCULAR SURGERY       Social History     Tobacco Use    Smoking status: Never    Smokeless tobacco: Current     Types: Snuff   Substance Use Topics    Alcohol use: Not Currently    Drug use: Never        REVIEW OF SYSTEMS  All other systems negative except as mentioned in HPI.     OBJECTIVE     VITAL SIGNS (Most Recent)  Temp: 98.2 °F (36.8 °C) (02/09/25 0701)  Pulse: 67 (02/09/25 0731)  Resp: 20 (02/09/25 0910)  BP: (!) 152/81 (02/09/25 0909)  SpO2: (!) 93 % (02/09/25 0731)    VENTILATION STATUS  Resp: 20 (02/09/25 0910)  SpO2: (!) 93 % (02/09/25 0731)  Oxygen Concentration (%):  [3] 3        I & O (Last 24H):  Intake/Output Summary (Last 24 hours) at 2/9/2025 0916  Last data filed at 2/9/2025 0655  Gross per 24 hour   Intake 2106.91 ml   Output 1525 ml   Net 581.91 ml       WEIGHTS  Wt Readings from Last 1 Encounters:   02/08/25 0501 76.7 kg (169 lb)   02/08/25 0200 76.9 kg (169 lb 8.5 oz)       PHYSICAL EXAM  GENERAL:  NAD  HEENT: Normocephalic R orbital edema/ecchymosis improving  NECK: No JVD  Chest wall TTP  CARDIAC: Regular rate and rhythm. S1 is normal.S2 is normal.  No murmurs.   LUNGS:  CTA b/l  ABDOMEN:  Nondistended  EXTREMITIES:  No edema/cyanosis.    CNS:  AAO x3  SKIN:  No rash.    PSYCH: normal affect    HOME MEDICATIONS:  No current facility-administered medications on file prior to encounter.     Current Outpatient Medications on File Prior to Encounter   Medication Sig Dispense Refill    amLODIPine (NORVASC) 10 MG tablet Take 1 tablet (10 mg total) by mouth once daily. 90 tablet 3    aspirin (ECOTRIN) 81 MG EC tablet Take 81 mg by mouth once daily.      atorvastatin (LIPITOR) 40 MG tablet TAKE 1 TABLET(40 MG) BY MOUTH EVERY DAY (Patient taking differently: Take 40 mg by mouth once  daily.) 90 tablet 3    clopidogreL (PLAVIX) 75 mg tablet Take 1 tablet (75 mg total) by mouth once daily. 90 tablet 3    JARDIANCE 10 mg tablet Take 10 mg by mouth once daily.      losartan (COZAAR) 25 MG tablet TAKE 1 TABLET(25 MG) BY MOUTH EVERY DAY (Patient taking differently: Take 25 mg by mouth once daily.) 90 tablet 3    meloxicam (MOBIC) 15 MG tablet Take 1 tablet by mouth daily as needed for Pain.      metoprolol tartrate (LOPRESSOR) 50 MG tablet Take 1 tablet (50 mg total) by mouth 2 (two) times daily. 90 tablet 3    multivit-min/FA/lycopen/lutein (CENTRUM SILVER MEN ORAL) Take 1 tablet by mouth once daily.      pregabalin (LYRICA) 75 MG capsule Take 1 capsule by mouth 2 (two) times daily.      tadalafiL (CIALIS) 5 MG tablet Take 1 tablet (5 mg total) by mouth daily as needed for Erectile Dysfunction. 15 tablet 3    traZODone (DESYREL) 150 MG tablet Take 1 tablet by mouth once daily.         SCHEDULED MEDS:   amLODIPine  10 mg Oral Daily    aspirin  81 mg Oral Daily    atorvastatin  80 mg Oral Daily    famotidine (PF)  20 mg Intravenous BID    gabapentin  300 mg Oral TID    isosorbide mononitrate  30 mg Oral Daily    losartan  25 mg Oral Daily    metoprolol tartrate  25 mg Oral BID    mupirocin   Nasal BID    senna-docusate 8.6-50 mg  1 tablet Oral BID    ticagrelor  90 mg Oral BID       CONTINUOUS INFUSIONS:   tirofiban-0.9% sodium chloride 12.5 mg/250ml    Continuous PRN 13.8 mL/hr at 02/08/25 0210 0.15 mcg/kg/min at 02/08/25 0210       PRN MEDS:  Current Facility-Administered Medications:     acetaminophen, 650 mg, Oral, Q8H PRN    acetaminophen, 650 mg, Oral, Q4H PRN    aluminum-magnesium hydroxide-simethicone, 30 mL, Oral, QID PRN    dextrose 50%, 12.5 g, Intravenous, PRN    dextrose 50%, 25 g, Intravenous, PRN    glucagon (human recombinant), 1 mg, Intramuscular, PRN    glucose, 16 g, Oral, PRN    glucose, 24 g, Oral, PRN    iodixanoL, , , PRN    magnesium oxide, 800 mg, Oral, PRN    magnesium oxide,  "800 mg, Oral, PRN    melatonin, 6 mg, Oral, Nightly PRN    morphine, 2 mg, Intravenous, Q30 Min PRN    naloxone, 0.02 mg, Intravenous, PRN    ondansetron, 4 mg, Intravenous, Q6H PRN    potassium bicarbonate, 35 mEq, Oral, PRN    potassium bicarbonate, 50 mEq, Oral, PRN    potassium bicarbonate, 60 mEq, Oral, PRN    potassium, sodium phosphates, 2 packet, Oral, PRN    potassium, sodium phosphates, 2 packet, Oral, PRN    potassium, sodium phosphates, 2 packet, Oral, PRN    sodium chloride 0.9%, 3 mL, Intravenous, Q12H PRN    tirofiban-0.9% sodium chloride 12.5 mg/250ml, , , Continuous PRN    LABS AND DIAGNOSTICS     CBC LAST 3 DAYS  Recent Labs   Lab 02/07/25 2342 02/08/25  0000 02/08/25 0311 02/09/25 0314   WBC 11.56  --  14.75* 7.02   RBC 5.23  --  5.17 4.20*   HGB 15.6  --  15.0 12.4*   HCT 45.2 47 45.6 38.0*   MCV 86  --  88 91   MCH 29.8  --  29.0 29.5   MCHC 34.5  --  32.9 32.6   RDW 13.2  --  13.2 14.1     --  234 167   MPV 10.8  --  11.4 11.1   GRAN 56.0  6.5  --  87.0*  12.8* 67.9  4.8   LYMPH 26.9  3.1  --  3.5*  0.5* 16.7*  1.2   MONO 10.8  1.3*  --  7.2  1.1* 13.5  1.0   BASO 0.10  --  0.06 0.02   NRBC 1*  --  0 0       COAGULATION LAST 3 DAYS  No results for input(s): "LABPT", "INR", "APTT" in the last 168 hours.    CHEMISTRY LAST 3 DAYS  Recent Labs   Lab 02/07/25 2342 02/08/25  0000 02/08/25 0311 02/08/25  0906 02/08/25  1230 02/09/25 0313     --  139  --  136 138   K 2.5*  --  2.9* 4.8 5.0 4.4   CL 99  --  102  --  110 110   CO2 20*  --  25  --  21* 23   ANIONGAP 20*  --  12  --  5* 5*   BUN 19  --  16  --  17 17   CREATININE 1.1  --  0.8  --  0.8 0.8   *  --  139*  --  117* 100   CALCIUM 9.3  --  8.6*  --  7.8* 8.1*   PH  --  7.485*  --   --   --   --    MG 2.0  --  2.6  --   --  2.4   ALBUMIN 4.6  --  4.3  --   --  3.8   PROT 7.6  --  7.1  --   --  6.1   ALKPHOS 94  --  73  --   --  61   ALT 26  --  59*  --   --  63*   AST 28  --  144*  --   --  170*   BILITOT 0.7  " "--  0.9  --   --  0.7       CARDIAC PROFILE LAST 3 DAYS  Recent Labs   Lab 02/07/25  2342 02/08/25  0311   *  --    TROPONINIHS 53.1* 57844.4*       ENDOCRINE LAST 3 DAYS  No results for input(s): "TSH", "PROCAL" in the last 168 hours.    LAST ARTERIAL BLOOD GAS  ABG  Recent Labs   Lab 02/08/25  0000   PH 7.485*   PO2 25*   PCO2 30.9*   HCO3 23.3*   BE 0       LAST 7 DAYS MICROBIOLOGY   Microbiology Results (last 7 days)       ** No results found for the last 168 hours. **            MOST RECENT IMAGING  Cardiac catheterization    Significant triple-vessel coronary artery disease.    Discrete 90% stenosis in proximal RCA and 100% thrombotic occlusion in   distal RCA (culprit lesion) status post successful IVUS guided PCI.    Dist RCA lesion: A STENT SYNERGY XD [id number] stent was successfully   placed at 11 VIELKA for 11 sec. and post dilated with 2.75 noncompliant   balloon.    Prox RCA lesion: A STENT SYNERGY XD [id number] stent was successfully   placed at 12 VIELKA for 10 sec. and post dilated with 3.5 noncompliant   balloon.    The post-procedure left ventricular end diastolic pressure was 17.    The procedure log was documented by Documenter: Madisyn Flanagan RN and   verified by Mo Levi MD.    Date: 2/8/25      ECHOCARDIOGRAM RESULTS (last 5)  Results for orders placed during the hospital encounter of 08/14/23    Echo    Interpretation Summary    Left Ventricle: The left ventricle is normal in size. Mildly increased wall thickness. There is concentric remodeling. Normal wall motion. There is normal systolic function. Ejection fraction by visual approximation is 65%. There is normal diastolic function.    Left Atrium: Left atrium is mildly dilated.    Right Ventricle: Normal right ventricular cavity size. Wall thickness is normal. Right ventricle wall motion  is normal. Systolic function is normal.    IVC/SVC: Normal venous pressure at 3 mmHg.      Results for orders placed during the hospital " encounter of 09/09/19    Echo Color Flow Doppler? Yes    Interpretation Summary  · Concentric left ventricular remodeling.  · Increased (hyperdynamic) left ventricular systolic function. The estimated ejection fraction is 83%  · Normal LV diastolic function.  · Normal right ventricular systolic function.  · Mild left atrial enlargement.    Status post emergent coronary angiogram via right radial access.    Significant triple-vessel coronary artery disease on angiogram.     Patent left main  Diffuse 70% stenosis in mid circumflex  70% InStent restenosis in D1  Short segment  in distal LAD with bridging collaterals  Discrete 90% stenosis in proximal RCA and 100% thrombotic occlusion in distal RCA (culprit lesion).     CURRENT/PREVIOUS VISIT EKG  Results for orders placed or performed during the hospital encounter of 02/07/25   EKG 12-LEAD on arrival to floor    Collection Time: 02/08/25  2:31 AM   Result Value Ref Range    QRS Duration 100 ms    [facility] QTC Calculation 544 ms    Narrative    Test Reason : I21.3,    Vent. Rate :  65 BPM     Atrial Rate :  65 BPM     P-R Int : 204 ms          QRS Dur : 100 ms      QT Int : 524 ms       P-R-T Axes :  52   4  29 degrees    QTcB Int : 544 ms    Sinus rhythm with occasional Premature ventricular complexes with  ventricular escape complexes  Inferior infarct , possibly acute  Prolonged QT    ACUTE MI / STEMI    Consider right ventricular involvement in acute inferior infarct  Abnormal ECG  No previous ECGs available  Confirmed by Nate Santana ([id number]) on 2/8/2025 6:25:42 PM    Referred By: AAAREFERRAL SELF           Confirmed By: Nate Santana         ASSESSMENT/PLAN:     Active Hospital Problems    Diagnosis    *STEMI (ST elevation myocardial infarction)    DMII (diabetes mellitus, type 2)    Cardiac arrest    Chest pain       Assessment:  STEMI/ acute inferior wall MI  Ventricular fibrillation/  Cardiac arrest x 2 in the ER status post resuscitation  History of CAD  status post PCI several years ago  Diabetes  Hypertension  Hypokalemia    Plan:      Status post successful IVUS guided PCI of distal RCA (2.5 x 38 stent post dilated with 2.75 noncompliant balloon) and PCI of proximal RCA (3.0 x 12 stent post dilated with a 3.5 noncompliant balloon).    Balloon angioplasty was performed at the ostium of RPDA with re-establishment of flow through the branch.  RPDA is of small-caliber with residual thrombus which will be managed medically with Aggrastat.    LVEDP postprocedure 17 mmHg      Chest pain resolved after the intervention.  He only has rib pain from chest compressions.    Dual antiplatelet therapy with aspirin and Brilinta    High-intensity statin and beta-blocker    Ok to get out of icu.   Aggressive lipid management.  .  Intensity statin therapy recommended    Staged intervention will be considered for residual CAD- mid circ. On Tues with Dr. Levi    Plan of care created with Dr. Max Rivera, NP  [facility]  Department of Cardiology  Date of Service: 02/09/2025 02/08/2025  As above  Patient is resting well post intervention.  He has a large area of ecchymosis and contusion on his right periorbital area and upper eyelid.  Clinically no anginal symptoms are noted  Angiographic findings noted and patient clinically remains pain-free  Continue dual antiplatelet therapy with aspirin and Brilinta  Aggressive risk factor modification add Zetia 10 mg to his regimen to achieve the LDL goal of 60 or less  Discussed with the patient and family at bedside  Ice pack for right facial trauma  Will gradually increase physical activity.    I have personally interviewed and examined the patient, I have reviewed the Nurse Practitioner's history and physical, assessment, and plan. I agree with the findings and plan.    02/09/2025    Patient denies having any episodes of angina.  Chest wall soreness is still persisting  Facial edema is improving.   Periorbital edema is decreased.  Patient wishes to proceed with coronary revascularization of the circumflex artery tentatively scheduled for Tuesday  In the meanwhile patient can be transferred to the floor increase physical activity continue on present medication  Continue on dual antiplatelet therapy with aspirin and ticagrelor  Avoid nitrate therapy because of Cialis usage in the past 72 hours  I have personally interviewed and examined the patient, I have reviewed the Nurse Practitioner's history and physical, assessment, and plan. I agree with the findings and plan.      Nate Santana M.D.  Department of Cardiology  Date of Service:  2/9/25

## 2025-02-09 NOTE — CARE UPDATE
02/09/25 1529   Patient Assessment/Suction   Level of Consciousness (AVPU) alert   Respiratory Effort Normal;Unlabored   All Lung Fields Breath Sounds diminished   Rhythm/Pattern, Respiratory unlabored   PRE-TX-O2   Device (Oxygen Therapy) room air   SpO2 (!) 92 %   Pulse Oximetry Type Continuous   $ Pulse Oximetry - Multiple Charge Pulse Oximetry - Multiple   Pulse 64   Resp (!) 23   Incentive Spirometer   $ Incentive Spirometer Charges done with encouragement   Administration (IS) mouthpiece utilized   Number of Repetitions (IS) 10   Level Incentive Spirometer (mL) 1500   Patient Tolerance (IS) no adverse signs/symptoms present

## 2025-02-09 NOTE — ASSESSMENT & PLAN NOTE
Distal % thrombotic occlusion. Proximal RCA 90% stenosis. fixed both proximal and distal RCA.   Has residual disease in LAD and circumflex diagonal which we can manage as a staged procedures   Completed Aggrastat x 12 hours, stopped 2/8  Continue asa 81 mg  Continue Brilinta b.i.d.  Beta blocker as tolerated  Avoid nitrates given he took Cialis before presentation (in the last 72 hours)  Continue high-dose atorvastatin, and zetia added ( )  Continue regular diet  Cardiology closely following  2D echocardiogram done and showed moderate hypokinesis of the inferior wall, mildly reduced EF 40-45%, normal diastolic function.  Losartan added.  Adjust GDM T as tolerated.  Staged PCI planned for Tuesday

## 2025-02-10 LAB
ALBUMIN SERPL BCP-MCNC: 4 G/DL (ref 3.5–5.2)
ALP SERPL-CCNC: 76 U/L (ref 55–135)
ALT SERPL W/O P-5'-P-CCNC: 60 U/L (ref 10–44)
ANION GAP SERPL CALC-SCNC: 4 MMOL/L (ref 8–16)
AST SERPL-CCNC: 95 U/L (ref 10–40)
BASOPHILS # BLD AUTO: 0.02 K/UL (ref 0–0.2)
BASOPHILS NFR BLD: 0.3 % (ref 0–1.9)
BILIRUB SERPL-MCNC: 0.9 MG/DL (ref 0.1–1)
BUN SERPL-MCNC: 13 MG/DL (ref 8–23)
CALCIUM SERPL-MCNC: 8.9 MG/DL (ref 8.7–10.5)
CHLORIDE SERPL-SCNC: 106 MMOL/L (ref 95–110)
CO2 SERPL-SCNC: 27 MMOL/L (ref 23–29)
CREAT SERPL-MCNC: 0.7 MG/DL (ref 0.5–1.4)
DIFFERENTIAL METHOD BLD: ABNORMAL
EOSINOPHIL # BLD AUTO: 0.1 K/UL (ref 0–0.5)
EOSINOPHIL NFR BLD: 1.7 % (ref 0–8)
ERYTHROCYTE [DISTWIDTH] IN BLOOD BY AUTOMATED COUNT: 13.2 % (ref 11.5–14.5)
EST. GFR  (NO RACE VARIABLE): >60 ML/MIN/1.73 M^2
GLUCOSE SERPL-MCNC: 110 MG/DL (ref 70–110)
HCT VFR BLD AUTO: 39.9 % (ref 40–54)
HGB BLD-MCNC: 13.1 G/DL (ref 14–18)
IMM GRANULOCYTES # BLD AUTO: 0.05 K/UL (ref 0–0.04)
IMM GRANULOCYTES NFR BLD AUTO: 0.7 % (ref 0–0.5)
LYMPHOCYTES # BLD AUTO: 1 K/UL (ref 1–4.8)
LYMPHOCYTES NFR BLD: 13.1 % (ref 18–48)
MAGNESIUM SERPL-MCNC: 2.1 MG/DL (ref 1.6–2.6)
MCH RBC QN AUTO: 29.5 PG (ref 27–31)
MCHC RBC AUTO-ENTMCNC: 32.8 G/DL (ref 32–36)
MCV RBC AUTO: 90 FL (ref 82–98)
MONOCYTES # BLD AUTO: 0.9 K/UL (ref 0.3–1)
MONOCYTES NFR BLD: 12.2 % (ref 4–15)
NEUTROPHILS # BLD AUTO: 5.2 K/UL (ref 1.8–7.7)
NEUTROPHILS NFR BLD: 72 % (ref 38–73)
NRBC BLD-RTO: 0 /100 WBC
PLATELET # BLD AUTO: 172 K/UL (ref 150–450)
PMV BLD AUTO: 11 FL (ref 9.2–12.9)
POTASSIUM SERPL-SCNC: 3.8 MMOL/L (ref 3.5–5.1)
PROT SERPL-MCNC: 6.7 G/DL (ref 6–8.4)
RBC # BLD AUTO: 4.44 M/UL (ref 4.6–6.2)
SODIUM SERPL-SCNC: 137 MMOL/L (ref 136–145)
WBC # BLD AUTO: 7.23 K/UL (ref 3.9–12.7)

## 2025-02-10 PROCEDURE — 94799 UNLISTED PULMONARY SVC/PX: CPT

## 2025-02-10 PROCEDURE — 97165 OT EVAL LOW COMPLEX 30 MIN: CPT

## 2025-02-10 PROCEDURE — 25000003 PHARM REV CODE 250: Performed by: INTERNAL MEDICINE

## 2025-02-10 PROCEDURE — S4991 NICOTINE PATCH NONLEGEND: HCPCS

## 2025-02-10 PROCEDURE — 83735 ASSAY OF MAGNESIUM: CPT | Performed by: INTERNAL MEDICINE

## 2025-02-10 PROCEDURE — 36415 COLL VENOUS BLD VENIPUNCTURE: CPT | Performed by: INTERNAL MEDICINE

## 2025-02-10 PROCEDURE — 99900035 HC TECH TIME PER 15 MIN (STAT)

## 2025-02-10 PROCEDURE — 25000003 PHARM REV CODE 250

## 2025-02-10 PROCEDURE — 80053 COMPREHEN METABOLIC PANEL: CPT | Performed by: INTERNAL MEDICINE

## 2025-02-10 PROCEDURE — 99900031 HC PATIENT EDUCATION (STAT)

## 2025-02-10 PROCEDURE — 97535 SELF CARE MNGMENT TRAINING: CPT

## 2025-02-10 PROCEDURE — 21000000 HC CCU ICU ROOM CHARGE

## 2025-02-10 PROCEDURE — 94761 N-INVAS EAR/PLS OXIMETRY MLT: CPT

## 2025-02-10 PROCEDURE — 97161 PT EVAL LOW COMPLEX 20 MIN: CPT

## 2025-02-10 PROCEDURE — 99233 SBSQ HOSP IP/OBS HIGH 50: CPT | Mod: ,,, | Performed by: INTERNAL MEDICINE

## 2025-02-10 PROCEDURE — 85025 COMPLETE CBC W/AUTO DIFF WBC: CPT | Performed by: INTERNAL MEDICINE

## 2025-02-10 PROCEDURE — 63600175 PHARM REV CODE 636 W HCPCS

## 2025-02-10 RX ORDER — LOSARTAN POTASSIUM 25 MG/1
25 TABLET ORAL ONCE
Status: COMPLETED | OUTPATIENT
Start: 2025-02-10 | End: 2025-02-10

## 2025-02-10 RX ORDER — LOSARTAN POTASSIUM 50 MG/1
50 TABLET ORAL DAILY
Status: DISCONTINUED | OUTPATIENT
Start: 2025-02-11 | End: 2025-02-12 | Stop reason: HOSPADM

## 2025-02-10 RX ORDER — LIDOCAINE 50 MG/G
1 PATCH TOPICAL
Status: DISCONTINUED | OUTPATIENT
Start: 2025-02-10 | End: 2025-02-12 | Stop reason: HOSPADM

## 2025-02-10 RX ADMIN — METOPROLOL TARTRATE 25 MG: 25 TABLET, FILM COATED ORAL at 09:02

## 2025-02-10 RX ADMIN — MUPIROCIN 1 G: 20 OINTMENT TOPICAL at 08:02

## 2025-02-10 RX ADMIN — MORPHINE SULFATE 1 MG: 2 INJECTION, SOLUTION INTRAMUSCULAR; INTRAVENOUS at 09:02

## 2025-02-10 RX ADMIN — FAMOTIDINE 20 MG: 10 INJECTION, SOLUTION INTRAVENOUS at 08:02

## 2025-02-10 RX ADMIN — TICAGRELOR 90 MG: 90 TABLET ORAL at 09:02

## 2025-02-10 RX ADMIN — LOSARTAN POTASSIUM 25 MG: 25 TABLET, FILM COATED ORAL at 09:02

## 2025-02-10 RX ADMIN — FAMOTIDINE 20 MG: 10 INJECTION, SOLUTION INTRAVENOUS at 09:02

## 2025-02-10 RX ADMIN — GABAPENTIN 300 MG: 300 CAPSULE ORAL at 09:02

## 2025-02-10 RX ADMIN — LIDOCAINE 5% 1 PATCH: 700 PATCH TOPICAL at 11:02

## 2025-02-10 RX ADMIN — ASPIRIN 81 MG: 81 TABLET, COATED ORAL at 09:02

## 2025-02-10 RX ADMIN — Medication 6 MG: at 08:02

## 2025-02-10 RX ADMIN — NICOTINE 1 PATCH: 21 PATCH, EXTENDED RELEASE TRANSDERMAL at 09:02

## 2025-02-10 RX ADMIN — SENNOSIDES AND DOCUSATE SODIUM 1 TABLET: 8.6; 5 TABLET ORAL at 08:02

## 2025-02-10 RX ADMIN — MORPHINE SULFATE 1 MG: 2 INJECTION, SOLUTION INTRAMUSCULAR; INTRAVENOUS at 08:02

## 2025-02-10 RX ADMIN — GABAPENTIN 300 MG: 300 CAPSULE ORAL at 08:02

## 2025-02-10 RX ADMIN — GABAPENTIN 300 MG: 300 CAPSULE ORAL at 02:02

## 2025-02-10 RX ADMIN — LOSARTAN POTASSIUM 25 MG: 25 TABLET, FILM COATED ORAL at 02:02

## 2025-02-10 RX ADMIN — METOPROLOL TARTRATE 25 MG: 25 TABLET, FILM COATED ORAL at 08:02

## 2025-02-10 RX ADMIN — MUPIROCIN 1 G: 20 OINTMENT TOPICAL at 09:02

## 2025-02-10 RX ADMIN — TRAZODONE HYDROCHLORIDE 25 MG: 50 TABLET ORAL at 11:02

## 2025-02-10 RX ADMIN — POTASSIUM BICARBONATE 50 MEQ: 978 TABLET, EFFERVESCENT ORAL at 05:02

## 2025-02-10 RX ADMIN — SENNOSIDES AND DOCUSATE SODIUM 1 TABLET: 8.6; 5 TABLET ORAL at 09:02

## 2025-02-10 RX ADMIN — HYDROCODONE BITARTRATE AND ACETAMINOPHEN 1 TABLET: 5; 325 TABLET ORAL at 02:02

## 2025-02-10 RX ADMIN — AMLODIPINE BESYLATE 10 MG: 5 TABLET ORAL at 09:02

## 2025-02-10 RX ADMIN — TICAGRELOR 90 MG: 90 TABLET ORAL at 08:02

## 2025-02-10 RX ADMIN — ATORVASTATIN CALCIUM 80 MG: 40 TABLET, FILM COATED ORAL at 08:02

## 2025-02-10 NOTE — PLAN OF CARE
Problem: Adult Inpatient Plan of Care  Goal: Plan of Care Review  Outcome: Progressing  Goal: Patient-Specific Goal (Individualized)  Outcome: Progressing  Goal: Absence of Hospital-Acquired Illness or Injury  Outcome: Progressing  Goal: Optimal Comfort and Wellbeing  Outcome: Progressing  Goal: Readiness for Transition of Care  Outcome: Progressing     Problem: Wound  Goal: Optimal Coping  Outcome: Progressing  Goal: Optimal Functional Ability  Outcome: Progressing  Goal: Absence of Infection Signs and Symptoms  Outcome: Progressing  Goal: Improved Oral Intake  Outcome: Progressing  Goal: Optimal Pain Control and Function  Outcome: Progressing  Goal: Skin Health and Integrity  Outcome: Progressing  Goal: Optimal Wound Healing  Outcome: Progressing     Problem: Diabetes Comorbidity  Goal: Blood Glucose Level Within Targeted Range  Outcome: Progressing      Pre-Visit Chart Review  For Appointment Scheduled on 1/3/19    Health Maintenance Due   Topic Date Due    TETANUS VACCINE  11/25/1961    DEXA SCAN  11/25/1983

## 2025-02-10 NOTE — PROGRESS NOTES
[facility]  Progress Note    Patient Name: Sanjeev Britt  MRN: [id number]  Patient Class: IP- Inpatient   Admission Date: 2/7/2025  Length of Stay: 2 days  Attending Physician: Paloma Boateng MD  Primary Care Provider: Chidi Vidal Jr., MD        Subjective     Principal Problem:STEMI (ST elevation myocardial infarction)        HPI:  59-year-old male with a past medical history of CAD status post stent placement\11 years ago ,  diabetes mellitus not on insulin ,  hypertension presents to the emergency department post ROSC while at triage.  Patient coded at triage and was defibrillated once.  On arrival to the emergency department patient was confused though was able to tell me that he has been experiencing chest pain presently 20 minutes prior to arrival.  Patient states that he was having intercourse with his partner and shortly afterwards began to experience chest pain.  Per patient's partner he did utilize sildenafil.  Patient also states he experienced diaphoresis and nausea without any episodes of vomiting.      NON Smoker  Non drinker  Here with wife or GF       His wife convinced him to come to ER    He did not want ambulance     So she drove him     In the parking lot he passed out and hit his head above right eye     But he went into V FIB arrest and was coded    They did CPR and shocked him twice     For about 2-3 minutes total       Then he was taken into ER    Potasium was 2.5     Mag was good over 2       EKG showed STEMI      Then dr Levi took him to cath lab         Distal % thrombotic occlusion. Proximal RCA 90% stenosis. fixed both proximal and distal RCA.     Has residual disease in LAD and circumflex diagonal which we can manage as a staged procedures         We are admitting him to ICU     Overview/Hospital Course:  59-year-old male with a past medical history of CAD status post PCI several years ago, diabetes, hypertension presented with chest pain  had Vfib and cardiac arrest in the ER twice status post resuscitation.  EKG showed inferior ST elevation. Patient did not take Plavix in the last couple of days.  Code STEMI was activated, patient underwent emergency coronary angiogram showing distal % thrombotic occlusion and proximal RCA 90% stenosis and underwent ELIDA placement.  Patient was continued on aspirin, Brilinta and was started on beta blocker as tolerated.  Statin increased to high-dose.  Aggrastat was continued for 12 hours postprocedure.  2D echocardiogram done and showed moderate hypokinesis of the inferior wall, mildly reduced EF 40-45%, normal diastolic function.  Cardiology closely followed the patient.  Adjusted GDM T as tolerated.  Zetia added for LDL of 103 for a goal less than 60.    Interval History:  Patient is seen and examined during multidisciplinary round.  Patient reports ribcage pain status post CPR.  Patient's wife who is a registered nurse present at bedside.  Patient is expected to undergo staged coronary intervention tomorrow.  Patient encouraged to continue use of incentive spirometry.  Patient encouraged to get out of bed and work with physical therapy.  Cardiology team following.    Review of Systems   All other systems reviewed and are negative.    Objective:     Vital Signs (Most Recent):  Temp: 98.9 °F (37.2 °C) (02/10/25 0300)  Pulse: 76 (02/10/25 0705)  Resp: 13 (02/10/25 0705)  BP: (!) 157/92 (02/10/25 0600)  SpO2: 95 % (02/10/25 0705) Vital Signs (24h Range):  Temp:  [98.1 °F (36.7 °C)-99.1 °F (37.3 °C)] 98.9 °F (37.2 °C)  Pulse:  [58-90] 76  Resp:  [13-35] 13  SpO2:  [89 %-97 %] 95 %  BP: (121-166)/(59-92) 157/92     Weight: 76.4 kg (168 lb 6.9 oz)  Body mass index is 27.19 kg/m².    Intake/Output Summary (Last 24 hours) at 2/10/2025 4942  Last data filed at 2/10/2025 0345  Gross per 24 hour   Intake 960 ml   Output 3175 ml   Net -2215 ml         Physical Exam  Vitals and nursing note reviewed. Exam conducted with  a chaperone present.   Constitutional:       Appearance: Normal appearance.   HENT:      Head: Normocephalic. Abrasion and contusion present.        Comments: Eye lid contusion significantly improving, no ophthalmoplegia, no change in vision.     Nose: Nose normal.      Mouth/Throat:      Mouth: Mucous membranes are moist.   Eyes:      Pupils: Pupils are equal, round, and reactive to light.   Cardiovascular:      Rate and Rhythm: Normal rate and regular rhythm.      Pulses: Normal pulses.      Heart sounds: Normal heart sounds.      Comments: Reproducible chest wall tenderness bilaterally.  Pulmonary:      Effort: Pulmonary effort is normal.      Breath sounds: Normal breath sounds.   Abdominal:      General: Abdomen is flat. Bowel sounds are normal.      Palpations: Abdomen is soft.   Musculoskeletal:         General: Normal range of motion.      Cervical back: Normal range of motion.   Skin:     General: Skin is warm.      Capillary Refill: Capillary refill takes less than 2 seconds.   Neurological:      General: No focal deficit present.      Mental Status: He is alert.   Psychiatric:         Mood and Affect: Mood normal.             Significant Labs:   Lab Results   Component Value Date    WBC 7.23 02/10/2025    HGB 13.1 (L) 02/10/2025    HCT 39.9 (L) 02/10/2025    MCV 90 02/10/2025     02/10/2025       CMP  Sodium   Date Value Ref Range Status   02/10/2025 137 136 - 145 mmol/L Final     Potassium   Date Value Ref Range Status   02/10/2025 3.8 3.5 - 5.1 mmol/L Final     Chloride   Date Value Ref Range Status   02/10/2025 106 95 - 110 mmol/L Final     CO2   Date Value Ref Range Status   02/10/2025 27 23 - 29 mmol/L Final     Glucose   Date Value Ref Range Status   02/10/2025 110 70 - 110 mg/dL Final     BUN   Date Value Ref Range Status   02/10/2025 13 8 - 23 mg/dL Final     Creatinine   Date Value Ref Range Status   02/10/2025 0.7 0.5 - 1.4 mg/dL Final     Calcium   Date Value Ref Range Status    02/10/2025 8.9 8.7 - 10.5 mg/dL Final     Total Protein   Date Value Ref Range Status   02/10/2025 6.7 6.0 - 8.4 g/dL Final     Albumin   Date Value Ref Range Status   02/10/2025 4.0 3.5 - 5.2 g/dL Final     Total Bilirubin   Date Value Ref Range Status   02/10/2025 0.9 0.1 - 1.0 mg/dL Final     Comment:     For infants and newborns, interpretation of results should be based  on gestational age, weight and in agreement with clinical  observations.    Premature Infant recommended reference ranges:  Up to 24 hours.............<8.0 mg/dL  Up to 48 hours............<12.0 mg/dL  3-5 days..................<15.0 mg/dL  6-29 days.................<15.0 mg/dL       Alkaline Phosphatase   Date Value Ref Range Status   02/10/2025 76 55 - 135 U/L Final     AST   Date Value Ref Range Status   02/10/2025 95 (H) 10 - 40 U/L Final     ALT   Date Value Ref Range Status   02/10/2025 60 (H) 10 - 44 U/L Final     Anion Gap   Date Value Ref Range Status   02/10/2025 4 (L) 8 - 16 mmol/L Final     eGFR   Date Value Ref Range Status   02/10/2025 >60.0 >60 mL/min/1.73 m^2 Final     Microbiology Results (last 7 days)       ** No results found for the last 168 hours. **          Recent Labs   Lab 02/08/25  0311   TROPONINIHS 49229.4*       Significant Imaging:  ECHO:    Left Ventricle: The left ventricle is normal in size. Normal wall thickness. There is concentric remodeling. Moderate hypokinesis of the inferior and the remaining walls appear to move fairly well There is mildly reduced systolic function with a visually estimated ejection fraction of 45 - 50%. There is normal diastolic function.    Right Ventricle: Normal right ventricular cavity size. Wall thickness is normal. Systolic function is normal.    Aortic Valve: The aortic valve is structurally normal.    Tricuspid Valve: The tricuspid valve is structurally normal.    Pulmonary Artery: The estimated pulmonary artery systolic pressure is 22 mmHg.    IVC/SVC: Intermediate venous  pressure at 8 mmHg.    CXR: Cardiac monitoring leads overlie the chest. Cardiomediastinal silhouette is unchanged in size and configuration. The lungs are symmetrically expanded with slight increased interstitial and parenchymal attenuation which can be seen with interstitial edema possibly atypical infectious process. Of no large region of confluent airspace consolidation, substantial volume of pleural fluid or pneumothorax identified. Osseous structures are intact.     X-ray bilateral ribs:  Normal radiographic appearance of the bilateral ribs.     CXR:  Development of right infrahilar opacity either reflecting atelectasis or, less likely, developing consolidation.     LHC:  Status post successful IVUS guided PCI of distal RCA (2.5 x 38 stent post dilated with 2.75 noncompliant balloon) and PCI of proximal RCA (3.0 x 12 stent post dilated with a 3.5 noncompliant balloon).    Balloon angioplasty was performed at the ostium of RPDA with re-establishment of flow through the branch.  RPDA is of small-caliber with residual thrombus which will be managed medically with Aggrastat.   LVEDP postprocedure 17 mmHg        Assessment and Plan     * STEMI (ST elevation myocardial infarction)  Distal % thrombotic occlusion. Proximal RCA 90% stenosis. fixed both proximal and distal RCA.   Has residual disease in LAD and circumflex diagonal which we can manage as a staged procedures   Completed Aggrastat x 12 hours, stopped 2/8  Continue asa 81 mg  Continue Brilinta b.i.d.  Beta blocker as tolerated  Avoid nitrates given he took Cialis before presentation (in the last 72 hours)  Continue high-dose atorvastatin, and zetia added ( )  Continue regular diet  Cardiology closely following  2D echocardiogram done and showed moderate hypokinesis of the inferior wall, mildly reduced EF 40-45%, normal diastolic function.  Losartan added.  Adjust GDM T as tolerated.  Staged PCI planned for Tuesday Okay to transfer to step-down  as per Cardiology    Cardiac arrest  VFib arrest post inferior STEMI  See above      DMII (diabetes mellitus, type 2)  A1c 5.8  POCT glucose  Can start insulin sliding scale as needed, currently well controlled blood glucose    Chest pain  Post chest compressions, had chest soreness generalized  Repeat chest x-ray negative for any rib fractures  Incentive spirometry  Improved      Discussed with bedside nurse and .  Discussed with patient's wife, answered all questions.  VTE Risk Mitigation (From admission, onward)           Ordered     IP VTE HIGH RISK PATIENT  Once         02/08/25 0001     Place sequential compression device  Until discontinued         02/08/25 0001     Reason for No Pharmacological VTE Prophylaxis  Once        Question:  Reasons:  Answer:  Patient is Ambulatory    02/08/25 0001                    Discharge Planning   BARRON: 2/13/25     Code Status: Full Code   Medical Readiness for Discharge Date:   Discharge Plan A: Home with family                        Paloma Boateng MD  Department of Hospital Medicine   [facility]

## 2025-02-10 NOTE — PT/OT/SLP EVAL
Physical Therapy Evaluation    Patient Name:  Sanjeev Britt   MRN:  [id number]    Recommendations:     Discharge Recommendations: No Therapy Indicated   Discharge Equipment Recommendations: none   Barriers to discharge: None    Assessment:     Sanjeev Britt is a 59 y.o. male admitted with a medical diagnosis of STEMI (ST elevation myocardial infarction).  He presents with the following impairments/functional limitations: weakness, impaired endurance, impaired functional mobility, decreased upper extremity function, pain, impaired cardiopulmonary response to activity. Patient is agreeable to participation with PT evaluation. He reports pain related to receiving chest compressions earlier in hospital stay with RN present to administer pain medication. He lives with his girlfriend and is independent at baseline. He requires SBA for supine <> sit <> stand. He ambulated 210' with no AD and SBA. Upon return to room he is agreeable to sit up in chair with all needs met.     Rehab Prognosis: Good; patient would benefit from acute skilled PT services to address these deficits and reach maximum level of function.    Recent Surgery: Procedure(s) (LRB):  Percutaneous coronary intervention (N/A)  IVUS, Coronary  Angiogram, Coronary, with Left Heart Cath (N/A) 2 Days Post-Op    Plan:     During this hospitalization, patient to be seen 3 x/week to address the identified rehab impairments via gait training, therapeutic activities, therapeutic exercises and progress toward the following goals:    Plan of Care Expires:  03/10/25    Subjective     Chief Complaint: chest discomfort  Patient/Family Comments/goals: home soon   Pain/Comfort:  Pain Rating 1:  (not rated)  Location 1: chest  Pain Addressed 1:  (nurse present to administer pain medication)    Patients cultural, spiritual, Congregational conflicts given the current situation:      Living Environment:  Patient lives with girlfriend  Prior to admission, patients level of  function was independent. 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 He reports a fall in tub and a fall prior to admission. He endorses recent OPPT for back issues.  Equipment used at home: none.  DME owned (not currently used): none.  Upon discharge, patient will have assistance from family.    Objective:     Communicated with YORDY Norton prior to session.  Patient found HOB elevated with blood pressure cuff, pulse ox (continuous), telemetry  upon PT entry to room.    General Precautions: Standard, fall  Orthopedic Precautions:N/A   Braces: N/A  Respiratory Status: Room air    Exams:  RLE Strength: WFL  LLE Strength: WFL    Functional Mobility:  Bed Mobility:     Supine to Sit: stand by assistance  Transfers:     Sit to Stand:  stand by assistance with no AD  Gait: 210' with no AD and SBA      AM-PAC 6 CLICK MOBILITY  Total Score:23       Treatment & Education:  Patient was educated on the importance of OOB activity and functional mobility to negate negative effects of prolonged bed rest during hospitalization, safe transfers and ambulation, and D/C planning     Patient left up in chair with all lines intact and call button in reach.    GOALS:   Multidisciplinary Problems       Physical Therapy Goals          Problem: Physical Therapy    Goal Priority Disciplines Outcome Interventions   Physical Therapy Goal     PT, PT/OT     Description: Goals to be met by: 3/10/25    Patient will increase functional independence with mobility by performin. Supine to sit with Supervision  2. Sit to stand transfer with Supervision  3. Bed to chair transfer with Supervision using no AD  4. Gait  x 250 feet with Supervision using no AD  5. Lower extremity exercise program x20 reps per handout, with supervision                       DME Justifications:  No DME recommended requiring DME justifications    History:     Past Medical History:   Diagnosis Date    Acute coronary syndrome 2019    Coronary artery disease     Diabetes mellitus      Hypertension     Sleep apnea        Past Surgical History:   Procedure Laterality Date    ANGIOGRAM, CORONARY, WITH LEFT HEART CATHETERIZATION N/A 2/8/2025    Procedure: Angiogram, Coronary, with Left Heart Cath;  Surgeon: Mo Levi MD;  Location: [facility] CATH/EP LAB;  Service: Cardiology;  Laterality: N/A;    FESS, USING COMPUTER-ASSISTED NAVIGATION, WITH NASAL TURBINATE REDUCTION N/A 6/7/2024    Procedure: FESS, USING COMPUTER-ASSISTED NAVIGATION, WITH NASAL TURBINATE REDUCTION;  Surgeon: Andres Abraham MD;  [facility];  Service: ENT;  Laterality: N/A;    IVUS, CORONARY  2/8/2025    Procedure: IVUS, Coronary;  Surgeon: Mo Levi MD;  Location: [facility] CATH/EP LAB;  Service: Cardiology;;    NASAL SEPTOPLASTY N/A 6/7/2024    Procedure: SEPTOPLASTY, NOSE;  Surgeon: Andres Abraham MD;  Location: [facility];  Service: ENT;  Laterality: N/A;    NASAL TURBINATE REDUCTION Bilateral 6/7/2024    Procedure: REDUCTION, NASAL TURBINATE;  Surgeon: Andres Abraham MD;  [facility];  Service: ENT;  Laterality: Bilateral;    PERCUTANEOUS CORONARY INTERVENTION, ARTERY N/A 2/8/2025    Procedure: Percutaneous coronary intervention;  Surgeon: Mo Levi MD;  Location: [facility] CATH/EP LAB;  Service: Cardiology;  Laterality: N/A;    VASCULAR SURGERY         Time Tracking:     PT Received On: 02/10/25  PT Start Time: 1448     PT Stop Time: 1502  PT Total Time (min): 14 min     Billable Minutes: Evaluation 14      02/10/2025

## 2025-02-10 NOTE — PROGRESS NOTES
[facility]  Department of Cardiology  Consult Note      PATIENT NAME: Sanjeev Britt    MRN: [id number]  TODAY'S DATE: 02/10/2025  ADMIT DATE: 2/7/2025                          CONSULT REQUESTED BY: Paloma Boateng MD    SUBJECTIVE     PRINCIPAL PROBLEM: STEMI (ST elevation myocardial infarction)      REASON FOR CONSULT:  STEMI    INTERVAL HISTORY:  02/10/2025  Pt seen this morning resting comfortably in hospital bed with wife at bedside. Discussed plans for staged PCI tomorrow with Dr. Levi and instructed nothing to eat after midnight. Pt agreeable to plan. Does admit to some intermittent chest soreness from CPR. Remains mildly hypertensive.     2/9/25    Resting in bed.  No acute distress. SR on tele.  RA.  R eye edema is improving.  Reports chest tenderness.  Plans for staged PCI tues with Dr. Levi .       02/08/2025    Patient resting in bed during examination.  Sinus rhythm on the monitor.  Heart rate 59.  Blood pressure 129/80.  Remains on IV fluids 25 mL per hour patient is Aggrastat 0.15 mcg per kg per minute. K  2.8 this am.  Repleted. Ecchymosis in right orbital edema.      HPI:  59-year-old male with a past medical history of CAD status post PCI several years ago, diabetes, hypertension presented with chest pain had Vfib and cardiac arrest in the ER twice status post resuscitation.  EKG showed inferior ST elevation.  STEMI code was activated.  Patient was transferred to cath lab for emergent coronary angiogram.  Patient did not take Plavix in the last couple of days.        Review of patient's allergies indicates:   Allergen Reactions    Levaquin [levofloxacin]        Past Medical History:   Diagnosis Date    Acute coronary syndrome 09/09/2019    Coronary artery disease     Diabetes mellitus     Hypertension     Sleep apnea      Past Surgical History:   Procedure Laterality Date    ANGIOGRAM, CORONARY, WITH LEFT HEART CATHETERIZATION N/A 2/8/2025    Procedure: Angiogram, Coronary, with Left  Heart Cath;  Surgeon: Mo Levi MD;  Location: [facility] CATH/EP LAB;  Service: Cardiology;  Laterality: N/A;    FESS, USING COMPUTER-ASSISTED NAVIGATION, WITH NASAL TURBINATE REDUCTION N/A 6/7/2024    Procedure: FESS, USING COMPUTER-ASSISTED NAVIGATION, WITH NASAL TURBINATE REDUCTION;  Surgeon: Andres Abraham MD;  Location: [facility] OR;  Service: ENT;  Laterality: N/A;    IVUS, CORONARY  2/8/2025    Procedure: IVUS, Coronary;  Surgeon: Mo Levi MD;  Location: [facility] CATH/EP LAB;  Service: Cardiology;;    NASAL SEPTOPLASTY N/A 6/7/2024    Procedure: SEPTOPLASTY, NOSE;  Surgeon: Andres Abraham MD;  Location: [facility] OR;  Service: ENT;  Laterality: N/A;    NASAL TURBINATE REDUCTION Bilateral 6/7/2024    Procedure: REDUCTION, NASAL TURBINATE;  Surgeon: Andres Abraham MD;  Location: [facility] OR;  Service: ENT;  Laterality: Bilateral;    PERCUTANEOUS CORONARY INTERVENTION, ARTERY N/A 2/8/2025    Procedure: Percutaneous coronary intervention;  Surgeon: Mo Levi MD;  Location: [facility] CATH/EP LAB;  Service: Cardiology;  Laterality: N/A;    VASCULAR SURGERY       Social History     Tobacco Use    Smoking status: Never    Smokeless tobacco: Current     Types: Snuff   Substance Use Topics    Alcohol use: Not Currently    Drug use: Never        REVIEW OF SYSTEMS  All other systems negative except as mentioned in HPI.     OBJECTIVE     VITAL SIGNS (Most Recent)  Temp: 98.4 °F (36.9 °C) (02/10/25 1215)  Pulse: 71 (02/10/25 1300)  Resp: (!) 22 (02/10/25 1300)  BP: (!) 150/87 (02/10/25 1300)  SpO2: (!) 92 % (02/10/25 1300)    VENTILATION STATUS  Resp: (!) 22 (02/10/25 1300)  SpO2: (!) 92 % (02/10/25 1300)           I & O (Last 24H):  Intake/Output Summary (Last 24 hours) at 2/10/2025 1425  Last data filed at 2/10/2025 1000  Gross per 24 hour   Intake 720 ml   Output 3925 ml   Net -3205 ml       WEIGHTS  Wt Readings from Last 1 Encounters:   02/10/25 0620 76.4 kg (168 lb 6.9 oz)    02/08/25 0501 76.7 kg (169 lb)   02/08/25 0200 76.9 kg (169 lb 8.5 oz)       PHYSICAL EXAM  GENERAL:  NAD  HEENT: Normocephalic R orbital edema/ecchymosis improving  NECK: No JVD  Chest wall TTP  CARDIAC: Regular rate and rhythm. S1 is normal.S2 is normal.  No murmurs.   LUNGS:  CTA b/l  ABDOMEN:  Nondistended  EXTREMITIES:  No edema/cyanosis.    CNS:  AAO x3  SKIN:  No rash.    PSYCH: normal affect    HOME MEDICATIONS:  No current facility-administered medications on file prior to encounter.     Current Outpatient Medications on File Prior to Encounter   Medication Sig Dispense Refill    amLODIPine (NORVASC) 10 MG tablet Take 1 tablet (10 mg total) by mouth once daily. 90 tablet 3    aspirin (ECOTRIN) 81 MG EC tablet Take 81 mg by mouth once daily.      atorvastatin (LIPITOR) 40 MG tablet TAKE 1 TABLET(40 MG) BY MOUTH EVERY DAY (Patient taking differently: Take 40 mg by mouth once daily.) 90 tablet 3    clopidogreL (PLAVIX) 75 mg tablet Take 1 tablet (75 mg total) by mouth once daily. 90 tablet 3    JARDIANCE 10 mg tablet Take 10 mg by mouth once daily.      losartan (COZAAR) 25 MG tablet TAKE 1 TABLET(25 MG) BY MOUTH EVERY DAY (Patient taking differently: Take 25 mg by mouth once daily.) 90 tablet 3    meloxicam (MOBIC) 15 MG tablet Take 1 tablet by mouth daily as needed for Pain.      metoprolol tartrate (LOPRESSOR) 50 MG tablet Take 1 tablet (50 mg total) by mouth 2 (two) times daily. 90 tablet 3    multivit-min/FA/lycopen/lutein (CENTRUM SILVER MEN ORAL) Take 1 tablet by mouth once daily.      pregabalin (LYRICA) 75 MG capsule Take 1 capsule by mouth 2 (two) times daily.      tadalafiL (CIALIS) 5 MG tablet Take 1 tablet (5 mg total) by mouth daily as needed for Erectile Dysfunction. 15 tablet 3    traZODone (DESYREL) 150 MG tablet Take 1 tablet by mouth once daily.         SCHEDULED MEDS:   amLODIPine  10 mg Oral Daily    aspirin  81 mg Oral Daily    atorvastatin  80 mg Oral Daily    famotidine (PF)  20 mg  Intravenous BID    gabapentin  300 mg Oral TID    LIDOcaine  1 patch Transdermal Q24H    losartan  25 mg Oral Daily    metoprolol tartrate  25 mg Oral BID    mupirocin   Nasal BID    nicotine  1 patch Transdermal Daily    senna-docusate 8.6-50 mg  1 tablet Oral BID    ticagrelor  90 mg Oral BID       CONTINUOUS INFUSIONS:   tirofiban-0.9% sodium chloride 12.5 mg/250ml    Continuous PRN 13.8 mL/hr at 02/08/25 0210 0.15 mcg/kg/min at 02/08/25 0210       PRN MEDS:  Current Facility-Administered Medications:     acetaminophen, 650 mg, Oral, Q8H PRN    acetaminophen, 650 mg, Oral, Q4H PRN    aluminum-magnesium hydroxide-simethicone, 30 mL, Oral, QID PRN    dextrose 50%, 12.5 g, Intravenous, PRN    dextrose 50%, 25 g, Intravenous, PRN    glucagon (human recombinant), 1 mg, Intramuscular, PRN    glucose, 16 g, Oral, PRN    glucose, 24 g, Oral, PRN    HYDROcodone-acetaminophen, 1 tablet, Oral, Q6H PRN    iodixanoL, , , PRN    magnesium oxide, 800 mg, Oral, PRN    magnesium oxide, 800 mg, Oral, PRN    melatonin, 6 mg, Oral, Nightly PRN    morphine, 1 mg, Intravenous, Q8H PRN    naloxone, 0.02 mg, Intravenous, PRN    ondansetron, 4 mg, Intravenous, Q6H PRN    potassium bicarbonate, 35 mEq, Oral, PRN    potassium bicarbonate, 50 mEq, Oral, PRN    potassium bicarbonate, 60 mEq, Oral, PRN    potassium, sodium phosphates, 2 packet, Oral, PRN    potassium, sodium phosphates, 2 packet, Oral, PRN    potassium, sodium phosphates, 2 packet, Oral, PRN    sodium chloride 0.9%, 2 mL, Intravenous, PRN    sodium chloride 0.9%, 3 mL, Intravenous, Q12H PRN    tirofiban-0.9% sodium chloride 12.5 mg/250ml, , , Continuous PRN    LABS AND DIAGNOSTICS     CBC LAST 3 DAYS  Recent Labs   Lab 02/08/25  0311 02/09/25  0314 02/10/25  0410   WBC 14.75* 7.02 7.23   RBC 5.17 4.20* 4.44*   HGB 15.0 12.4* 13.1*   HCT 45.6 38.0* 39.9*   MCV 88 91 90   MCH 29.0 29.5 29.5   MCHC 32.9 32.6 32.8   RDW 13.2 14.1 13.2    167 172   MPV 11.4 11.1 11.0   GRAN  "87.0*  12.8* 67.9  4.8 72.0  5.2   LYMPH 3.5*  0.5* 16.7*  1.2 13.1*  1.0   MONO 7.2  1.1* 13.5  1.0 12.2  0.9   BASO 0.06 0.02 0.02   NRBC 0 0 0       COAGULATION LAST 3 DAYS  No results for input(s): "LABPT", "INR", "APTT" in the last 168 hours.    CHEMISTRY LAST 3 DAYS  Recent Labs   Lab 02/08/25  0000 02/08/25  0311 02/08/25  0906 02/08/25  1230 02/09/25  0313 02/10/25  0410   NA  --  139  --  136 138 137   K  --  2.9*   < > 5.0 4.4 3.8   CL  --  102  --  110 110 106   CO2  --  25  --  21* 23 27   ANIONGAP  --  12  --  5* 5* 4*   BUN  --  16  --  17 17 13   CREATININE  --  0.8  --  0.8 0.8 0.7   GLU  --  139*  --  117* 100 110   CALCIUM  --  8.6*  --  7.8* 8.1* 8.9   PH 7.485*  --   --   --   --   --    MG  --  2.6  --   --  2.4 2.1   ALBUMIN  --  4.3  --   --  3.8 4.0   PROT  --  7.1  --   --  6.1 6.7   ALKPHOS  --  73  --   --  61 76   ALT  --  59*  --   --  63* 60*   AST  --  144*  --   --  170* 95*   BILITOT  --  0.9  --   --  0.7 0.9    < > = values in this interval not displayed.       CARDIAC PROFILE LAST 3 DAYS  Recent Labs   Lab 02/07/25  2342 02/08/25 0311   *  --    TROPONINIHS 53.1* 74908.4*       ENDOCRINE LAST 3 DAYS  No results for input(s): "TSH", "PROCAL" in the last 168 hours.    LAST ARTERIAL BLOOD GAS  ABG  Recent Labs   Lab 02/08/25  0000   PH 7.485*   PO2 25*   PCO2 30.9*   HCO3 23.3*   BE 0       LAST 7 DAYS MICROBIOLOGY   Microbiology Results (last 7 days)       ** No results found for the last 168 hours. **            MOST RECENT IMAGING  Cardiac catheterization    Significant triple-vessel coronary artery disease.    Discrete 90% stenosis in proximal RCA and 100% thrombotic occlusion in   distal RCA (culprit lesion) status post successful IVUS guided PCI.    Dist RCA lesion: A STENT SYNERGY XD [id number] stent was successfully   placed at 11 VIELKA for 11 sec. and post dilated with 2.75 noncompliant   balloon.    Prox RCA lesion: A STENT SYNERGY XD [id number] stent was " successfully   placed at 12 VIELKA for 10 sec. and post dilated with 3.5 noncompliant   balloon.    The post-procedure left ventricular end diastolic pressure was 17.    The procedure log was documented by Documenter: Madisyn Flanagan RN and   verified by Mo Levi MD.    Date: 2/8/25      ECHOCARDIOGRAM RESULTS (last 5)  Results for orders placed during the hospital encounter of 08/14/23    Echo    Interpretation Summary    Left Ventricle: The left ventricle is normal in size. Mildly increased wall thickness. There is concentric remodeling. Normal wall motion. There is normal systolic function. Ejection fraction by visual approximation is 65%. There is normal diastolic function.    Left Atrium: Left atrium is mildly dilated.    Right Ventricle: Normal right ventricular cavity size. Wall thickness is normal. Right ventricle wall motion  is normal. Systolic function is normal.    IVC/SVC: Normal venous pressure at 3 mmHg.      Results for orders placed during the hospital encounter of 09/09/19    Echo Color Flow Doppler? Yes    Interpretation Summary  · Concentric left ventricular remodeling.  · Increased (hyperdynamic) left ventricular systolic function. The estimated ejection fraction is 83%  · Normal LV diastolic function.  · Normal right ventricular systolic function.  · Mild left atrial enlargement.    Status post emergent coronary angiogram via right radial access.    Significant triple-vessel coronary artery disease on angiogram.     Patent left main  Diffuse 70% stenosis in mid circumflex  70% InStent restenosis in D1  Short segment  in distal LAD with bridging collaterals  Discrete 90% stenosis in proximal RCA and 100% thrombotic occlusion in distal RCA (culprit lesion).     CURRENT/PREVIOUS VISIT EKG  Results for orders placed or performed during the hospital encounter of 02/07/25   EKG 12-LEAD on arrival to floor    Collection Time: 02/08/25  2:31 AM   Result Value Ref Range    QRS Duration  100 ms    [facility] QTC Calculation 544 ms    Narrative    Test Reason : I21.3,    Vent. Rate :  65 BPM     Atrial Rate :  65 BPM     P-R Int : 204 ms          QRS Dur : 100 ms      QT Int : 524 ms       P-R-T Axes :  52   4  29 degrees    QTcB Int : 544 ms    Sinus rhythm with occasional Premature ventricular complexes with  ventricular escape complexes  Inferior infarct , possibly acute  Prolonged QT    ACUTE MI / STEMI    Consider right ventricular involvement in acute inferior infarct  Abnormal ECG  No previous ECGs available  Confirmed by Nate Santana ([id number]) on 2/8/2025 6:25:42 PM    Referred By: ALIZA SELF           Confirmed By: Nate Santana         ASSESSMENT/PLAN:     Active Hospital Problems    Diagnosis    *STEMI (ST elevation myocardial infarction)    DMII (diabetes mellitus, type 2)    Cardiac arrest    Chest pain       Assessment:  STEMI/ acute inferior wall MI  Ventricular fibrillation/  Cardiac arrest x 2 in the ER status post resuscitation  History of CAD status post PCI several years ago  Diabetes  Hypertension  Hypokalemia    Plan:    Plan for [facility] with staged PCI with Dr. Levi tomorrow morning. Orders already in place.   Discussed with patient the risks benefits and options and risks include but not limited to bleeding, vascular damage, renal failure, stroke, myocardial infarction, emergency bypass surgery and death.  All questions have been answered to patient's satisfaction.And patient has voiced that he wants to proceed with the procedure.  Will keep him nothing by mouth post midnight and proceed with the coronary angiography possible PCI in the morning.  Continue with DAPT to include asa and Brilinta.  Continue Lipitor 80 mg daily.  Continue Lopressor 25 mg BID.  Increase losartan to 50 mg daily.   Liver enzymes improving.   Mildly reduced EF 45-50% - will eventually need GDMT optimized.   Will continue to follow.     Sakina Driscoll NP  [facility]  Department of  Cardiology  Date of Service: 02/10/2025      02/08/2025  As above  Patient is resting well post intervention.  He has a large area of ecchymosis and contusion on his right periorbital area and upper eyelid.  Clinically no anginal symptoms are noted  Angiographic findings noted and patient clinically remains pain-free  Continue dual antiplatelet therapy with aspirin and Brilinta  Aggressive risk factor modification add Zetia 10 mg to his regimen to achieve the LDL goal of 60 or less  Discussed with the patient and family at bedside  Ice pack for right facial trauma  Will gradually increase physical activity.    I have personally interviewed and examined the patient, I have reviewed the Nurse Practitioner's history and physical, assessment, and plan. I agree with the findings and plan.    02/09/2025    Patient denies having any episodes of angina.  Chest wall soreness is still persisting  Facial edema is improving.  Periorbital edema is decreased.  Patient wishes to proceed with coronary revascularization of the circumflex artery tentatively scheduled for Tuesday  In the meanwhile patient can be transferred to the floor increase physical activity continue on present medication  Continue on dual antiplatelet therapy with aspirin and ticagrelor  Avoid nitrate therapy because of Cialis usage in the past 72 hours  I have personally interviewed and examined the patient, I have reviewed the Nurse Practitioner's history and physical, assessment, and plan. I agree with the findings and plan.    02/10/2025:     As above  Patient remains hemodynamically stable no further episodes of angina  Blood pressure has been reasonable continue on amlodipine 10 mg daily and losartan 50 mg a  Tolerating dual antiplatelet therapy with ticagrelor as well as Plavix  Also to continue on statin therapy with Lipitor 80 mg a day  Right facial swelling has improved although ecchymosis still persists  No nosebleeds and no hematuria  noted.  Tentatively scheduled for PCI of the circumflex artery tomorrow.  Patient understands the risks and wishes to proceed.    Nate Santana M.D.  Department of Cardiology  Date of Service:  2/9/25

## 2025-02-10 NOTE — SUBJECTIVE & OBJECTIVE
Interval History:  Patient is seen and examined during multidisciplinary round.  Patient reports ribcage pain status post CPR.  Patient's wife who is a registered nurse present at bedside.  Patient is expected to undergo staged coronary intervention tomorrow.  Patient encouraged to continue use of incentive spirometry.  Patient encouraged to get out of bed and work with physical therapy.  Cardiology team following.    Review of Systems   All other systems reviewed and are negative.    Objective:     Vital Signs (Most Recent):  Temp: 98.9 °F (37.2 °C) (02/10/25 0300)  Pulse: 76 (02/10/25 0705)  Resp: 13 (02/10/25 0705)  BP: (!) 157/92 (02/10/25 0600)  SpO2: 95 % (02/10/25 0705) Vital Signs (24h Range):  Temp:  [98.1 °F (36.7 °C)-99.1 °F (37.3 °C)] 98.9 °F (37.2 °C)  Pulse:  [58-90] 76  Resp:  [13-35] 13  SpO2:  [89 %-97 %] 95 %  BP: (121-166)/(59-92) 157/92     Weight: 76.4 kg (168 lb 6.9 oz)  Body mass index is 27.19 kg/m².    Intake/Output Summary (Last 24 hours) at 2/10/2025 0747  Last data filed at 2/10/2025 0345  Gross per 24 hour   Intake 960 ml   Output 3175 ml   Net -2215 ml         Physical Exam  Vitals and nursing note reviewed. Exam conducted with a chaperone present.   Constitutional:       Appearance: Normal appearance.   HENT:      Head: Normocephalic. Abrasion and contusion present.        Comments: Eye lid contusion significantly improving, no ophthalmoplegia, no change in vision.     Nose: Nose normal.      Mouth/Throat:      Mouth: Mucous membranes are moist.   Eyes:      Pupils: Pupils are equal, round, and reactive to light.   Cardiovascular:      Rate and Rhythm: Normal rate and regular rhythm.      Pulses: Normal pulses.      Heart sounds: Normal heart sounds.      Comments: Reproducible chest wall tenderness bilaterally.  Pulmonary:      Effort: Pulmonary effort is normal.      Breath sounds: Normal breath sounds.   Abdominal:      General: Abdomen is flat. Bowel sounds are normal.       Palpations: Abdomen is soft.   Musculoskeletal:         General: Normal range of motion.      Cervical back: Normal range of motion.   Skin:     General: Skin is warm.      Capillary Refill: Capillary refill takes less than 2 seconds.   Neurological:      General: No focal deficit present.      Mental Status: He is alert.   Psychiatric:         Mood and Affect: Mood normal.             Significant Labs:   Lab Results   Component Value Date    WBC 7.23 02/10/2025    HGB 13.1 (L) 02/10/2025    HCT 39.9 (L) 02/10/2025    MCV 90 02/10/2025     02/10/2025       CMP  Sodium   Date Value Ref Range Status   02/10/2025 137 136 - 145 mmol/L Final     Potassium   Date Value Ref Range Status   02/10/2025 3.8 3.5 - 5.1 mmol/L Final     Chloride   Date Value Ref Range Status   02/10/2025 106 95 - 110 mmol/L Final     CO2   Date Value Ref Range Status   02/10/2025 27 23 - 29 mmol/L Final     Glucose   Date Value Ref Range Status   02/10/2025 110 70 - 110 mg/dL Final     BUN   Date Value Ref Range Status   02/10/2025 13 8 - 23 mg/dL Final     Creatinine   Date Value Ref Range Status   02/10/2025 0.7 0.5 - 1.4 mg/dL Final     Calcium   Date Value Ref Range Status   02/10/2025 8.9 8.7 - 10.5 mg/dL Final     Total Protein   Date Value Ref Range Status   02/10/2025 6.7 6.0 - 8.4 g/dL Final     Albumin   Date Value Ref Range Status   02/10/2025 4.0 3.5 - 5.2 g/dL Final     Total Bilirubin   Date Value Ref Range Status   02/10/2025 0.9 0.1 - 1.0 mg/dL Final     Comment:     For infants and newborns, interpretation of results should be based  on gestational age, weight and in agreement with clinical  observations.    Premature Infant recommended reference ranges:  Up to 24 hours.............<8.0 mg/dL  Up to 48 hours............<12.0 mg/dL  3-5 days..................<15.0 mg/dL  6-29 days.................<15.0 mg/dL       Alkaline Phosphatase   Date Value Ref Range Status   02/10/2025 76 55 - 135 U/L Final     AST   Date Value Ref  Range Status   02/10/2025 95 (H) 10 - 40 U/L Final     ALT   Date Value Ref Range Status   02/10/2025 60 (H) 10 - 44 U/L Final     Anion Gap   Date Value Ref Range Status   02/10/2025 4 (L) 8 - 16 mmol/L Final     eGFR   Date Value Ref Range Status   02/10/2025 >60.0 >60 mL/min/1.73 m^2 Final     Microbiology Results (last 7 days)       ** No results found for the last 168 hours. **          Recent Labs   Lab 02/08/25  0311   TROPONINIHS 49161.4*       Significant Imaging:  ECHO:    Left Ventricle: The left ventricle is normal in size. Normal wall thickness. There is concentric remodeling. Moderate hypokinesis of the inferior and the remaining walls appear to move fairly well There is mildly reduced systolic function with a visually estimated ejection fraction of 45 - 50%. There is normal diastolic function.    Right Ventricle: Normal right ventricular cavity size. Wall thickness is normal. Systolic function is normal.    Aortic Valve: The aortic valve is structurally normal.    Tricuspid Valve: The tricuspid valve is structurally normal.    Pulmonary Artery: The estimated pulmonary artery systolic pressure is 22 mmHg.    IVC/SVC: Intermediate venous pressure at 8 mmHg.    CXR: Cardiac monitoring leads overlie the chest. Cardiomediastinal silhouette is unchanged in size and configuration. The lungs are symmetrically expanded with slight increased interstitial and parenchymal attenuation which can be seen with interstitial edema possibly atypical infectious process. Of no large region of confluent airspace consolidation, substantial volume of pleural fluid or pneumothorax identified. Osseous structures are intact.     X-ray bilateral ribs:  Normal radiographic appearance of the bilateral ribs.     CXR:  Development of right infrahilar opacity either reflecting atelectasis or, less likely, developing consolidation.     LHC:  Status post successful IVUS guided PCI of distal RCA (2.5 x 38 stent post dilated with 2.75  noncompliant balloon) and PCI of proximal RCA (3.0 x 12 stent post dilated with a 3.5 noncompliant balloon).    Balloon angioplasty was performed at the ostium of RPDA with re-establishment of flow through the branch.  RPDA is of small-caliber with residual thrombus which will be managed medically with Aggrastat.   LVEDP postprocedure 17 mmHg

## 2025-02-10 NOTE — PLAN OF CARE
Problem: Adult Inpatient Plan of Care  Goal: Plan of Care Review  Outcome: Progressing  Goal: Patient-Specific Goal (Individualized)  Outcome: Progressing  Goal: Absence of Hospital-Acquired Illness or Injury  Outcome: Progressing  Goal: Optimal Comfort and Wellbeing  Outcome: Progressing  Goal: Readiness for Transition of Care  Outcome: Progressing     Problem: Wound  Goal: Optimal Coping  Outcome: Progressing  Goal: Optimal Functional Ability  Outcome: Progressing  Goal: Absence of Infection Signs and Symptoms  Outcome: Progressing  Goal: Improved Oral Intake  Outcome: Progressing  Goal: Optimal Pain Control and Function  Outcome: Progressing  Goal: Skin Health and Integrity  Outcome: Progressing  Goal: Optimal Wound Healing  Outcome: Progressing     Problem: Diabetes Comorbidity  Goal: Blood Glucose Level Within Targeted Range  Outcome: Progressing     Problem: Coping Ineffective  Goal: Effective Coping  Outcome: Progressing

## 2025-02-10 NOTE — CARE UPDATE
02/09/25 2110   Patient Assessment/Suction   Level of Consciousness (AVPU) alert   Respiratory Effort Unlabored   Expansion/Accessory Muscles/Retractions no use of accessory muscles   All Lung Fields Breath Sounds clear;diminished   Rhythm/Pattern, Respiratory no shortness of breath reported   Cough Frequency no cough   PRE-TX-O2   Device (Oxygen Therapy) room air   SpO2 (!) 92 %   Pulse Oximetry Type Continuous   $ Pulse Oximetry - Multiple Charge Pulse Oximetry - Multiple   Pulse 81   Resp (!) 22   Positioning   Head of Bed (HOB) Positioning HOB elevated;HOB at 30 degrees   Incentive Spirometer   $ Incentive Spirometer Charges done with encouragement   Incentive Spirometer Predicted Level (mL) 1760   Administration (IS) mouthpiece utilized   Number of Repetitions (IS) 5   Level Incentive Spirometer (mL) 1250   Patient Tolerance (IS) fair   Education   $ Education 15 min;Incentive Spirometry

## 2025-02-10 NOTE — ACP (ADVANCE CARE PLANNING)
Advance Care Planning     Date: 02/10/2025    Power of   I initiated the process of voluntary advance care planning today and explained the importance of this process to the patient.  I introduced the concept of advance directives to the patient, as well. Then the patient received detailed information about the importance of designating a Health Care Power of  (HCPOA). He was also instructed to communicate with this person about their wishes for future healthcare, should he become sick and lose decision-making capacity. The patient has not previously appointed a HCPOA. After our discussion, the patient has decided to complete a HCPOA and has appointed his daughter and daughter , health care agent:  Karla Montalvo / Genetpatrick Britt  & health care agent number:   /  . I encouraged him to communicate with this person about their wishes for future healthcare, should he become sick and lose decision-making capacity.      A total of 15 min was spent on advance care planning, goals of care discussion, emotional support, formulating and communicating prognosis and exploring burden/benefit of various approaches of treatment. This discussion occurred on a fully voluntary basis with the verbal consent of the patient and/or family.

## 2025-02-11 LAB
ALBUMIN SERPL BCP-MCNC: 4.1 G/DL (ref 3.5–5.2)
ALP SERPL-CCNC: 80 U/L (ref 55–135)
ALT SERPL W/O P-5'-P-CCNC: 46 U/L (ref 10–44)
ANION GAP SERPL CALC-SCNC: 5 MMOL/L (ref 8–16)
AST SERPL-CCNC: 47 U/L (ref 10–40)
BASOPHILS # BLD AUTO: 0.04 K/UL (ref 0–0.2)
BASOPHILS NFR BLD: 0.6 % (ref 0–1.9)
BILIRUB SERPL-MCNC: 0.8 MG/DL (ref 0.1–1)
BUN SERPL-MCNC: 19 MG/DL (ref 8–23)
CALCIUM SERPL-MCNC: 9.1 MG/DL (ref 8.7–10.5)
CHLORIDE SERPL-SCNC: 106 MMOL/L (ref 95–110)
CO2 SERPL-SCNC: 25 MMOL/L (ref 23–29)
CREAT SERPL-MCNC: 0.7 MG/DL (ref 0.5–1.4)
DIFFERENTIAL METHOD BLD: ABNORMAL
EOSINOPHIL # BLD AUTO: 0.2 K/UL (ref 0–0.5)
EOSINOPHIL NFR BLD: 2.6 % (ref 0–8)
ERYTHROCYTE [DISTWIDTH] IN BLOOD BY AUTOMATED COUNT: 13.2 % (ref 11.5–14.5)
EST. GFR  (NO RACE VARIABLE): >60 ML/MIN/1.73 M^2
GLUCOSE SERPL-MCNC: 110 MG/DL (ref 70–110)
HCT VFR BLD AUTO: 40 % (ref 40–54)
HGB BLD-MCNC: 13.3 G/DL (ref 14–18)
IMM GRANULOCYTES # BLD AUTO: 0.07 K/UL (ref 0–0.04)
IMM GRANULOCYTES NFR BLD AUTO: 1 % (ref 0–0.5)
LYMPHOCYTES # BLD AUTO: 1.2 K/UL (ref 1–4.8)
LYMPHOCYTES NFR BLD: 17.6 % (ref 18–48)
MAGNESIUM SERPL-MCNC: 2 MG/DL (ref 1.6–2.6)
MCH RBC QN AUTO: 29.5 PG (ref 27–31)
MCHC RBC AUTO-ENTMCNC: 33.3 G/DL (ref 32–36)
MCV RBC AUTO: 89 FL (ref 82–98)
MONOCYTES # BLD AUTO: 0.9 K/UL (ref 0.3–1)
MONOCYTES NFR BLD: 12.5 % (ref 4–15)
NEUTROPHILS # BLD AUTO: 4.5 K/UL (ref 1.8–7.7)
NEUTROPHILS NFR BLD: 65.7 % (ref 38–73)
NRBC BLD-RTO: 0 /100 WBC
OHS QRS DURATION: 92 MS
OHS QTC CALCULATION: 472 MS
PLATELET # BLD AUTO: 181 K/UL (ref 150–450)
PMV BLD AUTO: 11 FL (ref 9.2–12.9)
POC ACTIVATED CLOTTING TIME K: 279 SEC (ref 74–137)
POC ACTIVATED CLOTTING TIME K: 302 SEC (ref 74–137)
POC ACTIVATED CLOTTING TIME K: 354 SEC (ref 74–137)
POCT GLUCOSE: 117 MG/DL (ref 70–110)
POTASSIUM SERPL-SCNC: 3.7 MMOL/L (ref 3.5–5.1)
PROT SERPL-MCNC: 6.9 G/DL (ref 6–8.4)
RBC # BLD AUTO: 4.51 M/UL (ref 4.6–6.2)
SAMPLE: ABNORMAL
SODIUM SERPL-SCNC: 136 MMOL/L (ref 136–145)
WBC # BLD AUTO: 6.8 K/UL (ref 3.9–12.7)

## 2025-02-11 PROCEDURE — 25000003 PHARM REV CODE 250

## 2025-02-11 PROCEDURE — C9600 PERC DRUG-EL COR STENT SING: HCPCS | Mod: LC | Performed by: INTERNAL MEDICINE

## 2025-02-11 PROCEDURE — 027035Z DILATION OF CORONARY ARTERY, ONE ARTERY WITH TWO DRUG-ELUTING INTRALUMINAL DEVICES, PERCUTANEOUS APPROACH: ICD-10-PCS | Performed by: INTERNAL MEDICINE

## 2025-02-11 PROCEDURE — 94761 N-INVAS EAR/PLS OXIMETRY MLT: CPT

## 2025-02-11 PROCEDURE — C1725 CATH, TRANSLUMIN NON-LASER: HCPCS | Performed by: INTERNAL MEDICINE

## 2025-02-11 PROCEDURE — 25000003 PHARM REV CODE 250: Performed by: INTERNAL MEDICINE

## 2025-02-11 PROCEDURE — 94799 UNLISTED PULMONARY SVC/PX: CPT

## 2025-02-11 PROCEDURE — 36415 COLL VENOUS BLD VENIPUNCTURE: CPT | Performed by: INTERNAL MEDICINE

## 2025-02-11 PROCEDURE — C1753 CATH, INTRAVAS ULTRASOUND: HCPCS | Performed by: INTERNAL MEDICINE

## 2025-02-11 PROCEDURE — S4991 NICOTINE PATCH NONLEGEND: HCPCS

## 2025-02-11 PROCEDURE — 92921 PR PTCA, ADD'L VESSEL: CPT | Mod: LC,,, | Performed by: INTERNAL MEDICINE

## 2025-02-11 PROCEDURE — 02703ZZ DILATION OF CORONARY ARTERY, ONE ARTERY, PERCUTANEOUS APPROACH: ICD-10-PCS | Performed by: INTERNAL MEDICINE

## 2025-02-11 PROCEDURE — C1887 CATHETER, GUIDING: HCPCS | Performed by: INTERNAL MEDICINE

## 2025-02-11 PROCEDURE — 92978 ENDOLUMINL IVUS OCT C 1ST: CPT | Mod: 26,LC,, | Performed by: INTERNAL MEDICINE

## 2025-02-11 PROCEDURE — 63600175 PHARM REV CODE 636 W HCPCS: Performed by: INTERNAL MEDICINE

## 2025-02-11 PROCEDURE — 27201423 OPTIME MED/SURG SUP & DEVICES STERILE SUPPLY: Performed by: INTERNAL MEDICINE

## 2025-02-11 PROCEDURE — B241ZZ3 ULTRASONOGRAPHY OF MULTIPLE CORONARY ARTERIES, INTRAVASCULAR: ICD-10-PCS | Performed by: INTERNAL MEDICINE

## 2025-02-11 PROCEDURE — 85025 COMPLETE CBC W/AUTO DIFF WBC: CPT | Performed by: INTERNAL MEDICINE

## 2025-02-11 PROCEDURE — 92928 PRQ TCAT PLMT NTRAC ST 1 LES: CPT | Mod: LC,,, | Performed by: INTERNAL MEDICINE

## 2025-02-11 PROCEDURE — 25500020 PHARM REV CODE 255: Performed by: INTERNAL MEDICINE

## 2025-02-11 PROCEDURE — 99900035 HC TECH TIME PER 15 MIN (STAT)

## 2025-02-11 PROCEDURE — 92921 HC PTCA , ADD'L BRANCH: CPT | Mod: LC | Performed by: INTERNAL MEDICINE

## 2025-02-11 PROCEDURE — C1769 GUIDE WIRE: HCPCS | Performed by: INTERNAL MEDICINE

## 2025-02-11 PROCEDURE — 92978 ENDOLUMINL IVUS OCT C 1ST: CPT | Mod: LC | Performed by: INTERNAL MEDICINE

## 2025-02-11 PROCEDURE — 99900031 HC PATIENT EDUCATION (STAT)

## 2025-02-11 PROCEDURE — 80053 COMPREHEN METABOLIC PANEL: CPT | Performed by: INTERNAL MEDICINE

## 2025-02-11 PROCEDURE — C1874 STENT, COATED/COV W/DEL SYS: HCPCS | Performed by: INTERNAL MEDICINE

## 2025-02-11 PROCEDURE — 63600175 PHARM REV CODE 636 W HCPCS

## 2025-02-11 PROCEDURE — 25000242 PHARM REV CODE 250 ALT 637 W/ HCPCS: Performed by: INTERNAL MEDICINE

## 2025-02-11 PROCEDURE — 21000000 HC CCU ICU ROOM CHARGE

## 2025-02-11 PROCEDURE — C1894 INTRO/SHEATH, NON-LASER: HCPCS | Performed by: INTERNAL MEDICINE

## 2025-02-11 PROCEDURE — 83735 ASSAY OF MAGNESIUM: CPT | Performed by: INTERNAL MEDICINE

## 2025-02-11 DEVICE — EVEROLIMUS-ELUTING PLATINUM CHROMIUM CORONARY STENT SYSTEM
Type: IMPLANTABLE DEVICE | Site: CORONARY | Status: FUNCTIONAL
Brand: SYNERGY™ XD

## 2025-02-11 RX ORDER — HEPARIN SODIUM 1000 [USP'U]/ML
INJECTION, SOLUTION INTRAVENOUS; SUBCUTANEOUS
Status: DISCONTINUED | OUTPATIENT
Start: 2025-02-11 | End: 2025-02-11 | Stop reason: HOSPADM

## 2025-02-11 RX ORDER — VERAPAMIL HYDROCHLORIDE 2.5 MG/ML
INJECTION, SOLUTION INTRAVENOUS
Status: DISCONTINUED | OUTPATIENT
Start: 2025-02-11 | End: 2025-02-11 | Stop reason: HOSPADM

## 2025-02-11 RX ORDER — NITROGLYCERIN 5 MG/ML
INJECTION, SOLUTION INTRAVENOUS
Status: DISCONTINUED | OUTPATIENT
Start: 2025-02-11 | End: 2025-02-11 | Stop reason: HOSPADM

## 2025-02-11 RX ORDER — NITROGLYCERIN 0.4 MG/1
TABLET SUBLINGUAL
Status: DISCONTINUED | OUTPATIENT
Start: 2025-02-11 | End: 2025-02-11 | Stop reason: HOSPADM

## 2025-02-11 RX ORDER — MIDAZOLAM HYDROCHLORIDE 1 MG/ML
INJECTION INTRAMUSCULAR; INTRAVENOUS
Status: DISCONTINUED | OUTPATIENT
Start: 2025-02-11 | End: 2025-02-11 | Stop reason: HOSPADM

## 2025-02-11 RX ORDER — LIDOCAINE HYDROCHLORIDE 10 MG/ML
INJECTION, SOLUTION EPIDURAL; INFILTRATION; INTRACAUDAL; PERINEURAL
Status: DISCONTINUED | OUTPATIENT
Start: 2025-02-11 | End: 2025-02-11 | Stop reason: HOSPADM

## 2025-02-11 RX ORDER — FENTANYL CITRATE 50 UG/ML
INJECTION, SOLUTION INTRAMUSCULAR; INTRAVENOUS
Status: DISCONTINUED | OUTPATIENT
Start: 2025-02-11 | End: 2025-02-11 | Stop reason: HOSPADM

## 2025-02-11 RX ORDER — HYDRALAZINE HYDROCHLORIDE 20 MG/ML
INJECTION INTRAMUSCULAR; INTRAVENOUS
Status: DISCONTINUED | OUTPATIENT
Start: 2025-02-11 | End: 2025-02-11 | Stop reason: HOSPADM

## 2025-02-11 RX ORDER — SODIUM CHLORIDE 9 MG/ML
INJECTION, SOLUTION INTRAVENOUS CONTINUOUS
Status: ACTIVE | OUTPATIENT
Start: 2025-02-11 | End: 2025-02-12

## 2025-02-11 RX ADMIN — METOPROLOL TARTRATE 25 MG: 25 TABLET, FILM COATED ORAL at 08:02

## 2025-02-11 RX ADMIN — ATORVASTATIN CALCIUM 80 MG: 40 TABLET, FILM COATED ORAL at 08:02

## 2025-02-11 RX ADMIN — GABAPENTIN 300 MG: 300 CAPSULE ORAL at 08:02

## 2025-02-11 RX ADMIN — NICOTINE 1 PATCH: 21 PATCH, EXTENDED RELEASE TRANSDERMAL at 08:02

## 2025-02-11 RX ADMIN — LOSARTAN POTASSIUM 50 MG: 50 TABLET, FILM COATED ORAL at 08:02

## 2025-02-11 RX ADMIN — MORPHINE SULFATE 1 MG: 2 INJECTION, SOLUTION INTRAMUSCULAR; INTRAVENOUS at 08:02

## 2025-02-11 RX ADMIN — MUPIROCIN: 20 OINTMENT TOPICAL at 09:02

## 2025-02-11 RX ADMIN — SENNOSIDES AND DOCUSATE SODIUM 1 TABLET: 8.6; 5 TABLET ORAL at 08:02

## 2025-02-11 RX ADMIN — ASPIRIN 81 MG: 81 TABLET, COATED ORAL at 08:02

## 2025-02-11 RX ADMIN — FAMOTIDINE 20 MG: 10 INJECTION, SOLUTION INTRAVENOUS at 08:02

## 2025-02-11 RX ADMIN — TRAZODONE HYDROCHLORIDE 25 MG: 50 TABLET ORAL at 08:02

## 2025-02-11 RX ADMIN — POTASSIUM BICARBONATE 50 MEQ: 978 TABLET, EFFERVESCENT ORAL at 06:02

## 2025-02-11 RX ADMIN — SODIUM CHLORIDE: 9 INJECTION, SOLUTION INTRAVENOUS at 03:02

## 2025-02-11 RX ADMIN — TICAGRELOR 90 MG: 90 TABLET ORAL at 08:02

## 2025-02-11 RX ADMIN — HYDROCODONE BITARTRATE AND ACETAMINOPHEN 1 TABLET: 5; 325 TABLET ORAL at 04:02

## 2025-02-11 RX ADMIN — AMLODIPINE BESYLATE 10 MG: 5 TABLET ORAL at 08:02

## 2025-02-11 RX ADMIN — MORPHINE SULFATE 1 MG: 2 INJECTION, SOLUTION INTRAMUSCULAR; INTRAVENOUS at 06:02

## 2025-02-11 RX ADMIN — MUPIROCIN 1 G: 20 OINTMENT TOPICAL at 08:02

## 2025-02-11 NOTE — PLAN OF CARE
Problem: Occupational Therapy  Goal: Occupational Therapy Goal  Description: Goals to be met by: 3/10/2025     Patient will increase functional independence with ADLs by performing:    UE Dressing with Modified [place].  LE Dressing with Modified [place].  Grooming while standing at sink with Modified [place].  Toileting from toilet with Modified [place] for hygiene and clothing management.   Step transfer with Modified [place]  Toilet transfer to toilet with Modified [place].  Increased functional strength to WNL for ADLS.  Upper extremity exercise program x10 reps per handout, with independence.    Outcome: Progressing

## 2025-02-11 NOTE — PT/OT/SLP PROGRESS
Occupational Therapy      Patient Name:  Sanjeev Britt   MRN:  [id number]    Patient not seen today secondary to (procedure-heart cath).  Will follow-up.      2/11/2025

## 2025-02-11 NOTE — PROGRESS NOTES
Critical access hospital Medicine  Progress Note    Patient Name: Sanjeev Britt  MRN: [id number]  Patient Class: IP- Inpatient   Admission Date: 2/7/2025  Length of Stay: 3 days  Attending Physician: Paloma Boateng MD  Primary Care Provider: Chidi Vidal Jr., MD        Subjective     Principal Problem:STEMI (ST elevation myocardial infarction)        HPI:  59-year-old male with a past medical history of CAD status post stent placement\11 years ago ,  diabetes mellitus not on insulin ,  hypertension presents to the emergency department post ROSC while at triage.  Patient coded at triage and was defibrillated once.  On arrival to the emergency department patient was confused though was able to tell me that he has been experiencing chest pain presently 20 minutes prior to arrival.  Patient states that he was having intercourse with his partner and shortly afterwards began to experience chest pain.  Per patient's partner he did utilize sildenafil.  Patient also states he experienced diaphoresis and nausea without any episodes of vomiting.      NON Smoker  Non drinker  Here with wife or GF       His wife convinced him to come to ER    He did not want ambulance     So she drove him     In the parking lot he passed out and hit his head above right eye     But he went into V FIB arrest and was coded    They did CPR and shocked him twice     For about 2-3 minutes total       Then he was taken into ER    Potasium was 2.5     Mag was good over 2       EKG showed STEMI      Then dr Levi took him to cath lab         Distal % thrombotic occlusion. Proximal RCA 90% stenosis. fixed both proximal and distal RCA.     Has residual disease in LAD and circumflex diagonal which we can manage as a staged procedures         We are admitting him to ICU     Overview/Hospital Course:  59-year-old male with a past medical history of CAD status post PCI several years ago, diabetes, hypertension presented with chest pain  had Vfib and cardiac arrest in the ER twice status post resuscitation.  EKG showed inferior ST elevation. Patient did not take Plavix in the last couple of days.  Code STEMI was activated, patient underwent emergency coronary angiogram showing distal % thrombotic occlusion and proximal RCA 90% stenosis and underwent ELIDA placement.  Patient was continued on aspirin, Brilinta and was started on beta blocker as tolerated.  Statin increased to high-dose.  Aggrastat was continued for 12 hours postprocedure.  2D echocardiogram done and showed moderate hypokinesis of the inferior wall, mildly reduced EF 40-45%, normal diastolic function.  Cardiology closely followed the patient.  Adjusted GDM T as tolerated.  Zetia added for LDL of 103 for a goal less than 60.    Interval History:  Patient is seen and examined during multidisciplinary round.  Patient reports ribcage pain status post CPR.  Patient is scheduled for staged coronary intervention today.  Patient encouraged to continue use of incentive spirometry.  Patient encouraged to get out of bed and work with physical therapy.  Cardiology team following.    Review of Systems   All other systems reviewed and are negative.    Objective:     Vital Signs (Most Recent):  Temp: 98.2 °F (36.8 °C) (02/11/25 0515)  Pulse: 72 (02/11/25 0730)  Resp: 16 (02/11/25 0730)  BP: (!) 147/86 (02/11/25 0730)  SpO2: (!) 92 % (02/11/25 0730) Vital Signs (24h Range):  Temp:  [97.5 °F (36.4 °C)-98.4 °F (36.9 °C)] 98.2 °F (36.8 °C)  Pulse:  [55-81] 72  Resp:  [16-22] 16  SpO2:  [88 %-98 %] 92 %  BP: (110-161)/(64-94) 147/86     Weight: 76.4 kg (168 lb 6.9 oz)  Body mass index is 27.19 kg/m².    Intake/Output Summary (Last 24 hours) at 2/11/2025 0738  Last data filed at 2/10/2025 1810  Gross per 24 hour   Intake 720 ml   Output 425 ml   Net 295 ml         Physical Exam  Vitals and nursing note reviewed. Exam conducted with a chaperone present.   Constitutional:       Appearance: Normal  appearance.   HENT:      Head: Normocephalic. Abrasion and contusion present.        Comments: Eye lid contusion significantly improving, no ophthalmoplegia, no change in vision.     Nose: Nose normal.      Mouth/Throat:      Mouth: Mucous membranes are moist.   Eyes:      Pupils: Pupils are equal, round, and reactive to light.   Cardiovascular:      Rate and Rhythm: Normal rate and regular rhythm.      Pulses: Normal pulses.      Heart sounds: Normal heart sounds.      Comments: Reproducible chest wall tenderness bilaterally.  Pulmonary:      Effort: Pulmonary effort is normal.      Breath sounds: Normal breath sounds.   Abdominal:      General: Abdomen is flat. Bowel sounds are normal.      Palpations: Abdomen is soft.   Musculoskeletal:         General: Normal range of motion.      Cervical back: Normal range of motion.   Skin:     General: Skin is warm.      Capillary Refill: Capillary refill takes less than 2 seconds.   Neurological:      General: No focal deficit present.      Mental Status: He is alert.   Psychiatric:         Mood and Affect: Mood normal.             Significant Labs:   Lab Results   Component Value Date    WBC 6.80 02/11/2025    HGB 13.3 (L) 02/11/2025    HCT 40.0 02/11/2025    MCV 89 02/11/2025     02/11/2025       CMP  Sodium   Date Value Ref Range Status   02/11/2025 136 136 - 145 mmol/L Final     Potassium   Date Value Ref Range Status   02/11/2025 3.7 3.5 - 5.1 mmol/L Final     Chloride   Date Value Ref Range Status   02/11/2025 106 95 - 110 mmol/L Final     CO2   Date Value Ref Range Status   02/11/2025 25 23 - 29 mmol/L Final     Glucose   Date Value Ref Range Status   02/11/2025 110 70 - 110 mg/dL Final     BUN   Date Value Ref Range Status   02/11/2025 19 8 - 23 mg/dL Final     Creatinine   Date Value Ref Range Status   02/11/2025 0.7 0.5 - 1.4 mg/dL Final     Calcium   Date Value Ref Range Status   02/11/2025 9.1 8.7 - 10.5 mg/dL Final     Total Protein   Date Value Ref  Range Status   02/11/2025 6.9 6.0 - 8.4 g/dL Final     Albumin   Date Value Ref Range Status   02/11/2025 4.1 3.5 - 5.2 g/dL Final     Total Bilirubin   Date Value Ref Range Status   02/11/2025 0.8 0.1 - 1.0 mg/dL Final     Comment:     For infants and newborns, interpretation of results should be based  on gestational age, weight and in agreement with clinical  observations.    Premature Infant recommended reference ranges:  Up to 24 hours.............<8.0 mg/dL  Up to 48 hours............<12.0 mg/dL  3-5 days..................<15.0 mg/dL  6-29 days.................<15.0 mg/dL       Alkaline Phosphatase   Date Value Ref Range Status   02/11/2025 80 55 - 135 U/L Final     AST   Date Value Ref Range Status   02/11/2025 47 (H) 10 - 40 U/L Final     ALT   Date Value Ref Range Status   02/11/2025 46 (H) 10 - 44 U/L Final     Anion Gap   Date Value Ref Range Status   02/11/2025 5 (L) 8 - 16 mmol/L Final     eGFR   Date Value Ref Range Status   02/11/2025 >60.0 >60 mL/min/1.73 m^2 Final     Microbiology Results (last 7 days)       ** No results found for the last 168 hours. **          Recent Labs   Lab 02/08/25  0311   [facility] 04805.4*       Significant Imaging:  ECHO:    Left Ventricle: The left ventricle is normal in size. Normal wall thickness. There is concentric remodeling. Moderate hypokinesis of the inferior and the remaining walls appear to move fairly well There is mildly reduced systolic function with a visually estimated ejection fraction of 45 - 50%. There is normal diastolic function.    Right Ventricle: Normal right ventricular cavity size. Wall thickness is normal. Systolic function is normal.    Aortic Valve: The aortic valve is structurally normal.    Tricuspid Valve: The tricuspid valve is structurally normal.    Pulmonary Artery: The estimated pulmonary artery systolic pressure is 22 mmHg.    IVC/SVC: Intermediate venous pressure at 8 mmHg.    CXR: Cardiac monitoring leads overlie the chest.  Cardiomediastinal silhouette is unchanged in size and configuration. The lungs are symmetrically expanded with slight increased interstitial and parenchymal attenuation which can be seen with interstitial edema possibly atypical infectious process. Of no large region of confluent airspace consolidation, substantial volume of pleural fluid or pneumothorax identified. Osseous structures are intact.     X-ray bilateral ribs:  Normal radiographic appearance of the bilateral ribs.     CXR:  Development of right infrahilar opacity either reflecting atelectasis or, less likely, developing consolidation.     LHC:  Status post successful IVUS guided PCI of distal RCA (2.5 x 38 stent post dilated with 2.75 noncompliant balloon) and PCI of proximal RCA (3.0 x 12 stent post dilated with a 3.5 noncompliant balloon).    Balloon angioplasty was performed at the ostium of RPDA with re-establishment of flow through the branch.  RPDA is of small-caliber with residual thrombus which will be managed medically with Aggrastat.   LVEDP postprocedure 17 mmHg        Assessment and Plan     * STEMI (ST elevation myocardial infarction)  Distal % thrombotic occlusion. Proximal RCA 90% stenosis. fixed both proximal and distal RCA.   Has residual disease in LAD and circumflex diagonal which we can manage as a staged procedures   Completed Aggrastat x 12 hours, stopped 2/8  Continue asa 81 mg  Continue Brilinta b.i.d.  Beta blocker as tolerated  Avoid nitrates given he took Cialis before presentation (in the last 72 hours)  Continue high-dose atorvastatin, and zetia added ( )  Continue regular diet  Cardiology closely following  2D echocardiogram done and showed moderate hypokinesis of the inferior wall, mildly reduced EF 40-45%, normal diastolic function.  Losartan added.  Adjust GDM T as tolerated.  Staged PCI planned for Tuesday      Cardiac arrest  VFib arrest post inferior STEMI  See above      DMII (diabetes mellitus, type  2)  A1c 5.8  POCT glucose  Can start insulin sliding scale as needed, currently well controlled blood glucose    Chest pain  Post chest compressions, had chest soreness generalized  Repeat chest x-ray negative for any rib fractures  Incentive spirometry  Improved      Discussed with patient's wife, answered all questions.  Discussed with bedside nurse and .  VTE Risk Mitigation (From admission, onward)           Ordered     IP VTE HIGH RISK PATIENT  Once         02/08/25 0001     Place sequential compression device  Until discontinued         02/08/25 0001     Reason for No Pharmacological VTE Prophylaxis  Once        Question:  Reasons:  Answer:  Patient is Ambulatory    02/08/25 0001                    Discharge Planning   [place]: 2/12/2025     Code Status: Full Code   Medical Readiness for Discharge Date:   Discharge Plan A: Home with family                Please place Justification for DME        Paloma Boateng MD  Department of Hospital Medicine   [facility]

## 2025-02-11 NOTE — PROCEDURES
PCI brief post procedure note:      Status post successful IVUS guided staged PCI of diffuse 70% stenosis in mid circumflex with 2 overlapping drug-eluting stents 2.5 x 24 and 3.0 x 32 and post dilated with 3.5 noncompliant balloon      Medical management of the residual LAD disease at this point (There was a short segment  of distal LAD with bridge collaterals and 70% In-Stent restenosis in the proximal portion of the diagonal (small-caliber distal vessel)      Plan:  Medical management with dual antiplatelet therapy aspirin, Brilinta   Risk factor reduction  IV hydration today   Possible DC home tomorrow

## 2025-02-11 NOTE — ASSESSMENT & PLAN NOTE
Distal % thrombotic occlusion. Proximal RCA 90% stenosis. fixed both proximal and distal RCA.   Has residual disease in LAD and circumflex diagonal which we can manage as a staged procedures   Completed Aggrastat x 12 hours, stopped 2/8  Continue asa 81 mg  Continue Brilinta b.i.d.  Beta blocker as tolerated  Avoid nitrates given he took Cialis before presentation (in the last 72 hours)  Continue high-dose atorvastatin, and zetia added ( )  Continue regular diet  Cardiology closely following  2D echocardiogram done and showed moderate hypokinesis of the inferior wall, mildly reduced EF 40-45%, normal diastolic function.  Losartan added.  Adjust GDM T as tolerated.  Staged PCI planned for today.

## 2025-02-11 NOTE — ASSESSMENT & PLAN NOTE
Post chest compressions, had chest soreness generalized  Repeat chest x-ray negative for any rib fractures. Using oral narcotics and Lidoderm patches.  Incentive spirometry  Improved

## 2025-02-11 NOTE — PT/OT/SLP EVAL
Occupational Therapy   Evaluation, tx    Name: Sanjeev Britt  MRN: [id number]  Admitting Diagnosis: STEMI (ST elevation myocardial infarction)  Recent Surgery: Procedure(s) (LRB):  Percutaneous coronary intervention (N/A)  IVUS, Coronary  Angiogram, Coronary, with Left Heart Cath (N/A) 2 Days Post-Op  The primary encounter diagnosis was Hypokalemia. Diagnoses of Chest pain, STEMI (ST elevation myocardial infarction), and ST elevation myocardial infarction involving right coronary artery [I21.11] were also pertinent to this visit.    Recommendations:     Discharge Recommendations: No Therapy Indicated  Discharge Equipment Recommendations:  none  Barriers to discharge:  None    Assessment:     Sanjeev Britt is a 59 y.o. male with a medical diagnosis of STEMI (ST elevation myocardial infarction).  He presents with Performance deficits affecting function: weakness, impaired endurance, impaired self care skills, gait instability, impaired functional mobility, impaired balance, decreased upper extremity function, decreased lower extremity function, decreased coordination, decreased safety awareness, pain, decreased ROM, impaired joint extensibility.      Pt c/o chest wall pain 8/10 from compressions and very guarded with UE movement, R>L. Pt ambulated SBA in room for ADLS, LE dressing SBA to don/doff socks seated, g/hygiene SBA standing at sink. Pt remained sitting UIC. Nurse was made aware.    Rehab Prognosis: Good; patient would benefit from acute skilled OT services to address these deficits and reach maximum level of function.       Plan:     Patient to be seen 5 x/week to address the above listed problems via self-care/home management, therapeutic activities, therapeutic exercises  Plan of Care Expires: 03/10/25  Plan of Care Reviewed with: patient    Subjective     Chief Complaint: chest wall pain     Patient/Family Comments/goals: pt agreeable to OT.    Occupational Profile:  Living Environment: pt lives with  his GF in a SSH with no steps; t/s combo w/ GB.  Previous level of function: Indep ADLs, IADL, drives, works in AC repair/installation.   Roles and Routines: active. Works. Goes to OP PT for neck and back therapy.  Equipment Used at Home: grab bar (pt has shower chair , wc, and RW available)  Assistance upon Discharge: girlfriend.    Pain/Comfort:  Pain Rating 1: 8/10  Location - Side 1: Bilateral  Location - Orientation 1: generalized  Location 1: chest (wall from chest compressions)  Pain Addressed 1: Reposition, Distraction, Cessation of Activity, Nurse notified, Pre-medicate for activity (pain patches in place)  Pain Rating Post-Intervention 1: 8/10    Patients cultural, spiritual, Synagogue conflicts given the current situation: no    Objective:     Communicated with: nurse prior to session.  Patient found sitting edge of bed with blood pressure cuff, telemetry, pulse ox (continuous) upon OT entry to room.    General Precautions: Standard, fall, diabetic  Orthopedic Precautions: N/A  Braces: N/A  Respiratory Status: Room air    Occupational Performance:    Functional Mobility/Transfers:  Patient completed Sit <> Stand Transfer with stand by assistance  with  no assistive device   Patient completed Bed <> Chair Transfer using Step Transfer technique with contact guard assistance with no assistive device  Functional Mobility: ambulated SBA-CGA 15 ft in room w/o AD.    Activities of Daily Living:  Feeding:  independence .  Grooming: stand by assistance to wash hands standing at sink; pt declined other tasks.  Lower Body Dressing: stand by assistance to don/doff socks w Ankle over opposite knee seated EOB; pt limited w/ downward bending due to chest pain  Toileting: declined     Cognitive/Visual Perceptual:  Cognitive/Psychosocial Skills:     -       Oriented to: Person, Place, Time, and Situation   -       Follows Commands/attention:Follows multistep  commands  -       Communication: clear/fluent  -       Memory:  No Deficits noted  -       Safety awareness/insight to disability: impaired   -       Mood/Affect/Coping skills/emotional control: Cooperative  Visual/Perceptual:      -Intact .    Physical Exam:  Balance:    -       sitting: static: good  dynamic: fair  standing; good dynamic: fair  Postural examination/scapula alignment:    -       No postural abnormalities identified  Skin integrity: Bruising of R orbital area  Dominant hand:    -       right  Upper Extremity Range of Motion:     -       Right Upper Extremity: WFL  -       Left Upper Extremity: WFL  Upper Extremity Strength:    -       Right Upper Extremity: WFL except shld 3+/5 due to pain  -       Left Upper Extremity: WFL   Strength:    -       Right Upper Extremity: WFL  -       Left Upper Extremity: WFL    AMPAC 6 Click ADL:  AMPAC Total Score: 19    Treatment & Education:  Pt seen for safe self care activities and fx mobility retraining as stated above.  All questions/concerns addressed within scope.  Call staff for BTB/OOB assist. Call bell use explained. Pt acknowledged.  Purpose of OT and POC  Impt of OOB activity and sitting UIC explained to pt to prevent negative effects of prolonged bed rest. Pt verbalized understanding and agreeable to sit UIC.   Pt instructed to performed single arm AROM ex and stretching in supine.      Patient left up in chair with all lines intact, call button in reach, and nurse notified    GOALS:   Multidisciplinary Problems       Occupational Therapy Goals          Problem: Occupational Therapy    Goal Priority Disciplines Outcome Interventions   Occupational Therapy Goal     OT, PT/OT Progressing    Description: Goals to be met by: 3/10/2025     Patient will increase functional independence with ADLs by performing:    UE Dressing with Modified [place].  LE Dressing with Modified [place].  Grooming while standing at sink with Modified [place].  Toileting from toilet with Modified [place] for hygiene and  clothing management.   Step transfer with Modified [place]  Toilet transfer to toilet with Modified [place].  Increased functional strength to WNL for ADLS.  Upper extremity exercise program x10 reps per handout, with independence.                         DME Justifications:  No DME recommended requiring DME justifications    History:     Past Medical History:   Diagnosis Date    Acute coronary syndrome 09/09/2019    Coronary artery disease     Diabetes mellitus     Hypertension     Sleep apnea          Past Surgical History:   Procedure Laterality Date    ANGIOGRAM, CORONARY, WITH LEFT HEART CATHETERIZATION N/A 2/8/2025    Procedure: Angiogram, Coronary, with Left Heart Cath;  Surgeon: Mo Levi MD;  Location: [facility] CATH/EP LAB;  Service: Cardiology;  Laterality: N/A;    FESS, USING COMPUTER-ASSISTED NAVIGATION, WITH NASAL TURBINATE REDUCTION N/A 6/7/2024    Procedure: FESS, USING COMPUTER-ASSISTED NAVIGATION, WITH NASAL TURBINATE REDUCTION;  Surgeon: Andres Abraham MD;  Location: [facility] OR;  Service: ENT;  Laterality: N/A;    IVUS, CORONARY  2/8/2025    Procedure: IVUS, Coronary;  Surgeon: Mo Levi MD;  Location: [facility] CATH/EP LAB;  Service: Cardiology;;    NASAL SEPTOPLASTY N/A 6/7/2024    Procedure: SEPTOPLASTY, NOSE;  Surgeon: Andres Abraham MD;  Location: [facility] OR;  Service: ENT;  Laterality: N/A;    NASAL TURBINATE REDUCTION Bilateral 6/7/2024    Procedure: REDUCTION, NASAL TURBINATE;  Surgeon: Andres Abraham MD;  Location: [facility] OR;  Service: ENT;  Laterality: Bilateral;    PERCUTANEOUS CORONARY INTERVENTION, ARTERY N/A 2/8/2025    Procedure: Percutaneous coronary intervention;  Surgeon: Mo Levi MD;  Location: [facility] CATH/EP LAB;  Service: Cardiology;  Laterality: N/A;    VASCULAR SURGERY         Time Tracking:     OT Date of Treatment: 02/10/25  OT Start Time: 1348  OT Stop Time: 1405  OT Total Time (min): 17 min    Billable  Minutes:Evaluation 9  Self Care/Home Management 8  Total Time 17    2/10/2025

## 2025-02-11 NOTE — PLAN OF CARE
Problem: Adult Inpatient Plan of Care  Goal: Plan of Care Review  Outcome: Progressing  Goal: Patient-Specific Goal (Individualized)  Outcome: Progressing  Goal: Absence of Hospital-Acquired Illness or Injury  Outcome: Progressing  Goal: Optimal Comfort and Wellbeing  Outcome: Progressing  Goal: Readiness for Transition of Care  Outcome: Progressing     Problem: Wound  Goal: Optimal Coping  Outcome: Progressing  Goal: Optimal Functional Ability  Outcome: Progressing  Goal: Absence of Infection Signs and Symptoms  Outcome: Progressing  Goal: Improved Oral Intake  Outcome: Progressing  Goal: Optimal Pain Control and Function  Outcome: Progressing  Goal: Skin Health and Integrity  Outcome: Progressing  Goal: Optimal Wound Healing  Outcome: Progressing     Problem: Diabetes Comorbidity  Goal: Blood Glucose Level Within Targeted Range  Outcome: Progressing     Problem: Coping Ineffective  Goal: Effective Coping  Outcome: Progressing     Problem: Cardiac Catheterization (Diagnostic/Interventional)  Goal: Absence of Bleeding  Outcome: Progressing  Goal: Absence of Contrast-Induced Injury  Outcome: Progressing  Goal: Stable Heart Rate and Rhythm  Outcome: Progressing  Goal: Absence of Embolism Signs and Symptoms  Outcome: Progressing  Goal: Anesthesia/Sedation Recovery  Outcome: Progressing  Goal: Optimal Pain Control and Function  Outcome: Progressing  Goal: Absence of Vascular Access Complication  Outcome: Progressing

## 2025-02-11 NOTE — SUBJECTIVE & OBJECTIVE
Interval History:  Patient is seen and examined during multidisciplinary round.  Patient reports ribcage pain status post CPR.  Patient is scheduled for staged coronary intervention today.  Patient encouraged to continue use of incentive spirometry.  Patient encouraged to get out of bed and work with physical therapy.  Cardiology team following.    Review of Systems   All other systems reviewed and are negative.    Objective:     Vital Signs (Most Recent):  Temp: 98.2 °F (36.8 °C) (02/11/25 0515)  Pulse: 72 (02/11/25 0730)  Resp: 16 (02/11/25 0730)  BP: (!) 147/86 (02/11/25 0730)  SpO2: (!) 92 % (02/11/25 0730) Vital Signs (24h Range):  Temp:  [97.5 °F (36.4 °C)-98.4 °F (36.9 °C)] 98.2 °F (36.8 °C)  Pulse:  [55-81] 72  Resp:  [16-22] 16  SpO2:  [88 %-98 %] 92 %  BP: (110-161)/(64-94) 147/86     Weight: 76.4 kg (168 lb 6.9 oz)  Body mass index is 27.19 kg/m².    Intake/Output Summary (Last 24 hours) at 2/11/2025 0738  Last data filed at 2/10/2025 1810  Gross per 24 hour   Intake 720 ml   Output 425 ml   Net 295 ml         Physical Exam  Vitals and nursing note reviewed. Exam conducted with a chaperone present.   Constitutional:       Appearance: Normal appearance.   HENT:      Head: Normocephalic. Abrasion and contusion present.        Comments: Eye lid contusion significantly improving, no ophthalmoplegia, no change in vision.     Nose: Nose normal.      Mouth/Throat:      Mouth: Mucous membranes are moist.   Eyes:      Pupils: Pupils are equal, round, and reactive to light.   Cardiovascular:      Rate and Rhythm: Normal rate and regular rhythm.      Pulses: Normal pulses.      Heart sounds: Normal heart sounds.      Comments: Reproducible chest wall tenderness bilaterally.  Pulmonary:      Effort: Pulmonary effort is normal.      Breath sounds: Normal breath sounds.   Abdominal:      General: Abdomen is flat. Bowel sounds are normal.      Palpations: Abdomen is soft.   Musculoskeletal:         General: Normal range  of motion.      Cervical back: Normal range of motion.   Skin:     General: Skin is warm.      Capillary Refill: Capillary refill takes less than 2 seconds.   Neurological:      General: No focal deficit present.      Mental Status: He is alert.   Psychiatric:         Mood and Affect: Mood normal.             Significant Labs:   Lab Results   Component Value Date    WBC 6.80 02/11/2025    HGB 13.3 (L) 02/11/2025    HCT 40.0 02/11/2025    MCV 89 02/11/2025     02/11/2025       CMP  Sodium   Date Value Ref Range Status   02/11/2025 136 136 - 145 mmol/L Final     Potassium   Date Value Ref Range Status   02/11/2025 3.7 3.5 - 5.1 mmol/L Final     Chloride   Date Value Ref Range Status   02/11/2025 106 95 - 110 mmol/L Final     CO2   Date Value Ref Range Status   02/11/2025 25 23 - 29 mmol/L Final     Glucose   Date Value Ref Range Status   02/11/2025 110 70 - 110 mg/dL Final     BUN   Date Value Ref Range Status   02/11/2025 19 8 - 23 mg/dL Final     Creatinine   Date Value Ref Range Status   02/11/2025 0.7 0.5 - 1.4 mg/dL Final     Calcium   Date Value Ref Range Status   02/11/2025 9.1 8.7 - 10.5 mg/dL Final     Total Protein   Date Value Ref Range Status   02/11/2025 6.9 6.0 - 8.4 g/dL Final     Albumin   Date Value Ref Range Status   02/11/2025 4.1 3.5 - 5.2 g/dL Final     Total Bilirubin   Date Value Ref Range Status   02/11/2025 0.8 0.1 - 1.0 mg/dL Final     Comment:     For infants and newborns, interpretation of results should be based  on gestational age, weight and in agreement with clinical  observations.    Premature Infant recommended reference ranges:  Up to 24 hours.............<8.0 mg/dL  Up to 48 hours............<12.0 mg/dL  3-5 days..................<15.0 mg/dL  6-29 days.................<15.0 mg/dL       Alkaline Phosphatase   Date Value Ref Range Status   02/11/2025 80 55 - 135 U/L Final     AST   Date Value Ref Range Status   02/11/2025 47 (H) 10 - 40 U/L Final     ALT   Date Value Ref Range  Status   02/11/2025 46 (H) 10 - 44 U/L Final     Anion Gap   Date Value Ref Range Status   02/11/2025 5 (L) 8 - 16 mmol/L Final     eGFR   Date Value Ref Range Status   02/11/2025 >60.0 >60 mL/min/1.73 m^2 Final     Microbiology Results (last 7 days)       ** No results found for the last 168 hours. **          Recent Labs   Lab 02/08/25  0311   [facility] 86098.4*       Significant Imaging:  ECHO:    Left Ventricle: The left ventricle is normal in size. Normal wall thickness. There is concentric remodeling. Moderate hypokinesis of the inferior and the remaining walls appear to move fairly well There is mildly reduced systolic function with a visually estimated ejection fraction of 45 - 50%. There is normal diastolic function.    Right Ventricle: Normal right ventricular cavity size. Wall thickness is normal. Systolic function is normal.    Aortic Valve: The aortic valve is structurally normal.    Tricuspid Valve: The tricuspid valve is structurally normal.    Pulmonary Artery: The estimated pulmonary artery systolic pressure is 22 mmHg.    IVC/SVC: Intermediate venous pressure at 8 mmHg.    CXR: Cardiac monitoring leads overlie the chest. Cardiomediastinal silhouette is unchanged in size and configuration. The lungs are symmetrically expanded with slight increased interstitial and parenchymal attenuation which can be seen with interstitial edema possibly atypical infectious process. Of no large region of confluent airspace consolidation, substantial volume of pleural fluid or pneumothorax identified. Osseous structures are intact.     X-ray bilateral ribs:  Normal radiographic appearance of the bilateral ribs.     CXR:  Development of right infrahilar opacity either reflecting atelectasis or, less likely, developing consolidation.     LHC:  Status post successful IVUS guided PCI of distal RCA (2.5 x 38 stent post dilated with 2.75 noncompliant balloon) and PCI of proximal RCA (3.0 x 12 stent post dilated with a  3.5 noncompliant balloon).    Balloon angioplasty was performed at the ostium of RPDA with re-establishment of flow through the branch.  RPDA is of small-caliber with residual thrombus which will be managed medically with Aggrastat.   LVEDP postprocedure 17 mmHg

## 2025-02-12 ENCOUNTER — TELEPHONE (OUTPATIENT)
Dept: CARDIOLOGY | Facility: CLINIC | Age: 60
End: 2025-02-12
Payer: MEDICAID

## 2025-02-12 ENCOUNTER — NURSE TRIAGE (OUTPATIENT)
Dept: ADMINISTRATIVE | Facility: CLINIC | Age: 60
End: 2025-02-12
Payer: MEDICAID

## 2025-02-12 VITALS
RESPIRATION RATE: 19 BRPM | SYSTOLIC BLOOD PRESSURE: 122 MMHG | DIASTOLIC BLOOD PRESSURE: 71 MMHG | OXYGEN SATURATION: 95 % | HEART RATE: 72 BPM | WEIGHT: 168.44 LBS | HEIGHT: 66 IN | TEMPERATURE: 99 F | BODY MASS INDEX: 27.07 KG/M2

## 2025-02-12 LAB
ALBUMIN SERPL BCP-MCNC: 4 G/DL (ref 3.5–5.2)
ALP SERPL-CCNC: 80 U/L (ref 55–135)
ALT SERPL W/O P-5'-P-CCNC: 39 U/L (ref 10–44)
ANION GAP SERPL CALC-SCNC: 6 MMOL/L (ref 8–16)
AST SERPL-CCNC: 52 U/L (ref 10–40)
BASOPHILS # BLD AUTO: 0.02 K/UL (ref 0–0.2)
BASOPHILS NFR BLD: 0.3 % (ref 0–1.9)
BILIRUB SERPL-MCNC: 0.7 MG/DL (ref 0.1–1)
BUN SERPL-MCNC: 21 MG/DL (ref 8–23)
CALCIUM SERPL-MCNC: 8.9 MG/DL (ref 8.7–10.5)
CHLORIDE SERPL-SCNC: 107 MMOL/L (ref 95–110)
CO2 SERPL-SCNC: 24 MMOL/L (ref 23–29)
CREAT SERPL-MCNC: 0.9 MG/DL (ref 0.5–1.4)
DIFFERENTIAL METHOD BLD: ABNORMAL
EOSINOPHIL # BLD AUTO: 0.2 K/UL (ref 0–0.5)
EOSINOPHIL NFR BLD: 2.2 % (ref 0–8)
ERYTHROCYTE [DISTWIDTH] IN BLOOD BY AUTOMATED COUNT: 13.4 % (ref 11.5–14.5)
EST. GFR  (NO RACE VARIABLE): >60 ML/MIN/1.73 M^2
GLUCOSE SERPL-MCNC: 122 MG/DL (ref 70–110)
HCT VFR BLD AUTO: 41.6 % (ref 40–54)
HGB BLD-MCNC: 13.9 G/DL (ref 14–18)
IMM GRANULOCYTES # BLD AUTO: 0.07 K/UL (ref 0–0.04)
IMM GRANULOCYTES NFR BLD AUTO: 0.9 % (ref 0–0.5)
LYMPHOCYTES # BLD AUTO: 1 K/UL (ref 1–4.8)
LYMPHOCYTES NFR BLD: 13.3 % (ref 18–48)
MAGNESIUM SERPL-MCNC: 2.1 MG/DL (ref 1.6–2.6)
MCH RBC QN AUTO: 29.4 PG (ref 27–31)
MCHC RBC AUTO-ENTMCNC: 33.4 G/DL (ref 32–36)
MCV RBC AUTO: 88 FL (ref 82–98)
MONOCYTES # BLD AUTO: 1 K/UL (ref 0.3–1)
MONOCYTES NFR BLD: 13.3 % (ref 4–15)
NEUTROPHILS # BLD AUTO: 5.3 K/UL (ref 1.8–7.7)
NEUTROPHILS NFR BLD: 70 % (ref 38–73)
NRBC BLD-RTO: 0 /100 WBC
PLATELET # BLD AUTO: 234 K/UL (ref 150–450)
PMV BLD AUTO: 10.5 FL (ref 9.2–12.9)
POTASSIUM SERPL-SCNC: 3.8 MMOL/L (ref 3.5–5.1)
PROT SERPL-MCNC: 6.7 G/DL (ref 6–8.4)
RBC # BLD AUTO: 4.72 M/UL (ref 4.6–6.2)
SODIUM SERPL-SCNC: 137 MMOL/L (ref 136–145)
TROPONIN I SERPL HS-MCNC: ABNORMAL PG/ML (ref 0–14.9)
WBC # BLD AUTO: 7.6 K/UL (ref 3.9–12.7)

## 2025-02-12 PROCEDURE — S4991 NICOTINE PATCH NONLEGEND: HCPCS

## 2025-02-12 PROCEDURE — 25000003 PHARM REV CODE 250: Performed by: INTERNAL MEDICINE

## 2025-02-12 PROCEDURE — 80053 COMPREHEN METABOLIC PANEL: CPT | Performed by: INTERNAL MEDICINE

## 2025-02-12 PROCEDURE — 25000003 PHARM REV CODE 250

## 2025-02-12 PROCEDURE — 99900031 HC PATIENT EDUCATION (STAT)

## 2025-02-12 PROCEDURE — 99233 SBSQ HOSP IP/OBS HIGH 50: CPT | Mod: ,,, | Performed by: INTERNAL MEDICINE

## 2025-02-12 PROCEDURE — 84484 ASSAY OF TROPONIN QUANT: CPT | Performed by: INTERNAL MEDICINE

## 2025-02-12 PROCEDURE — 94761 N-INVAS EAR/PLS OXIMETRY MLT: CPT

## 2025-02-12 PROCEDURE — 97110 THERAPEUTIC EXERCISES: CPT

## 2025-02-12 PROCEDURE — 83735 ASSAY OF MAGNESIUM: CPT | Performed by: INTERNAL MEDICINE

## 2025-02-12 PROCEDURE — 99900035 HC TECH TIME PER 15 MIN (STAT)

## 2025-02-12 PROCEDURE — 85025 COMPLETE CBC W/AUTO DIFF WBC: CPT | Performed by: INTERNAL MEDICINE

## 2025-02-12 PROCEDURE — 63600175 PHARM REV CODE 636 W HCPCS

## 2025-02-12 PROCEDURE — 97530 THERAPEUTIC ACTIVITIES: CPT

## 2025-02-12 RX ORDER — ATORVASTATIN CALCIUM 80 MG/1
80 TABLET, FILM COATED ORAL DAILY
Qty: 30 TABLET | Refills: 0 | Status: SHIPPED | OUTPATIENT
Start: 2025-02-12 | End: 2026-02-12

## 2025-02-12 RX ORDER — FAMOTIDINE 20 MG/1
20 TABLET, FILM COATED ORAL 2 TIMES DAILY
Status: DISCONTINUED | OUTPATIENT
Start: 2025-02-12 | End: 2025-02-12 | Stop reason: HOSPADM

## 2025-02-12 RX ORDER — LOSARTAN POTASSIUM 50 MG/1
50 TABLET ORAL DAILY
Qty: 30 TABLET | Refills: 0 | Status: SHIPPED | OUTPATIENT
Start: 2025-02-13 | End: 2026-02-13

## 2025-02-12 RX ORDER — METOPROLOL SUCCINATE 25 MG/1
25 TABLET, EXTENDED RELEASE ORAL 2 TIMES DAILY
Status: DISCONTINUED | OUTPATIENT
Start: 2025-02-12 | End: 2025-02-12 | Stop reason: HOSPADM

## 2025-02-12 RX ORDER — AMIODARONE HYDROCHLORIDE 200 MG/1
TABLET ORAL
Qty: 57 TABLET | Refills: 0 | Status: SHIPPED | OUTPATIENT
Start: 2025-02-12 | End: 2025-02-13 | Stop reason: SDUPTHER

## 2025-02-12 RX ORDER — METOPROLOL SUCCINATE 25 MG/1
25 TABLET, EXTENDED RELEASE ORAL 2 TIMES DAILY
Qty: 60 TABLET | Refills: 0 | Status: SHIPPED | OUTPATIENT
Start: 2025-02-12 | End: 2026-02-12

## 2025-02-12 RX ORDER — HYDROCODONE BITARTRATE AND ACETAMINOPHEN 5; 325 MG/1; MG/1
1 TABLET ORAL EVERY 6 HOURS PRN
Qty: 15 TABLET | Refills: 0 | Status: SHIPPED | OUTPATIENT
Start: 2025-02-12

## 2025-02-12 RX ORDER — AMIODARONE HYDROCHLORIDE 200 MG/1
200 TABLET ORAL 3 TIMES DAILY
Status: DISCONTINUED | OUTPATIENT
Start: 2025-02-12 | End: 2025-02-12 | Stop reason: HOSPADM

## 2025-02-12 RX ADMIN — METOPROLOL TARTRATE 25 MG: 25 TABLET, FILM COATED ORAL at 08:02

## 2025-02-12 RX ADMIN — GABAPENTIN 300 MG: 300 CAPSULE ORAL at 02:02

## 2025-02-12 RX ADMIN — MUPIROCIN 1 G: 20 OINTMENT TOPICAL at 08:02

## 2025-02-12 RX ADMIN — MORPHINE SULFATE 1 MG: 2 INJECTION, SOLUTION INTRAMUSCULAR; INTRAVENOUS at 08:02

## 2025-02-12 RX ADMIN — AMLODIPINE BESYLATE 10 MG: 5 TABLET ORAL at 08:02

## 2025-02-12 RX ADMIN — FAMOTIDINE 20 MG: 10 INJECTION, SOLUTION INTRAVENOUS at 08:02

## 2025-02-12 RX ADMIN — TICAGRELOR 90 MG: 90 TABLET ORAL at 08:02

## 2025-02-12 RX ADMIN — ASPIRIN 81 MG: 81 TABLET, COATED ORAL at 08:02

## 2025-02-12 RX ADMIN — GABAPENTIN 300 MG: 300 CAPSULE ORAL at 08:02

## 2025-02-12 RX ADMIN — LOSARTAN POTASSIUM 50 MG: 50 TABLET, FILM COATED ORAL at 08:02

## 2025-02-12 RX ADMIN — AMIODARONE HYDROCHLORIDE 200 MG: 200 TABLET ORAL at 02:02

## 2025-02-12 RX ADMIN — POTASSIUM BICARBONATE 50 MEQ: 978 TABLET, EFFERVESCENT ORAL at 05:02

## 2025-02-12 RX ADMIN — NICOTINE 1 PATCH: 21 PATCH, EXTENDED RELEASE TRANSDERMAL at 08:02

## 2025-02-12 NOTE — PT/OT/SLP PROGRESS
Physical Therapy      Patient Name:  Sanjeev Britt   MRN:  [id number]    Patient not seen today secondary to pt not in room, ambulating around the hallway with his s/o .     Ambulatory

## 2025-02-12 NOTE — ASSESSMENT & PLAN NOTE
Distal % thrombotic occlusion. Proximal RCA 90% stenosis. fixed both proximal and distal RCA.   Has residual disease in LAD and circumflex diagonal which we can manage as a staged procedures   Completed Aggrastat x 12 hours, stopped 2/8  Continue asa 81 mg  Continue Brilinta b.i.d.  Beta blocker as tolerated  Avoid nitrates given he took Cialis before presentation (in the last 72 hours)  Continue high-dose atorvastatin, and zetia added ( )  Continue regular diet  Cardiology closely following  2D echocardiogram done and showed moderate hypokinesis of the inferior wall, mildly reduced EF 40-45%, normal diastolic function.  Losartan added.  Adjust GDM T as tolerated.  Maintain potassium 4.0 and magnesium 2.0.

## 2025-02-12 NOTE — PT/OT/SLP DISCHARGE
Physical Therapy Discharge Summary    Name: Sanjeev Britt  MRN: [id number]   Principal Problem: STEMI (ST elevation myocardial infarction)     Patient Discharged from acute Physical Therapy on 25.  Please refer to prior PT noted date on 2/10/25 for functional status.     Assessment:     Patient has met all goals and is not appropriate for therapy.    Objective:     GOALS:   Multidisciplinary Problems       Physical Therapy Goals       Not on file              Multidisciplinary Problems (Resolved)          Problem: Physical Therapy    Goal Priority Disciplines Outcome Interventions   Physical Therapy Goal   (Resolved)     PT, PT/OT Met    Description: Goals to be met by: 3/10/25    Patient will increase functional independence with mobility by performin. Supine to sit with Supervision  2. Sit to stand transfer with Supervision  3. Bed to chair transfer with Supervision using no AD  4. Gait  x 250 feet with Supervision using no AD  5. Lower extremity exercise program x20 reps per handout, with supervision                       Reasons for Discontinuation of Therapy Services  Satisfactory goal achievement.      Plan:     Patient Discharged to: Home no PT services needed.      2025

## 2025-02-12 NOTE — PROGRESS NOTES
[facility]  Department of Cardiology  Consult Note      PATIENT NAME: Sanjeev Britt    MRN: [id number]  TODAY'S DATE: 02/12/2025  ADMIT DATE: 2/7/2025                          CONSULT REQUESTED BY: Paloma Boateng MD    SUBJECTIVE     PRINCIPAL PROBLEM: STEMI (ST elevation myocardial infarction)      REASON FOR CONSULT:  STEMI    INTERVAL HISTORY:  02/12/2025  Pt seen resting comfortably in room this morning with wife at bedside. He is doing well, has been up walking around chest pain free. Discussed medication regimen and time off work - he is agreeable to plan, ready to dc home. Did have 3 different runs of VT noted on tele. Will add amio to regimen.     12/11/2025  Status post successful IVUS guided staged PCI of diffuse 70% stenosis in mid circumflex with 2 overlapping drug-eluting stents 2.5 x 24 and 3.0 x 32 and post dilated with 3.5 noncompliant balloon        Medical management of the residual LAD disease at this point (There was a short segment  of distal LAD with bridge collaterals and 70% In-Stent restenosis in the proximal portion of the diagonal (small-caliber distal vessel)    12/10/2025  Pt seen this morning resting comfortably in hospital bed with wife at bedside. Discussed plans for staged PCI tomorrow with Dr. Levi and instructed nothing to eat after midnight. Pt agreeable to plan. Does admit to some intermittent chest soreness from CPR. Remains mildly hypertensive.     2/9/25    Resting in bed.  No acute distress. SR on tele.  RA.  R eye edema is improving.  Reports chest tenderness.  Plans for staged PCI tues with Dr. Levi .       02/08/2025    Patient resting in bed during examination.  Sinus rhythm on the monitor.  Heart rate 59.  Blood pressure 129/80.  Remains on IV fluids 25 mL per hour patient is Aggrastat 0.15 mcg per kg per minute. K  2.8 this am.  Repleted. Ecchymosis in right orbital edema.      HPI:  59-year-old male with a past medical history of CAD status post PCI  several years ago, diabetes, hypertension presented with chest pain had Vfib and cardiac arrest in the ER twice status post resuscitation.  EKG showed inferior ST elevation.  STEMI code was activated.  Patient was transferred to cath lab for emergent coronary angiogram.  Patient did not take Plavix in the last couple of days.        Review of patient's allergies indicates:   Allergen Reactions    Levaquin [levofloxacin]        Past Medical History:   Diagnosis Date    Acute coronary syndrome 09/09/2019    Coronary artery disease     Diabetes mellitus     Hypertension     Sleep apnea      Past Surgical History:   Procedure Laterality Date    ANGIOGRAM, CORONARY, WITH LEFT HEART CATHETERIZATION N/A 2/8/2025    Procedure: Angiogram, Coronary, with Left Heart Cath;  Surgeon: Mo Levi MD;  Location: [facility] CATH/EP LAB;  Service: Cardiology;  Laterality: N/A;    FESS, USING COMPUTER-ASSISTED NAVIGATION, WITH NASAL TURBINATE REDUCTION N/A 6/7/2024    Procedure: FESS, USING COMPUTER-ASSISTED NAVIGATION, WITH NASAL TURBINATE REDUCTION;  Surgeon: Andres Abraham MD;  [facility];  Service: ENT;  Laterality: N/A;    IVUS, CORONARY  2/8/2025    Procedure: IVUS, Coronary;  Surgeon: Mo Levi MD;  Location: [facility] CATH/EP LAB;  Service: Cardiology;;    NASAL SEPTOPLASTY N/A 6/7/2024    Procedure: SEPTOPLASTY, NOSE;  Surgeon: Andres Abraham MD;  [facility];  Service: ENT;  Laterality: N/A;    NASAL TURBINATE REDUCTION Bilateral 6/7/2024    Procedure: REDUCTION, NASAL TURBINATE;  Surgeon: Andres Abraham MD;  [facility];  Service: ENT;  Laterality: Bilateral;    PERCUTANEOUS CORONARY INTERVENTION, ARTERY N/A 2/8/2025    Procedure: Percutaneous coronary intervention;  Surgeon: Mo Levi MD;  Location: [facility] CATH/EP LAB;  Service: Cardiology;  Laterality: N/A;    VASCULAR SURGERY       Social History     Tobacco Use    Smoking status: Never    Smokeless  tobacco: Current     Types: Snuff   Substance Use Topics    Alcohol use: Not Currently    Drug use: Never        REVIEW OF SYSTEMS  All other systems negative except as mentioned in HPI.     OBJECTIVE     VITAL SIGNS (Most Recent)  Temp: 98.3 °F (36.8 °C) (02/12/25 1200)  Pulse: 72 (02/12/25 1200)  Resp: 19 (02/12/25 1145)  BP: 122/71 (02/12/25 1200)  SpO2: 95 % (02/12/25 1200)    VENTILATION STATUS  Resp: 19 (02/12/25 1145)  SpO2: 95 % (02/12/25 1200)           I & O (Last 24H):  Intake/Output Summary (Last 24 hours) at 2/12/2025 1452  Last data filed at 2/12/2025 1208  Gross per 24 hour   Intake 1536.49 ml   Output 401 ml   Net 1135.49 ml       WEIGHTS  Wt Readings from Last 1 Encounters:   02/10/25 0620 76.4 kg (168 lb 6.9 oz)   02/08/25 0501 76.7 kg (169 lb)   02/08/25 0200 76.9 kg (169 lb 8.5 oz)       PHYSICAL EXAM  GENERAL:  NAD  HEENT: Normocephalic R orbital edema/ecchymosis improving  NECK: No JVD  Chest wall TTP  CARDIAC: Regular rate and rhythm. S1 is normal.S2 is normal.  No murmurs.   LUNGS:  CTA b/l  ABDOMEN:  Nondistended  EXTREMITIES:  No edema/cyanosis.    CNS:  AAO x3  SKIN:  No rash.    PSYCH: normal affect    HOME MEDICATIONS:  No current facility-administered medications on file prior to encounter.     Current Outpatient Medications on File Prior to Encounter   Medication Sig Dispense Refill    amLODIPine (NORVASC) 10 MG tablet Take 1 tablet (10 mg total) by mouth once daily. 90 tablet 3    aspirin (ECOTRIN) 81 MG EC tablet Take 81 mg by mouth once daily.      atorvastatin (LIPITOR) 40 MG tablet TAKE 1 TABLET(40 MG) BY MOUTH EVERY DAY (Patient taking differently: Take 40 mg by mouth once daily.) 90 tablet 3    clopidogreL (PLAVIX) 75 mg tablet Take 1 tablet (75 mg total) by mouth once daily. 90 tablet 3    JARDIANCE 10 mg tablet Take 10 mg by mouth once daily.      losartan (COZAAR) 25 MG tablet TAKE 1 TABLET(25 MG) BY MOUTH EVERY DAY (Patient taking differently: Take 25 mg by mouth once  daily.) 90 tablet 3    meloxicam (MOBIC) 15 MG tablet Take 1 tablet by mouth daily as needed for Pain.      metoprolol tartrate (LOPRESSOR) 50 MG tablet Take 1 tablet (50 mg total) by mouth 2 (two) times daily. 90 tablet 3    multivit-min/FA/lycopen/lutein (CENTRUM SILVER MEN ORAL) Take 1 tablet by mouth once daily.      pregabalin (LYRICA) 75 MG capsule Take 1 capsule by mouth 2 (two) times daily.      tadalafiL (CIALIS) 5 MG tablet Take 1 tablet (5 mg total) by mouth daily as needed for Erectile Dysfunction. 15 tablet 3    traZODone (DESYREL) 150 MG tablet Take 1 tablet by mouth once daily.         SCHEDULED MEDS:   amiodarone  200 mg Oral TID    amLODIPine  10 mg Oral Daily    aspirin  81 mg Oral Daily    atorvastatin  80 mg Oral Daily    famotidine (PF)  20 mg Intravenous BID    gabapentin  300 mg Oral TID    LIDOcaine  1 patch Transdermal Q24H    losartan  50 mg Oral Daily    metoprolol succinate  25 mg Oral BID    mupirocin   Nasal BID    nicotine  1 patch Transdermal Daily    senna-docusate 8.6-50 mg  1 tablet Oral BID    ticagrelor  90 mg Oral BID    traZODone  25 mg Oral QHS       CONTINUOUS INFUSIONS:   tirofiban-0.9% sodium chloride 12.5 mg/250ml    Continuous PRN 13.8 mL/hr at 02/08/25 0210 0.15 mcg/kg/min at 02/08/25 0210       PRN MEDS:  Current Facility-Administered Medications:     acetaminophen, 650 mg, Oral, Q8H PRN    acetaminophen, 650 mg, Oral, Q4H PRN    aluminum-magnesium hydroxide-simethicone, 30 mL, Oral, QID PRN    dextrose 50%, 12.5 g, Intravenous, PRN    dextrose 50%, 25 g, Intravenous, PRN    glucagon (human recombinant), 1 mg, Intramuscular, PRN    glucose, 16 g, Oral, PRN    glucose, 24 g, Oral, PRN    HYDROcodone-acetaminophen, 1 tablet, Oral, Q6H PRN    magnesium oxide, 800 mg, Oral, PRN    magnesium oxide, 800 mg, Oral, PRN    melatonin, 6 mg, Oral, Nightly PRN    morphine, 1 mg, Intravenous, Q8H PRN    naloxone, 0.02 mg, Intravenous, PRN    ondansetron, 4 mg, Intravenous, Q6H PRN    " potassium bicarbonate, 35 mEq, Oral, PRN    potassium bicarbonate, 50 mEq, Oral, PRN    potassium bicarbonate, 60 mEq, Oral, PRN    potassium, sodium phosphates, 2 packet, Oral, PRN    potassium, sodium phosphates, 2 packet, Oral, PRN    potassium, sodium phosphates, 2 packet, Oral, PRN    sodium chloride 0.9%, 2 mL, Intravenous, PRN    sodium chloride 0.9%, 3 mL, Intravenous, Q12H PRN    tirofiban-0.9% sodium chloride 12.5 mg/250ml, , , Continuous PRN    LABS AND DIAGNOSTICS     CBC LAST 3 DAYS  Recent Labs   Lab 02/10/25  0410 02/11/25  0406 02/12/25  0325   WBC 7.23 6.80 7.60   RBC 4.44* 4.51* 4.72   HGB 13.1* 13.3* 13.9*   HCT 39.9* 40.0 41.6   MCV 90 89 88   MCH 29.5 29.5 29.4   MCHC 32.8 33.3 33.4   RDW 13.2 13.2 13.4    181 234   MPV 11.0 11.0 10.5   GRAN 72.0  5.2 65.7  4.5 70.0  5.3   LYMPH 13.1*  1.0 17.6*  1.2 13.3*  1.0   MONO 12.2  0.9 12.5  0.9 13.3  1.0   BASO 0.02 0.04 0.02   NRBC 0 0 0       COAGULATION LAST 3 DAYS  No results for input(s): "LABPT", "INR", "APTT" in the last 168 hours.    CHEMISTRY LAST 3 DAYS  Recent Labs   Lab 02/08/25  0000 02/08/25  0311 02/10/25  0410 02/11/25  0406 02/12/25  0325   NA  --    < > 137 136 137   K  --    < > 3.8 3.7 3.8   CL  --    < > 106 106 107   CO2  --    < > 27 25 24   ANIONGAP  --    < > 4* 5* 6*   BUN  --    < > 13 19 21   CREATININE  --    < > 0.7 0.7 0.9   GLU  --    < > 110 110 122*   CALCIUM  --    < > 8.9 9.1 8.9   PH 7.485*  --   --   --   --    MG  --    < > 2.1 2.0 2.1   ALBUMIN  --    < > 4.0 4.1 4.0   PROT  --    < > 6.7 6.9 6.7   ALKPHOS  --    < > 76 80 80   ALT  --    < > 60* 46* 39   AST  --    < > 95* 47* 52*   BILITOT  --    < > 0.9 0.8 0.7    < > = values in this interval not displayed.       CARDIAC PROFILE LAST 3 DAYS  Recent Labs   Lab 02/07/25  2342 02/08/25  0311 02/12/25  0325   *  --   --    TROPONINIHS 53.1* 31229.4* 56436.2*       ENDOCRINE LAST 3 DAYS  No results for input(s): "TSH", "PROCAL" in the last " 168 hours.    LAST ARTERIAL BLOOD GAS  ABG  Recent Labs   Lab 02/08/25  0000   PH 7.485*   PO2 25*   PCO2 30.9*   HCO3 23.3*   BE 0       LAST 7 DAYS MICROBIOLOGY   Microbiology Results (last 7 days)       ** No results found for the last 168 hours. **            MOST RECENT IMAGING  Cardiac catheterization    Successful staged IVUS guided PCI of diffuse lesion in mid circumflex.    The Mid Cx lesion was 70% stenosed with 0% stenosis post-intervention.    Mid Cx lesion: A STENT SYNERGY XD [id number] stent was successfully   placed at 11 VIELKA for 10 sec.    Mid Cx lesion: A STENT SYNERGY XD [id number] stent was successfully   placed at 11 VIELKA for 15 sec.    The procedure log was documented by Documenter: Madisyn Flanagan RN;   Ranjeet Villalpando RT and verified by Mo Levi MD.    Date:2/11/25      ECHOCARDIOGRAM RESULTS (last 5)  Results for orders placed during the hospital encounter of 08/14/23    Echo    Interpretation Summary    Left Ventricle: The left ventricle is normal in size. Mildly increased wall thickness. There is concentric remodeling. Normal wall motion. There is normal systolic function. Ejection fraction by visual approximation is 65%. There is normal diastolic function.    Left Atrium: Left atrium is mildly dilated.    Right Ventricle: Normal right ventricular cavity size. Wall thickness is normal. Right ventricle wall motion  is normal. Systolic function is normal.    IVC/SVC: Normal venous pressure at 3 mmHg.      Results for orders placed during the hospital encounter of 09/09/19    Echo Color Flow Doppler? Yes    Interpretation Summary  · Concentric left ventricular remodeling.  · Increased (hyperdynamic) left ventricular systolic function. The estimated ejection fraction is 83%  · Normal LV diastolic function.  · Normal right ventricular systolic function.  · Mild left atrial enlargement.    Status post emergent coronary angiogram via right radial access.    Significant triple-vessel  coronary artery disease on angiogram.     Patent left main  Diffuse 70% stenosis in mid circumflex  70% InStent restenosis in D1  Short segment  in distal LAD with bridging collaterals  Discrete 90% stenosis in proximal RCA and 100% thrombotic occlusion in distal RCA (culprit lesion).     CURRENT/PREVIOUS VISIT EKG  Results for orders placed or performed during the hospital encounter of 02/07/25   EKG 12-lead    Collection Time: 02/11/25  3:48 PM   Result Value Ref Range    QRS Duration 92 ms    [facility] QTC Calculation 472 ms    Narrative    Test Reason : R07.9,    Vent. Rate :  80 BPM     Atrial Rate :  80 BPM     P-R Int : 168 ms          QRS Dur :  92 ms      QT Int : 410 ms       P-R-T Axes :  69  11 -24 degrees    QTcB Int : 472 ms    Normal sinus rhythm  Inferior infarct (cited on or before 07-Feb-2025)  Abnormal ECG  When compared with ECG of 08-Feb-2025 02:31,  Premature ventricular complexes are no longer Present  Sinus rhythm is no longer with ventricular escape complexes  Serial changes of evolving Inferior infarct Present    Referred By: AAAREFERRAL SELF           Confirmed By:          ASSESSMENT/PLAN:     Active Hospital Problems    Diagnosis    *STEMI (ST elevation myocardial infarction)    DMII (diabetes mellitus, type 2)    Cardiac arrest    Chest pain       Assessment:  STEMI/ acute inferior wall MI  Ventricular fibrillation/  Cardiac arrest x 2 in the ER status post resuscitation  History of CAD status post PCI several years ago  Diabetes  Hypertension  Hypokalemia    Plan:  Continue with DAPT to include asa and Brilinta.  Continue Lipitor 80 mg daily.  Change Lopressor to Toprol XL 25 mg BID.  Add amio 200 mg TID x 3 days then BID for 21 days then daily thereafter for non sustained VT overnight last night.   Continue losartan to 50 mg daily and amlodipine 10 mg daily - encouraged to keep BP log at home. May need to further adjust.   Liver enzymes improving.   Pt can be cleared to dc home from a  cardiac standpoint at this time. Will need close clinic follow up in 1-2 weeks.     Sakina Driscoll NP  [facility]  Department of Cardiology  Date of Service: 02/12/2025 02/08/2025  As above  Patient is resting well post intervention.  He has a large area of ecchymosis and contusion on his right periorbital area and upper eyelid.  Clinically no anginal symptoms are noted  Angiographic findings noted and patient clinically remains pain-free  Continue dual antiplatelet therapy with aspirin and Brilinta  Aggressive risk factor modification add Zetia 10 mg to his regimen to achieve the LDL goal of 60 or less  Discussed with the patient and family at bedside  Ice pack for right facial trauma  Will gradually increase physical activity.    I have personally interviewed and examined the patient, I have reviewed the Nurse Practitioner's history and physical, assessment, and plan. I agree with the findings and plan.    02/09/2025    Patient denies having any episodes of angina.  Chest wall soreness is still persisting  Facial edema is improving.  Periorbital edema is decreased.  Patient wishes to proceed with coronary revascularization of the circumflex artery tentatively scheduled for Tuesday  In the meanwhile patient can be transferred to the floor increase physical activity continue on present medication  Continue on dual antiplatelet therapy with aspirin and ticagrelor  Avoid nitrate therapy because of Cialis usage in the past 72 hours  I have personally interviewed and examined the patient, I have reviewed the Nurse Practitioner's history and physical, assessment, and plan. I agree with the findings and plan.    02/10/2025:     As above  Patient remains hemodynamically stable no further episodes of angina  Blood pressure has been reasonable continue on amlodipine 10 mg daily and losartan 50 mg a  Tolerating dual antiplatelet therapy with ticagrelor as well as Plavix  Also to continue on statin  therapy with Lipitor 80 mg a day  Right facial swelling has improved although ecchymosis still persists  No nosebleeds and no hematuria noted.  Tentatively scheduled for PCI of the circumflex artery tomorrow.  Patient understands the risks and wishes to proceed.    02/12/2025:   Status post PCI had coronary artery and staged procedure of the circumflex artery doing well.  Denies having any anginal symptom chest wall soreness from CPR is still persists  Tolerating dual antiplatelet  Encouraged patient to maintain on current medical therapy to include amlodipine 10 mg daily, Lipitor 80 mg a day, losartan 50 mg daily, dual antiplatelet therapy with aspirin and ticagrelor 90 mg p.o. b.i.d..  And also to continue on Toprol-XL 25 mg p.o. b.i.d.  Strongly encouraged her to refrain from tobacco usage  Patient also developed nonsustained runs of ventricular tachycardia up to 9 beats  Recommend to start on amiodarone loading dose 200 mg 3 times a day for 7 days and then 200 mg b.i.d. thereafter with instructions to follow-up in the office  Is also encouraged to follow-up in 2-4 weeks time  In the meanwhile he is encouraged to enroll in cardiac rehabilitation phase    Nate Santana M.D.  Department of Cardiology  Date of Service:  2/9/25

## 2025-02-12 NOTE — NURSING
IVs dc'd. Tolerated well. Discharge instructions, follow up appts and meds reviewed with patient. Verbalized understanding. Pt brought down via wheelchair with all belongings to family transport. Safety maintained.

## 2025-02-12 NOTE — PROGRESS NOTES
[facility] Medicine  Progress Note    Patient Name: Sanjeev Britt  MRN: [id number]  Patient Class: IP- Inpatient   Admission Date: 2/7/2025  Length of Stay: 4 days  Attending Physician: Paloma Boateng MD  Primary Care Provider: Chidi Vidal Jr., MD        Subjective     Principal Problem:STEMI (ST elevation myocardial infarction)        HPI:  59-year-old male with a past medical history of CAD status post stent placement\11 years ago ,  diabetes mellitus not on insulin ,  hypertension presents to the emergency department post ROSC while at triage.  Patient coded at triage and was defibrillated once.  On arrival to the emergency department patient was confused though was able to tell me that he has been experiencing chest pain presently 20 minutes prior to arrival.  Patient states that he was having intercourse with his partner and shortly afterwards began to experience chest pain.  Per patient's partner he did utilize sildenafil.  Patient also states he experienced diaphoresis and nausea without any episodes of vomiting.      NON Smoker  Non drinker  Here with wife or GF       His wife convinced him to come to ER    He did not want ambulance     So she drove him     In the parking lot he passed out and hit his head above right eye     But he went into V FIB arrest and was coded    They did CPR and shocked him twice     For about 2-3 minutes total       Then he was taken into ER    Potasium was 2.5     Mag was good over 2       EKG showed STEMI      Then dr Levi took him to cath lab         Distal % thrombotic occlusion. Proximal RCA 90% stenosis. fixed both proximal and distal RCA.     Has residual disease in LAD and circumflex diagonal which we can manage as a staged procedures         We are admitting him to ICU     Overview/Hospital Course:  59-year-old male with a past medical history of CAD status post PCI several years ago, diabetes, hypertension presented with chest pain  had Vfib and cardiac arrest in the ER twice status post resuscitation.  EKG showed inferior ST elevation. Patient did not take Plavix in the last couple of days.  Code STEMI was activated, patient underwent emergency coronary angiogram showing distal % thrombotic occlusion and proximal RCA 90% stenosis and underwent ELIDA placement.  Patient was continued on aspirin, Brilinta and was started on beta blocker as tolerated.  Statin increased to high-dose.  Aggrastat was continued for 12 hours postprocedure.  2D echocardiogram done and showed moderate hypokinesis of the inferior wall, mildly reduced EF 40-45%, normal diastolic function.  Cardiology closely followed the patient.  Adjusted GDM T as tolerated.  Zetia added for LDL of 103 for a goal less than 60.    Interval History:  Patient is seen and examined during multidisciplinary round.  Patient reports improved reproducible chest wall pain.  Patient underwent staged coronary intervention when patient received ELIDA in circumflex artery.  Frequent nonsustained V-tach noted which are asymptomatic.  Patient encouraged to continue use of incentive spirometry.  Patient encouraged to get out of bed and work with physical therapy.  Cardiology team following.    Review of Systems   All other systems reviewed and are negative.    Objective:     Vital Signs (Most Recent):  Temp: 99 °F (37.2 °C) (02/12/25 0745)  Pulse: 76 (02/12/25 0725)  Resp: 20 (02/12/25 0809)  BP: (!) 159/94 (02/12/25 0725)  SpO2: (!) 93 % (02/12/25 0725) Vital Signs (24h Range):  Temp:  [98 °F (36.7 °C)-99 °F (37.2 °C)] 99 °F (37.2 °C)  Pulse:  [] 76  Resp:  [15-22] 20  SpO2:  [90 %-98 %] 93 %  BP: (128-176)/(72-94) 159/94     Weight: 76.4 kg (168 lb 6.9 oz)  Body mass index is 27.19 kg/m².    Intake/Output Summary (Last 24 hours) at 2/12/2025 0836  Last data filed at 2/12/2025 0500  Gross per 24 hour   Intake 1298.49 ml   Output 400 ml   Net 898.49 ml         Physical Exam  Vitals and nursing  note reviewed. Exam conducted with a chaperone present.   Constitutional:       Appearance: Normal appearance.   HENT:      Head: Normocephalic. Abrasion and contusion present.        Comments: Eye lid contusion significantly improving, no ophthalmoplegia, no change in vision.     Nose: Nose normal.      Mouth/Throat:      Mouth: Mucous membranes are moist.   Eyes:      Pupils: Pupils are equal, round, and reactive to light.   Cardiovascular:      Rate and Rhythm: Normal rate and regular rhythm.      Pulses: Normal pulses.      Heart sounds: Normal heart sounds.      Comments: Reproducible chest wall tenderness bilaterally.  Pulmonary:      Effort: Pulmonary effort is normal.      Breath sounds: Normal breath sounds.   Abdominal:      General: Abdomen is flat. Bowel sounds are normal.      Palpations: Abdomen is soft.   Musculoskeletal:         General: Normal range of motion.      Cervical back: Normal range of motion.   Skin:     General: Skin is warm.      Capillary Refill: Capillary refill takes less than 2 seconds.   Neurological:      General: No focal deficit present.      Mental Status: He is alert.   Psychiatric:         Mood and Affect: Mood normal.             Significant Labs:   Lab Results   Component Value Date    WBC 7.60 02/12/2025    HGB 13.9 (L) 02/12/2025    HCT 41.6 02/12/2025    MCV 88 02/12/2025     02/12/2025       CMP  Sodium   Date Value Ref Range Status   02/12/2025 137 136 - 145 mmol/L Final     Potassium   Date Value Ref Range Status   02/12/2025 3.8 3.5 - 5.1 mmol/L Final     Chloride   Date Value Ref Range Status   02/12/2025 107 95 - 110 mmol/L Final     CO2   Date Value Ref Range Status   02/12/2025 24 23 - 29 mmol/L Final     Glucose   Date Value Ref Range Status   02/12/2025 122 (H) 70 - 110 mg/dL Final     BUN   Date Value Ref Range Status   02/12/2025 21 8 - 23 mg/dL Final     Creatinine   Date Value Ref Range Status   02/12/2025 0.9 0.5 - 1.4 mg/dL Final     Calcium   Date  Value Ref Range Status   02/12/2025 8.9 8.7 - 10.5 mg/dL Final     Total Protein   Date Value Ref Range Status   02/12/2025 6.7 6.0 - 8.4 g/dL Final     Albumin   Date Value Ref Range Status   02/12/2025 4.0 3.5 - 5.2 g/dL Final     Total Bilirubin   Date Value Ref Range Status   02/12/2025 0.7 0.1 - 1.0 mg/dL Final     Comment:     For infants and newborns, interpretation of results should be based  on gestational age, weight and in agreement with clinical  observations.    Premature Infant recommended reference ranges:  Up to 24 hours.............<8.0 mg/dL  Up to 48 hours............<12.0 mg/dL  3-5 days..................<15.0 mg/dL  6-29 days.................<15.0 mg/dL       Alkaline Phosphatase   Date Value Ref Range Status   02/12/2025 80 55 - 135 U/L Final     AST   Date Value Ref Range Status   02/12/2025 52 (H) 10 - 40 U/L Final     ALT   Date Value Ref Range Status   02/12/2025 39 10 - 44 U/L Final     Anion Gap   Date Value Ref Range Status   02/12/2025 6 (L) 8 - 16 mmol/L Final     eGFR   Date Value Ref Range Status   02/12/2025 >60.0 >60 mL/min/1.73 m^2 Final     Microbiology Results (last 7 days)       ** No results found for the last 168 hours. **          Recent Labs   Lab 02/12/25  0325   TROPONINIHS 03228.2*       Significant Imaging:  ECHO:    Left Ventricle: The left ventricle is normal in size. Normal wall thickness. There is concentric remodeling. Moderate hypokinesis of the inferior and the remaining walls appear to move fairly well There is mildly reduced systolic function with a visually estimated ejection fraction of 45 - 50%. There is normal diastolic function.    Right Ventricle: Normal right ventricular cavity size. Wall thickness is normal. Systolic function is normal.    Aortic Valve: The aortic valve is structurally normal.    Tricuspid Valve: The tricuspid valve is structurally normal.    Pulmonary Artery: The estimated pulmonary artery systolic pressure is 22 mmHg.    IVC/SVC:  Intermediate venous pressure at 8 mmHg.    CXR: Cardiac monitoring leads overlie the chest. Cardiomediastinal silhouette is unchanged in size and configuration. The lungs are symmetrically expanded with slight increased interstitial and parenchymal attenuation which can be seen with interstitial edema possibly atypical infectious process. Of no large region of confluent airspace consolidation, substantial volume of pleural fluid or pneumothorax identified. Osseous structures are intact.     X-ray bilateral ribs:  Normal radiographic appearance of the bilateral ribs.     CXR:  Development of right infrahilar opacity either reflecting atelectasis or, less likely, developing consolidation.     LHC:  Status post successful IVUS guided PCI of distal RCA (2.5 x 38 stent post dilated with 2.75 noncompliant balloon) and PCI of proximal RCA (3.0 x 12 stent post dilated with a 3.5 noncompliant balloon).    Balloon angioplasty was performed at the ostium of RPDA with re-establishment of flow through the branch.  RPDA is of small-caliber with residual thrombus which will be managed medically with Aggrastat.   LVEDP postprocedure 17 mmHg    Repeat LHC:  Status post successful IVUS guided staged PCI of diffuse 70% stenosis in mid circumflex with 2 overlapping drug-eluting stents 2.5 x 24 and 3.0 x 32 and post dilated with 3.5 noncompliant balloon  Medical management of the residual LAD disease at this point (There was a short segment  of distal LAD with bridge collaterals and 70% In-Stent restenosis in the proximal portion of the diagonal (small-caliber distal vessel)    Assessment and Plan     * STEMI (ST elevation myocardial infarction)  Distal % thrombotic occlusion. Proximal RCA 90% stenosis. fixed both proximal and distal RCA.   Has residual disease in LAD and circumflex diagonal which we can manage as a staged procedures   Completed Aggrastat x 12 hours, stopped 2/8  Continue asa 81 mg  Continue Brilinta  b.i.d.  Beta blocker as tolerated  Avoid nitrates given he took Cialis before presentation (in the last 72 hours)  Continue high-dose atorvastatin, and zetia added ( )  Continue regular diet  Cardiology closely following  2D echocardiogram done and showed moderate hypokinesis of the inferior wall, mildly reduced EF 40-45%, normal diastolic function.  Losartan added.  Adjust GDM T as tolerated.  Staged PCI planned for today.      Cardiac arrest  VFib arrest post inferior STEMI  See above      DMII (diabetes mellitus, type 2)  A1c 5.8  POCT glucose  Can start insulin sliding scale as needed, currently well controlled blood glucose    Chest pain  Post chest compressions, had chest soreness generalized  Repeat chest x-ray negative for any rib fractures. Using oral narcotics and Lidoderm patches.  Incentive spirometry  Improved      Discussed with patient's wife at bedside, answered all questions.  Discussed with  and bedside nurse.  DC home once cleared by cardiology team.  VTE Risk Mitigation (From admission, onward)           Ordered     IP VTE HIGH RISK PATIENT  Once         02/08/25 0001     Place sequential compression device  Until discontinued         02/08/25 0001     Reason for No Pharmacological VTE Prophylaxis  Once        Question:  Reasons:  Answer:  Patient is Ambulatory    02/08/25 0001                    Discharge Planning   BARRON: 2/12/2025     Code Status: Full Code   Medical Readiness for Discharge Date:   Discharge Plan A: Home with family                Please place Justification for DME        Paloma Boateng MD  Department of Hospital Medicine   [facility]

## 2025-02-12 NOTE — TELEPHONE ENCOUNTER
----- Message from  Sammi sent at 2/12/2025  8:48 AM CST -----  Regarding: [facility]  Pt expected to discharge today. Will need hospital follow up appointment. Please contact pt with appointment date and time. Thank you, Sammi Gresham RN CM

## 2025-02-12 NOTE — CARE UPDATE
02/12/25 0739   Patient Assessment/Suction   Level of Consciousness (AVPU) alert   Respiratory Effort Normal;Unlabored   Expansion/Accessory Muscles/Retractions no use of accessory muscles   All Lung Fields Breath Sounds clear   Rhythm/Pattern, Respiratory unlabored   PRE-TX-O2   Device (Oxygen Therapy) room air   SpO2 (!) 93 %   Pulse Oximetry Type Continuous   $ Pulse Oximetry - Multiple Charge Pulse Oximetry - Multiple   Pulse 72   Resp 20   Incentive Spirometer   $ Incentive Spirometer Charges refused;done independently per patient;ready for self-administration   Administration (IS) self-administered   Education   $ Education Other (see comment);15 min  (sats)

## 2025-02-12 NOTE — PT/OT/SLP PROGRESS
Occupational Therapy   Treatment    Name: Sanjeev Britt  MRN: [id number]  Admitting Diagnosis:  STEMI (ST elevation myocardial infarction)  1 Day Post-Op    Recommendations:     Discharge Recommendations: No Therapy Indicated  Discharge Equipment Recommendations:  none  Barriers to discharge:  None    Assessment:     Sanjeev Britt is a 59 y.o. male with a medical diagnosis of STEMI (ST elevation myocardial infarction).  Performance deficits affecting function are weakness, impaired endurance, impaired self care skills, impaired functional mobility, gait instability, orthopedic precautions.     Rehab Prognosis:  Good; patient would benefit from acute skilled OT services to address these deficits and reach maximum level of function.       Plan:     Patient to be seen 5 x/week to address the above listed problems via self-care/home management, therapeutic activities, therapeutic exercises  Plan of Care Expires: 03/10/25  Plan of Care Reviewed with: patient    Subjective     Chief Complaint: fatigue following shower.   Patient/Family Comments/goals: to return home  Pain/Comfort:  Pain Rating 1: 0/10    Objective:     Communicated with: Nurse Leslie prior to session.  Patient found ambulatory in room/clements with blood pressure cuff, pulse ox (continuous), telemetry upon OT entry to room.    General Precautions: Standard, fall    Orthopedic Precautions:N/A  Braces: N/A  Respiratory Status: Room air     Occupational Performance:     Functional Mobility/Transfers:  Patient completed  Shower Transfer Step Transfer technique with independence with no AD  Functional Mobility: He ambulated from the bathroom to the bed 10 feet with no AD  Transfer from EOB to b/s chair with Supervision for line management with no AD    Activities of Daily Living:  Grooming: set up assistance for hair combing, applying deodorant seated EOB.      Therapeutic exercise  Anterior/ posterior tilt x 5,  shoulder rollsx 10 forward and backward,  scapula retraction x 5, neck exercises in all planes of motion x 10, UE exercises in all planes of motion x 10,      AMPAC 6 Click ADL: 20    Treatment & Education:  Pt seen for safe self care activities and fx mobility retraining as stated above.  All questions/concerns addressed within scope.  Call staff for BTB/OOB assist. Call bell use explained. Pt acknowledged.  Purpose of OT and POC     Patient left up in chair with all lines intact, call button in reach, and wife present    GOALS:   Multidisciplinary Problems       Occupational Therapy Goals          Problem: Occupational Therapy    Goal Priority Disciplines Outcome Interventions   Occupational Therapy Goal     OT, PT/OT Progressing    Description: Goals to be met by: 3/10/2025     Patient will increase functional independence with ADLs by performing:    UE Dressing with Modified [place].  LE Dressing with Modified [place].  Grooming while standing at sink with Modified [place].  Toileting from toilet with Modified [place] for hygiene and clothing management.   Step transfer with Modified [place]  Toilet transfer to toilet with Modified [place].  Increased functional strength to WNL for ADLS.  Upper extremity exercise program x10 reps per handout, with independence.                         Time Tracking:     OT Date of Treatment: 02/12/25  OT Start Time: 1118  OT Stop Time: 1141  OT Total Time (min): 23 min    Billable Minutes:Therapeutic Activity 15  Therapeutic Exercise 8    OT/LACHO: OT          2/12/2025

## 2025-02-12 NOTE — DISCHARGE SUMMARY
[facility]  Discharge Summary      Patient Name: Sanjeev Britt  MRN: [id number]  NASH: [id number]  Patient Class: IP- Inpatient  Admission Date: 2/7/2025  Hospital Length of Stay: 4 days  Discharge Date and Time:  02/12/2025 3:49 PM  Attending Physician: Paloma Boateng MD   Discharging Provider: Paloma Boateng MD  Primary Care Provider: Chidi Vidal Jr., MD    Primary Care Team: Networked reference to record PCT     HPI:   59-year-old male with a past medical history of CAD status post stent placement\11 years ago ,  diabetes mellitus not on insulin ,  hypertension presents to the emergency department post ROSC while at triage.  Patient coded at triage and was defibrillated once.  On arrival to the emergency department patient was confused though was able to tell me that he has been experiencing chest pain presently 20 minutes prior to arrival.  Patient states that he was having intercourse with his partner and shortly afterwards began to experience chest pain.  Per patient's partner he did utilize sildenafil.  Patient also states he experienced diaphoresis and nausea without any episodes of vomiting.      NON Smoker  Non drinker  Here with wife or GF       His wife convinced him to come to ER    He did not want ambulance     So she drove him     In the parking lot he passed out and hit his head above right eye     But he went into V FIB arrest and was coded    They did CPR and shocked him twice     For about 2-3 minutes total       Then he was taken into ER    Potasium was 2.5     Mag was good over 2       EKG showed STEMI      Then dr Levi took him to cath lab         Distal % thrombotic occlusion. Proximal RCA 90% stenosis. fixed both proximal and distal RCA.     Has residual disease in LAD and circumflex diagonal which we can manage as a staged procedures         We are admitting him to ICU     Procedure(s) (LRB):  IVUS, Coronary  Percutaneous coronary intervention  (N/A)      Hospital Course:   59-year-old male with a past medical history of CAD status post PCI several years ago, diabetes, hypertension presented with chest pain had Vfib and cardiac arrest in the ER twice status post resuscitation.  EKG showed inferior ST elevation. Patient did not take Plavix in the last couple of days.  Code STEMI was activated, patient underwent emergency coronary angiogram showing distal % thrombotic occlusion and proximal RCA 90% stenosis and underwent ELIDA placement.  Patient was continued on aspirin, Brilinta and was started on beta blocker as tolerated.  Statin increased to high-dose.  Aggrastat was continued for 12 hours postprocedure.  2D echocardiogram done and showed moderate hypokinesis of the inferior wall, mildly reduced EF 40-45%, normal diastolic function.  Cardiology closely followed the patient.  Adjusted GDM T as tolerated.  Zetia added for LDL of 103 for a goal less than 60.  Patient underwent repeat left heart catheterization during which received left circumflex coronary artery stent placement.  Patient tolerated procedure well.  Subsequently patient noted to have nonsustained asymptomatic occasional ventricular tachycardia.  Oral amiodarone has been added.  Patient instructed to have surveillance BMP checked next week.  Patient has been cleared for discharge by cardiology team.  Patient to continue close follow-up with primary care physician and cardiologist upon discharge.  Patient to maintain daily blood pressure and heart rate log.    Goals of Care Treatment Preferences:  Code Status: Full Code    Health care agent: Karla Montalvo / Genet Britt  [facility] care agent number:  /                    SDOH Screening:  The patient was unable to be screened for utility difficulties, food insecurity, transport difficulties, housing insecurity, and interpersonal safety, so no concerns could be identified this admission.     Consults:   Consults  (From admission, onward)          Status Ordering Provider     Inpatient consult to Palliative Care  Once        Provider:  Addy Rojas MD    Completed NITO MORROW     Inpatient consult to Social Work/Case Management  Once        Provider:  (Not yet assigned)    KELTON Cordero            * STEMI (ST elevation myocardial infarction)  Distal % thrombotic occlusion. Proximal RCA 90% stenosis. fixed both proximal and distal RCA.   Has residual disease in LAD and circumflex diagonal which we can manage as a staged procedures   Completed Aggrastat x 12 hours, stopped 2/8  Continue asa 81 mg  Continue Brilinta b.i.d.  Beta blocker as tolerated  Avoid nitrates given he took Cialis before presentation (in the last 72 hours)  Continue high-dose atorvastatin, and zetia added ( )  Continue regular diet  Cardiology closely following  2D echocardiogram done and showed moderate hypokinesis of the inferior wall, mildly reduced EF 40-45%, normal diastolic function.  Losartan added.  Adjust GDM T as tolerated.  Maintain potassium 4.0 and magnesium 2.0.    Cardiac arrest  VFib arrest post inferior STEMI  See above      DMII (diabetes mellitus, type 2)  A1c 5.8  POCT glucose  Can start insulin sliding scale as needed, currently well controlled blood glucose    Chest pain  Post chest compressions, had chest soreness generalized  Repeat chest x-ray negative for any rib fractures. Using oral narcotics and Lidoderm patches.  Incentive spirometry  Improved        Final Active Diagnoses:    Diagnosis Date Noted POA    PRINCIPAL PROBLEM:  STEMI (ST elevation myocardial infarction) [I21.3] 02/08/2025 Yes    DMII (diabetes mellitus, type 2) [E11.9] 02/08/2025 Yes    Cardiac arrest [I46.9] 02/08/2025 Yes    Chest pain [R07.9] 09/10/2019 Yes      Problems Resolved During this Admission:       Discharged Condition: good    Disposition:     Follow Up:   Follow-up Information       [facility]  "Counts include 234 beds at the [facility] Follow up.    Why: clinic to contact pt with appointment date and time. if you have not received a call within 3 days of discharge please call clinic to schedule. referral sent via email.  Contact information:  [place] [address]  [phone]               Nate Santana MD Follow up.    Specialties: Interventional Cardiology, Cardiology  Why: In basket message sent to clinic. Clinic to contact pt with appointment date and time.  Contact information:  [address] [place]  [place] [address]  [phone]                           Patient Instructions:   No discharge procedures on file.    Significant Diagnostic Studies: Labs: CMP   Recent Labs   Lab 02/11/25  0406 02/12/25  0325    137   K 3.7 3.8    107   CO2 25 24    122*   BUN 19 21   CREATININE 0.7 0.9   CALCIUM 9.1 8.9   PROT 6.9 6.7   ALBUMIN 4.1 4.0   BILITOT 0.8 0.7   ALKPHOS 80 80   AST 47* 52*   ALT 46* 39   ANIONGAP 5* 6*   , CBC   Recent Labs   Lab 02/11/25  0406 02/12/25  0325   WBC 6.80 7.60   HGB 13.3* 13.9*   HCT 40.0 41.6    234   , INR   Lab Results   Component Value Date    INR 1.0 07/26/2021   , Lipid Panel   Lab Results   Component Value Date    CHOL 180 02/08/2025    HDL 63 02/08/2025    LDLCALC 103.6 02/08/2025    TRIG 67 02/08/2025    CHOLHDL 35.0 02/08/2025   , Troponin No results for input(s): "TROPONINI" in the last 168 hours., and A1C:   Recent Labs   Lab 02/08/25  0311   HGBA1C 5.8       Pending Diagnostic Studies:       Procedure Component Value Units Date/Time    EKG 12-LEAD on arrival to floor [[id number]]     Order Status: Sent Lab Status: No result     EKG 12-LEAD starting tomorrow [[id number]]     Order Status: Sent Lab Status: No result     EKG 12-lead [[id number]] Collected: 02/11/25 1548    Order Status: Sent Lab Status: In process Updated: 02/11/25 1635     QRS Duration 92 ms      OHS QTC Calculation 472 ms     Narrative:      Test Reason : " R07.9,    Vent. Rate :  80 BPM     Atrial Rate :  80 BPM     P-R Int : 168 ms          QRS Dur :  92 ms      QT Int : 410 ms       P-R-T Axes :  69  11 -24 degrees    QTcB Int : 472 ms    Normal sinus rhythm  Inferior infarct (cited on or before 07-Feb-2025)  Abnormal ECG  When compared with ECG of 08-Feb-2025 02:31,  Premature ventricular complexes are no longer Present  Sinus rhythm is no longer with ventricular escape complexes  Serial changes of evolving Inferior infarct Present    Referred By: AAAREFERRAL SELF           Confirmed By:     EKG 12-lead [[id number]]     Order Status: Sent Lab Status: No result            ECHO:    Left Ventricle: The left ventricle is normal in size. Normal wall thickness. There is concentric remodeling. Moderate hypokinesis of the inferior and the remaining walls appear to move fairly well There is mildly reduced systolic function with a visually estimated ejection fraction of 45 - 50%. There is normal diastolic function.    Right Ventricle: Normal right ventricular cavity size. Wall thickness is normal. Systolic function is normal.    Aortic Valve: The aortic valve is structurally normal.    Tricuspid Valve: The tricuspid valve is structurally normal.    Pulmonary Artery: The estimated pulmonary artery systolic pressure is 22 mmHg.    IVC/SVC: Intermediate venous pressure at 8 mmHg.     CXR: Cardiac monitoring leads overlie the chest. Cardiomediastinal silhouette is unchanged in size and configuration. The lungs are symmetrically expanded with slight increased interstitial and parenchymal attenuation which can be seen with interstitial edema possibly atypical infectious process. Of no large region of confluent airspace consolidation, substantial volume of pleural fluid or pneumothorax identified. Osseous structures are intact.      X-ray bilateral ribs:  Normal radiographic appearance of the bilateral ribs.      CXR:  Development of right infrahilar opacity either reflecting  atelectasis or, less likely, developing consolidation.      LHC:  Status post successful IVUS guided PCI of distal RCA (2.5 x 38 stent post dilated with 2.75 noncompliant balloon) and PCI of proximal RCA (3.0 x 12 stent post dilated with a 3.5 noncompliant balloon).    Balloon angioplasty was performed at the ostium of RPDA with re-establishment of flow through the branch.  RPDA is of small-caliber with residual thrombus which will be managed medically with Aggrastat.   LVEDP postprocedure 17 mmHg     Repeat LHC:  Status post successful IVUS guided staged PCI of diffuse 70% stenosis in mid circumflex with 2 overlapping drug-eluting stents 2.5 x 24 and 3.0 x 32 and post dilated with 3.5 noncompliant balloon  Medical management of the residual LAD disease at this point (There was a short segment  of distal LAD with bridge collaterals and 70% In-Stent restenosis in the proximal portion of the diagonal (small-caliber distal vessel)    Medications:  Reconciled Home Medications:      Medication List        ASK your doctor about these medications      amLODIPine 10 MG tablet  Commonly known as: NORVASC  Take 1 tablet (10 mg total) by mouth once daily.     aspirin 81 MG EC tablet  Commonly known as: ECOTRIN  Take 81 mg by mouth once daily.     atorvastatin 40 MG tablet  Commonly known as: LIPITOR  TAKE 1 TABLET(40 MG) BY MOUTH EVERY DAY     CENTRUM SILVER MEN ORAL  Take 1 tablet by mouth once daily.     clopidogreL 75 mg tablet  Commonly known as: PLAVIX  Take 1 tablet (75 mg total) by mouth once daily.     JARDIANCE 10 mg tablet  Generic drug: empagliflozin  Take 10 mg by mouth once daily.     losartan 25 MG tablet  Commonly known as: COZAAR  TAKE 1 TABLET(25 MG) BY MOUTH EVERY DAY     meloxicam 15 MG tablet  Commonly known as: MOBIC  Take 1 tablet by mouth daily as needed for Pain.     metoprolol tartrate 50 MG tablet  Commonly known as: LOPRESSOR  Take 1 tablet (50 mg total) by mouth 2 (two) times daily.      pregabalin 75 MG capsule  Commonly known as: LYRICA  Take 1 capsule by mouth 2 (two) times daily.     tadalafiL 5 MG tablet  Commonly known as: CIALIS  Take 1 tablet (5 mg total) by mouth daily as needed for Erectile Dysfunction.     traZODone 150 MG tablet  Commonly known as: DESYREL  Take 1 tablet by mouth once daily.              Indwelling Lines/Drains at time of discharge:   Lines/Drains/Airways       None                   Time spent on the discharge of patient: 33 minutes         Paloma Boateng MD  Department of Hospital Medicine  [facility]

## 2025-02-12 NOTE — PLAN OF CARE
Problem: Adult Inpatient Plan of Care  Goal: Plan of Care Review  Outcome: Met  Goal: Patient-Specific Goal (Individualized)  Outcome: Met  Goal: Absence of Hospital-Acquired Illness or Injury  Outcome: Met  Goal: Optimal Comfort and Wellbeing  Outcome: Met  Goal: Readiness for Transition of Care  Outcome: Met     Problem: Wound  Goal: Optimal Coping  Outcome: Met  Goal: Optimal Functional Ability  Outcome: Met  Goal: Absence of Infection Signs and Symptoms  Outcome: Met  Goal: Improved Oral Intake  Outcome: Met  Goal: Optimal Pain Control and Function  Outcome: Met  Goal: Skin Health and Integrity  Outcome: Met  Goal: Optimal Wound Healing  Outcome: Met     Problem: Diabetes Comorbidity  Goal: Blood Glucose Level Within Targeted Range  Outcome: Met     Problem: Coping Ineffective  Goal: Effective Coping  Outcome: Met     Problem: Cardiac Catheterization (Diagnostic/Interventional)  Goal: Absence of Bleeding  Outcome: Met  Goal: Absence of Contrast-Induced Injury  Outcome: Met  Goal: Stable Heart Rate and Rhythm  Outcome: Met  Goal: Absence of Embolism Signs and Symptoms  Outcome: Met  Goal: Anesthesia/Sedation Recovery  Outcome: Met  Goal: Optimal Pain Control and Function  Outcome: Met  Goal: Absence of Vascular Access Complication  Outcome: Met     Problem: Fall Injury Risk  Goal: Absence of Fall and Fall-Related Injury  Outcome: Met

## 2025-02-12 NOTE — PLAN OF CARE
Follow up appointment with [facility] and Max cabrera. Added to AVS for reference. No further needs known at this time. Pt cleared to discharge home with significant other to transport.     02/12/25 1614   Final Note   Assessment Type Final Discharge Note   Anticipated Discharge Disposition Home   What phone number can be called within the next 1-3 days to see how you are doing after discharge? [phone]   Hospital Resources/Appts/Education Provided Provided patient/caregiver with written discharge plan information;Appointments scheduled and added to AVS   Post-Acute Status   Discharge Delays None known at this time

## 2025-02-12 NOTE — NURSING
Notified Dr. Feng that patient had a 7 beat run of V-Tach then a 2 beats then a 9 beat of v- tach. Patient was sleeping. denies any chest discomfort at present time. 2/11 patient had PCI done to the LDA with 2 stents and on 2/7 had the distal and proximal RCA done on admit >  Dr. Feng placed New Orders on chart.

## 2025-02-12 NOTE — SUBJECTIVE & OBJECTIVE
Interval History:  Patient is seen and examined during multidisciplinary round.  Patient reports improved reproducible chest wall pain.  Patient underwent staged coronary intervention when patient received ELIDA in circumflex artery.  Frequent nonsustained V-tach noted which are asymptomatic.  Patient encouraged to continue use of incentive spirometry.  Patient encouraged to get out of bed and work with physical therapy.  Cardiology team following.    Review of Systems   All other systems reviewed and are negative.    Objective:     Vital Signs (Most Recent):  Temp: 99 °F (37.2 °C) (02/12/25 0745)  Pulse: 76 (02/12/25 0725)  Resp: 20 (02/12/25 0809)  BP: (!) 159/94 (02/12/25 0725)  SpO2: (!) 93 % (02/12/25 0725) Vital Signs (24h Range):  Temp:  [98 °F (36.7 °C)-99 °F (37.2 °C)] 99 °F (37.2 °C)  Pulse:  [] 76  Resp:  [15-22] 20  SpO2:  [90 %-98 %] 93 %  BP: (128-176)/(72-94) 159/94     Weight: 76.4 kg (168 lb 6.9 oz)  Body mass index is 27.19 kg/m².    Intake/Output Summary (Last 24 hours) at 2/12/2025 0836  Last data filed at 2/12/2025 0500  Gross per 24 hour   Intake 1298.49 ml   Output 400 ml   Net 898.49 ml         Physical Exam  Vitals and nursing note reviewed. Exam conducted with a chaperone present.   Constitutional:       Appearance: Normal appearance.   HENT:      Head: Normocephalic. Abrasion and contusion present.        Comments: Eye lid contusion significantly improving, no ophthalmoplegia, no change in vision.     Nose: Nose normal.      Mouth/Throat:      Mouth: Mucous membranes are moist.   Eyes:      Pupils: Pupils are equal, round, and reactive to light.   Cardiovascular:      Rate and Rhythm: Normal rate and regular rhythm.      Pulses: Normal pulses.      Heart sounds: Normal heart sounds.      Comments: Reproducible chest wall tenderness bilaterally.  Pulmonary:      Effort: Pulmonary effort is normal.      Breath sounds: Normal breath sounds.   Abdominal:      General: Abdomen is flat.  Bowel sounds are normal.      Palpations: Abdomen is soft.   Musculoskeletal:         General: Normal range of motion.      Cervical back: Normal range of motion.   Skin:     General: Skin is warm.      Capillary Refill: Capillary refill takes less than 2 seconds.   Neurological:      General: No focal deficit present.      Mental Status: He is alert.   Psychiatric:         Mood and Affect: Mood normal.             Significant Labs:   Lab Results   Component Value Date    WBC 7.60 02/12/2025    HGB 13.9 (L) 02/12/2025    HCT 41.6 02/12/2025    MCV 88 02/12/2025     02/12/2025       CMP  Sodium   Date Value Ref Range Status   02/12/2025 137 136 - 145 mmol/L Final     Potassium   Date Value Ref Range Status   02/12/2025 3.8 3.5 - 5.1 mmol/L Final     Chloride   Date Value Ref Range Status   02/12/2025 107 95 - 110 mmol/L Final     CO2   Date Value Ref Range Status   02/12/2025 24 23 - 29 mmol/L Final     Glucose   Date Value Ref Range Status   02/12/2025 122 (H) 70 - 110 mg/dL Final     BUN   Date Value Ref Range Status   02/12/2025 21 8 - 23 mg/dL Final     Creatinine   Date Value Ref Range Status   02/12/2025 0.9 0.5 - 1.4 mg/dL Final     Calcium   Date Value Ref Range Status   02/12/2025 8.9 8.7 - 10.5 mg/dL Final     Total Protein   Date Value Ref Range Status   02/12/2025 6.7 6.0 - 8.4 g/dL Final     Albumin   Date Value Ref Range Status   02/12/2025 4.0 3.5 - 5.2 g/dL Final     Total Bilirubin   Date Value Ref Range Status   02/12/2025 0.7 0.1 - 1.0 mg/dL Final     Comment:     For infants and newborns, interpretation of results should be based  on gestational age, weight and in agreement with clinical  observations.    Premature Infant recommended reference ranges:  Up to 24 hours.............<8.0 mg/dL  Up to 48 hours............<12.0 mg/dL  3-5 days..................<15.0 mg/dL  6-29 days.................<15.0 mg/dL       Alkaline Phosphatase   Date Value Ref Range Status   02/12/2025 80 55 - 135 U/L  Final     AST   Date Value Ref Range Status   02/12/2025 52 (H) 10 - 40 U/L Final     ALT   Date Value Ref Range Status   02/12/2025 39 10 - 44 U/L Final     Anion Gap   Date Value Ref Range Status   02/12/2025 6 (L) 8 - 16 mmol/L Final     eGFR   Date Value Ref Range Status   02/12/2025 >60.0 >60 mL/min/1.73 m^2 Final     Microbiology Results (last 7 days)       ** No results found for the last 168 hours. **          Recent Labs   Lab 02/12/25  0325   TROPONINIHS 28339.2*       Significant Imaging:  ECHO:    Left Ventricle: The left ventricle is normal in size. Normal wall thickness. There is concentric remodeling. Moderate hypokinesis of the inferior and the remaining walls appear to move fairly well There is mildly reduced systolic function with a visually estimated ejection fraction of 45 - 50%. There is normal diastolic function.    Right Ventricle: Normal right ventricular cavity size. Wall thickness is normal. Systolic function is normal.    Aortic Valve: The aortic valve is structurally normal.    Tricuspid Valve: The tricuspid valve is structurally normal.    Pulmonary Artery: The estimated pulmonary artery systolic pressure is 22 mmHg.    IVC/SVC: Intermediate venous pressure at 8 mmHg.    CXR: Cardiac monitoring leads overlie the chest. Cardiomediastinal silhouette is unchanged in size and configuration. The lungs are symmetrically expanded with slight increased interstitial and parenchymal attenuation which can be seen with interstitial edema possibly atypical infectious process. Of no large region of confluent airspace consolidation, substantial volume of pleural fluid or pneumothorax identified. Osseous structures are intact.     X-ray bilateral ribs:  Normal radiographic appearance of the bilateral ribs.     CXR:  Development of right infrahilar opacity either reflecting atelectasis or, less likely, developing consolidation.     LHC:  Status post successful IVUS guided PCI of distal RCA (2.5 x 38  stent post dilated with 2.75 noncompliant balloon) and PCI of proximal RCA (3.0 x 12 stent post dilated with a 3.5 noncompliant balloon).    Balloon angioplasty was performed at the ostium of RPDA with re-establishment of flow through the branch.  RPDA is of small-caliber with residual thrombus which will be managed medically with Aggrastat.   LVEDP postprocedure 17 mmHg    Repeat C:  Status post successful IVUS guided staged PCI of diffuse 70% stenosis in mid circumflex with 2 overlapping drug-eluting stents 2.5 x 24 and 3.0 x 32 and post dilated with 3.5 noncompliant balloon  Medical management of the residual LAD disease at this point (There was a short segment  of distal LAD with bridge collaterals and 70% In-Stent restenosis in the proximal portion of the diagonal (small-caliber distal vessel)

## 2025-02-12 NOTE — CARE UPDATE
02/11/25 2025   Patient Assessment/Suction   Level of Consciousness (AVPU) alert   Respiratory Effort Normal   Expansion/Accessory Muscles/Retractions no use of accessory muscles   Rhythm/Pattern, Respiratory unlabored   PRE-TX-O2   Device (Oxygen Therapy) room air   SpO2 (!) 93 %   Pulse Oximetry Type Continuous   $ Pulse Oximetry - Multiple Charge Pulse Oximetry - Multiple   Pulse 77   Resp 17   Education   $ Education 15 min;Oxygen   Respiratory Evaluation   $ Care Plan Tech Time 15 min

## 2025-02-12 NOTE — PLAN OF CARE
Problem: Adult Inpatient Plan of Care  Goal: Plan of Care Review  Outcome: Progressing  Goal: Patient-Specific Goal (Individualized)  Outcome: Progressing  Goal: Absence of Hospital-Acquired Illness or Injury  Outcome: Progressing  Goal: Optimal Comfort and Wellbeing  Outcome: Progressing  Goal: Readiness for Transition of Care  Outcome: Progressing     Problem: Wound  Goal: Optimal Coping  Outcome: Progressing  Goal: Optimal Functional Ability  Outcome: Progressing  Goal: Absence of Infection Signs and Symptoms  Outcome: Progressing  Goal: Improved Oral Intake  Outcome: Progressing  Goal: Optimal Pain Control and Function  Outcome: Progressing  Goal: Skin Health and Integrity  Outcome: Progressing  Goal: Optimal Wound Healing  Outcome: Progressing     Problem: Diabetes Comorbidity  Goal: Blood Glucose Level Within Targeted Range  Outcome: Progressing     Problem: Coping Ineffective  Goal: Effective Coping  Outcome: Progressing     Problem: Cardiac Catheterization (Diagnostic/Interventional)  Goal: Absence of Bleeding  Outcome: Progressing  Goal: Absence of Contrast-Induced Injury  Outcome: Progressing  Goal: Stable Heart Rate and Rhythm  Outcome: Progressing  Goal: Absence of Embolism Signs and Symptoms  Outcome: Progressing  Goal: Anesthesia/Sedation Recovery  Outcome: Progressing  Goal: Optimal Pain Control and Function  Outcome: Progressing  Goal: Absence of Vascular Access Complication  Outcome: Progressing     Problem: Fall Injury Risk  Goal: Absence of Fall and Fall-Related Injury  Outcome: Progressing

## 2025-02-13 RX ORDER — AMIODARONE HYDROCHLORIDE 200 MG/1
TABLET ORAL
Qty: 90 TABLET | Refills: 0 | Status: SHIPPED | OUTPATIENT
Start: 2025-02-13 | End: 2025-02-13 | Stop reason: SDUPTHER

## 2025-02-13 RX ORDER — AMIODARONE HYDROCHLORIDE 200 MG/1
TABLET ORAL
Qty: 90 TABLET | Refills: 0 | Status: SHIPPED | OUTPATIENT
Start: 2025-02-13

## 2025-02-13 RX ORDER — AMIODARONE HYDROCHLORIDE 200 MG/1
TABLET ORAL
Qty: 57 TABLET | Refills: 0 | Status: SHIPPED | OUTPATIENT
Start: 2025-02-13 | End: 2025-02-13 | Stop reason: SDUPTHER

## 2025-02-13 NOTE — TELEPHONE ENCOUNTER
Spoke with pt, issue with amiodarone was corrected. Hospital follow up appointment made.

## 2025-02-13 NOTE — TELEPHONE ENCOUNTER
Spoke with Ellie (wife) who states the patient was discharged today from the hospital after a 5 day stay (heart attack).  Wife states amiodarone was not sent to the pharmacy. After review of the chart prescription was sent as a print.  Wife states they don't have a prescription print out in the discharge paperwork.  Patient is not having any symptoms.  Spoke with on call provider Dr. Rodriguez who states the patient can go tonight without the medication.  Dr. Rodriguez was made aware that per wife patient is supposed to take a dose tonight.   Message will be sent to have prescription sent to [facility] located at [address] [place] [address]. Wife was given information advised that she call back with questions or if office does not return call in the morning. Wife verbalized understanding.    Reason for Disposition   [1] Prescription not at pharmacy AND [2] was prescribed by PCP recently (Exception: [facility] has access to EMR and prescription is recorded there. Go to Home Care and confirm for pharmacy.)    Additional Information   Negative: [1] Intentional drug overdose AND [2] suicidal thoughts or ideas   Negative: MORE THAN A DOUBLE DOSE of a prescription or over-the-counter (OTC) drug   Negative: [1] DOUBLE DOSE (an extra dose or lesser amount) of prescription drug AND [2] any symptoms (e.g., dizziness, nausea, pain, sleepiness)   Negative: [1] DOUBLE DOSE (an extra dose or lesser amount) of over-the-counter (OTC) drug AND [2] any symptoms (e.g., dizziness, nausea, pain, sleepiness)   Negative: Took another person's prescription drug   Negative: [1] DOUBLE DOSE (an extra dose or lesser amount) of prescription drug AND [2] NO symptoms  (Exception: A double dose of antibiotics.)   Negative: Diabetes drug error or overdose (e.g., took wrong type of insulin or took extra dose)    Protocols used: Medication Question Call-A-

## 2025-02-13 NOTE — TELEPHONE ENCOUNTER
----- Message from Maura sent at 2/13/2025  8:22 AM CST -----  Regarding: refill  Type:  RX Refill Request    Who Called: pt    Refill or New Rx:refill    RX Name and Strength:amiodarone (PACERONE) 200 MG Tab 57 tablet 0 2/12/2025 -   Sig: Take 1 tablet by mouth 3 times daily for 3 days and then  Take 1 tablet by mouth twice daily for 21 days and then  Take 1 tablet by mouth daily afterwards.   Class: Print         How is the patient currently taking it? (ex. 1XDay):see above    Is this a 30 day or 90 day RX:see above    Preferred Pharmacy with phone number:    [id number] - [place] - [address] [facility]  [address]  [facility] [place] [address]  Phone: [phone] Fax: [phone]    Local or Mail Order:local     Ordering Provider:netta Duron Call Back Number:[phone]      Additional Information: pt st that he didn't not get his hard copy of script when he was discharge from [facility] last night. He wants to know if  would send a script to pharm for him. He st dr seen him in hospital.  Please call to discuss.

## 2025-02-28 ENCOUNTER — OFFICE VISIT (OUTPATIENT)
Dept: CARDIOLOGY | Facility: CLINIC | Age: 60
End: 2025-02-28
Payer: MEDICAID

## 2025-02-28 VITALS
DIASTOLIC BLOOD PRESSURE: 69 MMHG | HEART RATE: 47 BPM | HEIGHT: 66 IN | SYSTOLIC BLOOD PRESSURE: 111 MMHG | BODY MASS INDEX: 26.93 KG/M2 | WEIGHT: 167.56 LBS

## 2025-02-28 DIAGNOSIS — I47.29 NSVT (NONSUSTAINED VENTRICULAR TACHYCARDIA): ICD-10-CM

## 2025-02-28 DIAGNOSIS — I21.11 ST ELEVATION MYOCARDIAL INFARCTION INVOLVING RIGHT CORONARY ARTERY: ICD-10-CM

## 2025-02-28 DIAGNOSIS — I10 ESSENTIAL HYPERTENSION: Chronic | ICD-10-CM

## 2025-02-28 DIAGNOSIS — N52.9 ERECTILE DYSFUNCTION, UNSPECIFIED ERECTILE DYSFUNCTION TYPE: ICD-10-CM

## 2025-02-28 DIAGNOSIS — E11.69 TYPE 2 DIABETES MELLITUS WITH OTHER SPECIFIED COMPLICATION, UNSPECIFIED WHETHER LONG TERM INSULIN USE: ICD-10-CM

## 2025-02-28 DIAGNOSIS — R00.1 BRADYCARDIA: ICD-10-CM

## 2025-02-28 DIAGNOSIS — Z95.5 HISTORY OF CORONARY ANGIOPLASTY WITH INSERTION OF STENT: Primary | ICD-10-CM

## 2025-02-28 DIAGNOSIS — R07.9 CHEST PAIN, UNSPECIFIED TYPE: ICD-10-CM

## 2025-02-28 DIAGNOSIS — I25.10 CORONARY ARTERY DISEASE INVOLVING NATIVE CORONARY ARTERY OF NATIVE HEART WITHOUT ANGINA PECTORIS: Chronic | ICD-10-CM

## 2025-02-28 DIAGNOSIS — I50.21 ACUTE HFREF (HEART FAILURE WITH REDUCED EJECTION FRACTION): ICD-10-CM

## 2025-02-28 PROCEDURE — 99213 OFFICE O/P EST LOW 20 MIN: CPT | Mod: PBBFAC,PN

## 2025-02-28 PROCEDURE — 99999 PR PBB SHADOW E&M-EST. PATIENT-LVL III: CPT | Mod: PBBFAC,,,

## 2025-02-28 RX ORDER — METOPROLOL SUCCINATE 25 MG/1
25 TABLET, EXTENDED RELEASE ORAL DAILY
Qty: 90 TABLET | Refills: 3 | Status: SHIPPED | OUTPATIENT
Start: 2025-02-28 | End: 2026-02-28

## 2025-02-28 RX ORDER — AMLODIPINE BESYLATE 10 MG/1
10 TABLET ORAL DAILY
Qty: 90 TABLET | Refills: 3 | Status: SHIPPED | OUTPATIENT
Start: 2025-02-28

## 2025-02-28 RX ORDER — LOSARTAN POTASSIUM 50 MG/1
50 TABLET ORAL DAILY
Qty: 90 TABLET | Refills: 3 | Status: SHIPPED | OUTPATIENT
Start: 2025-02-28 | End: 2026-02-28

## 2025-02-28 RX ORDER — ATORVASTATIN CALCIUM 80 MG/1
80 TABLET, FILM COATED ORAL DAILY
Qty: 90 TABLET | Refills: 3 | Status: SHIPPED | OUTPATIENT
Start: 2025-02-28 | End: 2026-02-28

## 2025-02-28 RX ORDER — AMIODARONE HYDROCHLORIDE 200 MG/1
200 TABLET ORAL DAILY
Qty: 90 TABLET | Refills: 3 | Status: SHIPPED | OUTPATIENT
Start: 2025-03-10

## 2025-02-28 NOTE — ASSESSMENT & PLAN NOTE
VFib cardiac arrest with STEMI s/p PCI to prox and distal RCA and mid cx.    Patient continued to have brief runs of nonsustained VT during hospitalization and was placed on amiodarone upon discharge.  He is currently taking 200 mg b.i.d..  Patient to start taking amiodarone 200 mg daily on March 10, 2025.  We will reduce to 100 mg when I see patient again in 3 months

## 2025-02-28 NOTE — ASSESSMENT & PLAN NOTE
EF 45-50% on recent echocardiogram.  Recent STEMI triple-vessel CAD s/p PCIs.  Continue metoprolol and losartan.  Reduce metoprolol succinate to 25 mg daily due to bradycardia.  Continue losartan 50 mg daily.  Continue Jardiance 10 mg daily.  2 g salt restriction.  1.5 L fluid restriction.  Daily weights.  Instructions written down for patient.  Monitor for edema.  Euvolemic today in office.  We will repeat echocardiogram in 3 months

## 2025-02-28 NOTE — ASSESSMENT & PLAN NOTE
Distal % thrombotic occlusion and proximal RCA 90% stenosis.  S/p PCI to proximal and distal RCA.  Staged PCI of mid circumflex. Medical management of the residual LAD disease at this point (There was a short segment  of distal LAD with bridge collaterals and 70% In-Stent restenosis in the proximal portion of the diagonal (small-caliber distal vessel)     Continue aspirin and Brilinta x1 year.  Continue statin therapy.  Repeat lipid panel in 3 months.  Recommend LDL less than 60  Low-sodium heart healthy diet.  Continue losartan and metoprolol.

## 2025-02-28 NOTE — ASSESSMENT & PLAN NOTE
Recent admission for STEMI.  Significant triple-vessel CAD.  Stent placed to proximal and distal RCA, and mid circumflex.  Continue aspirin, Brilinta, statin therapy.  Continue metoprolol succinate and losartan.    2/8/25 cardiac catheterization      Significant triple-vessel coronary artery disease.    Discrete 90% stenosis in proximal RCA and 100% thrombotic occlusion in distal RCA (culprit lesion) status post successful IVUS guided PCI.    Dist RCA lesion: A STENT SYNERGY XD 2.5X38MM stent was successfully placed at 11 VIELKA for 11 sec. and post dilated with 2.75 noncompliant balloon.    Prox RCA lesion: A STENT SYNERGY XD 3.0X12MM stent was successfully placed at 12 VIELKA for 10 sec. and post dilated with 3.5 noncompliant balloon.    The post-procedure left ventricular end diastolic pressure was 17.      2/11/25 cardiac catheterization    Successful staged IVUS guided PCI of diffuse lesion in mid circumflex.    The Mid Cx lesion was 70% stenosed with 0% stenosis post-intervention.    Mid Cx lesion: A STENT SYNERGY XD 2.5X24MM stent was successfully placed at 11 VIELKA for 10 sec.    Mid Cx lesion: A STENT SYNERGY XD 3.0X32MM stent was successfully placed at 11 VIELKA for 15 sec.   Routine post PCI care.   Maximize medical management.   Risk factor reductions.   ASA 81mg.   Cardiac rehab referral.   Ticagrelor (Brilinta) for one year.      Medical management of the residual LAD disease at this point (There was a short segment  of distal LAD with bridge collaterals and 70% In-Stent restenosis in the proximal portion of the diagonal (small-caliber distal vessel)

## 2025-02-28 NOTE — ASSESSMENT & PLAN NOTE
Discussed with patient that he is no longer to take Cialis until he has completed cardiac rehab and is cleared by cardiologist.

## 2025-02-28 NOTE — PROGRESS NOTES
Subjective:    Patient ID:  Sajneev Britt is a 59 y.o. male patient here for evaluation Coronary Artery Disease, Hypertension, and Hospital Follow Up      History of Present Illness:     Patient is here for a hospital follow up.  Recently admitted on 02/07/2025 with STEMI and cardiac arrest.  Patient presented to the emergency room and went into VFib cardiac arrest twice in ED.  ROSC obtained.  Code STEMI was activated, patient underwent emergency coronary angiogram showing distal % thrombotic occlusion and proximal RCA 90% stenosis and underwent ELIDA placement.  Staged PCI Mid CX.     EKG today in office shows normal sinus rhythm with inferior infarct.  No acute STT wave changes.  He denies any chest pain, shortness of breath, lightheadedness, dizziness, jaw neck or arm pain, edema or bleeding.    He is following a low-sodium heart healthy diet.  Discussed with patient that he is no longer take Cialis at this time.  That he needs to complete cardiac rehab for clearance to return to work.  Discussed with patient that he will need clearance from a cardiologist in order to restart Cialis.  Strongly encourage 1.5 L fluid restriction and 2 g salt restriction.  Chest wall is tender to palpation secondary to chest compressions.    Significant other is present at appointment.        FROM DISCHARGE SUMMARY  Admission Date: 2/7/2025  Hospital Length of Stay: 4 days  Discharge Date and Time:  02/12/2025 3:49 PM  Attending Physician: Paloma Boateng MD   Discharging Provider: Paloma Boateng MD  Primary Care Provider: Chidi Vidal Jr., MD     Primary Care Team: Networked reference to record PCT      HPI:   59-year-old male with a past medical history of CAD status post stent placement\11 years ago ,  diabetes mellitus not on insulin ,  hypertension presents to the emergency department post ROSC while at triage.  Patient coded at triage and was defibrillated once.  On arrival to the emergency department patient was  confused though was able to tell me that he has been experiencing chest pain presently 20 minutes prior to arrival.  Patient states that he was having intercourse with his partner and shortly afterwards began to experience chest pain.  Per patient's partner he did utilize sildenafil.  Patient also states he experienced diaphoresis and nausea without any episodes of vomiting.       NON Smoker  Non drinker  Here with wife or GF         His wife convinced him to come to ER     He did not want ambulance      So she drove him      In the parking lot he passed out and hit his head above right eye      But he went into V FIB arrest and was coded     They did CPR and shocked him twice      For about 2-3 minutes total         Then he was taken into ER     Potasium was 2.5      Mag was good over 2         EKG showed STEMI        Then dr Levi took him to cath lab            Distal % thrombotic occlusion. Proximal RCA 90% stenosis. fixed both proximal and distal RCA.      Has residual disease in LAD and circumflex diagonal which we can manage as a staged procedures            We are admitting him to ICU      Procedure(s) (LRB):  IVUS, Coronary  Percutaneous coronary intervention (N/A)       Hospital Course:   59-year-old male with a past medical history of CAD status post PCI several years ago, diabetes, hypertension presented with chest pain had Vfib and cardiac arrest in the ER twice status post resuscitation.  EKG showed inferior ST elevation. Patient did not take Plavix in the last couple of days.  Code STEMI was activated, patient underwent emergency coronary angiogram showing distal % thrombotic occlusion and proximal RCA 90% stenosis and underwent ELIDA placement.  Patient was continued on aspirin, Brilinta and was started on beta blocker as tolerated.  Statin increased to high-dose.  Aggrastat was continued for 12 hours postprocedure.  2D echocardiogram done and showed moderate hypokinesis of the inferior  wall, mildly reduced EF 40-45%, normal diastolic function.  Cardiology closely followed the patient.  Adjusted GDM T as tolerated.  Zetia added for LDL of 103 for a goal less than 60.  Patient underwent repeat left heart catheterization during which received left circumflex coronary artery stent placement.  Patient tolerated procedure well.  Subsequently patient noted to have nonsustained asymptomatic occasional ventricular tachycardia.  Oral amiodarone has been added.  Patient instructed to have surveillance BMP checked next week.  Patient has been cleared for discharge by cardiology team.  Patient to continue close follow-up with primary care physician and cardiologist upon discharge.  Patient to maintain daily blood pressure and heart rate log.           Review of patient's allergies indicates:   Allergen Reactions    Levaquin [levofloxacin]        Past Medical History:   Diagnosis Date    Acute coronary syndrome 09/09/2019    Coronary artery disease     Diabetes mellitus     Hypertension     Sleep apnea      Past Surgical History:   Procedure Laterality Date    ANGIOGRAM, CORONARY, WITH LEFT HEART CATHETERIZATION N/A 2/8/2025    Procedure: Angiogram, Coronary, with Left Heart Cath;  Surgeon: Mo Levi MD;  Location: [facility] CATH/EP LAB;  Service: Cardiology;  Laterality: N/A;    FESS, USING COMPUTER-ASSISTED NAVIGATION, WITH NASAL TURBINATE REDUCTION N/A 6/7/2024    Procedure: FESS, USING COMPUTER-ASSISTED NAVIGATION, WITH NASAL TURBINATE REDUCTION;  Surgeon: Andres Abraham MD;  Location: [facility] ASU OR;  Service: ENT;  Laterality: N/A;    IVUS, CORONARY  2/8/2025    Procedure: IVUS, Coronary;  Surgeon: Mo Levi MD;  Location: [facility] CATH/EP LAB;  Service: Cardiology;;    IVUS, CORONARY  2/11/2025    Procedure: IVUS, Coronary;  Surgeon: Mo Levi MD;  Location: [facility] CATH/EP LAB;  Service: Cardiology;;    NASAL SEPTOPLASTY N/A 6/7/2024    Procedure: SEPTOPLASTY, NOSE;   Surgeon: Andres Abraham MD;  Location: [facility] OR;  Service: ENT;  Laterality: N/A;    NASAL TURBINATE REDUCTION Bilateral 6/7/2024    Procedure: REDUCTION, NASAL TURBINATE;  Surgeon: Andres Abraham MD;  Location: [facility] OR;  Service: ENT;  Laterality: Bilateral;    PERCUTANEOUS CORONARY INTERVENTION, ARTERY N/A 2/8/2025    Procedure: Percutaneous coronary intervention;  Surgeon: Mo Levi MD;  Location: [facility] CATH/EP LAB;  Service: Cardiology;  Laterality: N/A;    PERCUTANEOUS CORONARY INTERVENTION, ARTERY N/A 2/11/2025    Procedure: Percutaneous coronary intervention;  Surgeon: Mo Levi MD;  Location: [facility] CATH/EP LAB;  Service: Cardiology;  Laterality: N/A;    VASCULAR SURGERY       Social History[1]     Review of Systems:    As noted in HPI in addition      REVIEW OF SYSTEMS  CARDIOVASCULAR: No recent chest pain, palpitations, arm, neck, or jaw pain  RESPIRATORY: No recent fever, cough chills, SOB or congestion  : No blood in the urine  GI: No Nausea, vomiting, constipation, diarrhea, blood, or reflux.  MUSCULOSKELETAL: No myalgias  NEURO: No lightheadedness or dizziness  EYES: No Double vision, blurry, vision or headache              Objective        Vitals:    02/28/25 1102   BP: 111/69   Pulse: (!) 47       LIPIDS - LAST 2   Lab Results   Component Value Date    CHOL 180 02/08/2025    CHOL 164 01/22/2024    HDL 63 02/08/2025    HDL 55 01/22/2024    LDLCALC 103.6 02/08/2025    LDLCALC 98.2 01/22/2024    TRIG 67 02/08/2025    TRIG 54 01/22/2024    CHOLHDL 35.0 02/08/2025    CHOLHDL 33.5 01/22/2024       CBC - LAST 2  Lab Results   Component Value Date    WBC 7.60 02/12/2025    WBC 6.80 02/11/2025    RBC 4.72 02/12/2025    RBC 4.51 (L) 02/11/2025    HGB 13.9 (L) 02/12/2025    HGB 13.3 (L) 02/11/2025    HCT 41.6 02/12/2025    HCT 40.0 02/11/2025    MCV 88 02/12/2025    MCV 89 02/11/2025    MCH 29.4 02/12/2025    MCH 29.5 02/11/2025    MCHC 33.4 02/12/2025    MCHC 33.3  02/11/2025    RDW 13.4 02/12/2025    RDW 13.2 02/11/2025     02/12/2025     02/11/2025    MPV 10.5 02/12/2025    MPV 11.0 02/11/2025    GRAN 5.3 02/12/2025    GRAN 70.0 02/12/2025    LYMPH 1.0 02/12/2025    LYMPH 13.3 (L) 02/12/2025    MONO 1.0 02/12/2025    MONO 13.3 02/12/2025    BASO 0.02 02/12/2025    BASO 0.04 02/11/2025    NRBC 0 02/12/2025    NRBC 0 02/11/2025       CHEMISTRY & LIVER FUNCTION - LAST 2  Lab Results   Component Value Date     02/12/2025     02/11/2025    K 3.8 02/12/2025    K 3.7 02/11/2025     02/12/2025     02/11/2025    CO2 24 02/12/2025    CO2 25 02/11/2025    ANIONGAP 6 (L) 02/12/2025    ANIONGAP 5 (L) 02/11/2025    BUN 21 02/12/2025    BUN 19 02/11/2025    CREATININE 0.9 02/12/2025    CREATININE 0.7 02/11/2025     (H) 02/12/2025     02/11/2025    CALCIUM 8.9 02/12/2025    CALCIUM 9.1 02/11/2025    PH 7.485 (H) 02/08/2025    MG 2.1 02/12/2025    MG 2.0 02/11/2025    ALBUMIN 4.0 02/12/2025    ALBUMIN 4.1 02/11/2025    PROT 6.7 02/12/2025    PROT 6.9 02/11/2025    ALKPHOS 80 02/12/2025    ALKPHOS 80 02/11/2025    ALT 39 02/12/2025    ALT 46 (H) 02/11/2025    AST 52 (H) 02/12/2025    AST 47 (H) 02/11/2025    BILITOT 0.7 02/12/2025    BILITOT 0.8 02/11/2025        CARDIAC PROFILE - LAST 2  Lab Results   Component Value Date     (H) 02/07/2025    BNP 32 09/09/2019     08/16/2021     (H) 08/15/2021     (H) 08/15/2021    TROPONINI 0.033 08/15/2021    TROPONINI <0.030 07/26/2021    TROPONINIHS 34226.2 (HH) 02/12/2025    TROPONINIHS 02050.4 (HH) 02/08/2025        COAGULATION - LAST 2  Lab Results   Component Value Date    LABPT 12.8 07/26/2021    INR 1.0 07/26/2021       ENDOCRINE & PSA - LAST 2  Lab Results   Component Value Date    HGBA1C 5.8 02/08/2025    HGBA1C 6.1 01/22/2024    TSH 1.899 01/22/2024        ECHOCARDIOGRAM RESULTS  Results for orders placed during the hospital encounter of  02/07/25    Echo    Interpretation Summary    Left Ventricle: The left ventricle is normal in size. Normal wall thickness. There is concentric remodeling. Moderate hypokinesis of the inferior and the remaining walls appear to move fairly well There is mildly reduced systolic function with a visually estimated ejection fraction of 45 - 50%. There is normal diastolic function.    Right Ventricle: Normal right ventricular cavity size. Wall thickness is normal. Systolic function is normal.    Aortic Valve: The aortic valve is structurally normal.    Tricuspid Valve: The tricuspid valve is structurally normal.    Pulmonary Artery: The estimated pulmonary artery systolic pressure is 22 mmHg.    IVC/SVC: Intermediate venous pressure at 8 mmHg.      CURRENT/PREVIOUS VISIT EKG  Results for orders placed or performed during the hospital encounter of 02/07/25   EKG 12-lead    Collection Time: 02/11/25  3:48 PM   Result Value Ref Range    QRS Duration 92 ms    OHS QTC Calculation 472 ms    Narrative    Test Reason : R07.9,    Vent. Rate :  80 BPM     Atrial Rate :  80 BPM     P-R Int : 168 ms          QRS Dur :  92 ms      QT Int : 410 ms       P-R-T Axes :  69  11 -24 degrees    QTcB Int : 472 ms    Normal sinus rhythm  Inferior infarct (cited on or before 07-Feb-2025)  Abnormal ECG  When compared with ECG of 08-Feb-2025 02:31,  Premature ventricular complexes are no longer Present  Sinus rhythm is no longer with ventricular escape complexes  Serial changes of evolving Inferior infarct Present  Confirmed by Onofre Mcguire ([id number]) on 2/19/2025 8:34:41 PM    Referred By: AAAREFERRAL SELF           Confirmed By: Onofre Mcguire     No valid procedures specified.   Results for orders placed during the hospital encounter of 04/20/23    Nuclear Stress - Cardiology Interpreted    Interpretation Summary    Normal myocardial perfusion scan. There is no evidence of myocardial ischemia or infarction.    There is trivial apical thinning  which is a normal variant.    The gated perfusion images showed an ejection fraction of 69% at rest. The gated perfusion images showed an ejection fraction of 71% post stress. Normal ejection fraction is greater than 53%.    There is normal wall motion at rest and post stress.    LV cavity size is normal at rest and normal at stress.    The ECG portion of the study is negative for ischemia.    The patient reported no chest pain during the stress test.    There were no arrhythmias during stress.    No valid procedures specified.    PHYSICAL EXAM  CONSTITUTIONAL: Well built, well nourished in no apparent distress  NECK: no carotid bruit, no JVD  LUNGS: CTA  CHEST WALL: + tenderness to palpation  HEART: regular rate and rhythm, S1, S2 normal, no murmur, click, rub or gallop   ABDOMEN: soft, non-tender; bowel sounds normal;  EXTREMITIES: Extremities normal, no edema  NEURO: AAO X 3    I HAVE REVIEWED :    The vital signs, nurses notes, and all the pertinent radiology and labs.        Current Outpatient Medications   Medication Instructions    [START ON 3/10/2025] amiodarone (PACERONE) 200 mg, Oral, Daily    amLODIPine (NORVASC) 10 mg, Oral, Daily    aspirin (ECOTRIN) 81 mg, Daily    atorvastatin (LIPITOR) 80 mg, Oral, Daily    HYDROcodone-acetaminophen (NORCO) 5-325 mg per tablet 1 tablet, Oral, Every 6 hours PRN    JARDIANCE 10 mg, Daily    losartan (COZAAR) 50 mg, Oral, Daily    metoprolol succinate (TOPROL-XL) 25 mg, Oral, Daily    multivit-min/FA/lycopen/lutein (CENTRUM SILVER MEN ORAL) 1 tablet, Daily    pregabalin (LYRICA) 75 MG capsule 1 capsule, Oral, 2 times daily    ticagrelor (BRILINTA) 90 mg, Oral, 2 times daily    traZODone (DESYREL) 150 MG tablet 1 tablet, Daily          Assessment & Plan     CAD (coronary artery disease) status post PCI  Recent admission for STEMI.  Significant triple-vessel CAD.  Stent placed to proximal and distal RCA, and mid circumflex.  Continue aspirin, Brilinta, statin therapy.   Continue metoprolol succinate and losartan.    2/8/25 cardiac catheterization      Significant triple-vessel coronary artery disease.    Discrete 90% stenosis in proximal RCA and 100% thrombotic occlusion in distal RCA (culprit lesion) status post successful IVUS guided PCI.    Dist RCA lesion: A STENT SYNERGY XD 2.5X38MM stent was successfully placed at 11 VIELKA for 11 sec. and post dilated with 2.75 noncompliant balloon.    Prox RCA lesion: A STENT SYNERGY XD 3.0X12MM stent was successfully placed at 12 VIELKA for 10 sec. and post dilated with 3.5 noncompliant balloon.    The post-procedure left ventricular end diastolic pressure was 17.      2/11/25 cardiac catheterization    Successful staged IVUS guided PCI of diffuse lesion in mid circumflex.    The Mid Cx lesion was 70% stenosed with 0% stenosis post-intervention.    Mid Cx lesion: A STENT SYNERGY XD 2.5X24MM stent was successfully placed at 11 VIELKA for 10 sec.    Mid Cx lesion: A STENT SYNERGY XD 3.0X32MM stent was successfully placed at 11 VIELKA for 15 sec.   Routine post PCI care.   Maximize medical management.   Risk factor reductions.   ASA 81mg.   Cardiac rehab referral.   Ticagrelor (Brilinta) for one year.      Medical management of the residual LAD disease at this point (There was a short segment  of distal LAD with bridge collaterals and 70% In-Stent restenosis in the proximal portion of the diagonal (small-caliber distal vessel)       Acute HFrEF (heart failure with reduced ejection fraction)  EF 45-50% on recent echocardiogram.  Recent STEMI triple-vessel CAD s/p PCIs.  Continue metoprolol and losartan.  Reduce metoprolol succinate to 25 mg daily due to bradycardia.  Continue losartan 50 mg daily.  Continue Jardiance 10 mg daily.  2 g salt restriction.  1.5 L fluid restriction.  Daily weights.  Instructions written down for patient.  Monitor for edema.  Euvolemic today in office.  We will repeat echocardiogram in 3 months    Chest pain  Chest wall  tenderness post chest compressions.  Denies anginal equivalent symptoms.  Chest pain is reproducible with palpation, movement, and deep breathing.    STEMI (ST elevation myocardial infarction)  Distal % thrombotic occlusion and proximal RCA 90% stenosis.  S/p PCI to proximal and distal RCA.  Staged PCI of mid circumflex. Medical management of the residual LAD disease at this point (There was a short segment  of distal LAD with bridge collaterals and 70% In-Stent restenosis in the proximal portion of the diagonal (small-caliber distal vessel)     Continue aspirin and Brilinta x1 year.  Continue statin therapy.  Repeat lipid panel in 3 months.  Recommend LDL less than 60  Low-sodium heart healthy diet.  Continue losartan and metoprolol.    NSVT (nonsustained ventricular tachycardia)  VFib cardiac arrest with STEMI s/p PCI to prox and distal RCA and mid cx.    Patient continued to have brief runs of nonsustained VT during hospitalization and was placed on amiodarone upon discharge.  He is currently taking 200 mg b.i.d..  Patient to start taking amiodarone 200 mg daily on March 10, 2025.  We will reduce to 100 mg when I see patient again in 3 months    Essential hypertension  Blood pressure stable today in office 111/69.  He is bradycardic with heart rate 40s at rest.  No acute STT wave changes on EKG inferior infarct noted.  Reduce metoprolol succinate to 25 mg daily.  Hold metoprolol for heart rate less than 60.    Erectile dysfunction  Discussed with patient that he is no longer to take Cialis until he has completed cardiac rehab and is cleared by cardiologist.    DMII (diabetes mellitus, type 2)  Stable.  Managed by PCP.        No follow-ups on file.            [1]   Social History  Tobacco Use    Smoking status: Never    Smokeless tobacco: Current     Types: Snuff   Substance Use Topics    Alcohol use: Not Currently    Drug use: Never

## 2025-02-28 NOTE — ASSESSMENT & PLAN NOTE
Blood pressure stable today in office 111/69.  He is bradycardic with heart rate 40s at rest.  No acute STT wave changes on EKG inferior infarct noted.  Reduce metoprolol succinate to 25 mg daily.  Hold metoprolol for heart rate less than 60.

## 2025-02-28 NOTE — ASSESSMENT & PLAN NOTE
Chest wall tenderness post chest compressions.  Denies anginal equivalent symptoms.  Chest pain is reproducible with palpation, movement, and deep breathing.

## 2025-03-03 DIAGNOSIS — Z95.5 S/P CORONARY ARTERY STENT PLACEMENT: Primary | ICD-10-CM

## 2025-03-13 DIAGNOSIS — I10 ESSENTIAL HYPERTENSION: Primary | ICD-10-CM

## 2025-03-13 RX ORDER — AMLODIPINE BESYLATE 10 MG/1
10 TABLET ORAL DAILY
Qty: 90 TABLET | Refills: 3 | Status: SHIPPED | OUTPATIENT
Start: 2025-03-13

## 2025-03-13 RX ORDER — LOSARTAN POTASSIUM 25 MG/1
25 TABLET ORAL DAILY
Qty: 90 TABLET | Refills: 3 | Status: SHIPPED | OUTPATIENT
Start: 2025-03-13 | End: 2026-03-13

## 2025-03-13 RX ORDER — LOSARTAN POTASSIUM 50 MG/1
50 TABLET ORAL DAILY
Qty: 90 TABLET | Refills: 3 | Status: SHIPPED | OUTPATIENT
Start: 2025-03-13 | End: 2025-03-13

## 2025-03-13 RX ORDER — METOPROLOL SUCCINATE 25 MG/1
12.5 TABLET, EXTENDED RELEASE ORAL DAILY
Qty: 90 TABLET | Refills: 3 | Status: SHIPPED | OUTPATIENT
Start: 2025-03-13 | End: 2026-03-13

## 2025-03-13 RX ORDER — AMIODARONE HYDROCHLORIDE 200 MG/1
200 TABLET ORAL DAILY
Qty: 90 TABLET | Refills: 3 | Status: SHIPPED | OUTPATIENT
Start: 2025-03-13

## 2025-03-13 RX ORDER — ATORVASTATIN CALCIUM 80 MG/1
80 TABLET, FILM COATED ORAL DAILY
Qty: 90 TABLET | Refills: 3 | Status: SHIPPED | OUTPATIENT
Start: 2025-03-13 | End: 2026-03-13

## 2025-03-13 RX ORDER — METOPROLOL SUCCINATE 25 MG/1
25 TABLET, EXTENDED RELEASE ORAL DAILY
Qty: 90 TABLET | Refills: 3 | Status: SHIPPED | OUTPATIENT
Start: 2025-03-13 | End: 2025-03-13

## 2025-04-03 ENCOUNTER — TELEPHONE (OUTPATIENT)
Dept: CARDIOLOGY | Facility: CLINIC | Age: 60
End: 2025-04-03
Payer: MEDICAID

## 2025-06-11 ENCOUNTER — OFFICE VISIT (OUTPATIENT)
Dept: CARDIOLOGY | Facility: CLINIC | Age: 60
End: 2025-06-11
Payer: MEDICAID

## 2025-06-11 VITALS — SYSTOLIC BLOOD PRESSURE: 140 MMHG | DIASTOLIC BLOOD PRESSURE: 80 MMHG

## 2025-06-11 DIAGNOSIS — I50.21 ACUTE HFREF (HEART FAILURE WITH REDUCED EJECTION FRACTION): Primary | ICD-10-CM

## 2025-06-11 DIAGNOSIS — N52.9 ERECTILE DYSFUNCTION, UNSPECIFIED ERECTILE DYSFUNCTION TYPE: ICD-10-CM

## 2025-06-11 DIAGNOSIS — I25.10 CORONARY ARTERY DISEASE INVOLVING NATIVE CORONARY ARTERY OF NATIVE HEART WITHOUT ANGINA PECTORIS: Chronic | ICD-10-CM

## 2025-06-11 DIAGNOSIS — I46.9 CARDIAC ARREST: ICD-10-CM

## 2025-06-11 DIAGNOSIS — I10 ESSENTIAL HYPERTENSION: ICD-10-CM

## 2025-06-11 DIAGNOSIS — Z79.899 ON AMIODARONE THERAPY: ICD-10-CM

## 2025-06-11 DIAGNOSIS — Z95.5 HISTORY OF CORONARY ANGIOPLASTY WITH INSERTION OF STENT: ICD-10-CM

## 2025-06-11 DIAGNOSIS — R00.1 BRADYCARDIA: ICD-10-CM

## 2025-06-11 DIAGNOSIS — I21.11 ST ELEVATION MYOCARDIAL INFARCTION INVOLVING RIGHT CORONARY ARTERY: ICD-10-CM

## 2025-06-11 LAB
OHS QRS DURATION: 106 MS
OHS QTC CALCULATION: 470 MS

## 2025-06-11 PROCEDURE — 99999 PR PBB SHADOW E&M-EST. PATIENT-LVL II: CPT | Mod: PBBFAC,,,

## 2025-06-11 PROCEDURE — 3077F SYST BP >= 140 MM HG: CPT | Mod: CPTII,,,

## 2025-06-11 PROCEDURE — 99214 OFFICE O/P EST MOD 30 MIN: CPT | Mod: S$PBB,,,

## 2025-06-11 PROCEDURE — 4010F ACE/ARB THERAPY RXD/TAKEN: CPT | Mod: CPTII,,,

## 2025-06-11 PROCEDURE — 3044F HG A1C LEVEL LT 7.0%: CPT | Mod: CPTII,,,

## 2025-06-11 PROCEDURE — 3079F DIAST BP 80-89 MM HG: CPT | Mod: CPTII,,,

## 2025-06-11 PROCEDURE — 1159F MED LIST DOCD IN RCRD: CPT | Mod: CPTII,,,

## 2025-06-11 PROCEDURE — 99212 OFFICE O/P EST SF 10 MIN: CPT | Mod: PBBFAC,PN

## 2025-06-11 PROCEDURE — 1160F RVW MEDS BY RX/DR IN RCRD: CPT | Mod: CPTII,,,

## 2025-06-11 RX ORDER — AMIODARONE HYDROCHLORIDE 100 MG/1
100 TABLET ORAL DAILY
Qty: 90 TABLET | Refills: 3 | Status: SHIPPED | OUTPATIENT
Start: 2025-06-11

## 2025-06-11 RX ORDER — LORATADINE 10 MG/1
10 TABLET ORAL DAILY
COMMUNITY

## 2025-06-11 RX ORDER — AMLODIPINE BESYLATE 5 MG/1
5 TABLET ORAL DAILY
Qty: 90 TABLET | Refills: 3 | Status: SHIPPED | OUTPATIENT
Start: 2025-06-11

## 2025-06-11 RX ORDER — GABAPENTIN 300 MG/1
300 CAPSULE ORAL 2 TIMES DAILY
COMMUNITY

## 2025-06-11 RX ORDER — METOPROLOL SUCCINATE 25 MG/1
25 TABLET, EXTENDED RELEASE ORAL DAILY
Qty: 90 TABLET | Refills: 3 | Status: SHIPPED | OUTPATIENT
Start: 2025-06-11 | End: 2026-06-11

## 2025-06-12 NOTE — PROGRESS NOTES
Subjective:    Patient ID:  Sanjeev Britt is a 59 y.o. male patient here for evaluation Follow-up (3-4 month f/u ), Fatigue, and Insomnia      History of Present Illness    CHIEF COMPLAINT:  Patient presents today for follow-up after MI.    CARDIOVASCULAR:  He returned to work 5-6 weeks post MI with modified duties, primarily supervising with limited heavy lifting. He minimizes time in attic spaces, and his son monitors his heart rate, ensuring he exits if it reaches 100 BPM. He exercises 2 times weekly, working toward 3 times per week, completing 2 miles on treadmill and 20 minutes on elliptical. He denies chest pain during exercise.    SLEEP:  He reports difficulty with sleep initiation and maintenance, typically awakening after 2 hours of sleep.    MEDICATIONS:  He continues gabapentin 300mg BID, metoprolol 25mg daily, amiodarone 200mg daily, and dual antiplatelet therapy including Brilinta.      ROS:  General: -fever, -chills, -fatigue, -weight gain, -weight loss  Cardiovascular: -chest pain, -palpitations, -lower extremity edema  Respiratory: -cough, -shortness of breath, -exertional dyspnea  Gastrointestinal: -abdominal pain, -nausea, -vomiting, -diarrhea, -constipation, -blood in stool  Genitourinary: -dysuria, -hematuria, -frequency  Musculoskeletal: -joint pain, -muscle pain, +neck pain, +pain with movement  Skin: -rash, -lesion, +easy or excessive bleeding, +easy bruising  Neurological: -headache, -dizziness, -numbness, -tingling, +lightheadedness, +difficulty staying asleep, +difficulty falling asleep  Psychiatric: +anxiety, -depression, +sleep difficulty                    Review of patient's allergies indicates:   Allergen Reactions    Levaquin [levofloxacin]        Past Medical History:   Diagnosis Date    Acute coronary syndrome 09/09/2019    Coronary artery disease     Diabetes mellitus     Hypertension     Sleep apnea      Past Surgical History:   Procedure Laterality Date    ANGIOGRAM,  CORONARY, WITH LEFT HEART CATHETERIZATION N/A 2/8/2025    Procedure: Angiogram, Coronary, with Left Heart Cath;  Surgeon: Mo Levi MD;  Location: [facility] CATH/EP LAB;  Service: Cardiology;  Laterality: N/A;    FESS, USING COMPUTER-ASSISTED NAVIGATION, WITH NASAL TURBINATE REDUCTION N/A 6/7/2024    Procedure: FESS, USING COMPUTER-ASSISTED NAVIGATION, WITH NASAL TURBINATE REDUCTION;  Surgeon: Andres Abraham MD;  Location: [facility] OR;  Service: ENT;  Laterality: N/A;    IVUS, CORONARY  2/8/2025    Procedure: IVUS, Coronary;  Surgeon: Mo Levi MD;  Location: [facility] CATH/EP LAB;  Service: Cardiology;;    IVUS, CORONARY  2/11/2025    Procedure: IVUS, Coronary;  Surgeon: Mo Levi MD;  Location: [facility] CATH/EP LAB;  Service: Cardiology;;    NASAL SEPTOPLASTY N/A 6/7/2024    Procedure: SEPTOPLASTY, NOSE;  Surgeon: Andres Abraham MD;  Location: [facility] OR;  Service: ENT;  Laterality: N/A;    NASAL TURBINATE REDUCTION Bilateral 6/7/2024    Procedure: REDUCTION, NASAL TURBINATE;  Surgeon: Andres Abraham MD;  Location: [facility] OR;  Service: ENT;  Laterality: Bilateral;    PERCUTANEOUS CORONARY INTERVENTION, ARTERY N/A 2/8/2025    Procedure: Percutaneous coronary intervention;  Surgeon: Mo Levi MD;  Location: [facility] CATH/EP LAB;  Service: Cardiology;  Laterality: N/A;    PERCUTANEOUS CORONARY INTERVENTION, ARTERY N/A 2/11/2025    Procedure: Percutaneous coronary intervention;  Surgeon: Mo Levi MD;  Location: [facility] CATH/EP LAB;  Service: Cardiology;  Laterality: N/A;    VASCULAR SURGERY       Social History[1]                Objective        Vitals:    06/11/25 1658   BP: (!) 140/80       LIPIDS - LAST 2   Lab Results   Component Value Date    CHOL 180 02/08/2025    CHOL 164 01/22/2024    HDL 63 02/08/2025    HDL 55 01/22/2024    LDLCALC 103.6 02/08/2025    LDLCALC 98.2 01/22/2024    TRIG 67 02/08/2025    TRIG 54 01/22/2024    CHOLHDL 35.0 02/08/2025     CHOLHDL 33.5 01/22/2024       CBC - LAST 2  Lab Results   Component Value Date    WBC 7.60 02/12/2025    WBC 6.80 02/11/2025    RBC 4.72 02/12/2025    RBC 4.51 (L) 02/11/2025    HGB 13.9 (L) 02/12/2025    HGB 13.3 (L) 02/11/2025    HCT 41.6 02/12/2025    HCT 40.0 02/11/2025    MCV 88 02/12/2025    MCV 89 02/11/2025    MCH 29.4 02/12/2025    MCH 29.5 02/11/2025    MCHC 33.4 02/12/2025    MCHC 33.3 02/11/2025    RDW 13.4 02/12/2025    RDW 13.2 02/11/2025     02/12/2025     02/11/2025    MPV 10.5 02/12/2025    MPV 11.0 02/11/2025    GRAN 5.3 02/12/2025    GRAN 70.0 02/12/2025    LYMPH 1.0 02/12/2025    LYMPH 13.3 (L) 02/12/2025    MONO 1.0 02/12/2025    MONO 13.3 02/12/2025    BASO 0.02 02/12/2025    BASO 0.04 02/11/2025    NRBC 0 02/12/2025    NRBC 0 02/11/2025       CHEMISTRY & LIVER FUNCTION - LAST 2  Lab Results   Component Value Date     02/12/2025     02/11/2025    K 3.8 02/12/2025    K 3.7 02/11/2025     02/12/2025     02/11/2025    CO2 24 02/12/2025    CO2 25 02/11/2025    ANIONGAP 6 (L) 02/12/2025    ANIONGAP 5 (L) 02/11/2025    BUN 21 02/12/2025    BUN 19 02/11/2025    CREATININE 0.9 02/12/2025    CREATININE 0.7 02/11/2025     (H) 02/12/2025     02/11/2025    CALCIUM 8.9 02/12/2025    CALCIUM 9.1 02/11/2025    PH 7.485 (H) 02/08/2025    MG 2.1 02/12/2025    MG 2.0 02/11/2025    ALBUMIN 4.0 02/12/2025    ALBUMIN 4.1 02/11/2025    PROT 6.7 02/12/2025    PROT 6.9 02/11/2025    ALKPHOS 80 02/12/2025    ALKPHOS 80 02/11/2025    ALT 39 02/12/2025    ALT 46 (H) 02/11/2025    AST 52 (H) 02/12/2025    AST 47 (H) 02/11/2025    BILITOT 0.7 02/12/2025    BILITOT 0.8 02/11/2025        CARDIAC PROFILE - LAST 2  Lab Results   Component Value Date     (H) 02/07/2025    BNP 32 09/09/2019     08/16/2021     (H) 08/15/2021     (H) 08/15/2021    TROPONINI 0.033 08/15/2021    TROPONINI <0.030 07/26/2021    TROPONINIHS 63955.2 (HH) 02/12/2025     TROPONINIHS 50582.4 () 02/08/2025        COAGULATION - LAST 2  Lab Results   Component Value Date    LABPT 12.8 07/26/2021    INR 1.0 07/26/2021       ENDOCRINE & PSA - LAST 2  Lab Results   Component Value Date    HGBA1C 5.8 02/08/2025    HGBA1C 6.1 01/22/2024    TSH 1.899 01/22/2024        ECHOCARDIOGRAM RESULTS  Results for orders placed during the hospital encounter of 02/07/25    Echo    Interpretation Summary    Left Ventricle: The left ventricle is normal in size. Normal wall thickness. There is concentric remodeling. Moderate hypokinesis of the inferior and the remaining walls appear to move fairly well There is mildly reduced systolic function with a visually estimated ejection fraction of 45 - 50%. There is normal diastolic function.    Right Ventricle: Normal right ventricular cavity size. Wall thickness is normal. Systolic function is normal.    Aortic Valve: The aortic valve is structurally normal.    Tricuspid Valve: The tricuspid valve is structurally normal.    Pulmonary Artery: The estimated pulmonary artery systolic pressure is 22 mmHg.    IVC/SVC: Intermediate venous pressure at 8 mmHg.      CURRENT/PREVIOUS VISIT EKG  Results for orders placed or performed in visit on 06/11/25   IN OFFICE EKG 12-LEAD (to [place])    Collection Time: 06/11/25  4:14 PM   Result Value Ref Range    QRS Duration 106 ms    [facility] QTC Calculation 470 ms    Narrative    Test Reason : I50.21,    Vent. Rate :  54 BPM     Atrial Rate :  54 BPM     P-R Int : 186 ms          QRS Dur : 106 ms      QT Int : 496 ms       P-R-T Axes :  38 -18 -34 degrees    QTcB Int : 470 ms    Sinus bradycardia  Inferior infarct (cited on or before 07-Feb-2025)  Possible Anterior infarct (cited on or before 07-Feb-2025)  Abnormal ECG  When compared with ECG of 28-Feb-2025 11:00,  No significant change was found    Referred By:            Confirmed By:      No valid procedures specified.   Results for orders placed during the hospital encounter of  04/20/23    Nuclear Stress - Cardiology Interpreted    Interpretation Summary    Normal myocardial perfusion scan. There is no evidence of myocardial ischemia or infarction.    There is trivial apical thinning which is a normal variant.    The gated perfusion images showed an ejection fraction of 69% at rest. The gated perfusion images showed an ejection fraction of 71% post stress. Normal ejection fraction is greater than 53%.    There is normal wall motion at rest and post stress.    LV cavity size is normal at rest and normal at stress.    The ECG portion of the study is negative for ischemia.    The patient reported no chest pain during the stress test.    There were no arrhythmias during stress.    No valid procedures specified.    Physical Exam    Vitals: Blood pressure: 140/80. Heart rate: 57.  General: No acute distress. Well-developed. Well-nourished.  Eyes:Sclerae anicteric.  HENT: Normocephalic. Atraumatic.  Cardiovascular: Regular rate. Regular rhythm. No murmurs. No rubs. No gallops. Normal S1, S2.  Respiratory: Normal respiratory effort. Clear to auscultation bilaterally. No rales. No rhonchi. No wheezing.  Abdomen: Soft. Non-tender. Non-distended. Normoactive bowel sounds.  Musculoskeletal: No  obvious deformity.  Extremities: No lower extremity edema.  Neurological: Alert & oriented x3. No slurred speech. Normal gait.  Psychiatric: Normal mood. Normal affect. Good insight. Good judgment.  Skin: Warm. Dry. No rash.         I HAVE REVIEWED :    The vital signs, nurses notes, and all the pertinent radiology and labs.        Current Outpatient Medications   Medication Instructions    amiodarone (PACERONE) 100 mg, Oral, Daily    amLODIPine (NORVASC) 5 mg, Oral, Daily    aspirin (ECOTRIN) 81 mg, Daily    atorvastatin (LIPITOR) 80 mg, Oral, Daily    gabapentin (NEURONTIN) 300 mg, 2 times daily    HYDROcodone-acetaminophen (NORCO) 5-325 mg per tablet 1 tablet, Oral, Every 6 hours PRN    INV losartan (COZAAR)  50 mg, Oral, Daily    JARDIANCE 10 mg, Daily    loratadine (CLARITIN) 10 mg, Daily    metoprolol succinate (TOPROL-XL) 25 mg, Oral, Daily    multivit-min/FA/lycopen/lutein (CENTRUM SILVER MEN ORAL) 1 tablet, Daily    pregabalin (LYRICA) 75 MG capsule 1 capsule, 2 times daily    ticagrelor (BRILINTA) 90 mg, Oral, 2 times daily    traZODone (DESYREL) 150 MG tablet 1 tablet, Daily          Assessment & Plan   Assessment & Plan    I46.9 Cardiac arrest  I50.21 Acute HFrEF (heart failure with reduced EF)  I21.11 ST elevation myocardial infarction involving right coronary artery  I10 Essential hypertension  Z95.5 History of coronary angioplasty with insertion of stent  Z79.899 On amiodarone therapy  R00.1 Bradycardia  I25.10 Coronary artery disease involving native coronary artery of native heart without angina pectoris  N52.9 Erectile dysfunction, unspecified erectile dysfunction type    PLAN SUMMARY:  - Ordered echocardiogram to assess heart function recovery post-heart attack  - Ordered cholesterol blood test  - Decreased amiodarone from 200 mg to 100 mg daily  - Increased losartan from 25 mg to 50 mg daily for better blood pressure management  - Continued low pressure medication, atorvastatin, metoprolol (25 mg), and Brilinta at current doses  - Continue exercising at gym, focusing on low-impact cardio activities  - Follow up to review cholesterol test results and echocardiogram findings    ACUTE HFREF (HEART FAILURE WITH REDUCED EF):  - Ordered echocardiogram to assess heart function recovery post-heart attack.  Recent EF 45%. Low sodium heart healthy diet. 2 g salt restriction. Maintain /80 or less.  Increase losartan 50 mg daily, continue metoprolol succinate 25 mg daily, and continue Jardiance 10 mg daily    ST ELEVATION MYOCARDIAL INFARCTION INVOLVING RIGHT CORONARY ARTERY:  - Cardiovascular status post-heart attack improved, with better exercise tolerance but ongoing blood pressure control concerns.  -  Ordered echocardiogram to assess heart function recovery post-heart attack.  - Continue exercising at gym, focusing on low-impact cardio activities like walking on treadmill or using elliptical, avoid heavy labor, reduce overall stress levels, monitor for chest pain or shortness of breath during exertion, stopping to rest if symptoms occur.    ESSENTIAL HYPERTENSION:  - Ongoing blood pressure control concerns.  - Increased losartan from 25 mg to 50 mg daily to better manage blood pressure.  - Continued low pressure medication at current dose.  - Check blood pressure regularly at home, 3 times daily at random times.    HISTORY OF CORONARY ANGIOPLASTY WITH INSERTION OF STENT:  - Explained importance of maintaining anticoagulants for at least a year post-stent placement   - Continued aspirin and Brilinta. Will switch to Plavix after 1 yr    ON AMIODARONE THERAPY:  - Decreased amiodarone from 200 mg to 100 mg daily.    BRADYCARDIA:  - Continued metoprolol at 25 mg.    CORONARY ARTERY DISEASE INVOLVING NATIVE CORONARY ARTERY OF NATIVE HEART WITHOUT ANGINA PECTORIS:  - Clarified role of collateral circulation in managing partially blocked arteries.  - Continued atorvastatin at current dose.  - Ordered cholesterol blood test.  - Follow up to review cholesterol test results and echocardiogram findings.     CARDIAC CATH 2/8/25    Significant triple-vessel coronary artery disease.    Discrete 90% stenosis in proximal RCA and 100% thrombotic occlusion in distal RCA (culprit lesion) status post successful IVUS guided PCI.    Dist RCA lesion: A STENT SYNERGY XD [id number] stent was successfully placed at 11 [address] for 11 sec. and post dilated with 2.75 noncompliant balloon.    Prox RCA lesion: A STENT SYNERGY XD 3.0X12MM stent was successfully placed at 12 [address] for 10 sec. and post dilated with 3.5 noncompliant balloon.    The post-procedure left ventricular end diastolic pressure was 17.      CARDIAC CATH 2/11/25    Successful staged  IVUS guided PCI of diffuse lesion in mid circumflex.    The Mid Cx lesion was 70% stenosed with 0% stenosis post-intervention.    Mid Cx lesion: A STENT SYNERGY XD [id number] stent was successfully placed at 11 [address] for 10 sec.    Mid Cx lesion: A STENT SYNERGY XD 3.0X32MM stent was successfully placed at 11 [address] for 15 sec.      This note was generated with the assistance of ambient listening technology. Verbal consent was obtained by the patient and accompanying visitor(s) for the recording of patient appointment to facilitate this note. I attest to having reviewed and edited the generated note for accuracy, though some syntax or spelling errors may persist. Please contact the author of this note for any clarification.             [1]   Social History  Tobacco Use    Smoking status: Never    Smokeless tobacco: Current     Types: Snuff   Substance Use Topics    Alcohol use: Not Currently    Drug use: Never

## 2025-06-17 ENCOUNTER — HOSPITAL ENCOUNTER (INPATIENT)
Facility: HOSPITAL | Age: 60
LOS: 4 days | Discharge: HOME | DRG: 417 | End: 2025-06-21
Attending: EMERGENCY MEDICINE | Admitting: INTERNAL MEDICINE
Payer: MEDICAID

## 2025-06-17 DIAGNOSIS — K92.2 GI BLEED: ICD-10-CM

## 2025-06-17 DIAGNOSIS — K80.20 SYMPTOMATIC CHOLELITHIASIS: Primary | ICD-10-CM

## 2025-06-17 DIAGNOSIS — R07.9 CHEST PAIN: ICD-10-CM

## 2025-06-17 DIAGNOSIS — I50.22 HEART FAILURE WITH MILDLY REDUCED EJECTION FRACTION (HFMREF): ICD-10-CM

## 2025-06-17 DIAGNOSIS — K80.00 CALCULUS OF GALLBLADDER WITH ACUTE CHOLECYSTITIS WITHOUT OBSTRUCTION: ICD-10-CM

## 2025-06-17 DIAGNOSIS — R10.13 EPIGASTRIC PAIN: ICD-10-CM

## 2025-06-17 PROBLEM — J12.82 PNEUMONIA DUE TO COVID-19 VIRUS: Status: RESOLVED | Noted: 2021-08-15 | Resolved: 2025-06-17

## 2025-06-17 PROBLEM — U07.1 PNEUMONIA DUE TO COVID-19 VIRUS: Status: RESOLVED | Noted: 2021-08-15 | Resolved: 2025-06-17

## 2025-06-17 PROBLEM — F17.200 NICOTINE DEPENDENCE: Status: ACTIVE | Noted: 2025-06-17

## 2025-06-17 PROBLEM — R53.83 FATIGUE: Status: RESOLVED | Noted: 2023-06-21 | Resolved: 2025-06-17

## 2025-06-17 LAB
ABORH RETYPE: NORMAL
ABSOLUTE EOSINOPHIL (SMH): 0 K/UL
ABSOLUTE MONOCYTE (SMH): 0.58 K/UL (ref 0.3–1)
ABSOLUTE NEUTROPHIL COUNT (SMH): 9.2 K/UL (ref 1.8–7.7)
ALBUMIN SERPL-MCNC: 5 G/DL (ref 3.5–5.2)
ALP SERPL-CCNC: 100 UNIT/L (ref 55–135)
ALT SERPL-CCNC: 21 UNIT/L (ref 10–44)
ANION GAP (SMH): 15 MMOL/L (ref 8–16)
AST SERPL-CCNC: 17 UNIT/L (ref 10–40)
BASOPHILS # BLD AUTO: 0.03 K/UL
BASOPHILS NFR BLD AUTO: 0.3 %
BILIRUB SERPL-MCNC: 0.6 MG/DL (ref 0.1–1)
BUN SERPL-MCNC: 13 MG/DL (ref 8–23)
CALCIUM SERPL-MCNC: 9.7 MG/DL (ref 8.7–10.5)
CHLORIDE SERPL-SCNC: 105 MMOL/L (ref 95–110)
CO2 SERPL-SCNC: 20 MMOL/L (ref 23–29)
CREAT SERPL-MCNC: 0.8 MG/DL (ref 0.5–1.4)
ERYTHROCYTE [DISTWIDTH] IN BLOOD BY AUTOMATED COUNT: 14.6 % (ref 11.5–14.5)
GFR SERPLBLD CREATININE-BSD FMLA CKD-EPI: >60 ML/MIN/1.73/M2
GLUCOSE SERPL-MCNC: 118 MG/DL (ref 70–110)
HCT VFR BLD AUTO: 46.4 % (ref 40–54)
HGB BLD-MCNC: 15.2 GM/DL (ref 14–18)
IMM GRANULOCYTES # BLD AUTO: 0.08 K/UL (ref 0–0.04)
IMM GRANULOCYTES NFR BLD AUTO: 0.7 % (ref 0–0.5)
INDIRECT COOMBS: NORMAL
INR PPP: 1 (ref 0.8–1.2)
LIPASE SERPL-CCNC: 29 U/L (ref 4–60)
LYMPHOCYTES # BLD AUTO: 0.85 K/UL (ref 1–4.8)
MCH RBC QN AUTO: 28 PG (ref 27–31)
MCHC RBC AUTO-ENTMCNC: 32.8 G/DL (ref 32–36)
MCV RBC AUTO: 86 FL (ref 82–98)
NUCLEATED RBC (/100WBC) (SMH): 0 /100 WBC
PLATELET # BLD AUTO: 259 K/UL (ref 150–450)
PMV BLD AUTO: 10.8 FL (ref 9.2–12.9)
POCT GLUCOSE: 100 MG/DL (ref 70–110)
POTASSIUM SERPL-SCNC: 3.5 MMOL/L (ref 3.5–5.1)
PROT SERPL-MCNC: 7.9 GM/DL (ref 6–8.4)
PROTHROMBIN TIME: 11.4 SECONDS (ref 9–12.5)
RBC # BLD AUTO: 5.43 M/UL (ref 4.6–6.2)
RELATIVE EOSINOPHIL (SMH): 0 % (ref 0–8)
RELATIVE LYMPHOCYTE (SMH): 7.9 % (ref 18–48)
RELATIVE MONOCYTE (SMH): 5.4 % (ref 4–15)
RELATIVE NEUTROPHIL (SMH): 85.7 % (ref 38–73)
RH BLD: NORMAL
SODIUM SERPL-SCNC: 140 MMOL/L (ref 136–145)
SPECIMEN OUTDATE: NORMAL
TROPONIN HIGH SENSITIVE (SMH): 14.6 PG/ML
TROPONIN HIGH SENSITIVE (SMH): 16.2 PG/ML
WBC # BLD AUTO: 10.75 K/UL (ref 3.9–12.7)

## 2025-06-17 PROCEDURE — 96376 TX/PRO/DX INJ SAME DRUG ADON: CPT

## 2025-06-17 PROCEDURE — 86901 BLOOD TYPING SEROLOGIC RH(D): CPT | Performed by: EMERGENCY MEDICINE

## 2025-06-17 PROCEDURE — 25000003 PHARM REV CODE 250

## 2025-06-17 PROCEDURE — 84075 ASSAY ALKALINE PHOSPHATASE: CPT | Performed by: EMERGENCY MEDICINE

## 2025-06-17 PROCEDURE — 25000003 PHARM REV CODE 250: Performed by: EMERGENCY MEDICINE

## 2025-06-17 PROCEDURE — 63600175 PHARM REV CODE 636 W HCPCS

## 2025-06-17 PROCEDURE — 63600175 PHARM REV CODE 636 W HCPCS: Performed by: EMERGENCY MEDICINE

## 2025-06-17 PROCEDURE — 84484 ASSAY OF TROPONIN QUANT: CPT | Performed by: EMERGENCY MEDICINE

## 2025-06-17 PROCEDURE — 84484 ASSAY OF TROPONIN QUANT: CPT | Performed by: NURSE PRACTITIONER

## 2025-06-17 PROCEDURE — S4991 NICOTINE PATCH NONLEGEND: HCPCS

## 2025-06-17 PROCEDURE — 93005 ELECTROCARDIOGRAM TRACING: CPT | Performed by: GENERAL PRACTICE

## 2025-06-17 PROCEDURE — 96365 THER/PROPH/DIAG IV INF INIT: CPT

## 2025-06-17 PROCEDURE — 85025 COMPLETE CBC W/AUTO DIFF WBC: CPT | Performed by: EMERGENCY MEDICINE

## 2025-06-17 PROCEDURE — 96375 TX/PRO/DX INJ NEW DRUG ADDON: CPT

## 2025-06-17 PROCEDURE — G0378 HOSPITAL OBSERVATION PER HR: HCPCS

## 2025-06-17 PROCEDURE — 63600175 PHARM REV CODE 636 W HCPCS: Mod: JZ

## 2025-06-17 PROCEDURE — 36415 COLL VENOUS BLD VENIPUNCTURE: CPT | Performed by: EMERGENCY MEDICINE

## 2025-06-17 PROCEDURE — 85610 PROTHROMBIN TIME: CPT | Performed by: EMERGENCY MEDICINE

## 2025-06-17 PROCEDURE — 25500020 PHARM REV CODE 255: Performed by: EMERGENCY MEDICINE

## 2025-06-17 PROCEDURE — 93010 ELECTROCARDIOGRAM REPORT: CPT | Mod: ,,, | Performed by: GENERAL PRACTICE

## 2025-06-17 PROCEDURE — 83690 ASSAY OF LIPASE: CPT | Performed by: EMERGENCY MEDICINE

## 2025-06-17 PROCEDURE — 99285 EMERGENCY DEPT VISIT HI MDM: CPT | Mod: 25

## 2025-06-17 PROCEDURE — 96366 THER/PROPH/DIAG IV INF ADDON: CPT

## 2025-06-17 PROCEDURE — 11000001 HC ACUTE MED/SURG PRIVATE ROOM

## 2025-06-17 RX ORDER — SODIUM,POTASSIUM PHOSPHATES 280-250MG
2 POWDER IN PACKET (EA) ORAL
Status: DISCONTINUED | OUTPATIENT
Start: 2025-06-17 | End: 2025-06-21 | Stop reason: HOSPADM

## 2025-06-17 RX ORDER — IPRATROPIUM BROMIDE AND ALBUTEROL SULFATE 2.5; .5 MG/3ML; MG/3ML
3 SOLUTION RESPIRATORY (INHALATION) EVERY 6 HOURS PRN
Status: DISCONTINUED | OUTPATIENT
Start: 2025-06-17 | End: 2025-06-21 | Stop reason: HOSPADM

## 2025-06-17 RX ORDER — IBUPROFEN 200 MG
1 TABLET ORAL DAILY
Status: DISCONTINUED | OUTPATIENT
Start: 2025-06-17 | End: 2025-06-21 | Stop reason: HOSPADM

## 2025-06-17 RX ORDER — LOSARTAN POTASSIUM 25 MG/1
25 TABLET ORAL DAILY
Status: DISCONTINUED | OUTPATIENT
Start: 2025-06-18 | End: 2025-06-21 | Stop reason: HOSPADM

## 2025-06-17 RX ORDER — HYDROMORPHONE HYDROCHLORIDE 1 MG/ML
1 INJECTION, SOLUTION INTRAMUSCULAR; INTRAVENOUS; SUBCUTANEOUS
Refills: 0 | Status: COMPLETED | OUTPATIENT
Start: 2025-06-17 | End: 2025-06-17

## 2025-06-17 RX ORDER — ATORVASTATIN CALCIUM 40 MG/1
80 TABLET, FILM COATED ORAL DAILY
Status: DISCONTINUED | OUTPATIENT
Start: 2025-06-18 | End: 2025-06-21 | Stop reason: HOSPADM

## 2025-06-17 RX ORDER — SODIUM CHLORIDE 9 MG/ML
INJECTION, SOLUTION INTRAVENOUS CONTINUOUS
Status: ACTIVE | OUTPATIENT
Start: 2025-06-17 | End: 2025-06-18

## 2025-06-17 RX ORDER — LANOLIN ALCOHOL/MO/W.PET/CERES
800 CREAM (GRAM) TOPICAL
Status: DISCONTINUED | OUTPATIENT
Start: 2025-06-17 | End: 2025-06-21 | Stop reason: HOSPADM

## 2025-06-17 RX ORDER — GABAPENTIN 300 MG/1
300 CAPSULE ORAL 2 TIMES DAILY
Status: DISCONTINUED | OUTPATIENT
Start: 2025-06-17 | End: 2025-06-21 | Stop reason: HOSPADM

## 2025-06-17 RX ORDER — HYDROCODONE BITARTRATE AND ACETAMINOPHEN 5; 325 MG/1; MG/1
1 TABLET ORAL EVERY 6 HOURS PRN
Refills: 0 | Status: DISCONTINUED | OUTPATIENT
Start: 2025-06-17 | End: 2025-06-17

## 2025-06-17 RX ORDER — ALUMINUM HYDROXIDE, MAGNESIUM HYDROXIDE, AND SIMETHICONE 1200; 120; 1200 MG/30ML; MG/30ML; MG/30ML
30 SUSPENSION ORAL 4 TIMES DAILY PRN
Status: DISCONTINUED | OUTPATIENT
Start: 2025-06-17 | End: 2025-06-21 | Stop reason: HOSPADM

## 2025-06-17 RX ORDER — TALC
9 POWDER (GRAM) TOPICAL NIGHTLY PRN
Status: DISCONTINUED | OUTPATIENT
Start: 2025-06-17 | End: 2025-06-21 | Stop reason: HOSPADM

## 2025-06-17 RX ORDER — MORPHINE SULFATE 4 MG/ML
4 INJECTION, SOLUTION INTRAMUSCULAR; INTRAVENOUS
Refills: 0 | Status: COMPLETED | OUTPATIENT
Start: 2025-06-17 | End: 2025-06-17

## 2025-06-17 RX ORDER — IBUPROFEN 600 MG/1
600 TABLET, FILM COATED ORAL 3 TIMES DAILY
COMMUNITY
Start: 2025-05-07

## 2025-06-17 RX ORDER — ONDANSETRON HYDROCHLORIDE 2 MG/ML
4 INJECTION, SOLUTION INTRAVENOUS EVERY 6 HOURS PRN
Status: DISCONTINUED | OUTPATIENT
Start: 2025-06-17 | End: 2025-06-21 | Stop reason: HOSPADM

## 2025-06-17 RX ORDER — IBUPROFEN 200 MG
24 TABLET ORAL
Status: DISCONTINUED | OUTPATIENT
Start: 2025-06-17 | End: 2025-06-21 | Stop reason: HOSPADM

## 2025-06-17 RX ORDER — NALOXONE HCL 0.4 MG/ML
0.02 VIAL (ML) INJECTION
Status: DISCONTINUED | OUTPATIENT
Start: 2025-06-17 | End: 2025-06-21 | Stop reason: HOSPADM

## 2025-06-17 RX ORDER — AMLODIPINE BESYLATE 5 MG/1
10 TABLET ORAL DAILY
Status: DISCONTINUED | OUTPATIENT
Start: 2025-06-18 | End: 2025-06-21 | Stop reason: HOSPADM

## 2025-06-17 RX ORDER — AMIODARONE HYDROCHLORIDE 100 MG/1
100 TABLET ORAL DAILY
Status: DISCONTINUED | OUTPATIENT
Start: 2025-06-18 | End: 2025-06-21 | Stop reason: HOSPADM

## 2025-06-17 RX ORDER — AMOXICILLIN 250 MG
1 CAPSULE ORAL 2 TIMES DAILY PRN
Status: DISCONTINUED | OUTPATIENT
Start: 2025-06-17 | End: 2025-06-21 | Stop reason: HOSPADM

## 2025-06-17 RX ORDER — ACETAMINOPHEN 325 MG/1
650 TABLET ORAL EVERY 4 HOURS PRN
Status: DISCONTINUED | OUTPATIENT
Start: 2025-06-17 | End: 2025-06-18

## 2025-06-17 RX ORDER — IBUPROFEN 200 MG
16 TABLET ORAL
Status: DISCONTINUED | OUTPATIENT
Start: 2025-06-17 | End: 2025-06-21 | Stop reason: HOSPADM

## 2025-06-17 RX ORDER — METOPROLOL SUCCINATE 25 MG/1
25 TABLET, EXTENDED RELEASE ORAL DAILY
Status: DISCONTINUED | OUTPATIENT
Start: 2025-06-18 | End: 2025-06-21 | Stop reason: HOSPADM

## 2025-06-17 RX ORDER — TRAZODONE HYDROCHLORIDE 50 MG/1
150 TABLET ORAL NIGHTLY PRN
Status: DISCONTINUED | OUTPATIENT
Start: 2025-06-17 | End: 2025-06-21 | Stop reason: HOSPADM

## 2025-06-17 RX ORDER — SODIUM CHLORIDE 0.9 % (FLUSH) 0.9 %
10 SYRINGE (ML) INJECTION EVERY 12 HOURS PRN
Status: DISCONTINUED | OUTPATIENT
Start: 2025-06-17 | End: 2025-06-21 | Stop reason: HOSPADM

## 2025-06-17 RX ORDER — GLUCAGON 1 MG
1 KIT INJECTION
Status: DISCONTINUED | OUTPATIENT
Start: 2025-06-17 | End: 2025-06-20

## 2025-06-17 RX ORDER — BUSPIRONE HYDROCHLORIDE 7.5 MG/1
7.5 TABLET ORAL 2 TIMES DAILY
COMMUNITY
Start: 2025-02-24

## 2025-06-17 RX ORDER — ONDANSETRON HYDROCHLORIDE 2 MG/ML
4 INJECTION, SOLUTION INTRAVENOUS
Status: COMPLETED | OUTPATIENT
Start: 2025-06-17 | End: 2025-06-17

## 2025-06-17 RX ORDER — PANTOPRAZOLE SODIUM 40 MG/10ML
40 INJECTION, POWDER, LYOPHILIZED, FOR SOLUTION INTRAVENOUS 2 TIMES DAILY
Status: CANCELLED | OUTPATIENT
Start: 2025-06-18

## 2025-06-17 RX ORDER — PANTOPRAZOLE SODIUM 40 MG/10ML
80 INJECTION, POWDER, LYOPHILIZED, FOR SOLUTION INTRAVENOUS
Status: COMPLETED | OUTPATIENT
Start: 2025-06-17 | End: 2025-06-17

## 2025-06-17 RX ORDER — INSULIN ASPART 100 [IU]/ML
0-10 INJECTION, SOLUTION INTRAVENOUS; SUBCUTANEOUS
Status: DISCONTINUED | OUTPATIENT
Start: 2025-06-17 | End: 2025-06-18

## 2025-06-17 RX ORDER — HYDROMORPHONE HYDROCHLORIDE 1 MG/ML
1 INJECTION, SOLUTION INTRAMUSCULAR; INTRAVENOUS; SUBCUTANEOUS
Status: DISCONTINUED | OUTPATIENT
Start: 2025-06-17 | End: 2025-06-21 | Stop reason: HOSPADM

## 2025-06-17 RX ORDER — HYDROMORPHONE HYDROCHLORIDE 1 MG/ML
1 INJECTION, SOLUTION INTRAMUSCULAR; INTRAVENOUS; SUBCUTANEOUS EVERY 6 HOURS PRN
Status: DISCONTINUED | OUTPATIENT
Start: 2025-06-17 | End: 2025-06-17

## 2025-06-17 RX ADMIN — NICOTINE 1 PATCH: 21 PATCH, EXTENDED RELEASE TRANSDERMAL at 09:06

## 2025-06-17 RX ADMIN — PANTOPRAZOLE SODIUM 8 MG/HR: 40 INJECTION, POWDER, FOR SOLUTION INTRAVENOUS at 09:06

## 2025-06-17 RX ADMIN — BUSPIRONE HYDROCHLORIDE 7.5 MG: 5 TABLET ORAL at 08:06

## 2025-06-17 RX ADMIN — SODIUM CHLORIDE: 9 INJECTION, SOLUTION INTRAVENOUS at 10:06

## 2025-06-17 RX ADMIN — HYDROMORPHONE HYDROCHLORIDE 1 MG: 1 INJECTION, SOLUTION INTRAMUSCULAR; INTRAVENOUS; SUBCUTANEOUS at 05:06

## 2025-06-17 RX ADMIN — MORPHINE SULFATE 4 MG: 4 INJECTION, SOLUTION INTRAMUSCULAR; INTRAVENOUS at 08:06

## 2025-06-17 RX ADMIN — PANTOPRAZOLE SODIUM 8 MG/HR: 40 INJECTION, POWDER, FOR SOLUTION INTRAVENOUS at 03:06

## 2025-06-17 RX ADMIN — IOHEXOL 100 ML: 350 INJECTION, SOLUTION INTRAVENOUS at 05:06

## 2025-06-17 RX ADMIN — PANTOPRAZOLE SODIUM 80 MG: 40 INJECTION, POWDER, FOR SOLUTION INTRAVENOUS at 03:06

## 2025-06-17 RX ADMIN — ONDANSETRON 4 MG: 2 INJECTION INTRAMUSCULAR; INTRAVENOUS at 02:06

## 2025-06-17 RX ADMIN — HYDROMORPHONE HYDROCHLORIDE 1 MG: 1 INJECTION, SOLUTION INTRAMUSCULAR; INTRAVENOUS; SUBCUTANEOUS at 10:06

## 2025-06-17 RX ADMIN — HYDROMORPHONE HYDROCHLORIDE 1 MG: 1 INJECTION, SOLUTION INTRAMUSCULAR; INTRAVENOUS; SUBCUTANEOUS at 02:06

## 2025-06-17 NOTE — ED PROVIDER NOTES
Encounter Date: 6/17/2025       History     Chief Complaint   Patient presents with    Abdominal Pain     Abdominal pain radiating to chest since 10am. Patient states he had black stool and is on blood thinners.      59-year-old male history of CAD with cardiac arrest and STEMI recently presents with epigastric pain and black stools.  He has never had an endoscopy.  Symptoms began at 10:00 a.m..  He has been vomiting and has had 3 black tarry stools according to the patient.  On Brilinta and Plavix.  Some radiation of the epigastric pain into his chest but no chest heaviness.  He notes this feels nothing like his previous STEMI.  There is no shortness of breath.  No relief at home with meds.    The history is provided by the patient.     Review of patient's allergies indicates:   Allergen Reactions    Levaquin [levofloxacin]      Past Medical History:   Diagnosis Date    Acute coronary syndrome 09/09/2019    Coronary artery disease     Diabetes mellitus     Hypertension     Sleep apnea      Past Surgical History:   Procedure Laterality Date    ANGIOGRAM, CORONARY, WITH LEFT HEART CATHETERIZATION N/A 2/8/2025    Procedure: Angiogram, Coronary, with Left Heart Cath;  Surgeon: Mo Levi MD;  Location: [facility] CATH/EP LAB;  Service: Cardiology;  Laterality: N/A;    FESS, USING COMPUTER-ASSISTED NAVIGATION, WITH NASAL TURBINATE REDUCTION N/A 6/7/2024    Procedure: FESS, USING COMPUTER-ASSISTED NAVIGATION, WITH NASAL TURBINATE REDUCTION;  Surgeon: Andres Abraham MD;  Location: [facility] ASU OR;  Service: ENT;  Laterality: N/A;    IVUS, CORONARY  2/8/2025    Procedure: IVUS, Coronary;  Surgeon: Mo Levi MD;  Location: [facility] CATH/EP LAB;  Service: Cardiology;;    IVUS, CORONARY  2/11/2025    Procedure: IVUS, Coronary;  Surgeon: Mo Levi MD;  Location: [facility] CATH/EP LAB;  Service: Cardiology;;    NASAL SEPTOPLASTY N/A 6/7/2024    Procedure: SEPTOPLASTY, NOSE;  Surgeon: Andres Abraham  MD;  [facility];  Service: ENT;  Laterality: N/A;    NASAL TURBINATE REDUCTION Bilateral 6/7/2024    Procedure: REDUCTION, NASAL TURBINATE;  Surgeon: Andres Abraham MD;  [facility];  Service: ENT;  Laterality: Bilateral;    PERCUTANEOUS CORONARY INTERVENTION, ARTERY N/A 2/8/2025    Procedure: Percutaneous coronary intervention;  Surgeon: Mo Levi MD;  Location: [facility] CATH/EP LAB;  Service: Cardiology;  Laterality: N/A;    PERCUTANEOUS CORONARY INTERVENTION, ARTERY N/A 2/11/2025    Procedure: Percutaneous coronary intervention;  Surgeon: Mo Levi MD;  Location: [facility] CATH/EP LAB;  Service: Cardiology;  Laterality: N/A;    VASCULAR SURGERY       Family History   Problem Relation Name Age of Onset    Cancer Father       Social History[1]  Review of Systems   Constitutional:  Negative for fever.   HENT:  Negative for sore throat.    Respiratory:  Negative for shortness of breath.    Cardiovascular:  Negative for chest pain.   Gastrointestinal:  Positive for abdominal pain (epigastric R upper quadrant), blood in stool, nausea and vomiting.   Genitourinary:  Negative for flank pain.   Musculoskeletal:  Negative for back pain.   Skin:  Negative for rash.   Neurological:  Negative for weakness.       Physical Exam     Initial Vitals [06/17/25 1431]   BP Pulse Resp Temp SpO2   (!) 151/88 69 18 98.8 °F (37.1 °C) 100 %      MAP       --         Physical Exam    Nursing note and vitals reviewed.  Constitutional: He appears well-developed and well-nourished. He is not diaphoretic.  Non-toxic appearance. He does not have a sickly appearance. He appears distressed.   HENT:   Head: Normocephalic and atraumatic.   Eyes: EOM are normal.   Neck: Neck supple.   Normal range of motion.  Cardiovascular:  Normal rate, regular rhythm and normal heart sounds.     Exam reveals no gallop and no friction rub.       No murmur heard.  Pulmonary/Chest: Breath sounds normal. No respiratory  distress. He has no wheezes. He has no rhonchi. He has no rales.   Abdominal: He exhibits no distension. There is abdominal tenderness in the right upper quadrant and epigastric area. There is no rebound and no guarding.   Musculoskeletal:         General: Normal range of motion.      Cervical back: Normal range of motion and neck supple. No rigidity. Normal range of motion.     Neurological: He is alert and oriented to person, place, and time.   Skin: Skin is warm and dry. No rash noted.   Psychiatric: He has a normal mood and affect. His behavior is normal. Judgment and thought content normal.         ED Course   Procedures  Labs Reviewed   COMPREHENSIVE METABOLIC PANEL - Abnormal       Result Value    Sodium 140      Potassium 3.5      Chloride 105      CO2 20 (*)     Glucose 118 (*)     BUN 13      Creatinine 0.8      Calcium 9.7      Protein Total 7.9      Albumin 5.0      Bilirubin Total 0.6            AST 17      ALT 21      Anion Gap 15      eGFR >60     CBC WITH DIFFERENTIAL - Abnormal    WBC 10.75      RBC 5.43      Hgb 15.2      Hct 46.4      MCV 86      MCH 28.0      MCHC 32.8      RDW 14.6 (*)     Platelet Count 259      MPV 10.8      Nucleated RBC 0      Neut % 85.7 (*)     Lymph % 7.9 (*)     Mono % 5.4      Eos % 0.0      Basophil % 0.3      Imm Grans % 0.7 (*)     Neut # 9.2 (*)     Lymph # 0.85 (*)     Mono # 0.58      Eos # 0.00      Baso # 0.03      Imm Grans # 0.08 (*)    TROPONIN I HIGH SENSITIVITY - Abnormal    Troponin High Sensitive 16.2 (*)    PROTIME-INR - Normal    PT 11.4      INR 1.0     LIPASE - Normal    Lipase Level 29     TROPONIN I HIGH SENSITIVITY - Normal    Troponin High Sensitive 14.6     CBC W/ AUTO DIFFERENTIAL    Narrative:     The following orders were created for panel order CBC auto differential.  Procedure                               Abnormality         Status                     ---------                               -----------         ------                      CBC with Differential[[id number]]       Abnormal            Final result                 Please view results for these tests on the individual orders.   EXTRA TUBES    Narrative:     The following orders were created for panel order EXTRA TUBES.  Procedure                               Abnormality         Status                     ---------                               -----------         ------                     Lavender Top Hold[[id number]]                               In process                 Gold Top Hold[[id number]]                                   In process                   Please view results for these tests on the individual orders.   LAVENDER TOP HOLD   GOLD TOP HOLD   OCCULT BLOOD X 1, STOOL   POCT GLUCOSE, HAND-HELD DEVICE   TYPE & SCREEN    Specimen Outdate 06/20/2025 23:59      Group & Rh A POS      Indirect Viviane NEG     ABORH RETYPE    ABORH Retype A POS     POCT GLUCOSE    POCT Glucose 100          ECG Results              EKG 12-lead (In process)        Collection Time Result Time QRS Duration OHS QTC Calculation    06/17/25 14:20:25 06/17/25 14:25:44 92 470                     In process by Interface, Lab In [facility] (06/17/25 14:25:48)                   Narrative:    Test Reason : R07.9,    Vent. Rate :  63 BPM     Atrial Rate :  63 BPM     P-R Int : 158 ms          QRS Dur :  92 ms      QT Int : 460 ms       P-R-T Axes :  63  42   7 degrees    QTcB Int : 470 ms    Normal sinus rhythm with sinus arrhythmia  Possible Inferior infarct ,age undetermined  Possible Anterior infarct ,age undetermined  Abnormal ECG  No previous ECGs available    Referred By:            Confirmed By:                                   Imaging Results              CT Abdomen Pelvis With IV Contrast NO Oral Contrast (Final result)  Result time 06/17/25 19:27:00      Final result by Leroy Suresh MD (06/17/25 19:27:00)                   Impression:      No acute intra-abdominal abnormality.    49 x 40 x  39 mm hypodense soft tissue mass interposed between the right gluteus minimus and medius muscles.  Sarcoma is not excluded.  Recommend MRI with and without contrast.    12 mm noncalcified nodule opacity left lung base. Bibasilar atelectasis.  Follow-up chest CT in 3 months.      Electronically signed by: SachinDamon Chaserenee  Date:    06/17/2025  Time:    19:27               Narrative:    EXAMINATION:  CT ABDOMEN PELVIS WITH IV CONTRAST    CLINICAL HISTORY:  Abdominal pain, acute, nonlocalized;    TECHNIQUE:  Low dose axial images, sagittal and coronal reformations were obtained from the lung bases to the pubic symphysis following the IV administration of 100 mL of Omnipaque 350 .  Oral contrast was not given.    COMPARISON:  None.    FINDINGS:  Soft tissues: 49 x 40 x 39 mm hypodense soft tissue mass interposed between the right gluteus minimus and medius muscles.    Bones: No acute osseous abnormality.    Lower chest: 12 mm noncalcified nodule opacity left lung base.  Bibasilar atelectasis.    Lung Bases: Well aerated, without consolidation or pleural fluid.    Liver: Normal in size and attenuation, with no focal hepatic lesions.    Gallbladder: Distended.  Cholelithiasis.    Bile Ducts: No evidence of dilated ducts.    Pancreas: No mass or peripancreatic fat stranding.    Spleen: Unremarkable.    Adrenals: Unremarkable.    Kidneys/ Ureters: Small left renal cysts and additional subcentimeter hypodense lesions in each kidney which are too small to definitely characterize.    Bladder: Mild diffuse circumferential bladder wall thickening.  Correlate with with urinalysis.    Reproductive organs: Enlarged and heterogeneous containing calcifications. Correlate with PSA.    GI Tract/Mesentery: No evidence of bowel obstruction or inflammation.    Peritoneal Space: No ascites. No free air.    Lymphadenopathy: No significant adenopathy.    Vasculature: Multifocal atherosclerosis.                                       US  Abdomen Limited (Final result)  Result time 06/17/25 16:32:14      Final result by Eric Wade MD (06/17/25 16:32:14)                   Impression:      Cholelithiasis.    Hepatic steatosis.      Electronically signed by: Eric Wade  Date:    06/17/2025  Time:    16:32               Narrative:    EXAMINATION:  US ABDOMEN LIMITED    CLINICAL HISTORY:  ruq pain;    TECHNIQUE:  Grayscale and color Doppler evaluation of the abdomen was performed.    COMPARISON:  None    FINDINGS:  Pancreas was not visualized due to overlying bowel gas.  Aorta not well visualized.  Visualized IVC is unremarkable.    The liver measures 14.7 cm in length.  Increased hepatic parenchymal echogenicity consistent with steatosis.  No focal hepatic lesion.  Main portal vein is patent with hepatopetal flow.  Cholelithiasis.  Technologist notes indicate tenderness in the gallbladder region.  No gallbladder wall thickening or pericholecystic fluid.  Normal caliber common bile duct measuring 5 mm.    The right kidney measures 12.5 cm in length and has a normal sonographic appearance.                                       X-Ray Chest AP Portable (Final result)  Result time 06/17/25 15:24:57      Final result by Alexandre Beltran DO (06/17/25 15:24:57)                   Impression:      No acute cardiopulmonary abnormality.      Electronically signed by: Alexandre Beltran  Date:    06/17/2025  Time:    15:24               Narrative:    EXAMINATION:  XR CHEST AP PORTABLE    CLINICAL HISTORY:  Epigastric pain    FINDINGS:  Portable chest with comparison chest x-ray 02/08/2025.  Normal cardiomediastinal silhouette.Lungs are clear. Pulmonary vasculature is normal. No acute osseous abnormality.                                       Medications   pantoprazole (PROTONIX) 40 mg in 0.9% NaCl 100 mL IVPB (MB+) (8 mg/hr Intravenous New Bag 6/17/25 2105)   sodium chloride 0.9% flush 10 mL (has no administration in time range)   albuterol-ipratropium 2.5  mg-0.5 mg/3 mL nebulizer solution 3 mL (has no administration in time range)   melatonin tablet 9 mg (has no administration in time range)   ondansetron injection 4 mg (has no administration in time range)   senna-docusate 8.6-50 mg per tablet 1 tablet (has no administration in time range)   acetaminophen tablet 650 mg (has no administration in time range)   HYDROcodone-acetaminophen 5-325 mg per tablet 1 tablet (has no administration in time range)   naloxone 0.4 mg/mL injection 0.02 mg (has no administration in time range)   potassium bicarbonate disintegrating tablet 50 mEq (has no administration in time range)   potassium bicarbonate disintegrating tablet 35 mEq (has no administration in time range)   potassium bicarbonate disintegrating tablet 60 mEq (has no administration in time range)   magnesium oxide tablet 800 mg (has no administration in time range)   magnesium oxide tablet 800 mg (has no administration in time range)   potassium, sodium phosphates 280-160-250 mg packet 2 packet (has no administration in time range)   potassium, sodium phosphates 280-160-250 mg packet 2 packet (has no administration in time range)   potassium, sodium phosphates 280-160-250 mg packet 2 packet (has no administration in time range)   glucose chewable tablet 16 g (has no administration in time range)   glucose chewable tablet 24 g (has no administration in time range)   dextrose 50% injection 12.5 g (has no administration in time range)   dextrose 50% injection 25 g (has no administration in time range)   glucagon (human recombinant) injection 1 mg (has no administration in time range)   insulin aspart U-100 pen 0-10 Units (has no administration in time range)   aluminum-magnesium hydroxide-simethicone 200-200-20 mg/5 mL suspension 30 mL (has no administration in time range)   amiodarone tablet 100 mg (has no administration in time range)   amLODIPine tablet 10 mg (has no administration in time range)   atorvastatin tablet  80 mg (has no administration in time range)   busPIRone split tablet 7.5 mg (7.5 mg Oral Given 6/17/25 2057)   gabapentin capsule 300 mg (0 mg Oral Hold 6/17/25 2057)   losartan tablet 25 mg (has no administration in time range)   metoprolol succinate (TOPROL-XL) 24 hr tablet 25 mg (has no administration in time range)   multivitamin tablet (has no administration in time range)   traZODone tablet 150 mg (has no administration in time range)   nicotine 21 mg/24 hr 1 patch (has no administration in time range)   pantoprazole injection 80 mg (80 mg Intravenous Given 6/17/25 1514)   ondansetron injection 4 mg (4 mg Intravenous Given 6/17/25 1458)   HYDROmorphone injection 1 mg (1 mg Intravenous Given 6/17/25 1459)   HYDROmorphone injection 1 mg (1 mg Intravenous Given 6/17/25 1715)   iohexoL (OMNIPAQUE 350) injection 100 mL (100 mLs Intravenous Given 6/17/25 1756)   morphine injection 4 mg (4 mg Intravenous Given 6/17/25 2031)     Medical Decision Making  59-year-old male previous history of cardiac arrest STEMI on Plavix or Brilinta presents with right upper quadrant and epigastric pain that began earlier today.  Pain very difficult to control in the ER required multiple doses of IV narcotics.  Ultrasound and CT scan demonstrates cholelithiasis but no evidence of cholecystitis.  Given his persistent pain I think he probably does have cholecystitis but too early to see on imaging.  Given his persistent pain I think he needs to be admitted.  Case discussed with Dr. Lerner who recommends NPO after midnight, HIDA scan tomorrow.    Amount and/or Complexity of Data Reviewed  External Data Reviewed: notes.  Labs: ordered. Decision-making details documented in ED Course.  Radiology: ordered. Decision-making details documented in ED Course.    Risk  Prescription drug management.  Parenteral controlled substances.  Decision regarding hospitalization.               ED Course as of 06/17/25 2118 Tue Jun 17, 2025   7092  BP(!): 151/88 [EF]   1432 Temp: 98.8 °F (37.1 °C) [EF]   1432 Pulse: 69 [EF]   1433 Resp: 18 [EF]   1433 SpO2: 100 % [EF]   1436 BP(!): 151/88 [EF]   1436 Temp: 98.8 °F (37.1 °C) [EF]   1504 WBC: 10.75 [EF]   1504 Hemoglobin: 15.2 [EF]   1504 Platelet Count: 259 [EF]   1505 Hemoglobin is normal.  I do not see the results of the metabolic panel and then I will order some abdominal imaging.  I am going to see if the results with the metabolic panel point me in a particular direction.  Portable chest x-ray added on. [EF]   1529 X-Ray Chest AP Portable [EF]   1536 Pain much improved at this time after IV Dilaudid [EF]   1546 Sodium: 140 [EF]   1546 Potassium: 3.5 [EF]   1546 Chloride: 105 [EF]   1546 CO2(!): 20 [EF]   1546 Glucose(!): 118 [EF]   1546 BUN: 13 [EF]   1546 Creatinine: 0.8 [EF]   1546 Calcium: 9.7 [EF]   1546 PROTEIN TOTAL: 7.9 [EF]   1546 Albumin: 5.0 [EF]   1546 BILIRUBIN TOTAL: 0.6 [EF]   1546 ALP: 100 [EF]   1546 AST: 17 [EF]   1546 ALT: 21 [EF]   1546 Anion Gap: 15 [EF]   1607 Troponin I High Sensitivity(!): 16.2 [EF]   1641 US Abdomen Limited [EF]   1929 CT Abdomen Pelvis With IV Contrast NO Oral Contrast [EF]   2010 Pain returning [EF]   2013 Case d/w dr mcknight, npo, hida tomorrow [EF]   2020 Melvina to admit [EF]   2058 Patient and family are advised of the incidental finding of a right gluteal mass and the need for follow-up [EF]      ED Course User Index  [EF] Reji Arriaga MD                           Clinical Impression:  Final diagnoses:  [R07.9] Chest pain  [K92.2] GI bleed  [R10.13] Epigastric pain  [K80.20] Symptomatic cholelithiasis (Primary)          ED Disposition Condition    Observation                       Reji Arriaga MD  06/17/25 2051       [1]   Social History  Tobacco Use    Smoking status: Never    Smokeless tobacco: Current     Types: Snuff   Substance Use Topics    Alcohol use: Not Currently    Drug use: Never        Reji Arriaga MD  06/17/25 2118

## 2025-06-18 ENCOUNTER — CLINICAL SUPPORT (OUTPATIENT)
Dept: CARDIOLOGY | Facility: HOSPITAL | Age: 60
End: 2025-06-18
Attending: EMERGENCY MEDICINE
Payer: MEDICAID

## 2025-06-18 ENCOUNTER — ANESTHESIA EVENT (OUTPATIENT)
Dept: SURGERY | Facility: HOSPITAL | Age: 60
End: 2025-06-18
Payer: MEDICAID

## 2025-06-18 ENCOUNTER — ANESTHESIA (OUTPATIENT)
Dept: SURGERY | Facility: HOSPITAL | Age: 60
End: 2025-06-18
Payer: MEDICAID

## 2025-06-18 LAB
ABSOLUTE EOSINOPHIL (SMH): 0 K/UL
ABSOLUTE MONOCYTE (SMH): 1.76 K/UL (ref 0.3–1)
ABSOLUTE MONOCYTE (SMH): 1.83 K/UL (ref 0.3–1)
ABSOLUTE MONOCYTE (SMH): 2.13 K/UL (ref 0.3–1)
ABSOLUTE NEUTROPHIL COUNT (SMH): 13.2 K/UL (ref 1.8–7.7)
ABSOLUTE NEUTROPHIL COUNT (SMH): 13.9 K/UL (ref 1.8–7.7)
ABSOLUTE NEUTROPHIL COUNT (SMH): 16.2 K/UL (ref 1.8–7.7)
ANION GAP (SMH): 13 MMOL/L (ref 8–16)
AORTIC ROOT ANNULUS: 3.2 CM
AORTIC VALVE CUSP SEPERATION: 1.6 CM
APICAL FOUR CHAMBER EJECTION FRACTION: 56 %
APICAL TWO CHAMBER EJECTION FRACTION: 58 %
APTT PPP: 26.5 SECONDS (ref 21–32)
AV INDEX (PROSTH): 0.81
AV MEAN GRADIENT: 6 MMHG
AV PEAK GRADIENT: 12 MMHG
AV VALVE AREA BY VELOCITY RATIO: 2.4 CM²
AV VALVE AREA: 2.5 CM²
AV VELOCITY RATIO: 0.76
BASOPHILS # BLD AUTO: 0.03 K/UL
BASOPHILS # BLD AUTO: 0.04 K/UL
BASOPHILS # BLD AUTO: 0.05 K/UL
BASOPHILS NFR BLD AUTO: 0.2 %
BASOPHILS NFR BLD AUTO: 0.2 %
BASOPHILS NFR BLD AUTO: 0.3 %
BSA FOR ECHO PROCEDURE: 1.87 M2
BUN SERPL-MCNC: 11 MG/DL (ref 8–23)
CALCIUM SERPL-MCNC: 8.9 MG/DL (ref 8.7–10.5)
CHLORIDE SERPL-SCNC: 106 MMOL/L (ref 95–110)
CO2 SERPL-SCNC: 22 MMOL/L (ref 23–29)
CREAT SERPL-MCNC: 0.7 MG/DL (ref 0.5–1.4)
CV ECHO LV RWT: 0.44 CM
DOP CALC AO PEAK VEL: 1.7 M/S
DOP CALC AO VTI: 27.9 CM
DOP CALC LVOT AREA: 3.1 CM2
DOP CALC LVOT DIAMETER: 2 CM
DOP CALC LVOT PEAK VEL: 1.3 M/S
DOP CALC LVOT STROKE VOLUME: 70.7 CM3
DOP CALC MV VTI: 25.2 CM
DOP CALCLVOT PEAK VEL VTI: 22.5 CM
E WAVE DECELERATION TIME: 158 MSEC
E/A RATIO: 0.72
E/E' RATIO: 9 M/S
ECHO LV POSTERIOR WALL: 1.1 CM (ref 0.6–1.1)
ERYTHROCYTE [DISTWIDTH] IN BLOOD BY AUTOMATED COUNT: 14.8 % (ref 11.5–14.5)
ERYTHROCYTE [DISTWIDTH] IN BLOOD BY AUTOMATED COUNT: 15 % (ref 11.5–14.5)
ERYTHROCYTE [DISTWIDTH] IN BLOOD BY AUTOMATED COUNT: 15.1 % (ref 11.5–14.5)
FRACTIONAL SHORTENING: 46 % (ref 28–44)
GFR SERPLBLD CREATININE-BSD FMLA CKD-EPI: >60 ML/MIN/1.73/M2
GLUCOSE SERPL-MCNC: 122 MG/DL (ref 70–110)
HCT VFR BLD AUTO: 45.4 % (ref 40–54)
HCT VFR BLD AUTO: 46.3 % (ref 40–54)
HCT VFR BLD AUTO: 46.5 % (ref 40–54)
HGB BLD-MCNC: 14.6 GM/DL (ref 14–18)
HGB BLD-MCNC: 15 GM/DL (ref 14–18)
HGB BLD-MCNC: 15.1 GM/DL (ref 14–18)
IMM GRANULOCYTES # BLD AUTO: 0.11 K/UL (ref 0–0.04)
IMM GRANULOCYTES # BLD AUTO: 0.14 K/UL (ref 0–0.04)
IMM GRANULOCYTES # BLD AUTO: 0.17 K/UL (ref 0–0.04)
IMM GRANULOCYTES NFR BLD AUTO: 0.7 % (ref 0–0.5)
IMM GRANULOCYTES NFR BLD AUTO: 0.8 % (ref 0–0.5)
IMM GRANULOCYTES NFR BLD AUTO: 0.9 % (ref 0–0.5)
INR PPP: 1 (ref 0.8–1.2)
INTERVENTRICULAR SEPTUM: 1.1 CM (ref 0.6–1.1)
IVC DIAMETER: 1.78 CM
LEFT ATRIUM AREA SYSTOLIC (APICAL 2 CHAMBER): 16.3 CM2
LEFT ATRIUM AREA SYSTOLIC (APICAL 4 CHAMBER): 12.1 CM2
LEFT ATRIUM SIZE: 3.2 CM
LEFT ATRIUM VOLUME INDEX MOD: 21 ML/M2
LEFT ATRIUM VOLUME MOD: 38 ML
LEFT INTERNAL DIMENSION IN SYSTOLE: 2.7 CM (ref 2.1–4)
LEFT VENTRICLE DIASTOLIC VOLUME INDEX: 63.59 ML/M2
LEFT VENTRICLE DIASTOLIC VOLUME: 117 ML
LEFT VENTRICLE END DIASTOLIC VOLUME APICAL 2 CHAMBER: 76.5 ML
LEFT VENTRICLE END DIASTOLIC VOLUME APICAL 4 CHAMBER: 92.6 ML
LEFT VENTRICLE END SYSTOLIC VOLUME APICAL 2 CHAMBER: 45.2 ML
LEFT VENTRICLE END SYSTOLIC VOLUME APICAL 4 CHAMBER: 28.3 ML
LEFT VENTRICLE MASS INDEX: 112.6 G/M2
LEFT VENTRICLE SYSTOLIC VOLUME INDEX: 15.2 ML/M2
LEFT VENTRICLE SYSTOLIC VOLUME: 28 ML
LEFT VENTRICULAR INTERNAL DIMENSION IN DIASTOLE: 5 CM (ref 3.5–6)
LEFT VENTRICULAR MASS: 207.1 G
LV LATERAL E/E' RATIO: 7.6 M/S
LV SEPTAL E/E' RATIO: 10.9 M/S
LVED V (TEICH): 117 ML
LVES V (TEICH): 27.8 ML
LVOT MG: 4 MMHG
LVOT MV: 0.89 CM/S
LYMPHOCYTES # BLD AUTO: 0.62 K/UL (ref 1–4.8)
LYMPHOCYTES # BLD AUTO: 0.69 K/UL (ref 1–4.8)
LYMPHOCYTES # BLD AUTO: 0.79 K/UL (ref 1–4.8)
MCH RBC QN AUTO: 27.9 PG (ref 27–31)
MCH RBC QN AUTO: 28.1 PG (ref 27–31)
MCH RBC QN AUTO: 28.3 PG (ref 27–31)
MCHC RBC AUTO-ENTMCNC: 32.2 G/DL (ref 32–36)
MCHC RBC AUTO-ENTMCNC: 32.3 G/DL (ref 32–36)
MCHC RBC AUTO-ENTMCNC: 32.6 G/DL (ref 32–36)
MCV RBC AUTO: 87 FL (ref 82–98)
MV MEAN GRADIENT: 3 MMHG
MV PEAK A VEL: 1.05 M/S
MV PEAK E VEL: 0.76 M/S
MV PEAK GRADIENT: 6 MMHG
MV STENOSIS PRESSURE HALF TIME: 46 MS
MV VALVE AREA BY CONTINUITY EQUATION: 2.8 CM2
MV VALVE AREA P 1/2 METHOD: 4.78 CM2
NUCLEATED RBC (/100WBC) (SMH): 0 /100 WBC
OHS LV EJECTION FRACTION SIMPSONS BIPLANE MOD: 57 %
PLATELET # BLD AUTO: 257 K/UL (ref 150–450)
PLATELET # BLD AUTO: 265 K/UL (ref 150–450)
PLATELET # BLD AUTO: 271 K/UL (ref 150–450)
PMV BLD AUTO: 10.8 FL (ref 9.2–12.9)
PMV BLD AUTO: 11.1 FL (ref 9.2–12.9)
PMV BLD AUTO: 11.3 FL (ref 9.2–12.9)
POCT GLUCOSE: 115 MG/DL (ref 70–110)
POCT GLUCOSE: 125 MG/DL (ref 70–110)
POCT GLUCOSE: 128 MG/DL (ref 70–110)
POCT GLUCOSE: 135 MG/DL (ref 70–110)
POTASSIUM SERPL-SCNC: 3.8 MMOL/L (ref 3.5–5.1)
PROTHROMBIN TIME: 11.5 SECONDS (ref 9–12.5)
PV MV: 0.98 M/S
PV PEAK GRADIENT: 9 MMHG
PV PEAK VELOCITY: 1.5 M/S
RA PRESSURE ESTIMATED: 3 MMHG
RBC # BLD AUTO: 5.24 M/UL (ref 4.6–6.2)
RBC # BLD AUTO: 5.33 M/UL (ref 4.6–6.2)
RBC # BLD AUTO: 5.34 M/UL (ref 4.6–6.2)
RELATIVE EOSINOPHIL (SMH): 0 % (ref 0–8)
RELATIVE LYMPHOCYTE (SMH): 3.6 % (ref 18–48)
RELATIVE LYMPHOCYTE (SMH): 3.8 % (ref 18–48)
RELATIVE LYMPHOCYTE (SMH): 4.9 % (ref 18–48)
RELATIVE MONOCYTE (SMH): 10.7 % (ref 4–15)
RELATIVE MONOCYTE (SMH): 11.1 % (ref 4–15)
RELATIVE MONOCYTE (SMH): 11.5 % (ref 4–15)
RELATIVE NEUTROPHIL (SMH): 82.7 % (ref 38–73)
RELATIVE NEUTROPHIL (SMH): 84.2 % (ref 38–73)
RELATIVE NEUTROPHIL (SMH): 84.4 % (ref 38–73)
RIGHT ATRIUM END SYSTOLIC VOLUME APICAL 4 CHAMBER INDEX BSA: 14.13 ML/M2
RIGHT ATRIUM VOLUME AREA LENGTH APICAL 4 CHAMBER: 26 ML
RV TISSUE DOPPLER FREE WALL SYSTOLIC VELOCITY 1 (APICAL 4 CHAMBER VIEW): 24.9 CM/S
SODIUM SERPL-SCNC: 141 MMOL/L (ref 136–145)
TDI LATERAL: 0.1 M/S
TDI SEPTAL: 0.07 M/S
TDI: 0.09 M/S
TRICUSPID ANNULAR PLANE SYSTOLIC EXCURSION: 1.8 CM
WBC # BLD AUTO: 15.98 K/UL (ref 3.9–12.7)
WBC # BLD AUTO: 16.48 K/UL (ref 3.9–12.7)
WBC # BLD AUTO: 19.27 K/UL (ref 3.9–12.7)
Z-SCORE OF LEFT VENTRICULAR DIMENSION IN END DIASTOLE: -0.21
Z-SCORE OF LEFT VENTRICULAR DIMENSION IN END SYSTOLE: -1.22

## 2025-06-18 PROCEDURE — 63600175 PHARM REV CODE 636 W HCPCS

## 2025-06-18 PROCEDURE — 85730 THROMBOPLASTIN TIME PARTIAL: CPT

## 2025-06-18 PROCEDURE — 63600175 PHARM REV CODE 636 W HCPCS: Mod: JZ

## 2025-06-18 PROCEDURE — 27200043 HC FORCEPS, BIOPSY: Performed by: INTERNAL MEDICINE

## 2025-06-18 PROCEDURE — 25000003 PHARM REV CODE 250: Performed by: INTERNAL MEDICINE

## 2025-06-18 PROCEDURE — 63600175 PHARM REV CODE 636 W HCPCS: Performed by: RADIOLOGY

## 2025-06-18 PROCEDURE — A9585 GADOBUTROL INJECTION: HCPCS | Performed by: INTERNAL MEDICINE

## 2025-06-18 PROCEDURE — 25000003 PHARM REV CODE 250

## 2025-06-18 PROCEDURE — 0DB68ZX EXCISION OF STOMACH, VIA NATURAL OR ARTIFICIAL OPENING ENDOSCOPIC, DIAGNOSTIC: ICD-10-PCS | Performed by: FAMILY MEDICINE

## 2025-06-18 PROCEDURE — S4991 NICOTINE PATCH NONLEGEND: HCPCS

## 2025-06-18 PROCEDURE — 94761 N-INVAS EAR/PLS OXIMETRY MLT: CPT

## 2025-06-18 PROCEDURE — 27000221 HC OXYGEN, UP TO 24 HOURS

## 2025-06-18 PROCEDURE — 94799 UNLISTED PULMONARY SVC/PX: CPT

## 2025-06-18 PROCEDURE — 82962 GLUCOSE BLOOD TEST: CPT

## 2025-06-18 PROCEDURE — 93306 TTE W/DOPPLER COMPLETE: CPT | Mod: 26,,, | Performed by: GENERAL PRACTICE

## 2025-06-18 PROCEDURE — 36415 COLL VENOUS BLD VENIPUNCTURE: CPT

## 2025-06-18 PROCEDURE — 80048 BASIC METABOLIC PNL TOTAL CA: CPT

## 2025-06-18 PROCEDURE — 99900031 HC PATIENT EDUCATION (STAT)

## 2025-06-18 PROCEDURE — 25000003 PHARM REV CODE 250: Performed by: SURGERY

## 2025-06-18 PROCEDURE — 85610 PROTHROMBIN TIME: CPT

## 2025-06-18 PROCEDURE — 37000008 HC ANESTHESIA 1ST 15 MINUTES: Performed by: INTERNAL MEDICINE

## 2025-06-18 PROCEDURE — 43235 EGD DIAGNOSTIC BRUSH WASH: CPT | Performed by: INTERNAL MEDICINE

## 2025-06-18 PROCEDURE — 27000284 HC CANNULA NASAL: Performed by: ANESTHESIOLOGY

## 2025-06-18 PROCEDURE — 0DB58ZX EXCISION OF ESOPHAGUS, VIA NATURAL OR ARTIFICIAL OPENING ENDOSCOPIC, DIAGNOSTIC: ICD-10-PCS | Performed by: FAMILY MEDICINE

## 2025-06-18 PROCEDURE — 85025 COMPLETE CBC W/AUTO DIFF WBC: CPT | Mod: 91

## 2025-06-18 PROCEDURE — 63600175 PHARM REV CODE 636 W HCPCS: Performed by: INTERNAL MEDICINE

## 2025-06-18 PROCEDURE — 11000001 HC ACUTE MED/SURG PRIVATE ROOM

## 2025-06-18 PROCEDURE — 99222 1ST HOSP IP/OBS MODERATE 55: CPT | Mod: ,,, | Performed by: SURGERY

## 2025-06-18 PROCEDURE — 93306 TTE W/DOPPLER COMPLETE: CPT

## 2025-06-18 PROCEDURE — 99900035 HC TECH TIME PER 15 MIN (STAT)

## 2025-06-18 PROCEDURE — 37000009 HC ANESTHESIA EA ADD 15 MINS: Performed by: INTERNAL MEDICINE

## 2025-06-18 PROCEDURE — A9537 TC99M MEBROFENIN: HCPCS | Performed by: INTERNAL MEDICINE

## 2025-06-18 PROCEDURE — 25500020 PHARM REV CODE 255: Performed by: INTERNAL MEDICINE

## 2025-06-18 RX ORDER — PROPOFOL 10 MG/ML
VIAL (ML) INTRAVENOUS
Status: DISCONTINUED | OUTPATIENT
Start: 2025-06-18 | End: 2025-06-18

## 2025-06-18 RX ORDER — LIDOCAINE HYDROCHLORIDE 20 MG/ML
15 SOLUTION OROPHARYNGEAL ONCE
Status: COMPLETED | OUTPATIENT
Start: 2025-06-18 | End: 2025-06-18

## 2025-06-18 RX ORDER — MORPHINE SULFATE 2 MG/ML
2 INJECTION, SOLUTION INTRAMUSCULAR; INTRAVENOUS ONCE
Status: COMPLETED | OUTPATIENT
Start: 2025-06-18 | End: 2025-06-18

## 2025-06-18 RX ORDER — INSULIN ASPART 100 [IU]/ML
0-5 INJECTION, SOLUTION INTRAVENOUS; SUBCUTANEOUS EVERY 6 HOURS PRN
Status: DISCONTINUED | OUTPATIENT
Start: 2025-06-18 | End: 2025-06-21 | Stop reason: HOSPADM

## 2025-06-18 RX ORDER — ACETAMINOPHEN 325 MG/1
650 TABLET ORAL EVERY 4 HOURS PRN
Status: DISCONTINUED | OUTPATIENT
Start: 2025-06-18 | End: 2025-06-21 | Stop reason: HOSPADM

## 2025-06-18 RX ORDER — GADOBUTROL 604.72 MG/ML
7.5 INJECTION INTRAVENOUS
Status: COMPLETED | OUTPATIENT
Start: 2025-06-18 | End: 2025-06-18

## 2025-06-18 RX ORDER — KIT FOR THE PREPARATION OF TECHNETIUM TC 99M MEBROFENIN 45 MG/10ML
8.3 INJECTION, POWDER, LYOPHILIZED, FOR SOLUTION INTRAVENOUS
Status: COMPLETED | OUTPATIENT
Start: 2025-06-18 | End: 2025-06-18

## 2025-06-18 RX ORDER — HYDROCODONE BITARTRATE AND ACETAMINOPHEN 5; 325 MG/1; MG/1
1 TABLET ORAL EVERY 4 HOURS PRN
Refills: 0 | Status: DISCONTINUED | OUTPATIENT
Start: 2025-06-18 | End: 2025-06-21 | Stop reason: HOSPADM

## 2025-06-18 RX ORDER — PANTOPRAZOLE SODIUM 40 MG/10ML
40 INJECTION, POWDER, LYOPHILIZED, FOR SOLUTION INTRAVENOUS DAILY
Status: DISCONTINUED | OUTPATIENT
Start: 2025-06-19 | End: 2025-06-21

## 2025-06-18 RX ORDER — ALUMINUM HYDROXIDE, MAGNESIUM HYDROXIDE, AND SIMETHICONE 1200; 120; 1200 MG/30ML; MG/30ML; MG/30ML
30 SUSPENSION ORAL ONCE
Status: COMPLETED | OUTPATIENT
Start: 2025-06-18 | End: 2025-06-18

## 2025-06-18 RX ADMIN — GABAPENTIN 300 MG: 300 CAPSULE ORAL at 08:06

## 2025-06-18 RX ADMIN — HYDROMORPHONE HYDROCHLORIDE 1 MG: 1 INJECTION, SOLUTION INTRAMUSCULAR; INTRAVENOUS; SUBCUTANEOUS at 02:06

## 2025-06-18 RX ADMIN — NICOTINE 1 PATCH: 21 PATCH, EXTENDED RELEASE TRANSDERMAL at 07:06

## 2025-06-18 RX ADMIN — SODIUM CHLORIDE, SODIUM LACTATE, POTASSIUM CHLORIDE, AND CALCIUM CHLORIDE: .6; .31; .03; .02 INJECTION, SOLUTION INTRAVENOUS at 12:06

## 2025-06-18 RX ADMIN — BUSPIRONE HYDROCHLORIDE 7.5 MG: 5 TABLET ORAL at 08:06

## 2025-06-18 RX ADMIN — PROPOFOL 80 MG: 10 INJECTION, EMULSION INTRAVENOUS at 12:06

## 2025-06-18 RX ADMIN — PANTOPRAZOLE SODIUM 8 MG/HR: 40 INJECTION, POWDER, FOR SOLUTION INTRAVENOUS at 06:06

## 2025-06-18 RX ADMIN — PIPERACILLIN SODIUM AND TAZOBACTAM SODIUM 4.5 G: 4; .5 INJECTION, POWDER, LYOPHILIZED, FOR SOLUTION INTRAVENOUS at 04:06

## 2025-06-18 RX ADMIN — HYDROMORPHONE HYDROCHLORIDE 1 MG: 1 INJECTION, SOLUTION INTRAMUSCULAR; INTRAVENOUS; SUBCUTANEOUS at 01:06

## 2025-06-18 RX ADMIN — ALUMINUM HYDROXIDE, MAGNESIUM HYDROXIDE, AND DIMETHICONE 30 ML: 200; 20; 200 SUSPENSION ORAL at 06:06

## 2025-06-18 RX ADMIN — SENNOSIDES AND DOCUSATE SODIUM 1 TABLET: 50; 8.6 TABLET ORAL at 07:06

## 2025-06-18 RX ADMIN — PROPOFOL 20 MG: 10 INJECTION, EMULSION INTRAVENOUS at 12:06

## 2025-06-18 RX ADMIN — KIT FOR THE PREPARATION OF TECHNETIUM TC 99M MEBROFENIN 8.3 MILLICURIE: 45 INJECTION, POWDER, LYOPHILIZED, FOR SOLUTION INTRAVENOUS at 10:06

## 2025-06-18 RX ADMIN — MORPHINE SULFATE 2 MG: 2 INJECTION, SOLUTION INTRAMUSCULAR; INTRAVENOUS at 11:06

## 2025-06-18 RX ADMIN — PIPERACILLIN SODIUM AND TAZOBACTAM SODIUM 4.5 G: 4; .5 INJECTION, POWDER, LYOPHILIZED, FOR SOLUTION INTRAVENOUS at 08:06

## 2025-06-18 RX ADMIN — GADOBUTROL 7.5 ML: 604.72 INJECTION INTRAVENOUS at 04:06

## 2025-06-18 RX ADMIN — PANTOPRAZOLE SODIUM 8 MG/HR: 40 INJECTION, POWDER, FOR SOLUTION INTRAVENOUS at 02:06

## 2025-06-18 RX ADMIN — LIDOCAINE HYDROCHLORIDE 15 ML: 20 SOLUTION ORAL at 06:06

## 2025-06-18 RX ADMIN — HYDROCODONE BITARTRATE AND ACETAMINOPHEN 1 TABLET: 5; 325 TABLET ORAL at 08:06

## 2025-06-18 NOTE — SUBJECTIVE & OBJECTIVE
Interval History:   Patient seen and examined.  NAD. NAEO.  EGD complete.  HIDA scan pending.  MRI pending.  Review of Systems   Constitutional:  Negative for chills and fever.   Respiratory:  Negative for shortness of breath.    Cardiovascular:  Negative for chest pain, palpitations and leg swelling.   Gastrointestinal:  Positive for abdominal pain (right upper quadrant). Negative for blood in stool, diarrhea, nausea and vomiting.        Indigestion   Neurological: Negative.    All other systems reviewed and are negative.    Objective:     Vital Signs (Most Recent):  Temp: 99.9 °F (37.7 °C) (06/18/25 1300)  Pulse: 60 (06/18/25 1300)  Resp: 18 (06/18/25 1329)  BP: (!) 165/73 (06/18/25 1300)  SpO2: 95 % (06/18/25 1300) Vital Signs (24h Range):  Temp:  [98.6 °F (37 °C)-99.9 °F (37.7 °C)] 99.9 °F (37.7 °C)  Pulse:  [] 60  Resp:  [16-28] 18  SpO2:  [92 %-100 %] 95 %  BP: (137-168)/(68-90) 165/73     Weight: 74.8 kg (165 lb)  Body mass index is 26.63 kg/m².    Intake/Output Summary (Last 24 hours) at 6/18/2025 1435  Last data filed at 6/18/2025 1235  Gross per 24 hour   Intake 243.72 ml   Output 1 ml   Net 242.72 ml         Physical Exam  Vitals and nursing note reviewed.   Constitutional:       Appearance: Normal appearance. He is well-developed.   HENT:      Nose: Nose normal. No septal deviation.   Eyes:      Conjunctiva/sclera: Conjunctivae normal.      Pupils: Pupils are equal, round, and reactive to light.   Neck:      Thyroid: No thyroid mass.      Vascular: No JVD.      Trachea: No tracheal tenderness or tracheal deviation.   Cardiovascular:      Rate and Rhythm: Normal rate and regular rhythm.      Pulses: Normal pulses.      Heart sounds: Normal heart sounds, S1 normal and S2 normal.   Pulmonary:      Effort: Pulmonary effort is normal.      Breath sounds: Normal breath sounds. No decreased breath sounds.   Abdominal:      General: Bowel sounds are normal. There is no abdominal bruit.      Palpations:  Abdomen is soft. There is no hepatomegaly or splenomegaly.      Tenderness: There is abdominal tenderness in the right upper quadrant, right lower quadrant, epigastric area and periumbilical area.      Hernia: No hernia is present.   Musculoskeletal:      Right lower leg: No edema.      Left lower leg: No edema.   Skin:     General: Skin is warm.      Findings: No rash.   Neurological:      Mental Status: He is alert and oriented to person, place, and time.   Psychiatric:         Mood and Affect: Mood normal.         Behavior: Behavior normal.               Significant Labs: All pertinent labs within the past 24 hours have been reviewed.  CBC:   Recent Labs   Lab 06/17/25  1453 06/18/25  0451 06/18/25  0759   WBC 10.75 15.98* 16.48*   HGB 15.2 14.6 15.0   HCT 46.4 45.4 46.5    271 257     CMP:   Recent Labs   Lab 06/17/25  1453 06/18/25  0451    141   K 3.5 3.8    106   CO2 20* 22*   * 122*   BUN 13 11   CREATININE 0.8 0.7   CALCIUM 9.7 8.9   PROT 7.9  --    ALBUMIN 5.0  --    BILITOT 0.6  --    ALKPHOS 100  --    AST 17  --    ALT 21  --    ANIONGAP 15 13     Coagulation:   Recent Labs   Lab 06/18/25  0451   INR 1.0   APTT 26.5     POCT Glucose:   Recent Labs   Lab 06/17/25  2103 06/18/25  0732 06/18/25  1336   POCTGLUCOSE 100 135* 128*       Significant Imaging: I have reviewed all pertinent imaging results/findings within the past 24 hours.  Imaging Results              CT Abdomen Pelvis With IV Contrast NO Oral Contrast (Final result)  Result time 06/17/25 19:27:00      Final result by Leroy Suresh MD (06/17/25 19:27:00)                   Impression:      No acute intra-abdominal abnormality.    49 x 40 x 39 mm hypodense soft tissue mass interposed between the right gluteus minimus and medius muscles.  Sarcoma is not excluded.  Recommend MRI with and without contrast.    12 mm noncalcified nodule opacity left lung base. Bibasilar atelectasis.  Follow-up chest CT in 3  months.      Electronically signed by: Leroy Suresh  Date:    06/17/2025  Time:    19:27               Narrative:    EXAMINATION:  CT ABDOMEN PELVIS WITH IV CONTRAST    CLINICAL HISTORY:  Abdominal pain, acute, nonlocalized;    TECHNIQUE:  Low dose axial images, sagittal and coronal reformations were obtained from the lung bases to the pubic symphysis following the IV administration of 100 mL of Omnipaque 350 .  Oral contrast was not given.    COMPARISON:  None.    FINDINGS:  Soft tissues: 49 x 40 x 39 mm hypodense soft tissue mass interposed between the right gluteus minimus and medius muscles.    Bones: No acute osseous abnormality.    Lower chest: 12 mm noncalcified nodule opacity left lung base.  Bibasilar atelectasis.    Lung Bases: Well aerated, without consolidation or pleural fluid.    Liver: Normal in size and attenuation, with no focal hepatic lesions.    Gallbladder: Distended.  Cholelithiasis.    Bile Ducts: No evidence of dilated ducts.    Pancreas: No mass or peripancreatic fat stranding.    Spleen: Unremarkable.    Adrenals: Unremarkable.    Kidneys/ Ureters: Small left renal cysts and additional subcentimeter hypodense lesions in each kidney which are too small to definitely characterize.    Bladder: Mild diffuse circumferential bladder wall thickening.  Correlate with with urinalysis.    Reproductive organs: Enlarged and heterogeneous containing calcifications. Correlate with PSA.    GI Tract/Mesentery: No evidence of bowel obstruction or inflammation.    Peritoneal Space: No ascites. No free air.    Lymphadenopathy: No significant adenopathy.    Vasculature: Multifocal atherosclerosis.                                       US Abdomen Limited (Final result)  Result time 06/17/25 16:32:14      Final result by Eric Wade MD (06/17/25 16:32:14)                   Impression:      Cholelithiasis.    Hepatic steatosis.      Electronically signed by: Eric  Mauro  Date:    06/17/2025  Time:    16:32               Narrative:    EXAMINATION:  US ABDOMEN LIMITED    CLINICAL HISTORY:  ruq pain;    TECHNIQUE:  Grayscale and color Doppler evaluation of the abdomen was performed.    COMPARISON:  None    FINDINGS:  Pancreas was not visualized due to overlying bowel gas.  Aorta not well visualized.  Visualized IVC is unremarkable.    The liver measures 14.7 cm in length.  Increased hepatic parenchymal echogenicity consistent with steatosis.  No focal hepatic lesion.  Main portal vein is patent with hepatopetal flow.  Cholelithiasis.  Technologist notes indicate tenderness in the gallbladder region.  No gallbladder wall thickening or pericholecystic fluid.  Normal caliber common bile duct measuring 5 mm.    The right kidney measures 12.5 cm in length and has a normal sonographic appearance.                                       X-Ray Chest AP Portable (Final result)  Result time 06/17/25 15:24:57      Final result by Alexandre Beltran DO (06/17/25 15:24:57)                   Impression:      No acute cardiopulmonary abnormality.      Electronically signed by: Alexandre Beltran  Date:    06/17/2025  Time:    15:24               Narrative:    EXAMINATION:  XR CHEST AP PORTABLE    CLINICAL HISTORY:  Epigastric pain    FINDINGS:  Portable chest with comparison chest x-ray 02/08/2025.  Normal cardiomediastinal silhouette.Lungs are clear. Pulmonary vasculature is normal. No acute osseous abnormality.

## 2025-06-18 NOTE — ASSESSMENT & PLAN NOTE
Patient's FSGs are controlled on current medication regimen.  Last A1c reviewed-   Lab Results   Component Value Date    HGBA1C 5.8 02/08/2025     Most recent fingerstick glucose reviewed-   Recent Labs   Lab 06/17/25 2103   POCTGLUCOSE 100     Current correctional scale  Medium  Maintain anti-hyperglycemic dose as follows-   Antihyperglycemics (From admission, onward)      Start     Stop Route Frequency Ordered    06/17/25 2136  insulin aspart U-100 pen 0-10 Units         -- SubQ Before meals & nightly PRN 06/17/25 2037          Hold Oral hypoglycemics while patient is in the hospital.

## 2025-06-18 NOTE — ASSESSMENT & PLAN NOTE
"Patient has Systolic (HFrEF) heart failure that is Chronic. On presentation their CHF was well compensated. Most recent BNP and echo results are listed below.  No results for input(s): "BNP" in the last 72 hours.  Latest ECHO  Results for orders placed during the hospital encounter of 02/07/25    Echo    Interpretation Summary    Left Ventricle: The left ventricle is normal in size. Normal wall thickness. There is concentric remodeling. Moderate hypokinesis of the inferior and the remaining walls appear to move fairly well There is mildly reduced systolic function with a visually estimated ejection fraction of 45 - 50%. There is normal diastolic function.    Right Ventricle: Normal right ventricular cavity size. Wall thickness is normal. Systolic function is normal.    Aortic Valve: The aortic valve is structurally normal.    Tricuspid Valve: The tricuspid valve is structurally normal.    Pulmonary Artery: The estimated pulmonary artery systolic pressure is 22 mmHg.    IVC/SVC: Intermediate venous pressure at 8 mmHg.    Current Heart Failure Medications  losartan tablet 25 mg, Daily, Oral  metoprolol succinate (TOPROL-XL) 24 hr tablet 25 mg, Daily, Oral    Plan  - Monitor strict I&Os and daily weights.    - Place on telemetry  - Low sodium diet  - Place on fluid restriction of 1.5 L.   - Cardiology has not been consulted  - The patient's volume status is at their baseline  - Resume home metoprolol and losartan  "

## 2025-06-18 NOTE — ASSESSMENT & PLAN NOTE
Chronic, controlled.  Latest blood pressure and vitals reviewed-     Temp:  [98.6 °F (37 °C)-99.9 °F (37.7 °C)]   Pulse:  []   Resp:  [16-28]   BP: (137-168)/(68-90)   SpO2:  [92 %-100 %] .   Home meds for hypertension were reviewed and noted below-  Hypertension Medications              amLODIPine (NORVASC) 5 MG tablet Take 1 tablet (5 mg total) by mouth once daily.    losartan (COZAAR) 50 MG Tab Take 1 tablet (50 mg total) by mouth once daily.    metoprolol succinate (TOPROL-XL) 25 MG 24 hr tablet Take 1 tablet (25 mg total) by mouth once daily.            While in the hospital, will manage blood pressure as follows; Continue home antihypertensive regimen    Will utilize p.r.n. blood pressure medication only if patient's blood pressure greater than 180/110 and he develops symptoms such as worsening chest pain or shortness of breath.

## 2025-06-18 NOTE — HPI
Mr. Britt is a 59 yr old male with a hx of CAD s/p PCI, HTN, sleep apnea not on CPAP, DM type 2, SVT, bilateral PE, spontaneous pneumothorax, ED, chronic pansinusitis, deviated nasal septum, STEMI, cardiac arrest, bradycardia, heart failure with reduced EF who presents to the ED with a chief complaint of abdominal pain.  Patient endorses 10+/10 sharp right upper quadrant abdominal pain that began around 10:00 a.m. this morning and radiates to his midsternal chest.  He states that he ate spaghetti last night and began having emesis today that contained remnants of spaghetti with the last episode being around 4:00 p.m..  He also endorses he has had 3 black tarry looking stools.  He denies having any black tarry stools while in the ED and denies any other active bleeding.  He also endorses subjective fever, chills, and lightheadedness.  He has taken Mylanta, Zofran, Phenergan at home without relief.  He states he goes can of dip every 2 days, occasionally drinks alcohol, and denies recreational drug use.  He denies dyspnea, chest pain, dysuria, hematuria, dizziness, and syncope.    Upon arrival to ED, patient afebrile, HR of 69, RR of 18, BP of 151/88, satting 100% on RA.  Workup in the ED reveals CO2 of 20, glucose of 118, initial troponin of 16.2, repeat troponin of 14.6.  Remainder of labs unremarkable.  CT AP with IV contrast shows no acute intra-abdominal abnormality.  49 x 40 x 39 mm hypodense soft tissue mass interposed between the right gluteus minimus and medius muscles.  Sarcoma is not excluded.  Recommend MRI with and without contrast.  12 mm noncalcified nodule opacity left lung base.  Bibasilar atelectasis.  Follow-up chest CT in 3 months.  Right upper quadrant ultrasound shows cholelithiasis and hepatic steatosis.  CXR shows no acute cardiopulmonary abnormality.  Patient was given hydromorphone 1 mg IV x2, morphine 4 mg IV, ondansetron 4 mg IV, and pantoprazole 80 mg IV, and initiated on pantoprazole  gtt while in the ED. ED provider discussed case with General surgery Dr. Lerner recommended HIDA scan, NPO at midnight, and will see patient in AM. case discussed with ED provider and patient will be placed under observation for further management.

## 2025-06-18 NOTE — ASSESSMENT & PLAN NOTE
- Presented to the ED with chief complaint of right upper quadrant pain that radiates to midsternal chest  - Right upper quadrant ultrasound with cholelithiasis and hepatic steatosis  - ED provider discussed case with Dr. Lerner who recommended HIDA scan,  - PRN analgesia  - Symptomatic care  - Holding VTE prophylaxis as well as home aspirin and Plavix, resume once appropriate  -HIDA positive   -CLD  -NPO p midnight  -Lap anirudh when cleared by cards

## 2025-06-18 NOTE — CONSULTS
[facility]  Department of Cardiology  Consult Note      PATIENT NAME: Sanjeev Britt  MRN: [id number]  TODAY'S DATE: 06/19/2025  ADMIT DATE: 6/17/2025                          CONSULT REQUESTED BY: Paloma Boateng MD    SUBJECTIVE     PRINCIPAL PROBLEM: GI bleed      REASON FOR CONSULT:  GIB but recent stents, with obligate need for anticoagulation      HPI:  59yoM with a history of CAD s/p PCI, HTN, sleep apnea not on CPAP, DM type 2, SVT, bilateral PE, spontaneous pneumothorax, ED, chronic pansinusitis, deviated nasal septum, STEMI, cardiac arrest, bradycardia, heart failure with mildly reduced EF who presents to the ED with a chief complaint of abdominal pain found to have a GI bleed. Cardiology was consulted for anticoagulation recommendations.       [facility] 02/11/2025:    Successful staged IVUS guided PCI of diffuse lesion in mid circumflex.    The Mid Cx lesion was 70% stenosed with 0% stenosis post-intervention.    Mid Cx lesion: A STENT SYNERGY XD [id number] stent was successfully placed at 11 [address]ELKA for 10 sec.    Mid Cx lesion: A STENT SYNERGY XD [id number] stent was successfully placed at 11 VIELKA for 15 sec.      He denies chest pains. Reports taking his blood thinners appropriately.  Surgery is planning for a lap anirudh tomorrow.        From hospitalist H&P:  Principal Problem:GI bleed     Chief Complaint:        Chief Complaint   Patient presents with    Abdominal Pain       Abdominal pain radiating to chest since 10am. Patient states he had black stool and is on blood thinners.          HPI: Mr. Britt is a 59 yr old male with a hx of CAD s/p PCI, HTN, sleep apnea not on CPAP, DM type 2, SVT, bilateral PE, spontaneous pneumothorax, ED, chronic pansinusitis, deviated nasal septum, STEMI, cardiac arrest, bradycardia, heart failure with reduced EF who presents to the ED with a chief complaint of abdominal pain.  Patient endorses 10+/10 sharp right upper quadrant abdominal pain that began around 10:00  a.m. this morning and radiates to his midsternal chest.  He states that he ate spaghetti last night and began having emesis today that contained remnants of spaghetti with the last episode being around 4:00 p.m..  He also endorses he has had 3 black tarry looking stools.  He denies having any black tarry stools while in the ED and denies any other active bleeding.  He also endorses subjective fever, chills, and lightheadedness.  He has taken Mylanta, Zofran, Phenergan at home without relief.  He states he goes can of dip every 2 days, occasionally drinks alcohol, and denies recreational drug use.  He denies dyspnea, chest pain, dysuria, hematuria, dizziness, and syncope.     Upon arrival to ED, patient afebrile, HR of 69, RR of 18, BP of 151/88, satting 100% on RA.  Workup in the ED reveals CO2 of 20, glucose of 118, initial troponin of 16.2, repeat troponin of 14.6.  Remainder of labs unremarkable.  CT AP with IV contrast shows no acute intra-abdominal abnormality.  49 x 40 x 39 mm hypodense soft tissue mass interposed between the right gluteus minimus and medius muscles.  Sarcoma is not excluded.  Recommend MRI with and without contrast.  12 mm noncalcified nodule opacity left lung base.  Bibasilar atelectasis.  Follow-up chest CT in 3 months.  Right upper quadrant ultrasound shows cholelithiasis and hepatic steatosis.  CXR shows no acute cardiopulmonary abnormality.  Patient was given hydromorphone 1 mg IV x2, morphine 4 mg IV, ondansetron 4 mg IV, and pantoprazole 80 mg IV, and initiated on pantoprazole gtt while in the ED. ED provider discussed case with General surgery Dr. Lerner recommended HIDA scan, NPO at midnight, and will see patient in AM. case discussed with ED provider and patient will be placed under observation for further management.      Review of patient's allergies indicates:   Allergen Reactions    Levaquin [levofloxacin]        Past Medical History:   Diagnosis Date    Acute coronary  syndrome 09/09/2019    Coronary artery disease     Diabetes mellitus     Hypertension     Sleep apnea      Past Surgical History:   Procedure Laterality Date    ANGIOGRAM, CORONARY, WITH LEFT HEART CATHETERIZATION N/A 2/8/2025    Procedure: Angiogram, Coronary, with Left Heart Cath;  Surgeon: Mo Levi MD;  Location: [facility] CATH/EP LAB;  Service: Cardiology;  Laterality: N/A;    ESOPHAGOGASTRODUODENOSCOPY N/A 6/18/2025    Procedure: EGD (ESOPHAGOGASTRODUODENOSCOPY);  Surgeon: Silvia Fuller MD;  Location: [facility] ENDO;  Service: Endoscopy;  Laterality: N/A;    FESS, USING COMPUTER-ASSISTED NAVIGATION, WITH NASAL TURBINATE REDUCTION N/A 6/7/2024    Procedure: FESS, USING COMPUTER-ASSISTED NAVIGATION, WITH NASAL TURBINATE REDUCTION;  Surgeon: Andres Abraham MD;  Location: [facility] OR;  Service: ENT;  Laterality: N/A;    IVUS, CORONARY  2/8/2025    Procedure: IVUS, Coronary;  Surgeon: Mo Levi MD;  Location: [facility] CATH/EP LAB;  Service: Cardiology;;    IVUS, CORONARY  2/11/2025    Procedure: IVUS, Coronary;  Surgeon: Mo Levi MD;  Location: [facility] CATH/EP LAB;  Service: Cardiology;;    NASAL SEPTOPLASTY N/A 6/7/2024    Procedure: SEPTOPLASTY, NOSE;  Surgeon: Andres Abraham MD;  [facility];  Service: ENT;  Laterality: N/A;    NASAL TURBINATE REDUCTION Bilateral 6/7/2024    Procedure: REDUCTION, NASAL TURBINATE;  Surgeon: Andres Abraham MD;  [facility];  Service: ENT;  Laterality: Bilateral;    PERCUTANEOUS CORONARY INTERVENTION, ARTERY N/A 2/8/2025    Procedure: Percutaneous coronary intervention;  Surgeon: Mo Levi MD;  Location: [facility] CATH/EP LAB;  Service: Cardiology;  Laterality: N/A;    PERCUTANEOUS CORONARY INTERVENTION, ARTERY N/A 2/11/2025    Procedure: Percutaneous coronary intervention;  Surgeon: Mo Levi MD;  Location: [facility] CATH/EP LAB;  Service: Cardiology;  Laterality: N/A;    VASCULAR SURGERY       Social  History[1]     REVIEW OF SYSTEMS    As mentioned in HPI    OBJECTIVE     VITAL SIGNS (Most Recent)  Temp: 98.1 °F (36.7 °C) (06/19/25 1147)  Pulse: 92 (06/19/25 1147)  Resp: 16 (06/19/25 1147)  BP: (!) 141/84 (06/19/25 1147)  SpO2: 99 % (06/19/25 1147)    VENTILATION STATUS  Resp: 16 (06/19/25 1147)  SpO2: 99 % (06/19/25 1147)           I & O (Last 24H):  Intake/Output Summary (Last 24 hours) at 6/19/2025 1354  Last data filed at 6/19/2025 1214  Gross per 24 hour   Intake 779.3 ml   Output --   Net 779.3 ml       WEIGHTS  Wt Readings from Last 3 Encounters:   06/19/25 0400 70.7 kg (155 lb 14.4 oz)   06/17/25 1431 74.8 kg (165 lb)   02/28/25 1102 76 kg (167 lb 8.8 oz)   02/10/25 0620 76.4 kg (168 lb 6.9 oz)   02/08/25 0501 76.7 kg (169 lb)   02/08/25 0200 76.9 kg (169 lb 8.5 oz)       PHYSICAL EXAM    CONSTITUTIONAL: NAD  HEENT: Normocephalic. No pallor  NECK: no JVD, no carotid bruit  LUNGS: CTA b/l  HEART: regular rate and rhythm, S1, S2 normal, no murmur   ABDOMEN: soft, non-tender, bowel sounds normal. No bruit  EXTREMITIES: No edema. Pulses intact.  SKIN: No rash  NEURO: AAO X 3  PSYCH: normal affect      HOME MEDICATIONS:Medications Ordered Prior to Encounter[2]    SCHEDULED MEDS:   amiodarone  100 mg Oral Daily    amLODIPine  10 mg Oral Daily    atorvastatin  80 mg Oral Daily    busPIRone  7.5 mg Oral BID    gabapentin  300 mg Oral BID    INV losartan  25 mg Oral Daily    metoprolol succinate  25 mg Oral Daily    multivitamin  1 tablet Oral Daily    nicotine  1 patch Transdermal Daily    pantoprazole  40 mg Intravenous Daily    piperacillin-tazobactam (Zosyn) IV (PEDS and ADULTS) (extended infusion is not appropriate)  4.5 g Intravenous Q8H       CONTINUOUS INFUSIONS:        PRN MEDS:  Current Facility-Administered Medications:     acetaminophen, 650 mg, Oral, Q4H PRN    albuterol-ipratropium, 3 mL, Nebulization, Q6H PRN    aluminum-magnesium hydroxide-simethicone, 30 mL, Oral, QID PRN    dextrose 50%, 12.5 g,  "Intravenous, PRN    dextrose 50%, 25 g, Intravenous, PRN    glucagon (human recombinant), 1 mg, Intramuscular, PRN    glucose, 16 g, Oral, PRN    glucose, 24 g, Oral, PRN    HYDROcodone-acetaminophen, 1 tablet, Oral, Q4H PRN    HYDROmorphone, 1 mg, Intravenous, Q3H PRN    insulin aspart U-100, 0-5 Units, Subcutaneous, Q6H PRN    magnesium oxide, 800 mg, Oral, PRN    magnesium oxide, 800 mg, Oral, PRN    melatonin, 9 mg, Oral, Nightly PRN    naloxone, 0.02 mg, Intravenous, PRN    ondansetron, 4 mg, Intravenous, Q6H PRN    potassium bicarbonate, 35 mEq, Oral, PRN    potassium bicarbonate, 50 mEq, Oral, PRN    potassium bicarbonate, 60 mEq, Oral, PRN    potassium, sodium phosphates, 2 packet, Oral, PRN    potassium, sodium phosphates, 2 packet, Oral, PRN    potassium, sodium phosphates, 2 packet, Oral, PRN    senna-docusate, 1 tablet, Oral, BID PRN    sodium chloride 0.9%, 10 mL, Intravenous, Q12H PRN    traZODone, 150 mg, Oral, Nightly PRN    LABS AND DIAGNOSTICS     CBC LAST 3 DAYS  Recent Labs   Lab 06/18/25  0759 06/18/25  1625 06/19/25  0440   WBC 16.48* 19.27* 19.83*   RBC 5.34 5.33 5.24   HGB 15.0 15.1 14.9   HCT 46.5 46.3 45.4   MCV 87 87 87   MCH 28.1 28.3 28.4   MCHC 32.3 32.6 32.8   RDW 15.0* 15.1* 15.0*    265 235   MPV 11.3 10.8 11.1   NRBC 0 0 0       COAGULATION LAST 3 DAYS  Recent Labs   Lab 06/17/25  1453 06/18/25  0451   INR 1.0 1.0   APTT  --  26.5       CHEMISTRY LAST 3 DAYS  Recent Labs   Lab 06/17/25  1453 06/18/25  0451 06/19/25  0440    141 138   K 3.5 3.8 3.8    106 105   CO2 20* 22* 23   ANIONGAP 15 13 10   BUN 13 11 14   CREATININE 0.8 0.7 0.8   * 122* 136*   CALCIUM 9.7 8.9 9.5   ALBUMIN 5.0  --   --    PROT 7.9  --   --    ALKPHOS 100  --   --    ALT 21  --   --    AST 17  --   --    BILITOT 0.6  --   --        CARDIAC PROFILE LAST 3 DAYS  No results for input(s): "BNP", "CPK", "CPKMB", "LDH", "TROPONINI", "TROPONINIHS" in the last 168 hours.    ENDOCRINE LAST 3 " "DAYS  No results for input(s): "TSH", "PROCAL" in the last 168 hours.    LAST ARTERIAL BLOOD GAS  ABG  No results for input(s): "PH", "PO2", "PCO2", "HCO3", "BE" in the last 168 hours.    LAST 7 DAYS MICROBIOLOGY   Microbiology Results (last 7 days)       ** No results found for the last 168 hours. **            MOST RECENT IMAGING  MRI Pelvis W WO Contrast  Narrative: EXAMINATION:  MRI PELVIS W WO CONTRAST    CLINICAL HISTORY:  Soft tissue mass;    TECHNIQUE:  Multisequence, multiplanar imaging of the pelvis obtained before and after the administration of 7.5 mL Gadavist    COMPARISON:  CT abdomen and pelvis 06/17/2025    Lumbar spine MRI exams 01/28/2025, 03/03/2023, 07/02/2020    FINDINGS:  Degenerative changes of the lower lumbar spine are evident.  No bone marrow edema.  No aggressive marrow replacing lesion.    No free fluid or lymphadenopathy in the pelvis.  The prostate gland is enlarged measuring 5 cm transverse.    Intramuscular solid mass involving the right gluteus medius muscle with thin rim of fat surrounding the lesion, and no direct muscular invasion evident.  It measures 4.2 cm craniocaudal, 3.8 cm transverse, 4.5 cm AP and is similar or minimally enlarged compared to previous lumbar spine MRI exams dating back to 2020.  Mass shows relatively homogeneous marked T2 hyperintensity, and is isointense on T1.  On postcontrast imaging the mass shows rim and heterogeneous internal enhancement.  On coronal imaging, there appears to be oblong nature to the lesion with fasciculation.  Small amount of intramuscular edema involving gluteus medius superior to the mass.  Impression: Large right gluteus medius intramuscular enhancing mass which shows minimal interval growth compared to 2020.  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  This would be amenable to CT guided biopsy if indicated.    Electronically signed by: Eric" Mauro  Date:    06/18/2025  Time:    16:24  Echo    Left Ventricle: The left ventricle is normal in size. Normal wall   thickness. There is low normal systolic function with a visually estimated   ejection fraction of 50 - 55%. Grade I diastolic dysfunction. E/A ratio is   0.72.    Right Ventricle: The right ventricle is normal in size Wall thickness   is normal. Systolic function is reduced.TAPSE is 1.8 cm.    Left Atrium: The left atrium is mildly dilated    Aortic Valve: The aortic valve is a trileaflet valve. There is mild   aortic valve sclerosis.    Mitral Valve: There is mild regurgitation.    Pulmonic Valve: Not well visualized due to poor acoustic window.    IVC/SVC: Normal venous pressure at 3 mmHg.  NM Hepatobiliary Scan (HIDA)  Narrative: EXAMINATION:  NM HEPATOBILIARY IMAGING INC  (HIDA)    CLINICAL HISTORY:  Cholelithiasis;    TECHNIQUE:  Following the IV administration of 8.3 mCi of Tc-99m-mebrofenin, sequential dynamic anterior images of the abdomen were obtained for 60 minutes.  Subsequently, the patient was administered 2 mg of morphine sulfate intravenously and imaging over the upper abdomen performed for additional 30 minutes.    COMPARISON:  None    FINDINGS:  There is prompt extraction of tracer activity by the hepatic parenchyma with subsequent excretion into the biliary system.  There is normal biliary to bowel transit.    There is no identifiable accumulation demonstrated within the gallbladder during the initial 60 minutes of imaging.    Similarly, no accumulation is observed within the gallbladder on additional 30 minutes of imaging following morphine administration.  Reflux of contrast material is observed into the stomach.  Impression: No identifiable accumulation of tracer activity within the gallbladder suggesting cystic duct dysfunction/obstruction.    Normal biliary-bowel transit.    Electronically signed by: Galindo Kiser  Date:    06/18/2025  Time:    13:14      ECHOCARDIOGRAM  RESULTS (last 5)  Results for orders placed during the hospital encounter of 02/07/25    Echo    Interpretation Summary    Left Ventricle: The left ventricle is normal in size. Normal wall thickness. There is concentric remodeling. Moderate hypokinesis of the inferior and the remaining walls appear to move fairly well There is mildly reduced systolic function with a visually estimated ejection fraction of 45 - 50%. There is normal diastolic function.    Right Ventricle: Normal right ventricular cavity size. Wall thickness is normal. Systolic function is normal.    Aortic Valve: The aortic valve is structurally normal.    Tricuspid Valve: The tricuspid valve is structurally normal.    Pulmonary Artery: The estimated pulmonary artery systolic pressure is 22 mmHg.    IVC/SVC: Intermediate venous pressure at 8 mmHg.      Results for orders placed during the hospital encounter of 08/14/23    Echo    Interpretation Summary    Left Ventricle: The left ventricle is normal in size. Mildly increased wall thickness. There is concentric remodeling. Normal wall motion. There is normal systolic function. Ejection fraction by visual approximation is 65%. There is normal diastolic function.    Left Atrium: Left atrium is mildly dilated.    Right Ventricle: Normal right ventricular cavity size. Wall thickness is normal. Right ventricle wall motion  is normal. Systolic function is normal.    IVC/SVC: Normal venous pressure at 3 mmHg.      Results for orders placed during the hospital encounter of 09/09/19    Echo Color Flow Doppler? Yes    Interpretation Summary  · Concentric left ventricular remodeling.  · Increased (hyperdynamic) left ventricular systolic function. The estimated ejection fraction is 83%  · Normal LV diastolic function.  · Normal right ventricular systolic function.  · Mild left atrial enlargement.      CURRENT/PREVIOUS VISIT EKG      Results for orders placed or performed during the hospital encounter of 06/17/25    EKG 12-lead    Collection Time: 06/17/25  2:20 PM   Result Value Ref Range    QRS Duration 92 ms    [facility] QTC Calculation 470 ms    Narrative    Test Reason : R07.9,    Vent. Rate :  63 BPM     Atrial Rate :  63 BPM     P-R Int : 158 ms          QRS Dur :  92 ms      QT Int : 460 ms       P-R-T Axes :  63  42   7 degrees    QTcB Int : 470 ms    Normal sinus rhythm with sinus arrhythmia  Possible Inferior infarct ,age undetermined  Possible Anterior infarct ,age undetermined  Abnormal ECG  No previous ECGs available    Referred By:            Confirmed By:            ASSESSMENT/PLAN:     Active Hospital Problems    Diagnosis    *GI bleed    Schwannoma    Cholelithiasis    Nicotine dependence    NSVT (nonsustained ventricular tachycardia)    Chronic systolic CHF (congestive heart failure)    DMII (diabetes mellitus, type 2)    Sleep apnea    Essential hypertension    CAD (coronary artery disease) status post PCI       ASSESSMENT & PLAN:     GIB  CAD  hx of STEMI s/p PCI  Hx of cardiac arrest  Chronic HFmrEF 45-50%  HTN  JUDY not on CPAP   T2DM  Hx of SVT  Hx of bilateral PE  Hx of spontaneous pneumothorax  ED  Chronic pansinusitis  Deviated nasal septum       RECOMMENDATIONS:    - HOLD DAPT given awaiting surgery.  - Continue Lovenox in the interim  - H/H stable, recommend transfusion for Hgb <8.0  - Bibasilar atelectasis noted on CT with noncalcified nodule to LLL. Euvolemic. No apparent need for diuresis at this time.  - Echo shows LVEF 50-55% with Grade I DD.  - GDMT with Toprol-XL 25 mg daily and losartan 25 mg daily, resume home Jardiance 10 mg prior to discharge.  - Continue amiodarone 100 mg daily.  - continue home amlodipine 10 g and atorvastatin 80 mg    - Cleared for surgery tomorrow with moderate cardiac risks. Resume DAPT as soon as ok per surgeon.        Thank you for consulting cardiology.        PATTY [facility]  Date of Service: 06/19/2025        I have personally  interviewed and examined the patient, I have reviewed the Nurse Practitioner's history and physical, assessment, and plan. I have personally evaluated the patient at bedside and agree with the findings and made appropriate changes as necessary in recommendations.      Okay for surgery  He has been off the DAPT for  4 days  Will cover with Lovenox today    Onofre Mcguire MD  Department of Cardiology  [facility]  06/19/2025 8:47 AM           [1]   Social History  Tobacco Use    Smoking status: Never    Smokeless tobacco: Current     Types: Snuff   Substance Use Topics    Alcohol use: Not Currently    Drug use: Never   [2]   No current facility-administered medications on file prior to encounter.     Current Outpatient Medications on File Prior to Encounter   Medication Sig Dispense Refill    amiodarone (PACERONE) 100 MG Tab Take 1 tablet (100 mg total) by mouth once daily. 90 tablet 3    amLODIPine (NORVASC) 5 MG tablet Take 1 tablet (5 mg total) by mouth once daily. (Patient taking differently: Take 10 mg by mouth once daily.) 90 tablet 3    aspirin (ECOTRIN) 81 MG EC tablet Take 81 mg by mouth once daily.      atorvastatin (LIPITOR) 80 MG tablet Take 1 tablet (80 mg total) by mouth once daily. 90 tablet 3    busPIRone (BUSPAR) 7.5 MG tablet Take 7.5 mg by mouth 2 (two) times a day.      gabapentin (NEURONTIN) 300 MG capsule Take 300 mg by mouth 2 (two) times daily.      ibuprofen (ADVIL,MOTRIN) 600 MG tablet Take 600 mg by mouth 3 (three) times daily.      JARDIANCE 10 mg tablet Take 10 mg by mouth once daily.      loratadine (CLARITIN) 10 mg tablet Take 10 mg by mouth once daily.      losartan (COZAAR) 50 MG Tab Take 1 tablet (50 mg total) by mouth once daily. (Patient taking differently: Take 25 mg by mouth once daily.) 90 tablet 3    metoprolol succinate (TOPROL-XL) 25 MG 24 hr tablet Take 1 tablet (25 mg total) by mouth once daily. 90 tablet 3    multivit-min/FA/lycopen/lutein (CENTRUM SILVER MEN  ORAL) Take 1 tablet by mouth once daily.      ticagrelor (BRILINTA) 90 mg tablet Take 1 tablet (90 mg total) by mouth 2 (two) times daily. 180 tablet 3    traZODone (DESYREL) 150 MG tablet Take 1 tablet by mouth once daily.      HYDROcodone-acetaminophen (NORCO) 5-325 mg per tablet Take 1 tablet by mouth every 6 (six) hours as needed for Pain. (Patient not taking: Reported on 6/11/2025) 15 tablet 0    pregabalin (LYRICA) 75 MG capsule Take 1 capsule by mouth 2 (two) times daily.

## 2025-06-18 NOTE — ASSESSMENT & PLAN NOTE
Patient with known CAD s/p stent placement, which is controlled Will continue Statin and monitor for S/Sx of angina/ACS. Continue to monitor on telemetry.     - Holding home Brilinta and aspirin in setting of possible GI bleed, resume once appropriate

## 2025-06-18 NOTE — ASSESSMENT & PLAN NOTE
Chronic, controlled.  Latest blood pressure and vitals reviewed-     Temp:  [98.8 °F (37.1 °C)]   Pulse:  [65-83]   Resp:  [16-20]   BP: (151-168)/(71-88)   SpO2:  [93 %-100 %] .   Home meds for hypertension were reviewed and noted below-  Hypertension Medications              amLODIPine (NORVASC) 5 MG tablet Take 1 tablet (5 mg total) by mouth once daily.    losartan (COZAAR) 50 MG Tab Take 1 tablet (50 mg total) by mouth once daily.    metoprolol succinate (TOPROL-XL) 25 MG 24 hr tablet Take 1 tablet (25 mg total) by mouth once daily.            While in the hospital, will manage blood pressure as follows; Continue home antihypertensive regimen    Will utilize p.r.n. blood pressure medication only if patient's blood pressure greater than 180/110 and he develops symptoms such as worsening chest pain or shortness of breath.

## 2025-06-18 NOTE — CONSULTS
GASTROENTEROLOGY INPATIENT CONSULT NOTE  Patient Name: Sanjeev Britt  Patient MRN: [id number]  Patient : 1965    Admit Date: 2025  Service date: 2025    Reason for Consult: GI Bleed     PCP: Katt Fuentes NP    Chief Complaint   Patient presents with    Abdominal Pain     Abdominal pain radiating to chest since 10am. Patient states he had black stool and is on blood thinners.        HPI: Patient is 59 yr old male with a hx of CAD s/p PCI, HTN, sleep apnea not on CPAP, DM type 2, SVT, bilateral PE, spontaneous pneumothorax, ED, chronic pansinusitis, deviated nasal septum, STEMI, cardiac arrest, bradycardia, heart failure with reduced EF who presents to the ED with a chief complaint of abdominal pain. Patient endorses 10+/10 sharp right upper quadrant abdominal pain that began around 10:00 a.m. this morning and radiates to his midsternal chest. He states that he ate spaghetti last night and began having emesis today that contained remnants of spaghetti with the last episode being around 4:00 p.m.. He also endorses he has had 3 black tarry looking stools. He denies having any black tarry stools while in the ED and denies any other active bleeding. He also endorses subjective fever, chills, and lightheadedness. He has taken Mylanta, Zofran, Phenergan at home without relief. He states he goes can of dip every 2 days, occasionally drinks alcohol, and denies recreational drug use. He denies dyspnea, chest pain, dysuria, hematuria, dizziness, and syncope.        CHART REVIEW:  Was scheduled for colonoscopy at [facility] when he had his heart attack this year earlier so has not completed    Past Medical History:  Past Medical History:   Diagnosis Date    Acute coronary syndrome 2019    Coronary artery disease     Diabetes mellitus     Hypertension     Sleep apnea         Past Surgical History:  Past Surgical History:   Procedure Laterality Date    ANGIOGRAM, CORONARY, WITH LEFT  HEART CATHETERIZATION N/A 2/8/2025    Procedure: Angiogram, Coronary, with Left Heart Cath;  Surgeon: Mo Levi MD;  Location: [facility] CATH/EP LAB;  Service: Cardiology;  Laterality: N/A;    FESS, USING COMPUTER-ASSISTED NAVIGATION, WITH NASAL TURBINATE REDUCTION N/A 6/7/2024    Procedure: FESS, USING COMPUTER-ASSISTED NAVIGATION, WITH NASAL TURBINATE REDUCTION;  Surgeon: Andres Abraham MD;  Location: [facility] OR;  Service: ENT;  Laterality: N/A;    IVUS, CORONARY  2/8/2025    Procedure: IVUS, Coronary;  Surgeon: Mo Levi MD;  Location: [facility] CATH/EP LAB;  Service: Cardiology;;    IVUS, CORONARY  2/11/2025    Procedure: IVUS, Coronary;  Surgeon: Mo Levi MD;  Location: [facility] CATH/EP LAB;  Service: Cardiology;;    NASAL SEPTOPLASTY N/A 6/7/2024    Procedure: SEPTOPLASTY, NOSE;  Surgeon: Andres Abraham MD;  Location: [facility] OR;  Service: ENT;  Laterality: N/A;    NASAL TURBINATE REDUCTION Bilateral 6/7/2024    Procedure: REDUCTION, NASAL TURBINATE;  Surgeon: Andres Abraham MD;  Location: [facility] OR;  Service: ENT;  Laterality: Bilateral;    PERCUTANEOUS CORONARY INTERVENTION, ARTERY N/A 2/8/2025    Procedure: Percutaneous coronary intervention;  Surgeon: Mo Levi MD;  Location: [facility] CATH/EP LAB;  Service: Cardiology;  Laterality: N/A;    PERCUTANEOUS CORONARY INTERVENTION, ARTERY N/A 2/11/2025    Procedure: Percutaneous coronary intervention;  Surgeon: Mo Levi MD;  Location: [facility] CATH/EP LAB;  Service: Cardiology;  Laterality: N/A;    VASCULAR SURGERY          Home Medications:  Prescriptions Prior to Admission[1]    Inpatient Medications:  Review of patient's allergies indicates:   Allergen Reactions    Levaquin [levofloxacin]        Social History:   Social History     Occupational History    Not on file   Tobacco Use    Smoking status: Never    Smokeless tobacco: Current     Types: Snuff   Substance and Sexual Activity    Alcohol  "use: Not Currently    Drug use: Never    Sexual activity: Yes     Partners: Female       Family History:   Family History   Problem Relation Name Age of Onset    Cancer Father         Review of Systems:  GENERAL: No fever, chills, weight loss  SKIN: No rashes, jaundice, pruritus  HEENT: No rhinorrhea, epistaxis, vision changes.  No trauma, tinnitus, lymphadenopathy or pharyngitis  CV: No chest pain, palpitations, edima or SUGGS  PULM: No cough or sputum production.  No wheezing.  GI: AS IN HPI  URINARY:  No hematuria, dysuria  MS: No change in muscle or joint pain, weakness, or ROM  Neuro: No focal neurologic changes  PSYCH: No change in mood or personality.  No suicidal ideation.  ENDOCRINE: No fatigue      OBJECTIVE:    Vitals:    06/18/25 0411 06/18/25 0700 06/18/25 0750 06/18/25 1137   BP:   (!) 160/90    BP Location:       Patient Position:       Pulse: 88  89    Resp:   18 20   Temp:   98.6 °F (37 °C)    TempSrc:   Oral    SpO2:  97% 96%    Weight:       Height:         Physical Exam:    GEN: well-developed, well-nourished, awake and alert, non-toxic appearing adult  HEENT: PERRL, sclera anicteric, oral mucosa pink and moist without lesion  NECK: trachea midline; Good ROM  CV: regular rate and rhythm, no murmurs or gallops  RESP: clear to auscultation bilaterally, no wheezes, rhonci or rales  ABD: soft, non-tender, non-distended, normal bowel sounds  EXT: no swelling or edema, 2+ pulses distally  SKIN: no rashes or jaundice  PSYCH: normal affect    Labs:   Recent Labs   Lab 06/17/25  1453 06/18/25  0451 06/18/25  0759   WBC 10.75 15.98* 16.48*   HGB 15.2 14.6 15.0    271 257   MCV 86 87 87     No results for input(s): "IRON", "FERRITIN" in the last 168 hours.    Invalid input(s): "IRONSAT"  Recent Labs   Lab 06/17/25  1453 06/18/25  0451    141   K 3.5 3.8   BUN 13 11   CREATININE 0.8 0.7   ALBUMIN 5.0  --    AST 17  --    ALT 21  --    ALKPHOS 100  --    INR 1.0 1.0            Radiology " Review:  Imaging Results              CT Abdomen Pelvis With IV Contrast NO Oral Contrast (Final result)  Result time 06/17/25 19:27:00      Final result by Leroy Suresh MD (06/17/25 19:27:00)                   Impression:      No acute intra-abdominal abnormality.    49 x 40 x 39 mm hypodense soft tissue mass interposed between the right gluteus minimus and medius muscles.  Sarcoma is not excluded.  Recommend MRI with and without contrast.    12 mm noncalcified nodule opacity left lung base. Bibasilar atelectasis.  Follow-up chest CT in 3 months.      Electronically signed by: Leroy Suresh  Date:    06/17/2025  Time:    19:27               Narrative:    EXAMINATION:  CT ABDOMEN PELVIS WITH IV CONTRAST    CLINICAL HISTORY:  Abdominal pain, acute, nonlocalized;    TECHNIQUE:  Low dose axial images, sagittal and coronal reformations were obtained from the lung bases to the pubic symphysis following the IV administration of 100 mL of Omnipaque 350 .  Oral contrast was not given.    COMPARISON:  None.    FINDINGS:  Soft tissues: 49 x 40 x 39 mm hypodense soft tissue mass interposed between the right gluteus minimus and medius muscles.    Bones: No acute osseous abnormality.    Lower chest: 12 mm noncalcified nodule opacity left lung base.  Bibasilar atelectasis.    Lung Bases: Well aerated, without consolidation or pleural fluid.    Liver: Normal in size and attenuation, with no focal hepatic lesions.    Gallbladder: Distended.  Cholelithiasis.    Bile Ducts: No evidence of dilated ducts.    Pancreas: No mass or peripancreatic fat stranding.    Spleen: Unremarkable.    Adrenals: Unremarkable.    Kidneys/ Ureters: Small left renal cysts and additional subcentimeter hypodense lesions in each kidney which are too small to definitely characterize.    Bladder: Mild diffuse circumferential bladder wall thickening.  Correlate with with urinalysis.    Reproductive organs: Enlarged and heterogeneous containing  calcifications. Correlate with PSA.    GI Tract/Mesentery: No evidence of bowel obstruction or inflammation.    Peritoneal Space: No ascites. No free air.    Lymphadenopathy: No significant adenopathy.    Vasculature: Multifocal atherosclerosis.                                       US Abdomen Limited (Final result)  Result time 06/17/25 16:32:14      Final result by Eric Wade MD (06/17/25 16:32:14)                   Impression:      Cholelithiasis.    Hepatic steatosis.      Electronically signed by: Eric Wade  Date:    06/17/2025  Time:    16:32               Narrative:    EXAMINATION:  US ABDOMEN LIMITED    CLINICAL HISTORY:  ruq pain;    TECHNIQUE:  Grayscale and color Doppler evaluation of the abdomen was performed.    COMPARISON:  None    FINDINGS:  Pancreas was not visualized due to overlying bowel gas.  Aorta not well visualized.  Visualized IVC is unremarkable.    The liver measures 14.7 cm in length.  Increased hepatic parenchymal echogenicity consistent with steatosis.  No focal hepatic lesion.  Main portal vein is patent with hepatopetal flow.  Cholelithiasis.  Technologist notes indicate tenderness in the gallbladder region.  No gallbladder wall thickening or pericholecystic fluid.  Normal caliber common bile duct measuring 5 mm.    The right kidney measures 12.5 cm in length and has a normal sonographic appearance.                                       X-Ray Chest AP Portable (Final result)  Result time 06/17/25 15:24:57      Final result by Alexandre Beltran DO (06/17/25 15:24:57)                   Impression:      No acute cardiopulmonary abnormality.      Electronically signed by: Alexandre Beltran  Date:    06/17/2025  Time:    15:24               Narrative:    EXAMINATION:  XR CHEST AP PORTABLE    CLINICAL HISTORY:  Epigastric pain    FINDINGS:  Portable chest with comparison chest x-ray 02/08/2025.  Normal cardiomediastinal silhouette.Lungs are clear. Pulmonary vasculature is normal. No  acute osseous abnormality.                                          IMPRESSION / RECOMMENDATIONS:   Active Problem List with Overview Notes    Diagnosis Date Noted    GI bleed 06/17/2025    Cholelithiasis 06/17/2025    Nicotine dependence 06/17/2025    Bradycardia 02/28/2025    History of coronary angioplasty with insertion of stent 02/28/2025    Chronic systolic CHF (congestive heart failure) 02/28/2025    NSVT (nonsustained ventricular tachycardia) 02/28/2025    STEMI (ST elevation myocardial infarction) 02/08/2025    DMII (diabetes mellitus, type 2) 02/08/2025    Cardiac arrest 02/08/2025    Chronic pansinusitis 06/07/2024    Hypertrophy of nasal turbinates 06/07/2024    Deviated nasal septum 06/07/2024    Nasal obstruction 06/07/2024    Erectile dysfunction 09/27/2023    BMI 28.0-28.9,adult 04/10/2023    Sleep apnea 04/10/2023    Acute hypoxemic respiratory failure 08/15/2021    Bilateral pulmonary embolism 08/15/2021    Transaminitis 08/15/2021    History of spontaneous pneumothorax 08/15/2021    CAD (coronary artery disease) status post PCI 08/15/2021    Essential hypertension 08/15/2021    Chest pain 09/10/2019      59 yr old male with a hx of CAD s/p PCI, HTN, sleep apnea not on CPAP, DM type 2, SVT, bilateral PE, spontaneous pneumothorax, ED, chronic pansinusitis, deviated nasal septum, STEMI, cardiac arrest, bradycardia, heart failure with reduced EF And melena  HIDA scan pending and surgical consult on board for gallstones  EGD expedited today with a history of tarry stools.  Hemoglobin is normal  RIsks, benefits, alternatives discussed in detail regarding upcoming procedures and sedation and possible complications. Some of the more common endoscopic complications include but not limited to immediate or delayed perforation, bleeding, infections, pain, inadvertent injury to surrounding tissue / organs and possible need for surgical evaluation. All questions answered and will proceed with procedure as  planned.      Thank you for this consult.    Silvia Fuller  6/18/2025  12:34 PM  EGD today showed linear grade a erosive esophagitis and mild gastropathy but no slava bleeding.  Okay for Protonix 40 mg daily for 3 months.  Advance diet per surgical recommendations            [1]   Medications Prior to Admission   Medication Sig Dispense Refill Last Dose/Taking    amiodarone (PACERONE) 100 MG Tab Take 1 tablet (100 mg total) by mouth once daily. 90 tablet 3 6/16/2025    amLODIPine (NORVASC) 5 MG tablet Take 1 tablet (5 mg total) by mouth once daily. (Patient taking differently: Take 10 mg by mouth once daily.) 90 tablet 3 6/16/2025    aspirin (ECOTRIN) 81 MG EC tablet Take 81 mg by mouth once daily.   6/16/2025    atorvastatin (LIPITOR) 80 MG tablet Take 1 tablet (80 mg total) by mouth once daily. 90 tablet 3 6/16/2025    busPIRone (BUSPAR) 7.5 MG tablet Take 7.5 mg by mouth 2 (two) times a day.   6/16/2025    gabapentin (NEURONTIN) 300 MG capsule Take 300 mg by mouth 2 (two) times daily.   6/17/2025    ibuprofen (ADVIL,MOTRIN) 600 MG tablet Take 600 mg by mouth 3 (three) times daily.   Past Week    JARDIANCE 10 mg tablet Take 10 mg by mouth once daily.   6/16/2025    loratadine (CLARITIN) 10 mg tablet Take 10 mg by mouth once daily.   6/16/2025    losartan (COZAAR) 50 MG Tab Take 1 tablet (50 mg total) by mouth once daily. (Patient taking differently: Take 25 mg by mouth once daily.) 90 tablet 3 6/16/2025    metoprolol succinate (TOPROL-XL) 25 MG 24 hr tablet Take 1 tablet (25 mg total) by mouth once daily. 90 tablet 3 6/16/2025    multivit-min/FA/lycopen/lutein (CENTRUM SILVER MEN ORAL) Take 1 tablet by mouth once daily.   6/16/2025    ticagrelor (BRILINTA) 90 mg tablet Take 1 tablet (90 mg total) by mouth 2 (two) times daily. 180 tablet 3 6/16/2025    traZODone (DESYREL) 150 MG tablet Take 1 tablet by mouth once daily.   6/16/2025    HYDROcodone-acetaminophen (NORCO) 5-325 mg per tablet Take 1 tablet by mouth  every 6 (six) hours as needed for Pain. (Patient not taking: Reported on 6/11/2025) 15 tablet 0     pregabalin (LYRICA) 75 MG capsule Take 1 capsule by mouth 2 (two) times daily.

## 2025-06-18 NOTE — H&P
[facility] - Emergency Dept  Hospital Medicine  History & Physical    Patient Name: Sanjeev Britt  MRN: [id number]  Patient Class: OP- Observation  Admission Date: 6/17/2025  Attending Physician: Alisia Feng MD   Primary Care Provider: Katt Fuentes NP         Patient information was obtained from patient, past medical records, and ER records.     Subjective:     Principal Problem:GI bleed    Chief Complaint:   Chief Complaint   Patient presents with    Abdominal Pain     Abdominal pain radiating to chest since 10am. Patient states he had black stool and is on blood thinners.         HPI: Mr. Britt is a 59 yr old male with a hx of CAD s/p PCI, HTN, sleep apnea not on CPAP, DM type 2, SVT, bilateral PE, spontaneous pneumothorax, ED, chronic pansinusitis, deviated nasal septum, STEMI, cardiac arrest, bradycardia, heart failure with reduced EF who presents to the ED with a chief complaint of abdominal pain.  Patient endorses 10+/10 sharp right upper quadrant abdominal pain that began around 10:00 a.m. this morning and radiates to his midsternal chest.  He states that he ate spaghetti last night and began having emesis today that contained remnants of spaghetti with the last episode being around 4:00 p.m..  He also endorses he has had 3 black tarry looking stools.  He denies having any black tarry stools while in the ED and denies any other active bleeding.  He also endorses subjective fever, chills, and lightheadedness.  He has taken Mylanta, Zofran, Phenergan at home without relief.  He states he goes can of dip every 2 days, occasionally drinks alcohol, and denies recreational drug use.  He denies dyspnea, chest pain, dysuria, hematuria, dizziness, and syncope.    Upon arrival to ED, patient afebrile, HR of 69, RR of 18, BP of 151/88, satting 100% on RA.  Workup in the ED reveals CO2 of 20, glucose of 118, initial troponin of 16.2, repeat troponin of 14.6.  Remainder of labs  unremarkable.  CT AP with IV contrast shows no acute intra-abdominal abnormality.  49 x 40 x 39 mm hypodense soft tissue mass interposed between the right gluteus minimus and medius muscles.  Sarcoma is not excluded.  Recommend MRI with and without contrast.  12 mm noncalcified nodule opacity left lung base.  Bibasilar atelectasis.  Follow-up chest CT in 3 months.  Right upper quadrant ultrasound shows cholelithiasis and hepatic steatosis.  CXR shows no acute cardiopulmonary abnormality.  Patient was given hydromorphone 1 mg IV x2, morphine 4 mg IV, ondansetron 4 mg IV, and pantoprazole 80 mg IV, and initiated on pantoprazole gtt while in the ED. ED provider discussed case with General surgery Dr. Lerner recommended HIDA scan, NPO at midnight, and will see patient in AM. case discussed with ED provider and patient will be placed under observation for further management.    Past Medical History:   Diagnosis Date    Acute coronary syndrome 09/09/2019    Coronary artery disease     Diabetes mellitus     Hypertension     Sleep apnea        Past Surgical History:   Procedure Laterality Date    ANGIOGRAM, CORONARY, WITH LEFT HEART CATHETERIZATION N/A 2/8/2025    Procedure: Angiogram, Coronary, with Left Heart Cath;  Surgeon: Mo Levi MD;  [facility] CATH[facility] LAB;  Service: Cardiology;  Laterality: N/A;    FESS, USING COMPUTER-ASSISTED NAVIGATION, WITH NASAL TURBINATE REDUCTION N/A 6/7/2024    Procedure: FESS, USING COMPUTER-ASSISTED NAVIGATION, WITH NASAL TURBINATE REDUCTION;  Surgeon: Andres Abraham MD;  Location: [facility] ASU OR;  Service: ENT;  Laterality: N/A;    IVUS, CORONARY  2/8/2025    Procedure: IVUS, Coronary;  Surgeon: Mo Levi MD;  Location: [facility] CATH/EP LAB;  Service: Cardiology;;    IVUS, CORONARY  2/11/2025    Procedure: IVUS, Coronary;  Surgeon: Mo Levi MD;  Location: [facility] CATH/EP LAB;  Service: Cardiology;;    NASAL SEPTOPLASTY N/A 6/7/2024     Procedure: SEPTOPLASTY, NOSE;  Surgeon: Andres Abraham MD;  Location: [facility] ASU OR;  Service: ENT;  Laterality: N/A;    NASAL TURBINATE REDUCTION Bilateral 6/7/2024    Procedure: REDUCTION, NASAL TURBINATE;  Surgeon: Andres Abraham MD;  [facility];  Service: ENT;  Laterality: Bilateral;    PERCUTANEOUS CORONARY INTERVENTION, ARTERY N/A 2/8/2025    Procedure: Percutaneous coronary intervention;  Surgeon: Mo Levi MD;  Location: [facility] CATH/EP LAB;  Service: Cardiology;  Laterality: N/A;    PERCUTANEOUS CORONARY INTERVENTION, ARTERY N/A 2/11/2025    Procedure: Percutaneous coronary intervention;  Surgeon: Mo Levi MD;  Location: [facility] CATH/EP LAB;  Service: Cardiology;  Laterality: N/A;    VASCULAR SURGERY         Review of patient's allergies indicates:   Allergen Reactions    Levaquin [levofloxacin]        No current facility-administered medications on file prior to encounter.     Current Outpatient Medications on File Prior to Encounter   Medication Sig    amiodarone (PACERONE) 100 MG Tab Take 1 tablet (100 mg total) by mouth once daily.    amLODIPine (NORVASC) 5 MG tablet Take 1 tablet (5 mg total) by mouth once daily. (Patient taking differently: Take 10 mg by mouth once daily.)    aspirin (ECOTRIN) 81 MG EC tablet Take 81 mg by mouth once daily.    atorvastatin (LIPITOR) 80 MG tablet Take 1 tablet (80 mg total) by mouth once daily.    busPIRone (BUSPAR) 7.5 MG tablet Take 7.5 mg by mouth 2 (two) times a day.    gabapentin (NEURONTIN) 300 MG capsule Take 300 mg by mouth 2 (two) times daily.    ibuprofen (ADVIL,MOTRIN) 600 MG tablet Take 600 mg by mouth 3 (three) times daily.    JARDIANCE 10 mg tablet Take 10 mg by mouth once daily.    loratadine (CLARITIN) 10 mg tablet Take 10 mg by mouth once daily.    losartan (COZAAR) 50 MG Tab Take 1 tablet (50 mg total) by mouth once daily. (Patient taking differently: Take 25 mg by mouth once daily.)    metoprolol succinate  (TOPROL-XL) 25 MG 24 hr tablet Take 1 tablet (25 mg total) by mouth once daily.    multivit-min/FA/lycopen/lutein (CENTRUM SILVER MEN ORAL) Take 1 tablet by mouth once daily.    ticagrelor (BRILINTA) 90 mg tablet Take 1 tablet (90 mg total) by mouth 2 (two) times daily.    traZODone (DESYREL) 150 MG tablet Take 1 tablet by mouth once daily.    HYDROcodone-acetaminophen (NORCO) 5-325 mg per tablet Take 1 tablet by mouth every 6 (six) hours as needed for Pain. (Patient not taking: Reported on 6/11/2025)    pregabalin (LYRICA) 75 MG capsule Take 1 capsule by mouth 2 (two) times daily.     Family History       Problem Relation (Age of Onset)    Cancer Father          Tobacco Use    Smoking status: Never    Smokeless tobacco: Current     Types: Snuff   Substance and Sexual Activity    Alcohol use: Not Currently    Drug use: Never    Sexual activity: Yes     Partners: Female     Review of Systems   Constitutional:  Positive for chills and fever (subjective).   Respiratory:  Negative for shortness of breath.    Cardiovascular:  Positive for chest pain.   Gastrointestinal:  Positive for abdominal pain, blood in stool, nausea and vomiting.   Genitourinary:  Negative for dysuria and hematuria.   Neurological:  Positive for light-headedness. Negative for dizziness and syncope.     Objective:     Vital Signs (Most Recent):  Temp: 98.8 °F (37.1 °C) (06/17/25 1431)  Pulse: 81 (06/17/25 1831)  Resp: 20 (06/17/25 2031)  BP: (!) 156/80 (06/17/25 1831)  SpO2: (!) 93 % (06/17/25 1831) Vital Signs (24h Range):  Temp:  [98.8 °F (37.1 °C)] 98.8 °F (37.1 °C)  Pulse:  [65-83] 81  Resp:  [16-20] 20  SpO2:  [93 %-100 %] 93 %  BP: (151-168)/(71-88) 156/80     Weight: 74.8 kg (165 lb)  Body mass index is 26.63 kg/m².     Physical Exam  Vitals reviewed.   Constitutional:       General: He is awake. He is not in acute distress.     Appearance: Normal appearance. He is ill-appearing. He is not diaphoretic.      Comments: Patient appears  significantly uncomfortable while lying in bed during interview and exam.   Cardiovascular:      Rate and Rhythm: Normal rate.      Heart sounds: Normal heart sounds. No murmur heard.     No friction rub. No gallop.   Pulmonary:      Effort: Pulmonary effort is normal. No accessory muscle usage or respiratory distress.      Breath sounds: Normal breath sounds. No wheezing, rhonchi or rales.   Abdominal:      General: Bowel sounds are decreased.      Palpations: Abdomen is soft.      Tenderness: There is abdominal tenderness in the right upper quadrant, right lower quadrant, epigastric area and periumbilical area. There is no guarding or rebound.   Musculoskeletal:      Right lower leg: No edema.      Left lower leg: No edema.   Skin:     General: Skin is warm and dry.   Neurological:      Mental Status: He is alert and oriented to person, place, and time.   Psychiatric:         Behavior: Behavior is cooperative.                Significant Labs: All pertinent labs within the past 24 hours have been reviewed.  CBC:   Recent Labs   Lab 06/17/25  1453   WBC 10.75   HGB 15.2   HCT 46.4        CMP:   Recent Labs   Lab 06/17/25  1453      K 3.5      CO2 20*   *   BUN 13   CREATININE 0.8   CALCIUM 9.7   PROT 7.9   ALBUMIN 5.0   BILITOT 0.6   ALKPHOS 100   AST 17   ALT 21   ANIONGAP 15     Coagulation:   Recent Labs   Lab 06/17/25  1453   INR 1.0     Lipase:   Recent Labs   Lab 06/17/25  1453   LIPASE 29       Significant Imaging:   Imaging Results              CT Abdomen Pelvis With IV Contrast NO Oral Contrast (Final result)  Result time 06/17/25 19:27:00      Final result by Leroy Suresh MD (06/17/25 19:27:00)                   Impression:      No acute intra-abdominal abnormality.    49 x 40 x 39 mm hypodense soft tissue mass interposed between the right gluteus minimus and medius muscles.  Sarcoma is not excluded.  Recommend MRI with and without contrast.    12 mm noncalcified nodule  opacity left lung base. Bibasilar atelectasis.  Follow-up chest CT in 3 months.      Electronically signed by: Leroy Suresh  Date:    06/17/2025  Time:    19:27               Narrative:    EXAMINATION:  CT ABDOMEN PELVIS WITH IV CONTRAST    CLINICAL HISTORY:  Abdominal pain, acute, nonlocalized;    TECHNIQUE:  Low dose axial images, sagittal and coronal reformations were obtained from the lung bases to the pubic symphysis following the IV administration of 100 mL of Omnipaque 350 .  Oral contrast was not given.    COMPARISON:  None.    FINDINGS:  Soft tissues: 49 x 40 x 39 mm hypodense soft tissue mass interposed between the right gluteus minimus and medius muscles.    Bones: No acute osseous abnormality.    Lower chest: 12 mm noncalcified nodule opacity left lung base.  Bibasilar atelectasis.    Lung Bases: Well aerated, without consolidation or pleural fluid.    Liver: Normal in size and attenuation, with no focal hepatic lesions.    Gallbladder: Distended.  Cholelithiasis.    Bile Ducts: No evidence of dilated ducts.    Pancreas: No mass or peripancreatic fat stranding.    Spleen: Unremarkable.    Adrenals: Unremarkable.    Kidneys/ Ureters: Small left renal cysts and additional subcentimeter hypodense lesions in each kidney which are too small to definitely characterize.    Bladder: Mild diffuse circumferential bladder wall thickening.  Correlate with with urinalysis.    Reproductive organs: Enlarged and heterogeneous containing calcifications. Correlate with PSA.    GI Tract/Mesentery: No evidence of bowel obstruction or inflammation.    Peritoneal Space: No ascites. No free air.    Lymphadenopathy: No significant adenopathy.    Vasculature: Multifocal atherosclerosis.                                       US Abdomen Limited (Final result)  Result time 06/17/25 16:32:14      Final result by Eric Wade MD (06/17/25 16:32:14)                   Impression:      Cholelithiasis.    Hepatic  steatosis.      Electronically signed by: Eric Wade  Date:    06/17/2025  Time:    16:32               Narrative:    EXAMINATION:  US ABDOMEN LIMITED    CLINICAL HISTORY:  ruq pain;    TECHNIQUE:  Grayscale and color Doppler evaluation of the abdomen was performed.    COMPARISON:  None    FINDINGS:  Pancreas was not visualized due to overlying bowel gas.  Aorta not well visualized.  Visualized IVC is unremarkable.    The liver measures 14.7 cm in length.  Increased hepatic parenchymal echogenicity consistent with steatosis.  No focal hepatic lesion.  Main portal vein is patent with hepatopetal flow.  Cholelithiasis.  Technologist notes indicate tenderness in the gallbladder region.  No gallbladder wall thickening or pericholecystic fluid.  Normal caliber common bile duct measuring 5 mm.    The right kidney measures 12.5 cm in length and has a normal sonographic appearance.                                       X-Ray Chest AP Portable (Final result)  Result time 06/17/25 15:24:57      Final result by Alexandre Beltran DO (06/17/25 15:24:57)                   Impression:      No acute cardiopulmonary abnormality.      Electronically signed by: Alexandre Beltran  Date:    06/17/2025  Time:    15:24               Narrative:    EXAMINATION:  XR CHEST AP PORTABLE    CLINICAL HISTORY:  Epigastric pain    FINDINGS:  Portable chest with comparison chest x-ray 02/08/2025.  Normal cardiomediastinal silhouette.Lungs are clear. Pulmonary vasculature is normal. No acute osseous abnormality.                                     Assessment/Plan:     Assessment & Plan  GI bleed  Patient's hemorrhage is due to gastrointestinal bleed, Patients most recent Hgb, Hct, platelets, and INR are listed below.  Recent Labs     06/17/25  1453   HGB 15.2   HCT 46.4      INR 1.0     Plan  - Will trend hemoglobin/hematocrit Every 8 hours  - Will monitor and correct any coagulation defects  - Will transfuse if Hgb is <7g/dl (<8g/dl in cases  of active ACS) or if patient has rapid bleeding leading to hemodynamic instability  - On GI bleed pathway  - Pantoprazole gtt initiated in the ED  - GI consulted, appreciate recs  - NPO at midnight  - Occult blood stool pending  - Holding VTE prophylaxis as well as Brilinta and aspirin, resume once appropriate  Cholelithiasis  - Presented to the ED with chief complaint of right upper quadrant pain that radiates to midsternal chest  - Right upper quadrant ultrasound with cholelithiasis and hepatic steatosis  - ED provider discussed case with Dr. Lerner who recommended HIDA scan, NPO at midnight, and will see patient in a.m., appreciate further recs  - PRN analgesia  - Symptomatic care  - Holding VTE prophylaxis as well as home aspirin and Plavix, resume once appropriate    CAD (coronary artery disease) status post PCI  Patient with known CAD s/p stent placement, which is controlled Will continue Statin and monitor for S/Sx of angina/ACS. Continue to monitor on telemetry.     - Holding home Brilinta and aspirin in setting of possible GI bleed, resume once appropriate  Essential hypertension  Chronic, controlled.  Latest blood pressure and vitals reviewed-     Temp:  [98.8 °F (37.1 °C)]   Pulse:  [65-83]   Resp:  [16-20]   BP: (151-168)/(71-88)   SpO2:  [93 %-100 %] .   Home meds for hypertension were reviewed and noted below-  Hypertension Medications              amLODIPine (NORVASC) 5 MG tablet Take 1 tablet (5 mg total) by mouth once daily.    losartan (COZAAR) 50 MG Tab Take 1 tablet (50 mg total) by mouth once daily.    metoprolol succinate (TOPROL-XL) 25 MG 24 hr tablet Take 1 tablet (25 mg total) by mouth once daily.            While in the hospital, will manage blood pressure as follows; Continue home antihypertensive regimen    Will utilize p.r.n. blood pressure medication only if patient's blood pressure greater than 180/110 and he develops symptoms such as worsening chest pain or shortness of  "breath.    Sleep apnea  - Not using CPAP    DMII (diabetes mellitus, type 2)  Patient's FSGs are controlled on current medication regimen.  Last A1c reviewed-   Lab Results   Component Value Date    HGBA1C 5.8 02/08/2025     Most recent fingerstick glucose reviewed-   Recent Labs   Lab 06/17/25 2103   POCTGLUCOSE 100     Current correctional scale  Medium  Maintain anti-hyperglycemic dose as follows-   Antihyperglycemics (From admission, onward)      Start     Stop Route Frequency Ordered    06/17/25 2136  insulin aspart U-100 pen 0-10 Units         -- SubQ Before meals & nightly PRN 06/17/25 2037          Hold Oral hypoglycemics while patient is in the hospital.  Chronic systolic CHF (congestive heart failure)  Patient has Systolic (HFrEF) heart failure that is Chronic. On presentation their CHF was well compensated. Most recent BNP and echo results are listed below.  No results for input(s): "BNP" in the last 72 hours.  Latest ECHO  Results for orders placed during the hospital encounter of 02/07/25    Echo    Interpretation Summary    Left Ventricle: The left ventricle is normal in size. Normal wall thickness. There is concentric remodeling. Moderate hypokinesis of the inferior and the remaining walls appear to move fairly well There is mildly reduced systolic function with a visually estimated ejection fraction of 45 - 50%. There is normal diastolic function.    Right Ventricle: Normal right ventricular cavity size. Wall thickness is normal. Systolic function is normal.    Aortic Valve: The aortic valve is structurally normal.    Tricuspid Valve: The tricuspid valve is structurally normal.    Pulmonary Artery: The estimated pulmonary artery systolic pressure is 22 mmHg.    IVC/SVC: Intermediate venous pressure at 8 mmHg.    Current Heart Failure Medications  losartan tablet 25 mg, Daily, Oral  metoprolol succinate (TOPROL-XL) 24 hr tablet 25 mg, Daily, Oral    Plan  - Monitor strict I&Os and daily weights.    - " Place on telemetry  - Low sodium diet  - Place on fluid restriction of 1.5 L.   - Cardiology has not been consulted  - The patient's volume status is at their baseline  - Resume home metoprolol and losartan  NSVT (nonsustained ventricular tachycardia)  - Hx noted    Nicotine dependence  Dangers of smokeless tobacco were reviewed with patient in detail. Patient was Counseled for 3-10 minutes. Nicotine replacement options were discussed. Nicotine replacement was discussed- prescribed  VTE Risk Mitigation (From admission, onward)           Ordered     IP VTE HIGH RISK PATIENT  Once         06/17/25 2034     Place sequential compression device  Until discontinued         06/17/25 2034     Reason for No Pharmacological VTE Prophylaxis  Once        Question Answer Comment   Reasons: Active Bleeding    Reasons: Risk of Bleeding        06/17/25 2034                         On 06/17/2025, patient should be placed in hospital observation services under my care in collaboration with Alisia Feng MD.           Melvina Taveras PA-C  Department of Hospital Medicine  [facility] - Emergency Dept

## 2025-06-18 NOTE — ASSESSMENT & PLAN NOTE
Patient's FSGs are controlled on current medication regimen.  Last A1c reviewed-   Lab Results   Component Value Date    HGBA1C 5.8 02/08/2025     Most recent fingerstick glucose reviewed-   Recent Labs   Lab 06/17/25  2103 06/18/25  0732 06/18/25  1336   POCTGLUCOSE 100 135* 128*     Current correctional scale  Medium  Maintain anti-hyperglycemic dose as follows-   Antihyperglycemics (From admission, onward)      Start     Stop Route Frequency Ordered    06/18/25 0412  insulin aspart U-100 pen 0-5 Units         -- SubQ Every 6 hours PRN 06/18/25 0312          Hold Oral hypoglycemics while patient is in the hospital.

## 2025-06-18 NOTE — ASSESSMENT & PLAN NOTE
Patient's hemorrhage is due to gastrointestinal bleed, Patients most recent Hgb, Hct, platelets, and INR are listed below.  Recent Labs     06/17/25  1453   HGB 15.2   HCT 46.4      INR 1.0     Plan  - Will trend hemoglobin/hematocrit Every 8 hours  - Will monitor and correct any coagulation defects  - Will transfuse if Hgb is <7g/dl (<8g/dl in cases of active ACS) or if patient has rapid bleeding leading to hemodynamic instability  - On GI bleed pathway  - Pantoprazole gtt initiated in the ED  - GI consulted, appreciate recs  - NPO at midnight  - Occult blood stool pending  - Holding VTE prophylaxis as well as Brilinta and aspirin, resume once appropriate

## 2025-06-18 NOTE — ASSESSMENT & PLAN NOTE
Status post stent placement 4 months ago  Aspirin and Brilinta on hold secondary to GI bleed  Cardiology following  Abdominal pain that radiates to just  EGD revealed ulcers

## 2025-06-18 NOTE — NURSING
Patient was transported to radiology department for HIDA scan. Patient is stable. No acute distress noted.

## 2025-06-18 NOTE — ANESTHESIA PREPROCEDURE EVALUATION
06/18/2025  Sanjeev Britt is a 59 y.o., male.      Problem List[1]    Past Surgical History:   Procedure Laterality Date    ANGIOGRAM, CORONARY, WITH LEFT HEART CATHETERIZATION N/A 2/8/2025    Procedure: Angiogram, Coronary, with Left Heart Cath;  Surgeon: Mo Levi MD;  Location: [facility] CATH[facility]EP LAB;  Service: Cardiology;  Laterality: N/A;    FESS, USING COMPUTER-ASSISTED NAVIGATION, WITH NASAL TURBINATE REDUCTION N/A 6/7/2024    Procedure: FESS, USING COMPUTER-ASSISTED NAVIGATION, WITH NASAL TURBINATE REDUCTION;  Surgeon: Andres Abraham MD;  Location: [facility] OR;  Service: ENT;  Laterality: N/A;    IVUS, CORONARY  2/8/2025    Procedure: IVUS, Coronary;  Surgeon: Mo Levi MD;  Location: [facility] CATH[facility]EP LAB;  Service: Cardiology;;    IVUS, CORONARY  2/11/2025    Procedure: IVUS, Coronary;  Surgeon: Mo Levi MD;  Location: [facility] CATH[facility]EP LAB;  Service: Cardiology;;    NASAL SEPTOPLASTY N/A 6/7/2024    Procedure: SEPTOPLASTY, NOSE;  Surgeon: Andres Abraham MD;  [facility];  Service: ENT;  Laterality: N/A;    NASAL TURBINATE REDUCTION Bilateral 6/7/2024    Procedure: REDUCTION, NASAL TURBINATE;  Surgeon: Andres Abraham MD;  [facility];  Service: ENT;  Laterality: Bilateral;    PERCUTANEOUS CORONARY INTERVENTION, ARTERY N/A 2/8/2025    Procedure: Percutaneous coronary intervention;  Surgeon: Mo Levi MD;  Location: [facility] CATH[facility]EP LAB;  Service: Cardiology;  Laterality: N/A;    PERCUTANEOUS CORONARY INTERVENTION, ARTERY N/A 2/11/2025    Procedure: Percutaneous coronary intervention;  Surgeon: Mo Levi MD;  Location: [facility] CATH[facility]EP LAB;  Service: Cardiology;  Laterality: N/A;    VASCULAR SURGERY          Tobacco Use:  The patient  reports that he has never smoked. His smokeless tobacco use includes snuff.     Results  for orders placed or performed during the hospital encounter of 06/17/25   EKG 12-lead    Collection Time: 06/17/25  2:20 PM   Result Value Ref Range    QRS Duration 92 ms    [facility] QTC Calculation 470 ms    Narrative    Test Reason : R07.9,    Vent. Rate :  63 BPM     Atrial Rate :  63 BPM     P-R Int : 158 ms          QRS Dur :  92 ms      QT Int : 460 ms       P-R-T Axes :  63  42   7 degrees    QTcB Int : 470 ms    Normal sinus rhythm with sinus arrhythmia  Possible Inferior infarct ,age undetermined  Possible Anterior infarct ,age undetermined  Abnormal ECG  No previous ECGs available    Referred By:            Confirmed By:                Lab Results   Component Value Date    WBC 15.98 (H) 06/18/2025    HGB 14.6 06/18/2025    HCT 45.4 06/18/2025    MCV 87 06/18/2025     06/18/2025     BMP  Lab Results   Component Value Date     06/18/2025    K 3.8 06/18/2025     06/18/2025    CO2 22 (L) 06/18/2025    BUN 11 06/18/2025    CREATININE 0.7 06/18/2025    CALCIUM 8.9 06/18/2025    ANIONGAP 13 06/18/2025     (H) 06/18/2025     (H) 06/17/2025     (H) 02/12/2025       Results for orders placed during the hospital encounter of 02/07/25    Echo    Interpretation Summary    Left Ventricle: The left ventricle is normal in size. Normal wall thickness. There is concentric remodeling. Moderate hypokinesis of the inferior and the remaining walls appear to move fairly well There is mildly reduced systolic function with a visually estimated ejection fraction of 45 - 50%. There is normal diastolic function.    Right Ventricle: Normal right ventricular cavity size. Wall thickness is normal. Systolic function is normal.    Aortic Valve: The aortic valve is structurally normal.    Tricuspid Valve: The tricuspid valve is structurally normal.    Pulmonary Artery: The estimated pulmonary artery systolic pressure is 22 mmHg.    IVC/SVC: Intermediate venous pressure at 8 mmHg.              Pre-op  Assessment    I have reviewed the Patient Summary Reports.     I have reviewed the Nursing Notes. I have reviewed the NPO Status.   I have reviewed the Medications.     Review of Systems  Anesthesia Hx:  No problems with previous Anesthesia             Denies Family Hx of Anesthesia complications.    Denies Personal Hx of Anesthesia complications.                    Social:  Non-Smoker       Hematology/Oncology:  Hematology Normal                                     Cardiovascular:     Hypertension, well controlled  Past MI (hx of STEMI with cardiac arrest and CPR  02/2025) CAD (hx of PCI)   CABG/stent (ELIDA placed 02/2025) Dysrhythmias (hx of non-sustained V tach, hx SVT - on chronic amiodarone)   CHF (EF 45-50% on most recent ECHO)                                   Pulmonary:        Sleep Apnea (not using CPAP)                Hepatic/GI:  Hepatic/GI Normal                    Musculoskeletal:  Musculoskeletal Normal                Neurological:  Neurology Normal                                      Endocrine:  Diabetes, type 2               Physical Exam  General: Well nourished, Cooperative, Alert and Oriented    Airway:  Mallampati: III / II  Mouth Opening: Normal  TM Distance: Normal  Tongue: Normal  Neck ROM: Normal ROM    Dental:  Intact    Chest/Lungs:  Clear to auscultation    Heart:  Rate: Normal  Rhythm: Regular Rhythm  Sounds: Normal    Abdomen:  Normal, Soft, Nontender        Anesthesia Plan  Type of Anesthesia, risks & benefits discussed:    Anesthesia Type: Gen Natural Airway  Intra-op Monitoring Plan: Standard ASA Monitors  Post Op Pain Control Plan:   (medical reason for not using multimodal pain management)  Induction:  IV  Informed Consent: Informed consent signed with the Patient and all parties understand the risks and agree with anesthesia plan.  All questions answered. Patient consented to blood products? No  ASA Score: 3 Emergent  Anesthesia Plan Notes:   General Natural  Airway  POM  Propofol  Zofran    Ready For Surgery From Anesthesia Perspective.     .           [1]   Patient Active Problem List  Diagnosis    Chest pain    Acute hypoxemic respiratory failure    Bilateral pulmonary embolism    Transaminitis    History of spontaneous pneumothorax    CAD (coronary artery disease) status post PCI    Essential hypertension    BMI 28.0-28.9,adult    Sleep apnea    Erectile dysfunction    Chronic pansinusitis    Hypertrophy of nasal turbinates    Deviated nasal septum    Nasal obstruction    STEMI (ST elevation myocardial infarction)    DMII (diabetes mellitus, type 2)    Cardiac arrest    Bradycardia    History of coronary angioplasty with insertion of stent    Chronic systolic CHF (congestive heart failure)    NSVT (nonsustained ventricular tachycardia)    GI bleed    Cholelithiasis    Nicotine dependence

## 2025-06-18 NOTE — SUBJECTIVE & OBJECTIVE
Past Medical History:   Diagnosis Date    Acute coronary syndrome 09/09/2019    Coronary artery disease     Diabetes mellitus     Hypertension     Sleep apnea        Past Surgical History:   Procedure Laterality Date    ANGIOGRAM, CORONARY, WITH LEFT HEART CATHETERIZATION N/A 2/8/2025    Procedure: Angiogram, Coronary, with Left Heart Cath;  Surgeon: Mo Levi MD;  Location: [facility] CATH/EP LAB;  Service: Cardiology;  Laterality: N/A;    FESS, USING COMPUTER-ASSISTED NAVIGATION, WITH NASAL TURBINATE REDUCTION N/A 6/7/2024    Procedure: FESS, USING COMPUTER-ASSISTED NAVIGATION, WITH NASAL TURBINATE REDUCTION;  Surgeon: Andres Abraham MD;  [facility];  Service: ENT;  Laterality: N/A;    IVUS, CORONARY  2/8/2025    Procedure: IVUS, Coronary;  Surgeon: Mo Levi MD;  Location: [facility] CATH/EP LAB;  Service: Cardiology;;    IVUS, CORONARY  2/11/2025    Procedure: IVUS, Coronary;  Surgeon: Mo Levi MD;  Location: [facility] CATH/EP LAB;  Service: Cardiology;;    NASAL SEPTOPLASTY N/A 6/7/2024    Procedure: SEPTOPLASTY, NOSE;  Surgeon: Andres Abraham MD;  Location: [facility] OR;  Service: ENT;  Laterality: N/A;    NASAL TURBINATE REDUCTION Bilateral 6/7/2024    Procedure: REDUCTION, NASAL TURBINATE;  Surgeon: Andres Abraham MD;  Location: [facility] OR;  Service: ENT;  Laterality: Bilateral;    PERCUTANEOUS CORONARY INTERVENTION, ARTERY N/A 2/8/2025    Procedure: Percutaneous coronary intervention;  Surgeon: Mo Levi MD;  Location: [facility] CATH/EP LAB;  Service: Cardiology;  Laterality: N/A;    PERCUTANEOUS CORONARY INTERVENTION, ARTERY N/A 2/11/2025    Procedure: Percutaneous coronary intervention;  Surgeon: Mo Levi MD;  Location: [facility] CATH/EP LAB;  Service: Cardiology;  Laterality: N/A;    VASCULAR SURGERY         Review of patient's allergies indicates:   Allergen Reactions    Levaquin [levofloxacin]        No current facility-administered  medications on file prior to encounter.     Current Outpatient Medications on File Prior to Encounter   Medication Sig    amiodarone (PACERONE) 100 MG Tab Take 1 tablet (100 mg total) by mouth once daily.    amLODIPine (NORVASC) 5 MG tablet Take 1 tablet (5 mg total) by mouth once daily. (Patient taking differently: Take 10 mg by mouth once daily.)    aspirin (ECOTRIN) 81 MG EC tablet Take 81 mg by mouth once daily.    atorvastatin (LIPITOR) 80 MG tablet Take 1 tablet (80 mg total) by mouth once daily.    busPIRone (BUSPAR) 7.5 MG tablet Take 7.5 mg by mouth 2 (two) times a day.    gabapentin (NEURONTIN) 300 MG capsule Take 300 mg by mouth 2 (two) times daily.    ibuprofen (ADVIL,MOTRIN) 600 MG tablet Take 600 mg by mouth 3 (three) times daily.    JARDIANCE 10 mg tablet Take 10 mg by mouth once daily.    loratadine (CLARITIN) 10 mg tablet Take 10 mg by mouth once daily.    losartan (COZAAR) 50 MG Tab Take 1 tablet (50 mg total) by mouth once daily. (Patient taking differently: Take 25 mg by mouth once daily.)    metoprolol succinate (TOPROL-XL) 25 MG 24 hr tablet Take 1 tablet (25 mg total) by mouth once daily.    multivit-min/FA/lycopen/lutein (CENTRUM SILVER MEN ORAL) Take 1 tablet by mouth once daily.    ticagrelor (BRILINTA) 90 mg tablet Take 1 tablet (90 mg total) by mouth 2 (two) times daily.    traZODone (DESYREL) 150 MG tablet Take 1 tablet by mouth once daily.    HYDROcodone-acetaminophen (NORCO) 5-325 mg per tablet Take 1 tablet by mouth every 6 (six) hours as needed for Pain. (Patient not taking: Reported on 6/11/2025)    pregabalin (LYRICA) 75 MG capsule Take 1 capsule by mouth 2 (two) times daily.     Family History       Problem Relation (Age of Onset)    Cancer Father          Tobacco Use    Smoking status: Never    Smokeless tobacco: Current     Types: Snuff   Substance and Sexual Activity    Alcohol use: Not Currently    Drug use: Never    Sexual activity: Yes     Partners: Female     Review of  Systems   Constitutional:  Positive for chills and fever (subjective).   Respiratory:  Negative for shortness of breath.    Cardiovascular:  Positive for chest pain.   Gastrointestinal:  Positive for abdominal pain, blood in stool, nausea and vomiting.   Genitourinary:  Negative for dysuria and hematuria.   Neurological:  Positive for light-headedness. Negative for dizziness and syncope.     Objective:     Vital Signs (Most Recent):  Temp: 98.8 °F (37.1 °C) (06/17/25 1431)  Pulse: 81 (06/17/25 1831)  Resp: 20 (06/17/25 2031)  BP: (!) 156/80 (06/17/25 1831)  SpO2: (!) 93 % (06/17/25 1831) Vital Signs (24h Range):  Temp:  [98.8 °F (37.1 °C)] 98.8 °F (37.1 °C)  Pulse:  [65-83] 81  Resp:  [16-20] 20  SpO2:  [93 %-100 %] 93 %  BP: (151-168)/(71-88) 156/80     Weight: 74.8 kg (165 lb)  Body mass index is 26.63 kg/m².     Physical Exam  Vitals reviewed.   Constitutional:       General: He is awake. He is not in acute distress.     Appearance: Normal appearance. He is ill-appearing. He is not diaphoretic.      Comments: Patient appears significantly uncomfortable while lying in bed during interview and exam.   Cardiovascular:      Rate and Rhythm: Normal rate.      Heart sounds: Normal heart sounds. No murmur heard.     No friction rub. No gallop.   Pulmonary:      Effort: Pulmonary effort is normal. No accessory muscle usage or respiratory distress.      Breath sounds: Normal breath sounds. No wheezing, rhonchi or rales.   Abdominal:      General: Bowel sounds are decreased.      Palpations: Abdomen is soft.      Tenderness: There is abdominal tenderness in the right upper quadrant, right lower quadrant, epigastric area and periumbilical area. There is no guarding or rebound.   Musculoskeletal:      Right lower leg: No edema.      Left lower leg: No edema.   Skin:     General: Skin is warm and dry.   Neurological:      Mental Status: He is alert and oriented to person, place, and time.   Psychiatric:         Behavior:  Behavior is cooperative.                Significant Labs: All pertinent labs within the past 24 hours have been reviewed.  CBC:   Recent Labs   Lab 06/17/25  1453   WBC 10.75   HGB 15.2   HCT 46.4        CMP:   Recent Labs   Lab 06/17/25  1453      K 3.5      CO2 20*   *   BUN 13   CREATININE 0.8   CALCIUM 9.7   PROT 7.9   ALBUMIN 5.0   BILITOT 0.6   ALKPHOS 100   AST 17   ALT 21   ANIONGAP 15     Coagulation:   Recent Labs   Lab 06/17/25  1453   INR 1.0     Lipase:   Recent Labs   Lab 06/17/25  1453   LIPASE 29       Significant Imaging:   Imaging Results              CT Abdomen Pelvis With IV Contrast NO Oral Contrast (Final result)  Result time 06/17/25 19:27:00      Final result by Leroy Suresh MD (06/17/25 19:27:00)                   Impression:      No acute intra-abdominal abnormality.    49 x 40 x 39 mm hypodense soft tissue mass interposed between the right gluteus minimus and medius muscles.  Sarcoma is not excluded.  Recommend MRI with and without contrast.    12 mm noncalcified nodule opacity left lung base. Bibasilar atelectasis.  Follow-up chest CT in 3 months.      Electronically signed by: Leroy Suresh  Date:    06/17/2025  Time:    19:27               Narrative:    EXAMINATION:  CT ABDOMEN PELVIS WITH IV CONTRAST    CLINICAL HISTORY:  Abdominal pain, acute, nonlocalized;    TECHNIQUE:  Low dose axial images, sagittal and coronal reformations were obtained from the lung bases to the pubic symphysis following the IV administration of 100 mL of Omnipaque 350 .  Oral contrast was not given.    COMPARISON:  None.    FINDINGS:  Soft tissues: 49 x 40 x 39 mm hypodense soft tissue mass interposed between the right gluteus minimus and medius muscles.    Bones: No acute osseous abnormality.    Lower chest: 12 mm noncalcified nodule opacity left lung base.  Bibasilar atelectasis.    Lung Bases: Well aerated, without consolidation or pleural fluid.    Liver: Normal in  size and attenuation, with no focal hepatic lesions.    Gallbladder: Distended.  Cholelithiasis.    Bile Ducts: No evidence of dilated ducts.    Pancreas: No mass or peripancreatic fat stranding.    Spleen: Unremarkable.    Adrenals: Unremarkable.    Kidneys/ Ureters: Small left renal cysts and additional subcentimeter hypodense lesions in each kidney which are too small to definitely characterize.    Bladder: Mild diffuse circumferential bladder wall thickening.  Correlate with with urinalysis.    Reproductive organs: Enlarged and heterogeneous containing calcifications. Correlate with PSA.    GI Tract/Mesentery: No evidence of bowel obstruction or inflammation.    Peritoneal Space: No ascites. No free air.    Lymphadenopathy: No significant adenopathy.    Vasculature: Multifocal atherosclerosis.                                       US Abdomen Limited (Final result)  Result time 06/17/25 16:32:14      Final result by Eric Wade MD (06/17/25 16:32:14)                   Impression:      Cholelithiasis.    Hepatic steatosis.      Electronically signed by: Eric Wade  Date:    06/17/2025  Time:    16:32               Narrative:    EXAMINATION:  US ABDOMEN LIMITED    CLINICAL HISTORY:  ruq pain;    TECHNIQUE:  Grayscale and color Doppler evaluation of the abdomen was performed.    COMPARISON:  None    FINDINGS:  Pancreas was not visualized due to overlying bowel gas.  Aorta not well visualized.  Visualized IVC is unremarkable.    The liver measures 14.7 cm in length.  Increased hepatic parenchymal echogenicity consistent with steatosis.  No focal hepatic lesion.  Main portal vein is patent with hepatopetal flow.  Cholelithiasis.  Technologist notes indicate tenderness in the gallbladder region.  No gallbladder wall thickening or pericholecystic fluid.  Normal caliber common bile duct measuring 5 mm.    The right kidney measures 12.5 cm in length and has a normal sonographic appearance.                                        X-Ray Chest AP Portable (Final result)  Result time 06/17/25 15:24:57      Final result by Alexandre Beltran DO (06/17/25 15:24:57)                   Impression:      No acute cardiopulmonary abnormality.      Electronically signed by: Alexandre Beltran  Date:    06/17/2025  Time:    15:24               Narrative:    EXAMINATION:  XR CHEST AP PORTABLE    CLINICAL HISTORY:  Epigastric pain    FINDINGS:  Portable chest with comparison chest x-ray 02/08/2025.  Normal cardiomediastinal silhouette.Lungs are clear. Pulmonary vasculature is normal. No acute osseous abnormality.

## 2025-06-18 NOTE — CONSULTS
GENERAL SURGERY  INPATIENT CONSULT    REASON FOR CONSULT: Severe biliary colic    HPI: Sanjeev Britt is a 59 y.o. male with recent history of coronary artery disease with STEMI and congestive heart failure status post multiple stents 4 months ago currently on aspirin and Brilinta who presented to the emergency room with severe right upper quadrant abdominal pain associated with dark stools. In the emergency room hemoglobin was found to be normal. Required multiple doses of IV pain medication but still was having 10/10 pain in the upper abdomen.  Underwent ultrasound and CT imaging. Gallstones were seen with a distended gallbladder but no evidence of acute cholecystitis or biliary dilatation. Admitted for pain control.  Surgery consulted for evaluation.  I have requested HIDA scan given his high-risk for surgical intervention due to recent STEMI and also need for ongoing blood thinners. Patient reports pain is somewhat improved this morning.    I have reviewed the patient's chart including prior progress notes, procedures and testing.     Also noted the patient has a soft tissue mass in the right gluteus raising concerns for sarcoma, will be worked up later with MRI and outpatient follow up.    ROS:   Review of Systems    PROBLEM LIST:  Problem List[1]      HISTORY  Past Medical History:   Diagnosis Date    Acute coronary syndrome 09/09/2019    Coronary artery disease     Diabetes mellitus     Hypertension     Sleep apnea        Past Surgical History:   Procedure Laterality Date    ANGIOGRAM, CORONARY, WITH LEFT HEART CATHETERIZATION N/A 2/8/2025    Procedure: Angiogram, Coronary, with Left Heart Cath;  Surgeon: Mo Levi MD;  Location: [facility] CATH/EP LAB;  Service: Cardiology;  Laterality: N/A;    FESS, USING COMPUTER-ASSISTED NAVIGATION, WITH NASAL TURBINATE REDUCTION N/A 6/7/2024    Procedure: FESS, USING COMPUTER-ASSISTED NAVIGATION, WITH NASAL TURBINATE REDUCTION;  Surgeon: Andres Abraham MD;   Location: [facility] ASU OR;  Service: ENT;  Laterality: N/A;    IVUS, CORONARY  2/8/2025    Procedure: IVUS, Coronary;  Surgeon: Mo Levi MD;  Location: [facility] CATH/EP LAB;  Service: Cardiology;;    IVUS, CORONARY  2/11/2025    Procedure: IVUS, Coronary;  Surgeon: Mo Levi MD;  Location: [facility] CATH/EP LAB;  Service: Cardiology;;    NASAL SEPTOPLASTY N/A 6/7/2024    Procedure: SEPTOPLASTY, NOSE;  Surgeon: Andres Abraham MD;  [facility];  Service: ENT;  Laterality: N/A;    NASAL TURBINATE REDUCTION Bilateral 6/7/2024    Procedure: REDUCTION, NASAL TURBINATE;  Surgeon: Andres Abraham MD;  Location: [facility] OR;  Service: ENT;  Laterality: Bilateral;    PERCUTANEOUS CORONARY INTERVENTION, ARTERY N/A 2/8/2025    Procedure: Percutaneous coronary intervention;  Surgeon: Mo Levi MD;  Location: [facility] CATH/EP LAB;  Service: Cardiology;  Laterality: N/A;    PERCUTANEOUS CORONARY INTERVENTION, ARTERY N/A 2/11/2025    Procedure: Percutaneous coronary intervention;  Surgeon: Mo Levi MD;  Location: [facility] CATH/EP LAB;  Service: Cardiology;  Laterality: N/A;    VASCULAR SURGERY         Social History[2]    Family History   Problem Relation Name Age of Onset    Cancer Father           MEDS:  Medications Ordered Prior to Encounter[3]    ALLERGIES:  Review of patient's allergies indicates:   Allergen Reactions    Levaquin [levofloxacin]          VITALS:  Temp:  [98.8 °F (37.1 °C)-99 °F (37.2 °C)] 99 °F (37.2 °C)  Pulse:  [] 88  Resp:  [16-20] 20  SpO2:  [93 %-100 %] 97 %  BP: (137-168)/(70-88) 137/77    I/O last 3 completed shifts:  In: 93.7 [I.V.:93.7]  Out: 1 [Urine:1]      PHYSICAL EXAM:  Physical Exam  Vitals reviewed.   Constitutional:       General: He is not in acute distress.     Appearance: Normal appearance. He is well-developed.   HENT:      Head: Normocephalic and atraumatic.   Eyes:      General: No scleral icterus.  Neck:      Trachea: No tracheal  deviation.   Cardiovascular:      Rate and Rhythm: Normal rate and regular rhythm.      Pulses: Normal pulses.   Pulmonary:      Effort: Pulmonary effort is normal. No respiratory distress.      Breath sounds: Normal breath sounds.   Abdominal:      General: There is no distension.      Palpations: Abdomen is soft.      Tenderness: There is abdominal tenderness (epigastric and right upper quadrant).   Musculoskeletal:         General: No swelling or tenderness. Normal range of motion.      Cervical back: Normal range of motion and neck supple. No rigidity.   Skin:     General: Skin is warm and dry.      Coloration: Skin is not jaundiced.      Findings: No erythema.   Neurological:      General: No focal deficit present.      Mental Status: He is alert and oriented to person, place, and time. He is not disoriented.      Motor: No weakness or abnormal muscle tone.   Psychiatric:         Mood and Affect: Mood normal.         Behavior: Behavior normal.         Thought Content: Thought content normal.         Judgment: Judgment normal.           LABS:  Lab Results   Component Value Date    WBC 15.98 (H) 06/18/2025    RBC 5.24 06/18/2025    HGB 14.6 06/18/2025    HCT 45.4 06/18/2025    HCT 47 02/08/2025     06/18/2025     Lab Results   Component Value Date     (H) 06/18/2025     06/18/2025    K 3.8 06/18/2025     06/18/2025    CO2 22 (L) 06/18/2025    BUN 11 06/18/2025    CREATININE 0.7 06/18/2025    CALCIUM 8.9 06/18/2025     Lab Results   Component Value Date    ALT 21 06/17/2025    AST 17 06/17/2025    ALKPHOS 100 06/17/2025    BILITOT 0.6 06/17/2025     Lab Results   Component Value Date    MG 2.1 02/12/2025       STUDIES:  Images and reports were personally reviewed.    CT abdomen pelvis  FINDINGS:  Soft tissues: 49 x 40 x 39 mm hypodense soft tissue mass interposed between the right gluteus minimus and medius muscles.     Bones: No acute osseous abnormality.     Lower chest: 12 mm  noncalcified nodule opacity left lung base.  Bibasilar atelectasis.     Lung Bases: Well aerated, without consolidation or pleural fluid.     Liver: Normal in size and attenuation, with no focal hepatic lesions.     Gallbladder: Distended.  Cholelithiasis.     Bile Ducts: No evidence of dilated ducts.     Pancreas: No mass or peripancreatic fat stranding.     Spleen: Unremarkable.     Adrenals: Unremarkable.     Kidneys/ Ureters: Small left renal cysts and additional subcentimeter hypodense lesions in each kidney which are too small to definitely characterize.     Bladder: Mild diffuse circumferential bladder wall thickening.  Correlate with with urinalysis.     Reproductive organs: Enlarged and heterogeneous containing calcifications. Correlate with PSA.     GI Tract/Mesentery: No evidence of bowel obstruction or inflammation.     Peritoneal Space: No ascites. No free air.     Lymphadenopathy: No significant adenopathy.     Vasculature: Multifocal atherosclerosis.     Impression:     No acute intra-abdominal abnormality.     49 x 40 x 39 mm hypodense soft tissue mass interposed between the right gluteus minimus and medius muscles.  Sarcoma is not excluded.  Recommend MRI with and without contrast.     12 mm noncalcified nodule opacity left lung base. Bibasilar atelectasis.  Follow-up chest CT in 3 months.         Abdominal ultrasound  FINDINGS:  Pancreas was not visualized due to overlying bowel gas.  Aorta not well visualized.  Visualized IVC is unremarkable.     The liver measures 14.7 cm in length.  Increased hepatic parenchymal echogenicity consistent with steatosis.  No focal hepatic lesion.  Main portal vein is patent with hepatopetal flow.  Cholelithiasis.  Technologist notes indicate tenderness in the gallbladder region.  No gallbladder wall thickening or pericholecystic fluid.  Normal caliber common bile duct measuring 5 mm.     The right kidney measures 12.5 cm in length and has a normal sonographic  appearance.     Impression:     Cholelithiasis.     Hepatic steatosis.        ASSESSMENT & PLAN:  59 y.o. male with severe upper abdominal and right upper quadrant pain, recent STEMI status post stents on Brilinta, dark stools  -due to severity of pain there are concerns for acute cholecystitis despite negative findings for wall thickening or pericholecystic fluid, HIDA scan has been ordered  -if HIDA scan is positive will need to discuss with Cardiology the patient is operative risk to determine if he is a candidate for laparoscopic cholecystectomy, if not may need percutaneous cholecystostomy tube   -if HIDA scan is negative we would recommend GI evaluation for possible peptic ulcer disease that has an etiology for severe pain   -continue Protonix, where a GI cocktail   -dark stools noted by patient, hemoglobin stable, continue to observe, GI has been consulted                       [1]   Patient Active Problem List  Diagnosis    Chest pain    Acute hypoxemic respiratory failure    Bilateral pulmonary embolism    Transaminitis    History of spontaneous pneumothorax    CAD (coronary artery disease) status post PCI    Essential hypertension    BMI 28.0-28.9,adult    Sleep apnea    Erectile dysfunction    Chronic pansinusitis    Hypertrophy of nasal turbinates    Deviated nasal septum    Nasal obstruction    STEMI (ST elevation myocardial infarction)    DMII (diabetes mellitus, type 2)    Cardiac arrest    Bradycardia    History of coronary angioplasty with insertion of stent    Chronic systolic CHF (congestive heart failure)    NSVT (nonsustained ventricular tachycardia)    GI bleed    Cholelithiasis    Nicotine dependence   [2]   Social History  Tobacco Use    Smoking status: Never    Smokeless tobacco: Current     Types: Snuff   Substance Use Topics    Alcohol use: Not Currently    Drug use: Never   [3]   No current facility-administered medications on file prior to encounter.     Current Outpatient Medications on  File Prior to Encounter   Medication Sig Dispense Refill    amiodarone (PACERONE) 100 MG Tab Take 1 tablet (100 mg total) by mouth once daily. 90 tablet 3    amLODIPine (NORVASC) 5 MG tablet Take 1 tablet (5 mg total) by mouth once daily. (Patient taking differently: Take 10 mg by mouth once daily.) 90 tablet 3    aspirin (ECOTRIN) 81 MG EC tablet Take 81 mg by mouth once daily.      atorvastatin (LIPITOR) 80 MG tablet Take 1 tablet (80 mg total) by mouth once daily. 90 tablet 3    busPIRone (BUSPAR) 7.5 MG tablet Take 7.5 mg by mouth 2 (two) times a day.      gabapentin (NEURONTIN) 300 MG capsule Take 300 mg by mouth 2 (two) times daily.      ibuprofen (ADVIL,MOTRIN) 600 MG tablet Take 600 mg by mouth 3 (three) times daily.      JARDIANCE 10 mg tablet Take 10 mg by mouth once daily.      loratadine (CLARITIN) 10 mg tablet Take 10 mg by mouth once daily.      losartan (COZAAR) 50 MG Tab Take 1 tablet (50 mg total) by mouth once daily. (Patient taking differently: Take 25 mg by mouth once daily.) 90 tablet 3    metoprolol succinate (TOPROL-XL) 25 MG 24 hr tablet Take 1 tablet (25 mg total) by mouth once daily. 90 tablet 3    multivit-min/FA/lycopen/lutein (CENTRUM SILVER MEN ORAL) Take 1 tablet by mouth once daily.      ticagrelor (BRILINTA) 90 mg tablet Take 1 tablet (90 mg total) by mouth 2 (two) times daily. 180 tablet 3    traZODone (DESYREL) 150 MG tablet Take 1 tablet by mouth once daily.      HYDROcodone-acetaminophen (NORCO) 5-325 mg per tablet Take 1 tablet by mouth every 6 (six) hours as needed for Pain. (Patient not taking: Reported on 6/11/2025) 15 tablet 0    pregabalin (LYRICA) 75 MG capsule Take 1 capsule by mouth 2 (two) times daily.

## 2025-06-18 NOTE — ASSESSMENT & PLAN NOTE
- Presented to the ED with chief complaint of right upper quadrant pain that radiates to midsternal chest  - Right upper quadrant ultrasound with cholelithiasis and hepatic steatosis  - ED provider discussed case with Dr. Lerner who recommended HIDA scan, NPO at midnight, and will see patient in a.m., appreciate further recs  - PRN analgesia  - Symptomatic care  - Holding VTE prophylaxis as well as home aspirin and Plavix, resume once appropriate

## 2025-06-18 NOTE — TRANSFER OF CARE
"Anesthesia Transfer of Care Note    Patient: Sanjeev Britt    Procedure(s) Performed: Procedure(s) (LRB):  EGD (ESOPHAGOGASTRODUODENOSCOPY) (N/A)    Patient location: GI    Anesthesia Type: general    Transport from OR: Transported from OR on room air with adequate spontaneous ventilation    Post pain: adequate analgesia    Post assessment: no apparent anesthetic complications    Post vital signs: stable    Level of consciousness: awake    Nausea/Vomiting: no nausea/vomiting    Complications: none    Transfer of care protocol was followed      Last vitals: Visit Vitals  BP (!) 160/90   Pulse 89   Temp 37 °C (98.6 °F) (Oral)   Resp 20   Ht 5' 6" (1.676 m)   Wt 74.8 kg (165 lb)   SpO2 96%   BMI 26.63 kg/m²     "

## 2025-06-18 NOTE — ANESTHESIA POSTPROCEDURE EVALUATION
Anesthesia Post Evaluation    Patient: Sanjeev Britt    Procedure(s) Performed: Procedure(s) (LRB):  EGD (ESOPHAGOGASTRODUODENOSCOPY) (N/A)    Final Anesthesia Type: general      Patient location during evaluation: PACU  Patient participation: Yes- Able to Participate  Level of consciousness: awake and alert and oriented  Post-procedure vital signs: reviewed and stable  Pain management: adequate  Airway patency: patent    PONV status at discharge: No PONV  Anesthetic complications: no      Cardiovascular status: blood pressure returned to baseline and hemodynamically stable  Respiratory status: unassisted, spontaneous ventilation and room air  Hydration status: euvolemic  Follow-up not needed.              Vitals Value Taken Time   /79 06/18/25 12:51   Temp 37 °C (98.6 °F) 06/18/25 07:50   Pulse 91 06/18/25 12:54   Resp 19 06/18/25 12:54   SpO2 93 % 06/18/25 12:53   Vitals shown include unfiled device data.      Event Time   Out of Recovery 06/18/2025 12:56:07         Pain/Teresa Score: Pain Rating Prior to Med Admin: 9 (6/18/2025 11:37 AM)  Pain Rating Post Med Admin: 8 (6/17/2025  9:32 PM)

## 2025-06-18 NOTE — PROGRESS NOTES
[facility]  Progress Note    Patient Name: Sanjeev Britt  MRN: [id number]  Patient Class: OP- Observation   Admission Date: 6/17/2025  Length of Stay: 0 days  Attending Physician: Paloma Boateng MD  Primary Care Provider: Katt Fuentes NP        Subjective     Principal Problem:GI bleed        HPI:  Mr. Britt is a 59 yr old male with a hx of CAD s/p PCI, HTN, sleep apnea not on CPAP, DM type 2, SVT, bilateral PE, spontaneous pneumothorax, ED, chronic pansinusitis, deviated nasal septum, STEMI, cardiac arrest, bradycardia, heart failure with reduced EF who presents to the ED with a chief complaint of abdominal pain.  Patient endorses 10+/10 sharp right upper quadrant abdominal pain that began around 10:00 a.m. this morning and radiates to his midsternal chest.  He states that he ate spaghetti last night and began having emesis today that contained remnants of spaghetti with the last episode being around 4:00 p.m..  He also endorses he has had 3 black tarry looking stools.  He denies having any black tarry stools while in the ED and denies any other active bleeding.  He also endorses subjective fever, chills, and lightheadedness.  He has taken Mylanta, Zofran, Phenergan at home without relief.  He states he goes can of dip every 2 days, occasionally drinks alcohol, and denies recreational drug use.  He denies dyspnea, chest pain, dysuria, hematuria, dizziness, and syncope.    Upon arrival to ED, patient afebrile, HR of 69, RR of 18, BP of 151/88, satting 100% on RA.  Workup in the ED reveals CO2 of 20, glucose of 118, initial troponin of 16.2, repeat troponin of 14.6.  Remainder of labs unremarkable.  CT AP with IV contrast shows no acute intra-abdominal abnormality.  49 x 40 x 39 mm hypodense soft tissue mass interposed between the right gluteus minimus and medius muscles.  Sarcoma is not excluded.  Recommend MRI with and without contrast.  12 mm noncalcified nodule  opacity left lung base.  Bibasilar atelectasis.  Follow-up chest CT in 3 months.  Right upper quadrant ultrasound shows cholelithiasis and hepatic steatosis.  CXR shows no acute cardiopulmonary abnormality.  Patient was given hydromorphone 1 mg IV x2, morphine 4 mg IV, ondansetron 4 mg IV, and pantoprazole 80 mg IV, and initiated on pantoprazole gtt while in the ED. ED provider discussed case with General surgery Dr. Lerner recommended HIDA scan, NPO at midnight, and will see patient in AM. case discussed with ED provider and patient will be placed under observation for further management.    Overview/Hospital Course:  No notes on file    Interval History:   Patient seen and examined.  NAD. NAEO.  EGD complete.  HIDA scan pending.  MRI pending.  Review of Systems   Constitutional:  Negative for chills and fever.   Respiratory:  Negative for shortness of breath.    Cardiovascular:  Negative for chest pain, palpitations and leg swelling.   Gastrointestinal:  Positive for abdominal pain (right upper quadrant). Negative for blood in stool, diarrhea, nausea and vomiting.        Indigestion   Neurological: Negative.    All other systems reviewed and are negative.    Objective:     Vital Signs (Most Recent):  Temp: 99.9 °F (37.7 °C) (06/18/25 1300)  Pulse: 60 (06/18/25 1300)  Resp: 18 (06/18/25 1329)  BP: (!) 165/73 (06/18/25 1300)  SpO2: 95 % (06/18/25 1300) Vital Signs (24h Range):  Temp:  [98.6 °F (37 °C)-99.9 °F (37.7 °C)] 99.9 °F (37.7 °C)  Pulse:  [] 60  Resp:  [16-28] 18  SpO2:  [92 %-100 %] 95 %  BP: (137-168)/(68-90) 165/73     Weight: 74.8 kg (165 lb)  Body mass index is 26.63 kg/m².    Intake/Output Summary (Last 24 hours) at 6/18/2025 1435  Last data filed at 6/18/2025 1235  Gross per 24 hour   Intake 243.72 ml   Output 1 ml   Net 242.72 ml         Physical Exam  Vitals and nursing note reviewed.   Constitutional:       Appearance: Normal appearance. He is well-developed.   HENT:      Nose: Nose  normal. No septal deviation.   Eyes:      Conjunctiva/sclera: Conjunctivae normal.      Pupils: Pupils are equal, round, and reactive to light.   Neck:      Thyroid: No thyroid mass.      Vascular: No JVD.      Trachea: No tracheal tenderness or tracheal deviation.   Cardiovascular:      Rate and Rhythm: Normal rate and regular rhythm.      Pulses: Normal pulses.      Heart sounds: Normal heart sounds, S1 normal and S2 normal.   Pulmonary:      Effort: Pulmonary effort is normal.      Breath sounds: Normal breath sounds. No decreased breath sounds.   Abdominal:      General: Bowel sounds are normal. There is no abdominal bruit.      Palpations: Abdomen is soft. There is no hepatomegaly or splenomegaly.      Tenderness: There is abdominal tenderness in the right upper quadrant, right lower quadrant, epigastric area and periumbilical area.      Hernia: No hernia is present.   Musculoskeletal:      Right lower leg: No edema.      Left lower leg: No edema.   Skin:     General: Skin is warm.      Findings: No rash.   Neurological:      Mental Status: He is alert and oriented to person, place, and time.   Psychiatric:         Mood and Affect: Mood normal.         Behavior: Behavior normal.               Significant Labs: All pertinent labs within the past 24 hours have been reviewed.  CBC:   Recent Labs   Lab 06/17/25  1453 06/18/25  0451 06/18/25  0759   WBC 10.75 15.98* 16.48*   HGB 15.2 14.6 15.0   HCT 46.4 45.4 46.5    271 257     CMP:   Recent Labs   Lab 06/17/25  1453 06/18/25  0451    141   K 3.5 3.8    106   CO2 20* 22*   * 122*   BUN 13 11   CREATININE 0.8 0.7   CALCIUM 9.7 8.9   PROT 7.9  --    ALBUMIN 5.0  --    BILITOT 0.6  --    ALKPHOS 100  --    AST 17  --    ALT 21  --    ANIONGAP 15 13     Coagulation:   Recent Labs   Lab 06/18/25  0451   INR 1.0   APTT 26.5     POCT Glucose:   Recent Labs   Lab 06/17/25  2103 06/18/25  0732 06/18/25  1336   POCTGLUCOSE 100 135* 128*        Significant Imaging: I have reviewed all pertinent imaging results/findings within the past 24 hours.  Imaging Results              CT Abdomen Pelvis With IV Contrast NO Oral Contrast (Final result)  Result time 06/17/25 19:27:00      Final result by Leroy Suresh MD (06/17/25 19:27:00)                   Impression:      No acute intra-abdominal abnormality.    49 x 40 x 39 mm hypodense soft tissue mass interposed between the right gluteus minimus and medius muscles.  Sarcoma is not excluded.  Recommend MRI with and without contrast.    12 mm noncalcified nodule opacity left lung base. Bibasilar atelectasis.  Follow-up chest CT in 3 months.      Electronically signed by: Leroy Suresh  Date:    06/17/2025  Time:    19:27               Narrative:    EXAMINATION:  CT ABDOMEN PELVIS WITH IV CONTRAST    CLINICAL HISTORY:  Abdominal pain, acute, nonlocalized;    TECHNIQUE:  Low dose axial images, sagittal and coronal reformations were obtained from the lung bases to the pubic symphysis following the IV administration of 100 mL of Omnipaque 350 .  Oral contrast was not given.    COMPARISON:  None.    FINDINGS:  Soft tissues: 49 x 40 x 39 mm hypodense soft tissue mass interposed between the right gluteus minimus and medius muscles.    Bones: No acute osseous abnormality.    Lower chest: 12 mm noncalcified nodule opacity left lung base.  Bibasilar atelectasis.    Lung Bases: Well aerated, without consolidation or pleural fluid.    Liver: Normal in size and attenuation, with no focal hepatic lesions.    Gallbladder: Distended.  Cholelithiasis.    Bile Ducts: No evidence of dilated ducts.    Pancreas: No mass or peripancreatic fat stranding.    Spleen: Unremarkable.    Adrenals: Unremarkable.    Kidneys/ Ureters: Small left renal cysts and additional subcentimeter hypodense lesions in each kidney which are too small to definitely characterize.    Bladder: Mild diffuse circumferential bladder wall  thickening.  Correlate with with urinalysis.    Reproductive organs: Enlarged and heterogeneous containing calcifications. Correlate with PSA.    GI Tract/Mesentery: No evidence of bowel obstruction or inflammation.    Peritoneal Space: No ascites. No free air.    Lymphadenopathy: No significant adenopathy.    Vasculature: Multifocal atherosclerosis.                                       US Abdomen Limited (Final result)  Result time 06/17/25 16:32:14      Final result by Eric Wade MD (06/17/25 16:32:14)                   Impression:      Cholelithiasis.    Hepatic steatosis.      Electronically signed by: Eric Wade  Date:    06/17/2025  Time:    16:32               Narrative:    EXAMINATION:  US ABDOMEN LIMITED    CLINICAL HISTORY:  ruq pain;    TECHNIQUE:  Grayscale and color Doppler evaluation of the abdomen was performed.    COMPARISON:  None    FINDINGS:  Pancreas was not visualized due to overlying bowel gas.  Aorta not well visualized.  Visualized IVC is unremarkable.    The liver measures 14.7 cm in length.  Increased hepatic parenchymal echogenicity consistent with steatosis.  No focal hepatic lesion.  Main portal vein is patent with hepatopetal flow.  Cholelithiasis.  Technologist notes indicate tenderness in the gallbladder region.  No gallbladder wall thickening or pericholecystic fluid.  Normal caliber common bile duct measuring 5 mm.    The right kidney measures 12.5 cm in length and has a normal sonographic appearance.                                       X-Ray Chest AP Portable (Final result)  Result time 06/17/25 15:24:57      Final result by Alexandre Beltran DO (06/17/25 15:24:57)                   Impression:      No acute cardiopulmonary abnormality.      Electronically signed by: Alexandre Beltran  Date:    06/17/2025  Time:    15:24               Narrative:    EXAMINATION:  XR CHEST AP PORTABLE    CLINICAL HISTORY:  Epigastric pain    FINDINGS:  Portable chest with comparison chest  x-ray 02/08/2025.  Normal cardiomediastinal silhouette.Lungs are clear. Pulmonary vasculature is normal. No acute osseous abnormality.                                         Assessment & Plan  GI bleed  Patient's hemorrhage is due to gastrointestinal bleed, Patients most recent Hgb, Hct, platelets, and INR are listed below.  Recent Labs     06/17/25  1453 06/18/25  0451 06/18/25  0759   HGB 15.2 14.6 15.0   HCT 46.4 45.4 46.5    271 257   INR 1.0 1.0  --      Plan  - Will trend hemoglobin/hematocrit Every 8 hours  - Will monitor and correct any coagulation defects  - Will transfuse if Hgb is <7g/dl (<8g/dl in cases of active ACS) or if patient has rapid bleeding leading to hemodynamic instability  - On GI bleed pathway  - Pantoprazole gtt transitioned to daily  - GI consulted, appreciate recs  - EGD revealed linear grade a erosive esophagitis and mild gastropathy but no slava bleeding.  -Protonix 40 mg daily for 3 months   - Holding VTE prophylaxis as well as Brilinta and aspirin, resume once appropriate  Cholelithiasis  - Presented to the ED with chief complaint of right upper quadrant pain that radiates to midsternal chest  - Right upper quadrant ultrasound with cholelithiasis and hepatic steatosis  - ED provider discussed case with Dr. Lerner who recommended HIDA scan,  - PRN analgesia  - Symptomatic care  - Holding VTE prophylaxis as well as home aspirin and Plavix, resume once appropriate  -HIDA positive   -CLD  -NPO p midnight  -Lap anirudh when cleared by cards   CAD (coronary artery disease) status post PCI  Patient with known CAD s/p stent placement, which is controlled Will continue Statin and monitor for S/Sx of angina/ACS. Continue to monitor on telemetry.     - Holding home Brilinta and aspirin in setting of possible GI bleed, resume once appropriate  Essential hypertension  Chronic, controlled.  Latest blood pressure and vitals reviewed-     Temp:  [98.6 °F (37 °C)-99.9 °F (37.7 °C)]  "  Pulse:  []   Resp:  [16-28]   BP: (137-168)/(68-90)   SpO2:  [92 %-100 %] .   Home meds for hypertension were reviewed and noted below-  Hypertension Medications              amLODIPine (NORVASC) 5 MG tablet Take 1 tablet (5 mg total) by mouth once daily.    losartan (COZAAR) 50 MG Tab Take 1 tablet (50 mg total) by mouth once daily.    metoprolol succinate (TOPROL-XL) 25 MG 24 hr tablet Take 1 tablet (25 mg total) by mouth once daily.            While in the hospital, will manage blood pressure as follows; Continue home antihypertensive regimen    Will utilize p.r.n. blood pressure medication only if patient's blood pressure greater than 180/110 and he develops symptoms such as worsening chest pain or shortness of breath.    Sleep apnea  - Not using CPAP    DMII (diabetes mellitus, type 2)  Patient's FSGs are controlled on current medication regimen.  Last A1c reviewed-   Lab Results   Component Value Date    HGBA1C 5.8 02/08/2025     Most recent fingerstick glucose reviewed-   Recent Labs   Lab 06/17/25  2103 06/18/25  0732 06/18/25  1336   POCTGLUCOSE 100 135* 128*     Current correctional scale  Medium  Maintain anti-hyperglycemic dose as follows-   Antihyperglycemics (From admission, onward)      Start     Stop Route Frequency Ordered    06/18/25 0412  insulin aspart U-100 pen 0-5 Units         -- SubQ Every 6 hours PRN 06/18/25 0312          Hold Oral hypoglycemics while patient is in the hospital.  Chronic systolic CHF (congestive heart failure)  Patient has Systolic (HFrEF) heart failure that is Chronic. On presentation their CHF was well compensated. Most recent BNP and echo results are listed below.  No results for input(s): "BNP" in the last 72 hours.  Latest ECHO  Results for orders placed during the hospital encounter of 02/07/25    Echo    Interpretation Summary    Left Ventricle: The left ventricle is normal in size. Normal wall thickness. There is concentric remodeling. Moderate hypokinesis " of the inferior and the remaining walls appear to move fairly well There is mildly reduced systolic function with a visually estimated ejection fraction of 45 - 50%. There is normal diastolic function.    Right Ventricle: Normal right ventricular cavity size. Wall thickness is normal. Systolic function is normal.    Aortic Valve: The aortic valve is structurally normal.    Tricuspid Valve: The tricuspid valve is structurally normal.    Pulmonary Artery: The estimated pulmonary artery systolic pressure is 22 mmHg.    IVC/SVC: Intermediate venous pressure at 8 mmHg.    Current Heart Failure Medications  losartan tablet 25 mg, Daily, Oral  metoprolol succinate (TOPROL-XL) 24 hr tablet 25 mg, Daily, Oral    Plan  - Monitor strict I&Os and daily weights.    - Place on telemetry  - Low sodium diet  - Place on fluid restriction of 1.5 L.   - Cardiology has not been consulted  - The patient's volume status is at their baseline  - Resume home metoprolol and losartan  NSVT (nonsustained ventricular tachycardia)  - Hx noted    Nicotine dependence  Dangers of smokeless tobacco were reviewed with patient in detail. Patient was Counseled for 3-10 minutes. Nicotine replacement options were discussed. Nicotine replacement was discussed- prescribed  VTE Risk Mitigation (From admission, onward)           Ordered     IP VTE HIGH RISK PATIENT  Once         06/17/25 2034     Place sequential compression device  Until discontinued         06/17/25 2034     Reason for No Pharmacological VTE Prophylaxis  Once        Question Answer Comment   Reasons: Active Bleeding    Reasons: Risk of Bleeding        06/17/25 2034                    Discharge Planning   BARRON: 6/19/2025     Code Status: Full Code   Medical Readiness for Discharge Date:   Discharge Plan A: Home with family                        Kerline Spring NP  Department of Hospital Medicine   [facility]

## 2025-06-18 NOTE — PLAN OF CARE
[facility]  Initial Discharge Assessment       Primary Care Provider: Katt Fuentes NP    Admission Diagnosis: GI bleed [K92.2]    Admission Date: 6/17/2025  Expected Discharge Date:     SW met with patient bedside. Ellie, significant other, was present in the room assisting with assessment. SW verified demographics, insurance, supports, and PCP.  Patient reported he lives in the home with significant other and daughter. Patient reported either he will drive or significant other will bring him to appointments. SW assessed patient's needs. Patient is able to complete ADLs independently. Patient verified at home DME: none.  Patient verified No HH, No Dialysis, and No Oxygen. Patient verified he takes Brilinta for blood thinner.    Patient verified pharmacy of choice: [facility] on [address]  Patient confirmed his significant other, Ellie, will be source of transportation at the time of discharge.     Transition of Care Barriers: None    Payor: MEDICAID / Plan: [facility] COMMUNITY PLAN [facility] ([place] MEDICAID) / Product Type: Managed Medicaid /     Extended Emergency Contact Information  Primary Emergency Contact: ellie cage  Mobile Phone: [phone]  Relation: Significant other   needed? No  Secondary Emergency Contact: Karla Montalvo  Mobile Phone: [phone]  Relation: Daughter    Discharge Plan A: Home with family  Discharge Plan B: Home with family      [id number] - [place] - [address] [facility] [address]  [address] [address]  Phone: [phone] Fax: [phone]      Initial Assessment (most recent)       Adult Discharge Assessment - 06/18/25 1102          Discharge Assessment    Assessment Type Discharge Planning Assessment     Confirmed/corrected address, phone number and insurance Yes     Confirmed Demographics Correct on Facesheet     Source of Information patient     Communicated BARRON with  patient/caregiver Date not available/Unable to determine     People in Home significant other;child(estefania), adult     Do you expect to return to your current living situation? Yes     Do you have help at home or someone to help you manage your care at home? No     Prior to hospitilization cognitive status: Unable to Assess     Current cognitive status: Alert/Oriented     Walking or Climbing Stairs Difficulty no     Dressing/Bathing Difficulty no     Home Accessibility wheelchair accessible     Home Layout Able to live on 1st floor     Equipment Currently Used at Home none     Readmission within 30 days? No     Patient currently being followed by outpatient case management? No     Do you currently have service(s) that help you manage your care at home? No     Do you take prescription medications? Yes     Do you have prescription coverage? Yes     Do you have any problems affording any of your prescribed medications? No     Is the patient taking medications as prescribed? yes     Who is going to help you get home at discharge? significant other     How do you get to doctors appointments? car, drives self;family or friend will provide     Are you on dialysis? No     Do you take coumadin? No   Brilinta    Discharge Plan A Home with family     Discharge Plan B Home with family     DME Needed Upon Discharge  none     Discharge Plan discussed with: Patient     Transition of Care Barriers None

## 2025-06-18 NOTE — ASSESSMENT & PLAN NOTE
Patient's hemorrhage is due to gastrointestinal bleed, Patients most recent Hgb, Hct, platelets, and INR are listed below.  Recent Labs     06/17/25  1453 06/18/25  0451 06/18/25  0759   HGB 15.2 14.6 15.0   HCT 46.4 45.4 46.5    271 257   INR 1.0 1.0  --      Plan  - Will trend hemoglobin/hematocrit Every 8 hours  - Will monitor and correct any coagulation defects  - Will transfuse if Hgb is <7g/dl (<8g/dl in cases of active ACS) or if patient has rapid bleeding leading to hemodynamic instability  - On GI bleed pathway  - Pantoprazole gtt transitioned to daily  - GI consulted, appreciate recs  - EGD revealed linear grade a erosive esophagitis and mild gastropathy but no slava bleeding.  -Protonix 40 mg daily for 3 months   - Holding VTE prophylaxis as well as Brilinta and aspirin, resume once appropriate

## 2025-06-18 NOTE — PLAN OF CARE
Problem: Gastrointestinal Bleeding  Goal: Optimal Coping with Acute Illness  Outcome: Progressing  Goal: Hemostasis  Outcome: Progressing     Problem: Adult Inpatient Plan of Care  Goal: Plan of Care Review  Outcome: Progressing  Goal: Patient-Specific Goal (Individualized)  Outcome: Progressing  Goal: Absence of Hospital-Acquired Illness or Injury  Outcome: Progressing  Goal: Optimal Comfort and Wellbeing  Outcome: Progressing  Goal: Readiness for Transition of Care  Outcome: Progressing     Problem: Diabetes Comorbidity  Goal: Blood Glucose Level Within Targeted Range  Outcome: Progressing

## 2025-06-18 NOTE — ASSESSMENT & PLAN NOTE
Dangers of smokeless tobacco were reviewed with patient in detail. Patient was Counseled for 3-10 minutes. Nicotine replacement options were discussed. Nicotine replacement was discussed- prescribed

## 2025-06-18 NOTE — PROGRESS NOTES
Pharmacist Dose Adjustment Note    Sanjeev Britt is a 59 y.o. male being treated with Zosyn.     Patient Data:    Vital Signs (Most Recent):  Temp: 98.8 °F (37.1 °C) (06/17/25 1431)  Pulse: 90 (06/18/25 0100)  Resp: 20 (06/18/25 0222)  BP: (!) 158/77 (06/18/25 0100)  SpO2: 95 % (06/18/25 0100) Vital Signs (72h Range):  Temp:  [98.8 °F (37.1 °C)]   Pulse:  []   Resp:  [16-20]   BP: (149-168)/(70-88)   SpO2:  [93 %-100 %]      Recent Labs   Lab 06/17/25  1453   CREATININE 0.8     Serum creatinine: 0.8 mg/dL 06/17/25 1453  Estimated creatinine clearance: 87.5 mL/min    Zosyn 3.375 g every 6 hours will be changed to Zosyn 4.5 g every 8 hours per pharmacy protocol.     Pharmacist's Name: Pricila Irene  Pharmacist's Extension: [phone]

## 2025-06-19 PROBLEM — D36.10 SCHWANNOMA: Status: ACTIVE | Noted: 2025-06-19

## 2025-06-19 LAB
ABSOLUTE EOSINOPHIL (SMH): 0 K/UL
ABSOLUTE MONOCYTE (SMH): 2.12 K/UL (ref 0.3–1)
ABSOLUTE NEUTROPHIL COUNT (SMH): 16.8 K/UL (ref 1.8–7.7)
ANION GAP (SMH): 10 MMOL/L (ref 8–16)
BASOPHILS # BLD AUTO: 0.05 K/UL
BASOPHILS NFR BLD AUTO: 0.3 %
BUN SERPL-MCNC: 14 MG/DL (ref 8–23)
CALCIUM SERPL-MCNC: 9.5 MG/DL (ref 8.7–10.5)
CHLORIDE SERPL-SCNC: 105 MMOL/L (ref 95–110)
CO2 SERPL-SCNC: 23 MMOL/L (ref 23–29)
CREAT SERPL-MCNC: 0.8 MG/DL (ref 0.5–1.4)
ERYTHROCYTE [DISTWIDTH] IN BLOOD BY AUTOMATED COUNT: 15 % (ref 11.5–14.5)
GFR SERPLBLD CREATININE-BSD FMLA CKD-EPI: >60 ML/MIN/1.73/M2
GLUCOSE SERPL-MCNC: 136 MG/DL (ref 70–110)
HCT VFR BLD AUTO: 45.4 % (ref 40–54)
HGB BLD-MCNC: 14.9 GM/DL (ref 14–18)
IMM GRANULOCYTES # BLD AUTO: 0.28 K/UL (ref 0–0.04)
IMM GRANULOCYTES NFR BLD AUTO: 1.4 % (ref 0–0.5)
LYMPHOCYTES # BLD AUTO: 0.58 K/UL (ref 1–4.8)
MCH RBC QN AUTO: 28.4 PG (ref 27–31)
MCHC RBC AUTO-ENTMCNC: 32.8 G/DL (ref 32–36)
MCV RBC AUTO: 87 FL (ref 82–98)
NUCLEATED RBC (/100WBC) (SMH): 0 /100 WBC
OHS QRS DURATION: 92 MS
OHS QTC CALCULATION: 470 MS
PLATELET # BLD AUTO: 235 K/UL (ref 150–450)
PMV BLD AUTO: 11.1 FL (ref 9.2–12.9)
POCT GLUCOSE: 126 MG/DL (ref 70–110)
POCT GLUCOSE: 126 MG/DL (ref 70–110)
POCT GLUCOSE: 145 MG/DL (ref 70–110)
POCT GLUCOSE: 167 MG/DL (ref 70–110)
POTASSIUM SERPL-SCNC: 3.8 MMOL/L (ref 3.5–5.1)
RBC # BLD AUTO: 5.24 M/UL (ref 4.6–6.2)
RELATIVE EOSINOPHIL (SMH): 0 % (ref 0–8)
RELATIVE LYMPHOCYTE (SMH): 2.9 % (ref 18–48)
RELATIVE MONOCYTE (SMH): 10.7 % (ref 4–15)
RELATIVE NEUTROPHIL (SMH): 84.7 % (ref 38–73)
SODIUM SERPL-SCNC: 138 MMOL/L (ref 136–145)
WBC # BLD AUTO: 19.83 K/UL (ref 3.9–12.7)

## 2025-06-19 PROCEDURE — 94761 N-INVAS EAR/PLS OXIMETRY MLT: CPT

## 2025-06-19 PROCEDURE — 85025 COMPLETE CBC W/AUTO DIFF WBC: CPT

## 2025-06-19 PROCEDURE — 63600175 PHARM REV CODE 636 W HCPCS: Performed by: GENERAL PRACTICE

## 2025-06-19 PROCEDURE — 36415 COLL VENOUS BLD VENIPUNCTURE: CPT

## 2025-06-19 PROCEDURE — 99900035 HC TECH TIME PER 15 MIN (STAT)

## 2025-06-19 PROCEDURE — 11000001 HC ACUTE MED/SURG PRIVATE ROOM

## 2025-06-19 PROCEDURE — 63600175 PHARM REV CODE 636 W HCPCS: Performed by: INTERNAL MEDICINE

## 2025-06-19 PROCEDURE — 25000003 PHARM REV CODE 250: Performed by: INTERNAL MEDICINE

## 2025-06-19 PROCEDURE — S4991 NICOTINE PATCH NONLEGEND: HCPCS

## 2025-06-19 PROCEDURE — 99900031 HC PATIENT EDUCATION (STAT)

## 2025-06-19 PROCEDURE — 99231 SBSQ HOSP IP/OBS SF/LOW 25: CPT | Mod: 57,,, | Performed by: SURGERY

## 2025-06-19 PROCEDURE — 63600175 PHARM REV CODE 636 W HCPCS

## 2025-06-19 PROCEDURE — 25000003 PHARM REV CODE 250

## 2025-06-19 PROCEDURE — 80048 BASIC METABOLIC PNL TOTAL CA: CPT

## 2025-06-19 RX ORDER — ENOXAPARIN SODIUM 100 MG/ML
1 INJECTION SUBCUTANEOUS EVERY 12 HOURS
Status: DISCONTINUED | OUTPATIENT
Start: 2025-06-19 | End: 2025-06-19

## 2025-06-19 RX ORDER — ENOXAPARIN SODIUM 100 MG/ML
1 INJECTION SUBCUTANEOUS EVERY 24 HOURS
Status: DISCONTINUED | OUTPATIENT
Start: 2025-06-19 | End: 2025-06-19

## 2025-06-19 RX ORDER — ENOXAPARIN SODIUM 100 MG/ML
1 INJECTION SUBCUTANEOUS ONCE
Status: COMPLETED | OUTPATIENT
Start: 2025-06-19 | End: 2025-06-19

## 2025-06-19 RX ADMIN — PANTOPRAZOLE SODIUM 40 MG: 40 INJECTION, POWDER, FOR SOLUTION INTRAVENOUS at 10:06

## 2025-06-19 RX ADMIN — HYDROCODONE BITARTRATE AND ACETAMINOPHEN 1 TABLET: 5; 325 TABLET ORAL at 08:06

## 2025-06-19 RX ADMIN — GABAPENTIN 300 MG: 300 CAPSULE ORAL at 10:06

## 2025-06-19 RX ADMIN — THERA TABS 1 TABLET: TAB at 10:06

## 2025-06-19 RX ADMIN — BUSPIRONE HYDROCHLORIDE 7.5 MG: 5 TABLET ORAL at 08:06

## 2025-06-19 RX ADMIN — LOSARTAN POTASSIUM 25 MG: 25 TABLET, FILM COATED ORAL at 10:06

## 2025-06-19 RX ADMIN — POTASSIUM BICARBONATE 50 MEQ: 977.5 TABLET, EFFERVESCENT ORAL at 10:06

## 2025-06-19 RX ADMIN — ENOXAPARIN SODIUM 70 MG: 80 INJECTION SUBCUTANEOUS at 12:06

## 2025-06-19 RX ADMIN — HYDROCODONE BITARTRATE AND ACETAMINOPHEN 1 TABLET: 5; 325 TABLET ORAL at 10:06

## 2025-06-19 RX ADMIN — BUSPIRONE HYDROCHLORIDE 7.5 MG: 5 TABLET ORAL at 10:06

## 2025-06-19 RX ADMIN — METOPROLOL SUCCINATE 25 MG: 25 TABLET, EXTENDED RELEASE ORAL at 10:06

## 2025-06-19 RX ADMIN — AMIODARONE HYDROCHLORIDE 100 MG: 100 TABLET ORAL at 10:06

## 2025-06-19 RX ADMIN — AMLODIPINE BESYLATE 10 MG: 5 TABLET ORAL at 10:06

## 2025-06-19 RX ADMIN — ATORVASTATIN CALCIUM 80 MG: 40 TABLET, FILM COATED ORAL at 10:06

## 2025-06-19 RX ADMIN — NICOTINE 1 PATCH: 21 PATCH, EXTENDED RELEASE TRANSDERMAL at 10:06

## 2025-06-19 RX ADMIN — PIPERACILLIN SODIUM AND TAZOBACTAM SODIUM 4.5 G: 4; .5 INJECTION, POWDER, LYOPHILIZED, FOR SOLUTION INTRAVENOUS at 08:06

## 2025-06-19 RX ADMIN — PIPERACILLIN SODIUM AND TAZOBACTAM SODIUM 4.5 G: 4; .5 INJECTION, POWDER, LYOPHILIZED, FOR SOLUTION INTRAVENOUS at 04:06

## 2025-06-19 RX ADMIN — PIPERACILLIN SODIUM AND TAZOBACTAM SODIUM 4.5 G: 4; .5 INJECTION, POWDER, LYOPHILIZED, FOR SOLUTION INTRAVENOUS at 12:06

## 2025-06-19 RX ADMIN — GABAPENTIN 300 MG: 300 CAPSULE ORAL at 08:06

## 2025-06-19 NOTE — ASSESSMENT & PLAN NOTE
Confirmed by MRI  Findings discussed with the patient and spouse  We will need follow up with Oncology surgery at [facility]

## 2025-06-19 NOTE — ASSESSMENT & PLAN NOTE
Patient's hemorrhage is due to gastrointestinal bleed, Patients most recent Hgb, Hct, platelets, and INR are listed below.  Recent Labs     06/17/25  1453 06/18/25  0451 06/18/25  0759 06/18/25  1625 06/19/25  0440   HGB 15.2 14.6 15.0 15.1 14.9   HCT 46.4 45.4 46.5 46.3 45.4    271 257 265 235   INR 1.0 1.0  --   --   --      Plan  - Will trend hemoglobin/hematocrit Every 8 hours  - Will monitor and correct any coagulation defects  - Will transfuse if Hgb is <7g/dl (<8g/dl in cases of active ACS) or if patient has rapid bleeding leading to hemodynamic instability  - On GI bleed pathway  - Pantoprazole gtt transitioned to daily  - GI consulted, appreciate recs  - EGD revealed linear grade a erosive esophagitis and mild gastropathy but no slava bleeding.  -Protonix 40 mg daily for 3 months   - Holding VTE prophylaxis as well as Brilinta and aspirin, resume once appropriate

## 2025-06-19 NOTE — PROGRESS NOTES
Critical access hospital Medicine  Progress Note    Patient Name: Sanjeev Britt  MRN: [id number]  Patient Class: IP- Inpatient   Admission Date: 6/17/2025  Length of Stay: 1 days  Attending Physician: Paloma Boateng MD  Primary Care Provider: Katt Fuentes NP        Subjective     Principal Problem:GI bleed        HPI:  Mr. Britt is a 59 yr old male with a hx of CAD s/p PCI, HTN, sleep apnea not on CPAP, DM type 2, SVT, bilateral PE, spontaneous pneumothorax, ED, chronic pansinusitis, deviated nasal septum, STEMI, cardiac arrest, bradycardia, heart failure with reduced EF who presents to the ED with a chief complaint of abdominal pain.  Patient endorses 10+/10 sharp right upper quadrant abdominal pain that began around 10:00 a.m. this morning and radiates to his midsternal chest.  He states that he ate spaghetti last night and began having emesis today that contained remnants of spaghetti with the last episode being around 4:00 p.m..  He also endorses he has had 3 black tarry looking stools.  He denies having any black tarry stools while in the ED and denies any other active bleeding.  He also endorses subjective fever, chills, and lightheadedness.  He has taken Mylanta, Zofran, Phenergan at home without relief.  He states he goes can of dip every 2 days, occasionally drinks alcohol, and denies recreational drug use.  He denies dyspnea, chest pain, dysuria, hematuria, dizziness, and syncope.    Upon arrival to ED, patient afebrile, HR of 69, RR of 18, BP of 151/88, satting 100% on RA.  Workup in the ED reveals CO2 of 20, glucose of 118, initial troponin of 16.2, repeat troponin of 14.6.  Remainder of labs unremarkable.  CT AP with IV contrast shows no acute intra-abdominal abnormality.  49 x 40 x 39 mm hypodense soft tissue mass interposed between the right gluteus minimus and medius muscles.  Sarcoma is not excluded.  Recommend MRI with and without contrast.  12 mm noncalcified nodule opacity  left lung base.  Bibasilar atelectasis.  Follow-up chest CT in 3 months.  Right upper quadrant ultrasound shows cholelithiasis and hepatic steatosis.  CXR shows no acute cardiopulmonary abnormality.  Patient was given hydromorphone 1 mg IV x2, morphine 4 mg IV, ondansetron 4 mg IV, and pantoprazole 80 mg IV, and initiated on pantoprazole gtt while in the ED. ED provider discussed case with General surgery Dr. Lerner recommended HIDA scan, NPO at midnight, and will see patient in AM. case discussed with ED provider and patient will be placed under observation for further management.    Overview/Hospital Course:  Sanjeev Britt 59 y.o. male was closely monitored while in the hospital.  Patient was admitted to Hospital Medicine for GI bleed.  Brilinta and aspirin were held on admission. Gastroenterology was consulted on admission and patient underwent EGD that showed linear grade a erosive esophagitis and mild gastropathy but no slava bleeding.  Protonix 40 mg daily  3 months was recommended.  Right upper quadrant ultrasound revealed cholelithiasis and hepatic steatosis.  Neurosurgery was consulted in the HIDA scan was recommended.  HIDA scan positive.  Cardiology care patient is to go for laparoscopic cholecystectomy.  Patient maintained on anticoagulation with full-dose Lovenox.  Protonix drip positioned to IV Protonix.  CTA abdomen and pelvis revealed incidental finding of 49 x 40 x 39 mm hypodense soft tissue mass interposed between the right gluteus minimus and medius muscles. Sarcoma is not excluded.  Follow up MRI showed Schwannoma.  Ambulatory referral to [place]cology surgery at [facility] was [place].    Patient is pending surgery in a.m..    Interval History:   Patient seen and examined.  NAD.  Patient is to go for lap anirudh tomorrow.  NPO at midnight.  Patient cleared by Cardiology for surgery.  Review of Systems   Constitutional:  Negative for chills and fever.   Respiratory:  Negative for shortness of breath.     Cardiovascular:  Negative for chest pain, palpitations and leg swelling.   Gastrointestinal:  Positive for abdominal pain (right upper quadrant). Negative for blood in stool, diarrhea, nausea and vomiting.        Indigestion   Neurological: Negative.    All other systems reviewed and are negative.    Objective:     Vital Signs (Most Recent):  Temp: 98.1 °F (36.7 °C) (06/19/25 1147)  Pulse: 92 (06/19/25 1147)  Resp: 16 (06/19/25 1147)  BP: (!) 141/84 (06/19/25 1147)  SpO2: 99 % (06/19/25 1147) Vital Signs (24h Range):  Temp:  [98.1 °F (36.7 °C)-99.7 °F (37.6 °C)] 98.1 °F (36.7 °C)  Pulse:  [] 92  Resp:  [15-18] 16  SpO2:  [94 %-99 %] 99 %  BP: (138-161)/(75-91) 141/84     Weight: 70.7 kg (155 lb 14.4 oz)  Body mass index is 25.16 kg/m².    Intake/Output Summary (Last 24 hours) at 6/19/2025 1339  Last data filed at 6/19/2025 1214  Gross per 24 hour   Intake 779.3 ml   Output --   Net 779.3 ml         Physical Exam  Vitals and nursing note reviewed.   Constitutional:       Appearance: Normal appearance. He is well-developed.   HENT:      Nose: Nose normal. No septal deviation.   Eyes:      Conjunctiva/sclera: Conjunctivae normal.      Pupils: Pupils are equal, round, and reactive to light.   Neck:      Thyroid: No thyroid mass.      Vascular: No JVD.      Trachea: No tracheal tenderness or tracheal deviation.   Cardiovascular:      Rate and Rhythm: Normal rate and regular rhythm.      Pulses: Normal pulses.      Heart sounds: Normal heart sounds, S1 normal and S2 normal.   Pulmonary:      Effort: Pulmonary effort is normal.      Breath sounds: Normal breath sounds. No decreased breath sounds.   Abdominal:      General: Bowel sounds are normal. There is no abdominal bruit.      Palpations: Abdomen is soft. There is no hepatomegaly or splenomegaly.      Tenderness: There is abdominal tenderness in the right upper quadrant, right lower quadrant, epigastric area and periumbilical area.      Hernia: No hernia is  present.   Musculoskeletal:      Right lower leg: No edema.      Left lower leg: No edema.   Skin:     General: Skin is warm.      Findings: No rash.   Neurological:      Mental Status: He is alert and oriented to person, place, and time.   Psychiatric:         Mood and Affect: Mood normal.         Behavior: Behavior normal.               Significant Labs: All pertinent labs within the past 24 hours have been reviewed.  Recent Lab Results  (Last 5 results in the past 24 hours)        06/19/25  1223   06/19/25  0758   06/19/25  0440   06/18/25 2002 06/18/25  1625        Anion Gap     10           Baso #     0.05     0.04       Basophil %     0.3     0.2       BUN     14           Calcium     9.5           Chloride     105           CO2     23           Creatinine     0.8           eGFR     >60           Eos #     0.00     0.00       Eos %     0.0     0.0       Glucose     136           Hematocrit     45.4     46.3       Hemoglobin     14.9     15.1       Immature Grans (Abs)     0.28  Comment: Mild elevation in immature granulocytes is non specific and can be seen in a variety of conditions including stress response, acute inflammation, trauma and pregnancy. Correlation with other laboratory and clinical findings is essential.     0.17  Comment: Mild elevation in immature granulocytes is non specific and can be seen in a variety of conditions including stress response, acute inflammation, trauma and pregnancy. Correlation with other laboratory and clinical findings is essential.       Immature Granulocytes     1.4     0.9       Lymph #     0.58     0.69       LYMPH %     2.9     3.6       MCH     28.4     28.3       MCHC     32.8     32.6       MCV     87     87       Mono #     2.12     2.13       Mono %     10.7     11.1       MPV     11.1     10.8       Neut #     16.8     16.2       Neut %     84.7     84.2       nRBC     0     0       Platelet Count     235     265       POCT Glucose 167   145     125          Potassium     3.8           RBC     5.24     5.33       RDW     15.0     15.1       Sodium     138           WBC     19.83     19.27                              Significant Imaging: I have reviewed all pertinent imaging results/findings within the past 24 hours.      Assessment & Plan  GI bleed  Patient's hemorrhage is due to gastrointestinal bleed, Patients most recent Hgb, Hct, platelets, and INR are listed below.  Recent Labs     06/17/25  1453 06/18/25  0451 06/18/25  0759 06/18/25  1625 06/19/25  0440   HGB 15.2 14.6 15.0 15.1 14.9   HCT 46.4 45.4 46.5 46.3 45.4    271 257 265 235   INR 1.0 1.0  --   --   --      Plan  - Will trend hemoglobin/hematocrit Every 8 hours  - Will monitor and correct any coagulation defects  - Will transfuse if Hgb is <7g/dl (<8g/dl in cases of active ACS) or if patient has rapid bleeding leading to hemodynamic instability  - On GI bleed pathway  - Pantoprazole gtt transitioned to daily  - GI consulted, appreciate recs  - EGD revealed linear grade a erosive esophagitis and mild gastropathy but no slava bleeding.  -Protonix 40 mg daily for 3 months   - Holding VTE prophylaxis as well as Brilinta and aspirin, resume once appropriate  Cholelithiasis  - Presented to the ED with chief complaint of right upper quadrant pain that radiates to midsternal chest  - Right upper quadrant ultrasound with cholelithiasis and hepatic steatosis  - ED provider discussed case with Dr. Lerner who recommended HIDA scan,  - PRN analgesia  - Symptomatic care  - Holding VTE prophylaxis as well as home aspirin and Plavix, resume once appropriate  -HIDA positive   -CLD  -NPO p midnight  -Lap anirudh when cleared by cards   CAD (coronary artery disease) status post PCI  Patient with known CAD s/p stent placement, which is controlled Will continue Statin and monitor for S/Sx of angina/ACS. Continue to monitor on telemetry.     - Holding home Brilinta and aspirin in setting of possible GI bleed, resume  "once appropriate  Essential hypertension  Chronic, controlled.  Latest blood pressure and vitals reviewed-     Temp:  [98.1 °F (36.7 °C)-99.7 °F (37.6 °C)]   Pulse:  []   Resp:  [15-18]   BP: (138-161)/(75-91)   SpO2:  [94 %-99 %] .   Home meds for hypertension were reviewed and noted below-  Hypertension Medications              amLODIPine (NORVASC) 5 MG tablet Take 1 tablet (5 mg total) by mouth once daily.    losartan (COZAAR) 50 MG Tab Take 1 tablet (50 mg total) by mouth once daily.    metoprolol succinate (TOPROL-XL) 25 MG 24 hr tablet Take 1 tablet (25 mg total) by mouth once daily.            While in the hospital, will manage blood pressure as follows; Continue home antihypertensive regimen    Will utilize p.r.n. blood pressure medication only if patient's blood pressure greater than 180/110 and he develops symptoms such as worsening chest pain or shortness of breath.    Sleep apnea  - Not using CPAP    DMII (diabetes mellitus, type 2)  Patient's FSGs are controlled on current medication regimen.  Last A1c reviewed-   Lab Results   Component Value Date    HGBA1C 5.8 02/08/2025     Most recent fingerstick glucose reviewed-   Recent Labs   Lab 06/18/25  1621 06/18/25 2002 06/19/25  0758 06/19/25  1223   POCTGLUCOSE 115* 125* 145* 167*     Current correctional scale  Medium  Maintain anti-hyperglycemic dose as follows-   Antihyperglycemics (From admission, onward)      Start     Stop Route Frequency Ordered    06/18/25 0412  insulin aspart U-100 pen 0-5 Units         -- SubQ Every 6 hours PRN 06/18/25 0312          Hold Oral hypoglycemics while patient is in the hospital.  Chronic systolic CHF (congestive heart failure)  Patient has Systolic (HFrEF) heart failure that is Chronic. On presentation their CHF was well compensated. Most recent BNP and echo results are listed below.  No results for input(s): "BNP" in the last 72 hours.  Latest ECHO  Results for orders placed during the hospital encounter of " 02/07/25    Echo    Interpretation Summary    Left Ventricle: The left ventricle is normal in size. Normal wall thickness. There is concentric remodeling. Moderate hypokinesis of the inferior and the remaining walls appear to move fairly well There is mildly reduced systolic function with a visually estimated ejection fraction of 45 - 50%. There is normal diastolic function.    Right Ventricle: Normal right ventricular cavity size. Wall thickness is normal. Systolic function is normal.    Aortic Valve: The aortic valve is structurally normal.    Tricuspid Valve: The tricuspid valve is structurally normal.    Pulmonary Artery: The estimated pulmonary artery systolic pressure is 22 mmHg.    IVC/SVC: Intermediate venous pressure at 8 mmHg.    Current Heart Failure Medications  losartan tablet 25 mg, Daily, Oral  metoprolol succinate (TOPROL-XL) 24 hr tablet 25 mg, Daily, Oral    Plan  - Monitor strict I&Os and daily weights.    - Place on telemetry  - Low sodium diet  - Place on fluid restriction of 1.5 L.   - Cardiology has been consulted  - The patient's volume status is at their baseline  - Resume home metoprolol and losartan  NSVT (nonsustained ventricular tachycardia)  - Hx noted    Nicotine dependence  Dangers of smokeless tobacco were reviewed with patient in detail. Patient was Counseled for 3-10 minutes. Nicotine replacement options were discussed. Nicotine replacement was discussed- prescribed  Schwannoma  Confirmed by MRI  Findings discussed with the patient and spouse  We will need follow up with Oncology surgery at [facility]    VTE Risk Mitigation (From admission, onward)           Ordered     IP VTE HIGH RISK PATIENT  Once         06/17/25 2034     Place sequential compression device  Until discontinued         06/17/25 2034     Reason for No Pharmacological VTE Prophylaxis  Once        Question Answer Comment   Reasons: Active Bleeding    Reasons: Risk of Bleeding        06/17/25 2034                     Discharge Planning   BARRON: 6/21/2025     Code Status: Full Code   Medical Readiness for Discharge Date:   Discharge Plan A: Home with family                        Kerline Spring NP  Department of Hospital Medicine   [facility]

## 2025-06-19 NOTE — HOSPITAL COURSE
Sanjeve Britt 59 y.o. male was closely monitored while in the hospital.  Patient was admitted to Hospital Medicine for GI bleed.  Brilinta and aspirin were held on admission. Gastroenterology was consulted on admission and patient underwent EGD that showed linear grade a erosive esophagitis and mild gastropathy but no slava bleeding.  Protonix 40 mg daily  3 months was recommended.  Right upper quadrant ultrasound revealed cholelithiasis and hepatic steatosis.  Neurosurgery was consulted in the HIDA scan was recommended.  HIDA scan positive.  Cardiology care patient is to go for laparoscopic cholecystectomy.  Patient maintained on anticoagulation with full-dose Lovenox.  Protonix drip positioned to IV Protonix.  CTA abdomen and pelvis revealed incidental finding of 49 x 40 x 39 mm hypodense soft tissue mass interposed between the right gluteus minimus and medius muscles. Sarcoma is not excluded.  Follow up MRI showed Schwannoma.  Ambulatory referral to Oncology surgery at [facility] [place] was placed.    Patient had a  lap anirudh on 6/20/2025 and tolerated procedure well.  Patient's incision sites are clean dry and intact patient tolerating well up and moving.  Patient was able to tolerate diet.  Patient we will be cleared for DC after BM with resumption of his aspirin and Brilinta on discharge.

## 2025-06-19 NOTE — PLAN OF CARE
Problem: Gastrointestinal Bleeding  Goal: Optimal Coping with Acute Illness  Outcome: Progressing  Goal: Hemostasis  Outcome: Progressing     Problem: Adult Inpatient Plan of Care  Goal: Plan of Care Review  Outcome: Progressing  Goal: Patient-Specific Goal (Individualized)  Outcome: Progressing  Goal: Absence of Hospital-Acquired Illness or Injury  Outcome: Progressing  Goal: Optimal Comfort and Wellbeing  Outcome: Progressing  Goal: Readiness for Transition of Care  Outcome: Progressing     Problem: Diabetes Comorbidity  Goal: Blood Glucose Level Within Targeted Range  Outcome: Progressing     Problem: Wound  Goal: Optimal Coping  Outcome: Progressing  Goal: Optimal Functional Ability  Outcome: Progressing  Goal: Absence of Infection Signs and Symptoms  Outcome: Progressing  Goal: Improved Oral Intake  Outcome: Progressing  Goal: Optimal Pain Control and Function  Outcome: Progressing  Goal: Skin Health and Integrity  Outcome: Progressing  Goal: Optimal Wound Healing  Outcome: Progressing

## 2025-06-19 NOTE — CARE UPDATE
06/18/25 2200   Patient Assessment/Suction   Level of Consciousness (AVPU) alert   Respiratory Effort Normal;Unlabored   Expansion/Accessory Muscles/Retractions no use of accessory muscles   All Lung Fields Breath Sounds equal bilaterally   PRE-TX-O2   Device (Oxygen Therapy) room air   SpO2 95 %   Pulse Oximetry Type Intermittent   $ Pulse Oximetry - Multiple Charge Pulse Oximetry - Multiple   ETCO2   ETCO2 (mmHg) 0 mmHg   Aerosol Therapy   $ Aerosol Therapy Charges PRN treatment not required   Education   $ Education Bronchodilator;15 min   Respiratory Evaluation   $ Care Plan Tech Time 15 min

## 2025-06-19 NOTE — SUBJECTIVE & OBJECTIVE
Interval History:   Patient seen and examined.  NAD.  Patient is to go for lap anirudh tomorrow.  NPO at midnight.  Patient cleared by Cardiology for surgery.  Review of Systems   Constitutional:  Negative for chills and fever.   Respiratory:  Negative for shortness of breath.    Cardiovascular:  Negative for chest pain, palpitations and leg swelling.   Gastrointestinal:  Positive for abdominal pain (right upper quadrant). Negative for blood in stool, diarrhea, nausea and vomiting.        Indigestion   Neurological: Negative.    All other systems reviewed and are negative.    Objective:     Vital Signs (Most Recent):  Temp: 98.1 °F (36.7 °C) (06/19/25 1147)  Pulse: 92 (06/19/25 1147)  Resp: 16 (06/19/25 1147)  BP: (!) 141/84 (06/19/25 1147)  SpO2: 99 % (06/19/25 1147) Vital Signs (24h Range):  Temp:  [98.1 °F (36.7 °C)-99.7 °F (37.6 °C)] 98.1 °F (36.7 °C)  Pulse:  [] 92  Resp:  [15-18] 16  SpO2:  [94 %-99 %] 99 %  BP: (138-161)/(75-91) 141/84     Weight: 70.7 kg (155 lb 14.4 oz)  Body mass index is 25.16 kg/m².    Intake/Output Summary (Last 24 hours) at 6/19/2025 1339  Last data filed at 6/19/2025 1214  Gross per 24 hour   Intake 779.3 ml   Output --   Net 779.3 ml         Physical Exam  Vitals and nursing note reviewed.   Constitutional:       Appearance: Normal appearance. He is well-developed.   HENT:      Nose: Nose normal. No septal deviation.   Eyes:      Conjunctiva/sclera: Conjunctivae normal.      Pupils: Pupils are equal, round, and reactive to light.   Neck:      Thyroid: No thyroid mass.      Vascular: No JVD.      Trachea: No tracheal tenderness or tracheal deviation.   Cardiovascular:      Rate and Rhythm: Normal rate and regular rhythm.      Pulses: Normal pulses.      Heart sounds: Normal heart sounds, S1 normal and S2 normal.   Pulmonary:      Effort: Pulmonary effort is normal.      Breath sounds: Normal breath sounds. No decreased breath sounds.   Abdominal:      General: Bowel sounds are  normal. There is no abdominal bruit.      Palpations: Abdomen is soft. There is no hepatomegaly or splenomegaly.      Tenderness: There is abdominal tenderness in the right upper quadrant, right lower quadrant, epigastric area and periumbilical area.      Hernia: No hernia is present.   Musculoskeletal:      Right lower leg: No edema.      Left lower leg: No edema.   Skin:     General: Skin is warm.      Findings: No rash.   Neurological:      Mental Status: He is alert and oriented to person, place, and time.   Psychiatric:         Mood and Affect: Mood normal.         Behavior: Behavior normal.               Significant Labs: All pertinent labs within the past 24 hours have been reviewed.  Recent Lab Results  (Last 5 results in the past 24 hours)        06/19/25  1223   06/19/25  0758   06/19/25  0440   06/18/25 2002 06/18/25  1625        Anion Gap     10           Baso #     0.05     0.04       Basophil %     0.3     0.2       BUN     14           Calcium     9.5           Chloride     105           CO2     23           Creatinine     0.8           eGFR     >60           Eos #     0.00     0.00       Eos %     0.0     0.0       Glucose     136           Hematocrit     45.4     46.3       Hemoglobin     14.9     15.1       Immature Grans (Abs)     0.28  Comment: Mild elevation in immature granulocytes is non specific and can be seen in a variety of conditions including stress response, acute inflammation, trauma and pregnancy. Correlation with other laboratory and clinical findings is essential.     0.17  Comment: Mild elevation in immature granulocytes is non specific and can be seen in a variety of conditions including stress response, acute inflammation, trauma and pregnancy. Correlation with other laboratory and clinical findings is essential.       Immature Granulocytes     1.4     0.9       Lymph #     0.58     0.69       LYMPH %     2.9     3.6       MCH     28.4     28.3       MCHC     32.8     32.6        MCV     87     87       Mono #     2.12     2.13       Mono %     10.7     11.1       MPV     11.1     10.8       Neut #     16.8     16.2       Neut %     84.7     84.2       nRBC     0     0       Platelet Count     235     265       POCT Glucose 167   145     125         Potassium     3.8           RBC     5.24     5.33       RDW     15.0     15.1       Sodium     138           WBC     19.83     19.27                              Significant Imaging: I have reviewed all pertinent imaging results/findings within the past 24 hours.

## 2025-06-19 NOTE — PLAN OF CARE
Problem: Gastrointestinal Bleeding  Goal: Optimal Coping with Acute Illness  Outcome: Ongoing  Goal: Hemostasis  Outcome: Ongoing     Problem: Adult Inpatient Plan of Care  Goal: Plan of Care Review  Outcome: Ongoing  Flowsheets (Taken 6/19/2025 1005)  Plan of Care Reviewed With:   spouse   patient  Goal: Patient-Specific Goal (Individualized)  Outcome: Ongoing  Goal: Absence of Hospital-Acquired Illness or Injury  Outcome: Ongoing  Goal: Optimal Comfort and Wellbeing  Outcome: Ongoing  Goal: Readiness for Transition of Care  Outcome: Ongoing     Problem: Diabetes Comorbidity  Goal: Blood Glucose Level Within Targeted Range  Outcome: Ongoing     Problem: Wound  Goal: Optimal Coping  Outcome: Ongoing  Goal: Optimal Functional Ability  Outcome: Ongoing  Goal: Absence of Infection Signs and Symptoms  Outcome: Ongoing  Goal: Improved Oral Intake  Outcome: Ongoing  Goal: Optimal Pain Control and Function  Outcome: Ongoing  Goal: Skin Health and Integrity  Outcome: Ongoing  Goal: Optimal Wound Healing  Outcome: Ongoing

## 2025-06-19 NOTE — ASSESSMENT & PLAN NOTE
Patient's FSGs are controlled on current medication regimen.  Last A1c reviewed-   Lab Results   Component Value Date    HGBA1C 5.8 02/08/2025     Most recent fingerstick glucose reviewed-   Recent Labs   Lab 06/18/25  1621 06/18/25 2002 06/19/25  0758 06/19/25  1223   POCTGLUCOSE 115* 125* 145* 167*     Current correctional scale  Medium  Maintain anti-hyperglycemic dose as follows-   Antihyperglycemics (From admission, onward)      Start     Stop Route Frequency Ordered    06/18/25 0412  insulin aspart U-100 pen 0-5 Units         -- SubQ Every 6 hours PRN 06/18/25 0312          Hold Oral hypoglycemics while patient is in the hospital.

## 2025-06-19 NOTE — PROGRESS NOTES
General Surgery Progress Note    Admit Date: 6/17/2025  S/P: Procedure(s) (LRB):  EGD (ESOPHAGOGASTRODUODENOSCOPY) (N/A)    Post-operative Day: 1 Day Post-Op    Hospital Day: 3    SUBJECTIVE:   HIDA scan positive for acute cholecystitis. Has been seen by Cardiology. Lorene being held on heparin.  Okay for to proceed with surgical intervention. Did undergo endoscopy.    OBJECTIVE:     Vital Signs (Most Recent)  Temp:  [98.5 °F (36.9 °C)-99.9 °F (37.7 °C)] 98.5 °F (36.9 °C)  Pulse:  [] 89  Resp:  [17-28] 17  SpO2:  [92 %-100 %] 94 %  BP: (138-165)/(68-91) 161/91    I&Os:  I/O last 3 completed shifts:  In: 483.7 [P.O.:240; I.V.:93.7; IV Piggyback:150]  Out: 1 [Urine:1]    Physical Exam:  Gen: NAD, AAOx3  HEENT: Anicteric sclera  Pulm: unlabored, symmetrical   Abd: Soft but tender to palpation in the right upper abdomen    Laboratory:  CBC:   Recent Labs   Lab 06/19/25  0440   WBC 19.83*   RBC 5.24   HGB 14.9   HCT 45.4      MCV 87   MCH 28.4   MCHC 32.8     CMP:   Recent Labs   Lab 06/17/25  1453 06/18/25  0451 06/19/25  0440   *   < > 136*   CALCIUM 9.7   < > 9.5   ALBUMIN 5.0  --   --    PROT 7.9  --   --       < > 138   K 3.5   < > 3.8   CO2 20*   < > 23      < > 105   BUN 13   < > 14   CREATININE 0.8   < > 0.8   ALKPHOS 100  --   --    ALT 21  --   --    AST 17  --   --    BILITOT 0.6  --   --     < > = values in this interval not displayed.     Labs within the past 24 hours have been reviewed.    ASSESSMENT/PLAN:     Patient Active Problem List    Diagnosis Date Noted    GI bleed 06/17/2025    Cholelithiasis 06/17/2025    Nicotine dependence 06/17/2025    Bradycardia 02/28/2025    History of coronary angioplasty with insertion of stent 02/28/2025    Chronic systolic CHF (congestive heart failure) 02/28/2025    NSVT (nonsustained ventricular tachycardia) 02/28/2025    STEMI (ST elevation myocardial infarction) 02/08/2025    DMII (diabetes mellitus, type 2) 02/08/2025    Cardiac  arrest 02/08/2025    Chronic pansinusitis 06/07/2024    Hypertrophy of nasal turbinates 06/07/2024    Deviated nasal septum 06/07/2024    Nasal obstruction 06/07/2024    Erectile dysfunction 09/27/2023    BMI 28.0-28.9,adult 04/10/2023    Sleep apnea 04/10/2023    Acute hypoxemic respiratory failure 08/15/2021    Bilateral pulmonary embolism 08/15/2021    Transaminitis 08/15/2021    History of spontaneous pneumothorax 08/15/2021    CAD (coronary artery disease) status post PCI 08/15/2021    Essential hypertension 08/15/2021    Chest pain 09/10/2019         59 y.o. male with the acute cholecystitis, recent MI status post stents on long-term anticoagulation, anemia  -HIDA scan confirmed cholecystitis therefore I believe the patient has benefit from cholecystectomy   -deemed a reasonable risk to proceed with a general anesthesia   -continue to hold Brilinta but okay for heparin   -clear liquids today, NPO at midnight

## 2025-06-19 NOTE — ASSESSMENT & PLAN NOTE
Patient with known CAD s/p stent placement, which is controlled Will continue Statin and monitor for S/Sx of angina/ACS. Continue to monitor on telemetry.     - Holding home Brilinta and aspirin in setting of possible GI bleed, resume once appropriate   patient/staff

## 2025-06-19 NOTE — ASSESSMENT & PLAN NOTE
Chronic, controlled.  Latest blood pressure and vitals reviewed-     Temp:  [98.1 °F (36.7 °C)-99.7 °F (37.6 °C)]   Pulse:  []   Resp:  [15-18]   BP: (138-161)/(75-91)   SpO2:  [94 %-99 %] .   Home meds for hypertension were reviewed and noted below-  Hypertension Medications              amLODIPine (NORVASC) 5 MG tablet Take 1 tablet (5 mg total) by mouth once daily.    losartan (COZAAR) 50 MG Tab Take 1 tablet (50 mg total) by mouth once daily.    metoprolol succinate (TOPROL-XL) 25 MG 24 hr tablet Take 1 tablet (25 mg total) by mouth once daily.            While in the hospital, will manage blood pressure as follows; Continue home antihypertensive regimen    Will utilize p.r.n. blood pressure medication only if patient's blood pressure greater than 180/110 and he develops symptoms such as worsening chest pain or shortness of breath.

## 2025-06-19 NOTE — ASSESSMENT & PLAN NOTE
"Patient has Systolic (HFrEF) heart failure that is Chronic. On presentation their CHF was well compensated. Most recent BNP and echo results are listed below.  No results for input(s): "BNP" in the last 72 hours.  Latest ECHO  Results for orders placed during the hospital encounter of 02/07/25    Echo    Interpretation Summary    Left Ventricle: The left ventricle is normal in size. Normal wall thickness. There is concentric remodeling. Moderate hypokinesis of the inferior and the remaining walls appear to move fairly well There is mildly reduced systolic function with a visually estimated ejection fraction of 45 - 50%. There is normal diastolic function.    Right Ventricle: Normal right ventricular cavity size. Wall thickness is normal. Systolic function is normal.    Aortic Valve: The aortic valve is structurally normal.    Tricuspid Valve: The tricuspid valve is structurally normal.    Pulmonary Artery: The estimated pulmonary artery systolic pressure is 22 mmHg.    IVC/SVC: Intermediate venous pressure at 8 mmHg.    Current Heart Failure Medications  losartan tablet 25 mg, Daily, Oral  metoprolol succinate (TOPROL-XL) 24 hr tablet 25 mg, Daily, Oral    Plan  - Monitor strict I&Os and daily weights.    - Place on telemetry  - Low sodium diet  - Place on fluid restriction of 1.5 L.   - Cardiology has been consulted  - The patient's volume status is at their baseline  - Resume home metoprolol and losartan  "

## 2025-06-20 ENCOUNTER — ANESTHESIA (OUTPATIENT)
Dept: SURGERY | Facility: HOSPITAL | Age: 60
End: 2025-06-20
Payer: MEDICAID

## 2025-06-20 ENCOUNTER — ANESTHESIA EVENT (OUTPATIENT)
Dept: SURGERY | Facility: HOSPITAL | Age: 60
End: 2025-06-20
Payer: MEDICAID

## 2025-06-20 DIAGNOSIS — I50.21 ACUTE HFREF (HEART FAILURE WITH REDUCED EJECTION FRACTION): ICD-10-CM

## 2025-06-20 LAB
ABSOLUTE EOSINOPHIL (SMH): 0.03 K/UL
ABSOLUTE MONOCYTE (SMH): 1.71 K/UL (ref 0.3–1)
ABSOLUTE NEUTROPHIL COUNT (SMH): 13.3 K/UL (ref 1.8–7.7)
ANION GAP (SMH): 10 MMOL/L (ref 8–16)
BASOPHILS # BLD AUTO: 0.03 K/UL
BASOPHILS NFR BLD AUTO: 0.2 %
BUN SERPL-MCNC: 15 MG/DL (ref 8–23)
CALCIUM SERPL-MCNC: 9.2 MG/DL (ref 8.7–10.5)
CHLORIDE SERPL-SCNC: 100 MMOL/L (ref 95–110)
CO2 SERPL-SCNC: 27 MMOL/L (ref 23–29)
CREAT SERPL-MCNC: 0.9 MG/DL (ref 0.5–1.4)
ERYTHROCYTE [DISTWIDTH] IN BLOOD BY AUTOMATED COUNT: 15.1 % (ref 11.5–14.5)
GFR SERPLBLD CREATININE-BSD FMLA CKD-EPI: >60 ML/MIN/1.73/M2
GLUCOSE SERPL-MCNC: 102 MG/DL (ref 70–110)
HCT VFR BLD AUTO: 43.5 % (ref 40–54)
HGB BLD-MCNC: 14 GM/DL (ref 14–18)
IMM GRANULOCYTES # BLD AUTO: 0.21 K/UL (ref 0–0.04)
IMM GRANULOCYTES NFR BLD AUTO: 1.3 % (ref 0–0.5)
LYMPHOCYTES # BLD AUTO: 1.01 K/UL (ref 1–4.8)
MCH RBC QN AUTO: 27.9 PG (ref 27–31)
MCHC RBC AUTO-ENTMCNC: 32.2 G/DL (ref 32–36)
MCV RBC AUTO: 87 FL (ref 82–98)
NUCLEATED RBC (/100WBC) (SMH): 0 /100 WBC
PLATELET # BLD AUTO: 199 K/UL (ref 150–450)
PMV BLD AUTO: 11.5 FL (ref 9.2–12.9)
POCT GLUCOSE: 115 MG/DL (ref 70–110)
POCT GLUCOSE: 129 MG/DL (ref 70–110)
POCT GLUCOSE: 134 MG/DL (ref 70–110)
POCT GLUCOSE: 96 MG/DL (ref 70–110)
POTASSIUM SERPL-SCNC: 3.8 MMOL/L (ref 3.5–5.1)
RBC # BLD AUTO: 5.01 M/UL (ref 4.6–6.2)
RELATIVE EOSINOPHIL (SMH): 0.2 % (ref 0–8)
RELATIVE LYMPHOCYTE (SMH): 6.2 % (ref 18–48)
RELATIVE MONOCYTE (SMH): 10.5 % (ref 4–15)
RELATIVE NEUTROPHIL (SMH): 81.6 % (ref 38–73)
SODIUM SERPL-SCNC: 137 MMOL/L (ref 136–145)
WBC # BLD AUTO: 16.3 K/UL (ref 3.9–12.7)

## 2025-06-20 PROCEDURE — 63600175 PHARM REV CODE 636 W HCPCS: Performed by: NURSE ANESTHETIST, CERTIFIED REGISTERED

## 2025-06-20 PROCEDURE — 85025 COMPLETE CBC W/AUTO DIFF WBC: CPT

## 2025-06-20 PROCEDURE — 36000708 HC OR TIME LEV III 1ST 15 MIN: Performed by: SURGERY

## 2025-06-20 PROCEDURE — 63600175 PHARM REV CODE 636 W HCPCS: Performed by: INTERNAL MEDICINE

## 2025-06-20 PROCEDURE — 36415 COLL VENOUS BLD VENIPUNCTURE: CPT

## 2025-06-20 PROCEDURE — 47562 LAPAROSCOPIC CHOLECYSTECTOMY: CPT | Mod: ,,, | Performed by: SURGERY

## 2025-06-20 PROCEDURE — 27201423 OPTIME MED/SURG SUP & DEVICES STERILE SUPPLY: Performed by: SURGERY

## 2025-06-20 PROCEDURE — 37000008 HC ANESTHESIA 1ST 15 MINUTES: Performed by: SURGERY

## 2025-06-20 PROCEDURE — 99232 SBSQ HOSP IP/OBS MODERATE 35: CPT | Mod: ,,, | Performed by: GENERAL PRACTICE

## 2025-06-20 PROCEDURE — 25000003 PHARM REV CODE 250: Performed by: INTERNAL MEDICINE

## 2025-06-20 PROCEDURE — 94761 N-INVAS EAR/PLS OXIMETRY MLT: CPT

## 2025-06-20 PROCEDURE — 37000009 HC ANESTHESIA EA ADD 15 MINS: Performed by: SURGERY

## 2025-06-20 PROCEDURE — 25000003 PHARM REV CODE 250

## 2025-06-20 PROCEDURE — 25000003 PHARM REV CODE 250: Performed by: ANESTHESIOLOGY

## 2025-06-20 PROCEDURE — 99900031 HC PATIENT EDUCATION (STAT)

## 2025-06-20 PROCEDURE — 25000003 PHARM REV CODE 250: Performed by: NURSE ANESTHETIST, CERTIFIED REGISTERED

## 2025-06-20 PROCEDURE — 71000033 HC RECOVERY, INTIAL HOUR: Performed by: SURGERY

## 2025-06-20 PROCEDURE — 82310 ASSAY OF CALCIUM: CPT

## 2025-06-20 PROCEDURE — 36000709 HC OR TIME LEV III EA ADD 15 MIN: Performed by: SURGERY

## 2025-06-20 PROCEDURE — 63600175 PHARM REV CODE 636 W HCPCS: Mod: JZ | Performed by: ANESTHESIOLOGY

## 2025-06-20 PROCEDURE — 99900035 HC TECH TIME PER 15 MIN (STAT)

## 2025-06-20 PROCEDURE — 11000001 HC ACUTE MED/SURG PRIVATE ROOM

## 2025-06-20 RX ORDER — FENTANYL CITRATE 50 UG/ML
INJECTION, SOLUTION INTRAMUSCULAR; INTRAVENOUS
Status: DISCONTINUED | OUTPATIENT
Start: 2025-06-20 | End: 2025-06-20

## 2025-06-20 RX ORDER — DIPHENHYDRAMINE HYDROCHLORIDE 50 MG/ML
6.25 INJECTION, SOLUTION INTRAMUSCULAR; INTRAVENOUS ONCE AS NEEDED
Status: DISCONTINUED | OUTPATIENT
Start: 2025-06-20 | End: 2025-06-20 | Stop reason: HOSPADM

## 2025-06-20 RX ORDER — LIDOCAINE HYDROCHLORIDE 20 MG/ML
JELLY TOPICAL
Status: DISCONTINUED | OUTPATIENT
Start: 2025-06-20 | End: 2025-06-20

## 2025-06-20 RX ORDER — HYDROMORPHONE HYDROCHLORIDE 1 MG/ML
0.2 INJECTION, SOLUTION INTRAMUSCULAR; INTRAVENOUS; SUBCUTANEOUS EVERY 5 MIN PRN
Status: DISCONTINUED | OUTPATIENT
Start: 2025-06-20 | End: 2025-06-20 | Stop reason: HOSPADM

## 2025-06-20 RX ORDER — MIDAZOLAM HYDROCHLORIDE 1 MG/ML
INJECTION INTRAMUSCULAR; INTRAVENOUS
Status: DISCONTINUED | OUTPATIENT
Start: 2025-06-20 | End: 2025-06-20

## 2025-06-20 RX ORDER — OXYCODONE HYDROCHLORIDE 5 MG/1
5 TABLET ORAL
Status: DISCONTINUED | OUTPATIENT
Start: 2025-06-20 | End: 2025-06-20 | Stop reason: HOSPADM

## 2025-06-20 RX ORDER — ONDANSETRON HYDROCHLORIDE 2 MG/ML
4 INJECTION, SOLUTION INTRAVENOUS DAILY PRN
Status: DISCONTINUED | OUTPATIENT
Start: 2025-06-20 | End: 2025-06-20 | Stop reason: HOSPADM

## 2025-06-20 RX ORDER — FAMOTIDINE 10 MG/ML
INJECTION, SOLUTION INTRAVENOUS
Status: DISCONTINUED | OUTPATIENT
Start: 2025-06-20 | End: 2025-06-20

## 2025-06-20 RX ORDER — SUCCINYLCHOLINE CHLORIDE 20 MG/ML
INJECTION INTRAMUSCULAR; INTRAVENOUS
Status: DISCONTINUED | OUTPATIENT
Start: 2025-06-20 | End: 2025-06-20

## 2025-06-20 RX ORDER — PROPOFOL 10 MG/ML
VIAL (ML) INTRAVENOUS
Status: DISCONTINUED | OUTPATIENT
Start: 2025-06-20 | End: 2025-06-20

## 2025-06-20 RX ORDER — EPHEDRINE SULFATE 50 MG/ML
INJECTION, SOLUTION INTRAVENOUS
Status: DISCONTINUED | OUTPATIENT
Start: 2025-06-20 | End: 2025-06-20

## 2025-06-20 RX ORDER — ROCURONIUM BROMIDE 10 MG/ML
INJECTION, SOLUTION INTRAVENOUS
Status: DISCONTINUED | OUTPATIENT
Start: 2025-06-20 | End: 2025-06-20

## 2025-06-20 RX ORDER — ONDANSETRON HYDROCHLORIDE 2 MG/ML
INJECTION, SOLUTION INTRAVENOUS
Status: DISCONTINUED | OUTPATIENT
Start: 2025-06-20 | End: 2025-06-20

## 2025-06-20 RX ORDER — LIDOCAINE HYDROCHLORIDE 20 MG/ML
INJECTION INTRAVENOUS
Status: DISCONTINUED | OUTPATIENT
Start: 2025-06-20 | End: 2025-06-20

## 2025-06-20 RX ORDER — SODIUM CHLORIDE, SODIUM LACTATE, POTASSIUM CHLORIDE, CALCIUM CHLORIDE 600; 310; 30; 20 MG/100ML; MG/100ML; MG/100ML; MG/100ML
INJECTION, SOLUTION INTRAVENOUS CONTINUOUS PRN
Status: DISCONTINUED | OUTPATIENT
Start: 2025-06-20 | End: 2025-06-20

## 2025-06-20 RX ORDER — GLUCAGON 1 MG
1 KIT INJECTION
Status: DISCONTINUED | OUTPATIENT
Start: 2025-06-20 | End: 2025-06-20 | Stop reason: HOSPADM

## 2025-06-20 RX ADMIN — PIPERACILLIN SODIUM AND TAZOBACTAM SODIUM 4.5 G: 4; .5 INJECTION, POWDER, LYOPHILIZED, FOR SOLUTION INTRAVENOUS at 03:06

## 2025-06-20 RX ADMIN — ROCURONIUM BROMIDE 10 MG: 10 INJECTION, SOLUTION INTRAVENOUS at 01:06

## 2025-06-20 RX ADMIN — PIPERACILLIN SODIUM AND TAZOBACTAM SODIUM 4.5 G: 4; .5 INJECTION, POWDER, LYOPHILIZED, FOR SOLUTION INTRAVENOUS at 01:06

## 2025-06-20 RX ADMIN — PIPERACILLIN SODIUM AND TAZOBACTAM SODIUM 4.5 G: 4; .5 INJECTION, POWDER, LYOPHILIZED, FOR SOLUTION INTRAVENOUS at 08:06

## 2025-06-20 RX ADMIN — MIDAZOLAM HYDROCHLORIDE 2 MG: 1 INJECTION, SOLUTION INTRAMUSCULAR; INTRAVENOUS at 01:06

## 2025-06-20 RX ADMIN — FAMOTIDINE 20 MG: 10 INJECTION, SOLUTION INTRAVENOUS at 01:06

## 2025-06-20 RX ADMIN — LIDOCAINE HYDROCHLORIDE 75 MG: 20 INJECTION, SOLUTION INTRAVENOUS at 01:06

## 2025-06-20 RX ADMIN — PROPOFOL 140 MG: 10 INJECTION, EMULSION INTRAVENOUS at 01:06

## 2025-06-20 RX ADMIN — HYDROCODONE BITARTRATE AND ACETAMINOPHEN 1 TABLET: 5; 325 TABLET ORAL at 07:06

## 2025-06-20 RX ADMIN — HYDROCODONE BITARTRATE AND ACETAMINOPHEN 1 TABLET: 5; 325 TABLET ORAL at 12:06

## 2025-06-20 RX ADMIN — HYDROCODONE BITARTRATE AND ACETAMINOPHEN 1 TABLET: 5; 325 TABLET ORAL at 08:06

## 2025-06-20 RX ADMIN — OXYCODONE HYDROCHLORIDE 5 MG: 5 TABLET ORAL at 03:06

## 2025-06-20 RX ADMIN — EPHEDRINE SULFATE 15 MG: 50 INJECTION INTRAVENOUS at 02:06

## 2025-06-20 RX ADMIN — HYDROMORPHONE HYDROCHLORIDE 0.2 MG: 1 INJECTION, SOLUTION INTRAMUSCULAR; INTRAVENOUS; SUBCUTANEOUS at 03:06

## 2025-06-20 RX ADMIN — SUGAMMADEX 200 MG: 100 INJECTION, SOLUTION INTRAVENOUS at 02:06

## 2025-06-20 RX ADMIN — LIDOCAINE HYDROCHLORIDE 3 ML: 20 JELLY TOPICAL at 01:06

## 2025-06-20 RX ADMIN — GABAPENTIN 300 MG: 300 CAPSULE ORAL at 08:06

## 2025-06-20 RX ADMIN — SODIUM CHLORIDE, SODIUM LACTATE, POTASSIUM CHLORIDE, AND CALCIUM CHLORIDE: .6; .31; .03; .02 INJECTION, SOLUTION INTRAVENOUS at 01:06

## 2025-06-20 RX ADMIN — ONDANSETRON 4 MG: 2 INJECTION INTRAMUSCULAR; INTRAVENOUS at 01:06

## 2025-06-20 RX ADMIN — BUSPIRONE HYDROCHLORIDE 7.5 MG: 5 TABLET ORAL at 08:06

## 2025-06-20 RX ADMIN — SODIUM CHLORIDE, SODIUM LACTATE, POTASSIUM CHLORIDE, AND CALCIUM CHLORIDE: .6; .31; .03; .02 INJECTION, SOLUTION INTRAVENOUS at 02:06

## 2025-06-20 RX ADMIN — Medication 140 MG: at 01:06

## 2025-06-20 RX ADMIN — FENTANYL CITRATE 100 MCG: 50 INJECTION, SOLUTION INTRAMUSCULAR; INTRAVENOUS at 01:06

## 2025-06-20 NOTE — TRANSFER OF CARE
"Anesthesia Transfer of Care Note    Patient: Sanjeev Britt    Procedure(s) Performed: Procedure(s) (LRB):  CHOLECYSTECTOMY, LAPAROSCOPIC (N/A)    Patient location: PACU    Anesthesia Type: general    Transport from OR: Transported from OR on room air with adequate spontaneous ventilation    Post pain: adequate analgesia    Post assessment: no apparent anesthetic complications    Post vital signs: stable    Level of consciousness: awake and alert    Nausea/Vomiting: no nausea/vomiting    Complications: none    Transfer of care protocol was followed      Last vitals: Visit Vitals  /81   Pulse 65   Temp 36.8 °C (98.2 °F) (Oral)   Resp 17   Ht 5' 6" (1.676 m)   Wt 72.5 kg (159 lb 13.3 oz)   SpO2 97%   BMI 25.80 kg/m²     "

## 2025-06-20 NOTE — SUBJECTIVE & OBJECTIVE
Interval History:  Patient seen and examined.  No acute overnight events.  He denies any chest pain or shortness of breath.  He reports he is scheduled for his surgery at 2:00 p.m..  Remains NPO.    Review of Systems   Constitutional:  Negative for activity change, appetite change, chills, diaphoresis and fatigue.   Respiratory:  Negative for cough and shortness of breath.    Cardiovascular:  Negative for chest pain.   Gastrointestinal:  Positive for abdominal pain. Negative for abdominal distention, nausea and vomiting.   Genitourinary:  Negative for difficulty urinating.   Musculoskeletal:  Negative for back pain.   Neurological:  Negative for dizziness, seizures, syncope, facial asymmetry, speech difficulty, weakness, light-headedness, numbness and headaches.     Objective:     Vital Signs (Most Recent):  Temp: 98.2 °F (36.8 °C) (06/20/25 1125)  Pulse: 65 (06/20/25 1125)  Resp: 17 (06/20/25 1236)  BP: 120/81 (06/20/25 1125)  SpO2: 97 % (06/20/25 1125) Vital Signs (24h Range):  Temp:  [98.1 °F (36.7 °C)-99.9 °F (37.7 °C)] 98.2 °F (36.8 °C)  Pulse:  [63-93] 65  Resp:  [17-20] 17  SpO2:  [94 %-97 %] 97 %  BP: (107-134)/(71-83) 120/81     Weight: 72.5 kg (159 lb 13.3 oz)  Body mass index is 25.8 kg/m².    Intake/Output Summary (Last 24 hours) at 6/20/2025 1423  Last data filed at 6/20/2025 1135  Gross per 24 hour   Intake 427.58 ml   Output --   Net 427.58 ml         Physical Exam  Vitals and nursing note reviewed.   Constitutional:       Appearance: He is not ill-appearing.   Eyes:      Pupils: Pupils are equal, round, and reactive to light.   Cardiovascular:      Rate and Rhythm: Normal rate and regular rhythm.      Pulses: Normal pulses.      Heart sounds: Normal heart sounds.   Pulmonary:      Effort: Pulmonary effort is normal.      Breath sounds: Normal breath sounds.   Abdominal:      General: Bowel sounds are normal. There is no distension.      Palpations: Abdomen is soft.      Tenderness: There is abdominal  tenderness in the right upper quadrant. There is guarding.   Musculoskeletal:      Right lower leg: No edema.      Left lower leg: No edema.   Skin:     General: Skin is warm and dry.      Capillary Refill: Capillary refill takes less than 2 seconds.   Neurological:      Mental Status: He is alert and oriented to person, place, and time.      GCS: GCS eye subscore is 4. GCS verbal subscore is 5. GCS motor subscore is 6.               Significant Labs: All pertinent labs within the past 24 hours have been reviewed.  A1C:   Recent Labs   Lab 02/08/25  0311   HGBA1C 5.8     A  Bilirubin:   Recent Labs   Lab 06/17/25  1453   BILITOT 0.6     BMP:   Recent Labs   Lab 06/20/25  0336         K 3.8      CO2 27   BUN 15   CREATININE 0.9   CALCIUM 9.2     CBC:   Recent Labs   Lab 06/19/25  0440 06/20/25  0336   WBC 19.83* 16.30*   HGB 14.9 14.0   HCT 45.4 43.5    199     CMP:   Recent Labs   Lab 06/19/25  0440 06/20/25  0336    137   K 3.8 3.8    100   CO2 23 27   * 102   BUN 14 15   CREATININE 0.8 0.9   CALCIUM 9.5 9.2   ANIONGAP 10 10     POCT Glucose:   Recent Labs   Lab 06/20/25  0725 06/20/25  1120 06/20/25  1501   POCTGLUCOSE 134* 115* 129*         Significant Imaging: I have reviewed all pertinent imaging results/findings within the past 24 hours.    Upper GI endoscopy  Order: [id number]   Status: Final result       Next appt: 06/23/2025 at 01:45 PM in Cardiology ([facility] Echocardiography/Vascular Ultrasound)    Test Result Released: Yes (not seen)    0 Result Notes        Narrative  Performed by: [facility]  Patient Name: Sanjeev Britt  Procedure Date: 6/18/2025 11:23 AM  MRN: [id number]  Account Number: [id number]  YOB: 1965  Age: 59  Room: 1  Gender: Male  Attending MD: Silvia Fuller MD, [id number]  Procedure:             Upper GI endoscopy  Indications:           Epigastric abdominal pain, Hematemesis  Providers:              Silvia Fuller MD  Referring MD:          Silvia Fuller MD  Complications:         No immediate complications.  Medicines:             Propofol per Anesthesia  Procedure:             Pre-Anesthesia Assessment:                         - ASA Grade Assessment: II - A patient with mild                         systemic disease.                         After obtaining informed consent, the endoscope                         was passed under direct vision. Throughout the                         procedure, the patient's blood pressure, pulse,                         and oxygen saturations were monitored                         continuously. The Olympus GIF-H190 ([id number]) was                         introduced through the mouth, and advanced to the                         second part of duodenum.  Findings:       LA Grade A (one or more mucosal breaks less than 5 mm, not extending       between tops of 2 mucosal folds) esophagitis with no bleeding was       found 37 cm from the incisors. Biopsies were taken with a cold       forceps for histology.       Striped mildly erythematous mucosa without bleeding was found in the       gastric antrum. Biopsies were taken with a cold forceps for       Helicobacter pylori testing.       The examined duodenum was normal.  Impression:            - LA Grade A erosive esophagitis with no bleeding.                         Biopsied.                         - Erythematous mucosa in the antrum. Biopsied.                         - Normal examined duodenum.  Recommendation:        - Await pathology results.                         - Use Protonix (pantoprazole) 40 mg PO daily for 3                         months.                         - [facility].  Silvia Fuller MD  6/20/2025 8:00:24 AM  This report has been verified and signed electronically.  Dear patient,  As a result of recent federal legislation (The Federal Cures Act), you may  receive  lab or pathology results from your procedure in your [facility]  account before your physician is able to contact you. Your physician or  their representative will relay the results to you with their  recommendations at their soonest availability.  Thank you,  Number of Addenda: 0  Note Initiated On: 6/18/2025 11:23 AM  Estimated Blood Loss:  Estimated blood loss: none.       This report has been verified and signed electronically.   Specimen Collected: 06/18/25 11:23 CDT Last Resulted: 06/20/25 08:02 CDT

## 2025-06-20 NOTE — PLAN OF CARE
Problem: Gastrointestinal Bleeding  Goal: Optimal Coping with Acute Illness  Outcome: Ongoing  Goal: Hemostasis  Outcome: Ongoing     Problem: Adult Inpatient Plan of Care  Goal: Plan of Care Review  Outcome: Ongoing  Flowsheets (Taken 6/20/2025 1601)  Plan of Care Reviewed With: patient  Goal: Patient-Specific Goal (Individualized)  Outcome: Ongoing  Goal: Absence of Hospital-Acquired Illness or Injury  Outcome: Ongoing  Goal: Optimal Comfort and Wellbeing  Outcome: Ongoing  Goal: Readiness for Transition of Care  Outcome: Ongoing     Problem: Diabetes Comorbidity  Goal: Blood Glucose Level Within Targeted Range  Outcome: Ongoing     Problem: Wound  Goal: Optimal Coping  Outcome: Ongoing  Goal: Optimal Functional Ability  Outcome: Ongoing  Goal: Absence of Infection Signs and Symptoms  Outcome: Ongoing  Goal: Improved Oral Intake  Outcome: Ongoing  Goal: Optimal Pain Control and Function  Outcome: Ongoing  Goal: Skin Health and Integrity  Outcome: Ongoing  Goal: Optimal Wound Healing  Outcome: Ongoing

## 2025-06-20 NOTE — PROGRESS NOTES
[facility]  Department of Cardiology  Progress Note      PATIENT NAME: Sanjeev Britt  MRN: [id number]  TODAY'S DATE: 06/20/2025  ADMIT DATE: 6/17/2025                          CONSULT REQUESTED BY: Shanika Irene MD    SUBJECTIVE     PRINCIPAL PROBLEM: GI bleed      REASON FOR CONSULT:  GIB but recent stents, with obligate need for anticoagulation    Interval History:   06/20/2025  No cardiac complaints. Awaiting surgery.    HPI:  59yoM with a history of CAD s/p PCI, HTN, sleep apnea not on CPAP, DM type 2, SVT, bilateral PE, spontaneous pneumothorax, ED, chronic pansinusitis, deviated nasal septum, STEMI, cardiac arrest, bradycardia, heart failure with mildly reduced EF who presents to the ED with a chief complaint of abdominal pain found to have a GI bleed. Cardiology was consulted for anticoagulation recommendations.       [facility] 02/11/2025:    Successful staged IVUS guided PCI of diffuse lesion in mid circumflex.    The Mid Cx lesion was 70% stenosed with 0% stenosis post-intervention.    Mid Cx lesion: A STENT SYNERGY XD [id number] stent was successfully placed at 11 VIELKA for 10 sec.    Mid Cx lesion: A STENT SYNERGY XD [id number] stent was successfully placed at 11 VIELKA for 15 sec.      He denies chest pains. Reports taking his blood thinners appropriately.  Surgery is planning for a lap anirudh tomorrow.        From hospitalist H&P:  Principal Problem: GI bleed     Chief Complaint:        Chief Complaint   Patient presents with    Abdominal Pain       Abdominal pain radiating to chest since 10am. Patient states he had black stool and is on blood thinners.          HPI: Mr. Britt is a 59 yr old male with a hx of CAD s/p PCI, HTN, sleep apnea not on CPAP, DM type 2, SVT, bilateral PE, spontaneous pneumothorax, ED, chronic pansinusitis, deviated nasal septum, STEMI, cardiac arrest, bradycardia, heart failure with reduced EF who presents to the ED with a chief complaint of abdominal pain.  Patient  endorses 10+/10 sharp right upper quadrant abdominal pain that began around 10:00 a.m. this morning and radiates to his midsternal chest.  He states that he ate spaghetti last night and began having emesis today that contained remnants of spaghetti with the last episode being around 4:00 p.m..  He also endorses he has had 3 black tarry looking stools.  He denies having any black tarry stools while in the ED and denies any other active bleeding.  He also endorses subjective fever, chills, and lightheadedness.  He has taken Mylanta, Zofran, Phenergan at home without relief.  He states he goes can of dip every 2 days, occasionally drinks alcohol, and denies recreational drug use.  He denies dyspnea, chest pain, dysuria, hematuria, dizziness, and syncope.     Upon arrival to ED, patient afebrile, HR of 69, RR of 18, BP of 151/88, satting 100% on RA.  Workup in the ED reveals CO2 of 20, glucose of 118, initial troponin of 16.2, repeat troponin of 14.6.  Remainder of labs unremarkable.  CT AP with IV contrast shows no acute intra-abdominal abnormality.  49 x 40 x 39 mm hypodense soft tissue mass interposed between the right gluteus minimus and medius muscles.  Sarcoma is not excluded.  Recommend MRI with and without contrast.  12 mm noncalcified nodule opacity left lung base.  Bibasilar atelectasis.  Follow-up chest CT in 3 months.  Right upper quadrant ultrasound shows cholelithiasis and hepatic steatosis.  CXR shows no acute cardiopulmonary abnormality.  Patient was given hydromorphone 1 mg IV x2, morphine 4 mg IV, ondansetron 4 mg IV, and pantoprazole 80 mg IV, and initiated on pantoprazole gtt while in the ED. ED provider discussed case with General surgery Dr. Lerner recommended HIDA scan, NPO at midnight, and will see patient in AM. case discussed with ED provider and patient will be placed under observation for further management.      Review of patient's allergies indicates:   Allergen Reactions    Levaquin  [levofloxacin]        Past Medical History:   Diagnosis Date    Acute coronary syndrome 09/09/2019    Coronary artery disease     Diabetes mellitus     Hypertension     Sleep apnea      Past Surgical History:   Procedure Laterality Date    ANGIOGRAM, CORONARY, WITH LEFT HEART CATHETERIZATION N/A 2/8/2025    Procedure: Angiogram, Coronary, with Left Heart Cath;  Surgeon: Mo Levi MD;  Location: [facility] CATH/EP LAB;  Service: Cardiology;  Laterality: N/A;    ESOPHAGOGASTRODUODENOSCOPY N/A 6/18/2025    Procedure: EGD (ESOPHAGOGASTRODUODENOSCOPY);  Surgeon: Silvia Fuller MD;  Location: [facility] ENDO;  Service: Endoscopy;  Laterality: N/A;    FESS, USING COMPUTER-ASSISTED NAVIGATION, WITH NASAL TURBINATE REDUCTION N/A 6/7/2024    Procedure: FESS, USING COMPUTER-ASSISTED NAVIGATION, WITH NASAL TURBINATE REDUCTION;  Surgeon: Andres Abraham MD;  Location: [facility] OR;  Service: ENT;  Laterality: N/A;    IVUS, CORONARY  2/8/2025    Procedure: IVUS, Coronary;  Surgeon: Mo Levi MD;  Location: [facility] CATH/EP LAB;  Service: Cardiology;;    IVUS, CORONARY  2/11/2025    Procedure: IVUS, Coronary;  Surgeon: Mo Levi MD;  Location: [facility] CATH/EP LAB;  Service: Cardiology;;    NASAL SEPTOPLASTY N/A 6/7/2024    Procedure: SEPTOPLASTY, NOSE;  Surgeon: Andres Abraham MD;  Location: [facility] OR;  Service: ENT;  Laterality: N/A;    NASAL TURBINATE REDUCTION Bilateral 6/7/2024    Procedure: REDUCTION, NASAL TURBINATE;  Surgeon: Andres Abraham MD;  [facility];  Service: ENT;  Laterality: Bilateral;    PERCUTANEOUS CORONARY INTERVENTION, ARTERY N/A 2/8/2025    Procedure: Percutaneous coronary intervention;  Surgeon: Mo Levi MD;  Location: [facility] CATH/EP LAB;  Service: Cardiology;  Laterality: N/A;    PERCUTANEOUS CORONARY INTERVENTION, ARTERY N/A 2/11/2025    Procedure: Percutaneous coronary intervention;  Surgeon: Mo Levi MD;  Location: [facility] CATH/EP  LAB;  Service: Cardiology;  Laterality: N/A;    VASCULAR SURGERY       Social History[1]     REVIEW OF SYSTEMS    As mentioned in HPI    OBJECTIVE     VITAL SIGNS (Most Recent)  Temp: 98.2 °F (36.8 °C) (06/20/25 1125)  Pulse: 65 (06/20/25 1125)  Resp: 17 (06/20/25 1236)  BP: 120/81 (06/20/25 1125)  SpO2: 97 % (06/20/25 1125)    VENTILATION STATUS  Resp: 17 (06/20/25 1236)  SpO2: 97 % (06/20/25 1125)           I & O (Last 24H):  Intake/Output Summary (Last 24 hours) at 6/20/2025 1439  Last data filed at 6/20/2025 1135  Gross per 24 hour   Intake 427.58 ml   Output --   Net 427.58 ml       WEIGHTS  Wt Readings from Last 3 Encounters:   06/20/25 0346 72.5 kg (159 lb 13.3 oz)   06/19/25 0400 70.7 kg (155 lb 14.4 oz)   06/17/25 1431 74.8 kg (165 lb)   02/28/25 1102 76 kg (167 lb 8.8 oz)   02/10/25 0620 76.4 kg (168 lb 6.9 oz)   02/08/25 0501 76.7 kg (169 lb)   02/08/25 0200 76.9 kg (169 lb 8.5 oz)       PHYSICAL EXAM    CONSTITUTIONAL: NAD  HEENT: Normocephalic. No pallor  NECK: no JVD, no carotid bruit  LUNGS: CTA b/l  HEART: regular rate and rhythm, S1, S2 normal, no murmur   ABDOMEN: soft, non-tender, bowel sounds normal. No bruit  EXTREMITIES: No edema. Pulses intact.  SKIN: No rash  NEURO: AAO X 3  PSYCH: normal affect      HOME MEDICATIONS:Medications Ordered Prior to Encounter[2]    SCHEDULED MEDS:   amiodarone  100 mg Oral Daily    amLODIPine  10 mg Oral Daily    atorvastatin  80 mg Oral Daily    busPIRone  7.5 mg Oral BID    gabapentin  300 mg Oral BID    INV losartan  25 mg Oral Daily    metoprolol succinate  25 mg Oral Daily    multivitamin  1 tablet Oral Daily    nicotine  1 patch Transdermal Daily    pantoprazole  40 mg Intravenous Daily    piperacillin-tazobactam (Zosyn) IV (PEDS and ADULTS) (extended infusion is not appropriate)  4.5 g Intravenous Q8H       CONTINUOUS INFUSIONS:        PRN MEDS:  Current Facility-Administered Medications:     acetaminophen, 650 mg, Oral, Q4H PRN    albuterol-ipratropium, 3  mL, Nebulization, Q6H PRN    aluminum-magnesium hydroxide-simethicone, 30 mL, Oral, QID PRN    dextrose 50%, 12.5 g, Intravenous, PRN    dextrose 50%, 25 g, Intravenous, PRN    diphenhydrAMINE, 6.25 mg, Intravenous, Once PRN    glucagon (human recombinant), 1 mg, Intramuscular, PRN    glucose, 16 g, Oral, PRN    glucose, 24 g, Oral, PRN    HYDROcodone-acetaminophen, 1 tablet, Oral, Q4H PRN    HYDROmorphone, 0.2 mg, Intravenous, Q5 Min PRN    HYDROmorphone, 1 mg, Intravenous, Q3H PRN    insulin aspart U-100, 0-5 Units, Subcutaneous, Q6H PRN    magnesium oxide, 800 mg, Oral, PRN    magnesium oxide, 800 mg, Oral, PRN    melatonin, 9 mg, Oral, Nightly PRN    naloxone, 0.02 mg, Intravenous, PRN    ondansetron, 4 mg, Intravenous, Q6H PRN    ondansetron, 4 mg, Intravenous, Daily PRN    oxyCODONE, 5 mg, Oral, Q20 Min PRN    potassium bicarbonate, 35 mEq, Oral, PRN    potassium bicarbonate, 50 mEq, Oral, PRN    potassium bicarbonate, 60 mEq, Oral, PRN    potassium, sodium phosphates, 2 packet, Oral, PRN    potassium, sodium phosphates, 2 packet, Oral, PRN    potassium, sodium phosphates, 2 packet, Oral, PRN    senna-docusate, 1 tablet, Oral, BID PRN    sodium chloride 0.9%, 10 mL, Intravenous, Q12H PRN    traZODone, 150 mg, Oral, Nightly PRN    LABS AND DIAGNOSTICS     CBC LAST 3 DAYS  Recent Labs   Lab 06/18/25  1625 06/19/25  0440 06/20/25  0336   WBC 19.27* 19.83* 16.30*   RBC 5.33 5.24 5.01   HGB 15.1 14.9 14.0   HCT 46.3 45.4 43.5   MCV 87 87 87   MCH 28.3 28.4 27.9   MCHC 32.6 32.8 32.2   RDW 15.1* 15.0* 15.1*    235 199   MPV 10.8 11.1 11.5   NRBC 0 0 0       COAGULATION LAST 3 DAYS  Recent Labs   Lab 06/17/25  1453 06/18/25  0451   INR 1.0 1.0   APTT  --  26.5       CHEMISTRY LAST 3 DAYS  Recent Labs   Lab 06/17/25  1453 06/18/25  0451 06/19/25  0440 06/20/25  0336    141 138 137   K 3.5 3.8 3.8 3.8    106 105 100   CO2 20* 22* 23 27   ANIONGAP 15 13 10 10   BUN 13 11 14 15   CREATININE 0.8 0.7 0.8  "0.9   * 122* 136* 102   CALCIUM 9.7 8.9 9.5 9.2   ALBUMIN 5.0  --   --   --    PROT 7.9  --   --   --    ALKPHOS 100  --   --   --    ALT 21  --   --   --    AST 17  --   --   --    BILITOT 0.6  --   --   --        CARDIAC PROFILE LAST 3 DAYS  No results for input(s): "BNP", "CPK", "CPKMB", "LDH", "TROPONINI", "TROPONINIHS" in the last 168 hours.    ENDOCRINE LAST 3 DAYS  No results for input(s): "TSH", "PROCAL" in the last 168 hours.    LAST ARTERIAL BLOOD GAS  ABG  No results for input(s): "PH", "PO2", "PCO2", "HCO3", "BE" in the last 168 hours.    LAST 7 DAYS MICROBIOLOGY   Microbiology Results (last 7 days)       ** No results found for the last 168 hours. **            MOST RECENT IMAGING  MRI Pelvis W WO Contrast  Narrative: EXAMINATION:  MRI PELVIS W WO CONTRAST    CLINICAL HISTORY:  Soft tissue mass;    TECHNIQUE:  Multisequence, multiplanar imaging of the pelvis obtained before and after the administration of 7.5 mL Gadavist    COMPARISON:  CT abdomen and pelvis 06/17/2025    Lumbar spine MRI exams 01/28/2025, 03/03/2023, 07/02/2020    FINDINGS:  Degenerative changes of the lower lumbar spine are evident.  No bone marrow edema.  No aggressive marrow replacing lesion.    No free fluid or lymphadenopathy in the pelvis.  The prostate gland is enlarged measuring 5 cm transverse.    Intramuscular solid mass involving the right gluteus medius muscle with thin rim of fat surrounding the lesion, and no direct muscular invasion evident.  It measures 4.2 cm craniocaudal, 3.8 cm transverse, 4.5 cm AP and is similar or minimally enlarged compared to previous lumbar spine MRI exams dating back to 2020.  Mass shows relatively homogeneous marked T2 hyperintensity, and is isointense on T1.  On postcontrast imaging the mass shows rim and heterogeneous internal enhancement.  On coronal imaging, there appears to be oblong nature to the lesion with fasciculation.  Small amount of intramuscular edema involving gluteus " medius superior to the mass.  Impression: Large right gluteus medius intramuscular enhancing mass which shows minimal interval growth compared to 2020.  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  This would be amenable to CT guided biopsy if indicated.    Electronically signed by: Eric Wade  Date:    06/18/2025  Time:    16:24  Echo    Left Ventricle: The left ventricle is normal in size. Normal wall   thickness. There is low normal systolic function with a visually estimated   ejection fraction of 50 - 55%. Grade I diastolic dysfunction. E/A ratio is   0.72.    Right Ventricle: The right ventricle is normal in size Wall thickness   is normal. Systolic function is reduced.TAPSE is 1.8 cm.    Left Atrium: The left atrium is mildly dilated    Aortic Valve: The aortic valve is a trileaflet valve. There is mild   aortic valve sclerosis.    Mitral Valve: There is mild regurgitation.    Pulmonic Valve: Not well visualized due to poor acoustic window.    IVC/SVC: Normal venous pressure at 3 mmHg.  NM Hepatobiliary Scan (HIDA)  Narrative: EXAMINATION:  NM HEPATOBILIARY IMAGING [facility]    CLINICAL HISTORY:  Cholelithiasis;    TECHNIQUE:  Following the IV administration of 8.3 mCi of Tc-99m-mebrofenin, sequential dynamic anterior images of the abdomen were obtained for 60 minutes.  Subsequently, the patient was administered 2 mg of morphine sulfate intravenously and imaging over the upper abdomen performed for additional 30 minutes.    COMPARISON:  None    FINDINGS:  There is prompt extraction of tracer activity by the hepatic parenchyma with subsequent excretion into the biliary system.  There is normal biliary to bowel transit.    There is no identifiable accumulation demonstrated within the gallbladder during the initial 60 minutes of imaging.    Similarly, no accumulation is observed within the gallbladder on additional 30 minutes of imaging following  morphine administration.  Reflux of contrast material is observed into the stomach.  Impression: No identifiable accumulation of tracer activity within the gallbladder suggesting cystic duct dysfunction/obstruction.    Normal biliary-bowel transit.    Electronically signed by: Galindo Kiser  Date:    06/18/2025  Time:    13:14      ECHOCARDIOGRAM RESULTS (last 5)  Results for orders placed during the hospital encounter of 02/07/25    Echo    Interpretation Summary    Left Ventricle: The left ventricle is normal in size. Normal wall thickness. There is concentric remodeling. Moderate hypokinesis of the inferior and the remaining walls appear to move fairly well There is mildly reduced systolic function with a visually estimated ejection fraction of 45 - 50%. There is normal diastolic function.    Right Ventricle: Normal right ventricular cavity size. Wall thickness is normal. Systolic function is normal.    Aortic Valve: The aortic valve is structurally normal.    Tricuspid Valve: The tricuspid valve is structurally normal.    Pulmonary Artery: The estimated pulmonary artery systolic pressure is 22 mmHg.    IVC/SVC: Intermediate venous pressure at 8 mmHg.      Results for orders placed during the hospital encounter of 08/14/23    Echo    Interpretation Summary    Left Ventricle: The left ventricle is normal in size. Mildly increased wall thickness. There is concentric remodeling. Normal wall motion. There is normal systolic function. Ejection fraction by visual approximation is 65%. There is normal diastolic function.    Left Atrium: Left atrium is mildly dilated.    Right Ventricle: Normal right ventricular cavity size. Wall thickness is normal. Right ventricle wall motion  is normal. Systolic function is normal.    IVC/SVC: Normal venous pressure at 3 mmHg.      Results for orders placed during the hospital encounter of 09/09/19    Echo Color Flow Doppler? Yes    Interpretation Summary  · Concentric left ventricular  remodeling.  · Increased (hyperdynamic) left ventricular systolic function. The estimated ejection fraction is 83%  · Normal LV diastolic function.  · Normal right ventricular systolic function.  · Mild left atrial enlargement.      CURRENT/PREVIOUS VISIT EKG    Results for orders placed or performed during the hospital encounter of 06/17/25   EKG 12-lead    Collection Time: 06/17/25  2:20 PM   Result Value Ref Range    QRS Duration 92 ms    [facility] QTC Calculation 470 ms    Narrative    Test Reason : R07.9,    Vent. Rate :  63 BPM     Atrial Rate :  63 BPM     P-R Int : 158 ms          QRS Dur :  92 ms      QT Int : 460 ms       P-R-T Axes :  63  42   7 degrees    QTcB Int : 470 ms    Normal sinus rhythm with sinus arrhythmia  Possible Inferior infarct ,age undetermined  Possible Anterior infarct ,age undetermined  Abnormal ECG  No previous ECGs available  Confirmed by Onofre Mcguire ([id number]) on 6/19/2025 9:00:14 PM    Referred By:            Confirmed By: Onofre Mcguire           ASSESSMENT/PLAN:     Active Hospital Problems    Diagnosis    *GI bleed    Schwannoma    Cholelithiasis    Nicotine dependence    NSVT (nonsustained ventricular tachycardia)    Chronic systolic CHF (congestive heart failure)    DMII (diabetes mellitus, type 2)    Sleep apnea    Essential hypertension    CAD (coronary artery disease) status post PCI       ASSESSMENT & PLAN:     GIB  CAD  hx of STEMI s/p PCI  Hx of cardiac arrest  Chronic HFmrEF 45-50%  HTN  JUDY not on CPAP   T2DM  Hx of SVT  Hx of bilateral PE  Hx of spontaneous pneumothorax  ED  Chronic pansinusitis  Deviated nasal septum       RECOMMENDATIONS:      - GDMT with Toprol-XL 25 mg daily and losartan 25 mg daily, resume home Jardiance 10 mg prior to discharge.  - Continue amiodarone 100 mg daily.  - Continue home amlodipine 10 g and atorvastatin 80 mg    - Cleared for surgery today with moderate cardiac risks. Resume DAPT as soon as ok per surgeon.        Will see PRN over the  weekend.        Kathleen Diamond AGACNP[facility] Cardiology  Date of Service: 06/20/2025        I have personally interviewed and examined the patient, I have reviewed the Nurse Practitioner's history and physical, assessment, and plan. I have personally evaluated the patient at bedside and agree with the findings and made appropriate changes as necessary in recommendations.      Okay for surgery  He has been off the DAPT for  4 days  Will cover with Lovenox today      6/20/25  PATIENT IS AWAITING SURGERY TODAY  WILL BE AVAILABLE P.R.GENEVIEVE.        Onofre Mcguire MD  Department of Cardiology  [facility]  06/20/2025 8:47 AM             [1]   Social History  Tobacco Use    Smoking status: Never    Smokeless tobacco: Current     Types: Snuff   Substance Use Topics    Alcohol use: Not Currently    Drug use: Never   [2]   No current facility-administered medications on file prior to encounter.     Current Outpatient Medications on File Prior to Encounter   Medication Sig Dispense Refill    amiodarone (PACERONE) 100 MG Tab Take 1 tablet (100 mg total) by mouth once daily. 90 tablet 3    amLODIPine (NORVASC) 5 MG tablet Take 1 tablet (5 mg total) by mouth once daily. (Patient taking differently: Take 10 mg by mouth once daily.) 90 tablet 3    aspirin (ECOTRIN) 81 MG EC tablet Take 81 mg by mouth once daily.      atorvastatin (LIPITOR) 80 MG tablet Take 1 tablet (80 mg total) by mouth once daily. 90 tablet 3    busPIRone (BUSPAR) 7.5 MG tablet Take 7.5 mg by mouth 2 (two) times a day.      gabapentin (NEURONTIN) 300 MG capsule Take 300 mg by mouth 2 (two) times daily.      ibuprofen (ADVIL,MOTRIN) 600 MG tablet Take 600 mg by mouth 3 (three) times daily.      JARDIANCE 10 mg tablet Take 10 mg by mouth once daily.      loratadine (CLARITIN) 10 mg tablet Take 10 mg by mouth once daily.      losartan (COZAAR) 50 MG Tab Take 1 tablet (50 mg total) by mouth once daily. (Patient taking differently: Take 25 mg by  mouth once daily.) 90 tablet 3    metoprolol succinate (TOPROL-XL) 25 MG 24 hr tablet Take 1 tablet (25 mg total) by mouth once daily. 90 tablet 3    multivit-min/FA/lycopen/lutein (CENTRUM SILVER MEN ORAL) Take 1 tablet by mouth once daily.      ticagrelor (BRILINTA) 90 mg tablet Take 1 tablet (90 mg total) by mouth 2 (two) times daily. 180 tablet 3    traZODone (DESYREL) 150 MG tablet Take 1 tablet by mouth once daily.      HYDROcodone-acetaminophen (NORCO) 5-325 mg per tablet Take 1 tablet by mouth every 6 (six) hours as needed for Pain. (Patient not taking: Reported on 6/11/2025) 15 tablet 0    pregabalin (LYRICA) 75 MG capsule Take 1 capsule by mouth 2 (two) times daily.

## 2025-06-20 NOTE — ASSESSMENT & PLAN NOTE
Chronic, controlled.  Latest blood pressure and vitals reviewed-     Temp:  [97.2 °F (36.2 °C)-99.9 °F (37.7 °C)]   Pulse:  [63-93]   Resp:  [14-22]   BP: (107-137)/(70-84)   SpO2:  [90 %-98 %] .   Home meds for hypertension were reviewed and noted below-  Hypertension Medications              amLODIPine (NORVASC) 5 MG tablet Take 1 tablet (5 mg total) by mouth once daily.    losartan (COZAAR) 50 MG Tab Take 1 tablet (50 mg total) by mouth once daily.    metoprolol succinate (TOPROL-XL) 25 MG 24 hr tablet Take 1 tablet (25 mg total) by mouth once daily.            While in the hospital, will manage blood pressure as follows; Continue home antihypertensive regimen    Will utilize p.r.n. blood pressure medication only if patient's blood pressure greater than 180/110 and he develops symptoms such as worsening chest pain or shortness of breath.

## 2025-06-20 NOTE — ASSESSMENT & PLAN NOTE
Confirmed by MRI  Findings discussed with the patient and spouse  We will need follow up with Oncology surgery at [facility]

## 2025-06-20 NOTE — BRIEF OP NOTE
[facility]  Brief Operative Note    SUMMARY     Surgery Date: 6/20/2025     Surgeons and Role:     * Xavier Lerner Jr., MD - Primary    Assisting Surgeon: None    Pre-op Diagnosis:  Calculus of gallbladder with acute cholecystitis without obstruction [K80.00]    Post-op Diagnosis:  Post-Op Diagnosis Codes:     * Calculus of gallbladder with acute cholecystitis without obstruction [K80.00]    Procedure(s) (LRB):  CHOLECYSTECTOMY, LAPAROSCOPIC (N/A)    Anesthesia: General    Implants:  * No implants in log *    Operative Findings: empyema with severe gallbladder inflammation    Estimated Blood Loss: * No values recorded between 6/20/2025  1:37 PM and 6/20/2025  2:43 PM *    Estimated Blood Loss has been documented.         Specimens:   Specimen (24h ago, onward)       Start     Ordered    06/20/25 1416  Specimen to Pathology - Surgery  Once        Comments: Pre-op Diagnosis: Calculus of gallbladder with acute cholecystitis without obstruction [K80.00]Procedure(s):CHOLECYSTECTOMY, LAPAROSCOPIC Number of specimens: 1Name of specimens: 1. Gallbladder     Question:  Release to patient  Answer:  Immediate    06/20/25 1416                  * No specimens in log *    [id number]

## 2025-06-20 NOTE — ASSESSMENT & PLAN NOTE
Patient's FSGs are controlled on current medication regimen.  Last A1c reviewed-   Lab Results   Component Value Date    HGBA1C 5.8 02/08/2025     Most recent fingerstick glucose reviewed-   Recent Labs   Lab 06/19/25  1929 06/20/25  0725 06/20/25  1120 06/20/25  1501   POCTGLUCOSE 126* 134* 115* 129*     Current correctional scale  Medium  Maintain anti-hyperglycemic dose as follows-   Antihyperglycemics (From admission, onward)      Start     Stop Route Frequency Ordered    06/18/25 0412  insulin aspart U-100 pen 0-5 Units         -- SubQ Every 6 hours PRN 06/18/25 0312          Hold Oral hypoglycemics while patient is in the hospital.

## 2025-06-20 NOTE — [employer]OVATION PATIENT INSTRUCTIONS
Discharge Summary/Instructions after an Endoscopic Procedure  Patient Name: Sanjeev Britt  Patient MRN: [id number]  Patient YOB: 1965 Wednesday, June 18, 2025  Silvia Fuller MD  RESTRICTIONS:  During your procedure today, you received medications for sedation.  These   medications may affect your judgment, balance and coordination.  Therefore,   for 24 hours, you have the following restrictions:   - DO NOT drive a car, operate machinery, make legal/financial decisions,   sign important papers or drink alcohol.    ACTIVITY:  Today: no heavy lifting, straining or running due to procedural   sedation/anesthesia.  The following day: return to full activity including work.  DIET:  Eat and drink normally unless instructed otherwise.     TREATMENT FOR COMMON SIDE EFFECTS:  - Mild abdominal pain, nausea, belching, bloating or excessive gas:  rest,   eat lightly and use a heating pad.  - Sore Throat: treat with throat lozenges and/or gargle with warm salt   water.  - Because air was used during the procedure, expelling large amounts of air   from your rectum or belching is normal.  - If a bowel prep was taken, you may not have a bowel movement for 1-3 days.    This is normal.  SYMPTOMS TO WATCH FOR AND REPORT TO YOUR PHYSICIAN:  1. Abdominal pain or bloating, other than gas cramps.  2. Chest pain.  3. Back pain.  4. Signs of infection such as: chills or fever occurring within 24 hours   after the procedure.  5. Rectal bleeding, which would show as bright red, maroon, or black stools.   (A tablespoon of blood from the rectum is not serious, especially if   hemorrhoids are present.)  6. Vomiting.  7. Weakness or dizziness.  GO DIRECTLY TO THE NEAREST EMERGENCY ROOM IF YOU HAVE ANY OF THE FOLLOWING:      Difficulty breathing              Chills and/or fever over 101 F   Persistent vomiting and/or vomiting blood   Severe abdominal pain   Severe chest pain   Black, tarry stools   Bleeding- more than one  tablespoon   Any other symptom or condition that you feel may need urgent attention  Your doctor recommends these additional instructions:  If any biopsies were taken, your doctors clinic will contact you in 1 to 2   weeks with any results.  - Await pathology results.   - Use Protonix (pantoprazole) 40 mg PO daily for 3 months.   - [facility].  For questions, problems or results please call your physician - Silvia Fuller MD at Work:  [phone].  [facility], EMERGENCY ROOM PHONE NUMBER: [phone]  IF A COMPLICATION OR EMERGENCY SITUATION ARISES AND YOU ARE UNABLE TO REACH   YOUR PHYSICIAN - GO DIRECTLY TO THE EMERGENCY ROOM.  Silvia Fuller MD  6/20/2025 8:00:24 AM  This report has been verified and signed electronically.  Dear patient,  As a result of recent federal legislation (The Federal Cures Act), you may   receive lab or pathology results from your procedure in your [facility]   account before your physician is able to contact you. Your physician or   their representative will relay the results to you with their   recommendations at their soonest availability.  Thank you,  [employer]ATION

## 2025-06-20 NOTE — NURSING
Patient returned to unit via hospital bed, no complaints of pain voiced at this time, family at bedside, 4 lap sites C/D/I.  Call light within easy reach, patient on RA.

## 2025-06-20 NOTE — RESPIRATORY THERAPY
06/19/25 2040   Patient Assessment/Suction   Level of Consciousness (AVPU) alert   Respiratory Effort Normal;Unlabored   Expansion/Accessory Muscles/Retractions no retractions;no use of accessory muscles   All Lung Fields Breath Sounds clear   Rhythm/Pattern, Respiratory pattern regular;unlabored   Cough Frequency no cough   PRE-TX-O2   Device (Oxygen Therapy) room air   SpO2 96 %   Pulse Oximetry Type Intermittent   $ Pulse Oximetry - Multiple Charge Pulse Oximetry - Multiple   Aerosol Therapy   $ Aerosol Therapy Charges PRN treatment not required   Education   $ Education Bronchodilator;15 min   Respiratory Evaluation   $ Care Plan Tech Time 15 min

## 2025-06-20 NOTE — PROGRESS NOTES
[facility] Medicine  Progress Note    Patient Name: Sanjeev Britt  MRN: [id number]  Patient Class: IP- Inpatient   Admission Date: 6/17/2025  Length of Stay: 2 days  Attending Physician: Shanika Irene MD  Primary Care Provider: Katt Fuentes NP        Subjective     Principal Problem:GI bleed        HPI:  Mr. Britt is a 59 yr old male with a hx of CAD s/p PCI, HTN, sleep apnea not on CPAP, DM type 2, SVT, bilateral PE, spontaneous pneumothorax, ED, chronic pansinusitis, deviated nasal septum, STEMI, cardiac arrest, bradycardia, heart failure with reduced EF who presents to the ED with a chief complaint of abdominal pain.  Patient endorses 10+/10 sharp right upper quadrant abdominal pain that began around 10:00 a.m. this morning and radiates to his midsternal chest.  He states that he ate spaghetti last night and began having emesis today that contained remnants of spaghetti with the last episode being around 4:00 p.m..  He also endorses he has had 3 black tarry looking stools.  He denies having any black tarry stools while in the ED and denies any other active bleeding.  He also endorses subjective fever, chills, and lightheadedness.  He has taken Mylanta, Zofran, Phenergan at home without relief.  He states he goes can of dip every 2 days, occasionally drinks alcohol, and denies recreational drug use.  He denies dyspnea, chest pain, dysuria, hematuria, dizziness, and syncope.    Upon arrival to ED, patient afebrile, HR of 69, RR of 18, BP of 151/88, satting 100% on RA.  Workup in the ED reveals CO2 of 20, glucose of 118, initial troponin of 16.2, repeat troponin of 14.6.  Remainder of labs unremarkable.  CT AP with IV contrast shows no acute intra-abdominal abnormality.  49 x 40 x 39 mm hypodense soft tissue mass interposed between the right gluteus minimus and medius muscles.  Sarcoma is not excluded.  Recommend MRI with and without contrast.  12 mm noncalcified nodule  opacity left lung base.  Bibasilar atelectasis.  Follow-up chest CT in 3 months.  Right upper quadrant ultrasound shows cholelithiasis and hepatic steatosis.  CXR shows no acute cardiopulmonary abnormality.  Patient was given hydromorphone 1 mg IV x2, morphine 4 mg IV, ondansetron 4 mg IV, and pantoprazole 80 mg IV, and initiated on pantoprazole gtt while in the ED. ED provider discussed case with General surgery Dr. Lerner recommended HIDA scan, NPO at midnight, and will see patient in AM. case discussed with ED provider and patient will be placed under observation for further management.    Overview/Hospital Course:  Sanjeev Britt 59 y.o. male was closely monitored while in the hospital.  Patient was admitted to Hospital Medicine for GI bleed.  Brilinta and aspirin were held on admission. Gastroenterology was consulted on admission and patient underwent EGD that showed linear grade a erosive esophagitis and mild gastropathy but no slava bleeding.  Protonix 40 mg daily  3 months was recommended.  Right upper quadrant ultrasound revealed cholelithiasis and hepatic steatosis.  Neurosurgery was consulted in the HIDA scan was recommended.  HIDA scan positive.  Cardiology care patient is to go for laparoscopic cholecystectomy.  Patient maintained on anticoagulation with full-dose Lovenox.  Protonix drip positioned to IV Protonix.  CTA abdomen and pelvis revealed incidental finding of 49 x 40 x 39 mm hypodense soft tissue mass interposed between the right gluteus minimus and medius muscles. Sarcoma is not excluded.  Follow up MRI showed Schwannoma.  Ambulatory referral to Oncology surgery at [facility] was placed.    Patient is pending surgery in a.m..    Interval History:  Patient seen and examined.  No acute overnight events.  He denies any chest pain or shortness of breath.  He reports he is scheduled for his surgery at 2:00 p.m..  Remains NPO.    Review of Systems   Constitutional:  Negative for activity change,  appetite change, chills, diaphoresis and fatigue.   Respiratory:  Negative for cough and shortness of breath.    Cardiovascular:  Negative for chest pain.   Gastrointestinal:  Positive for abdominal pain. Negative for abdominal distention, nausea and vomiting.   Genitourinary:  Negative for difficulty urinating.   Musculoskeletal:  Negative for back pain.   Neurological:  Negative for dizziness, seizures, syncope, facial asymmetry, speech difficulty, weakness, light-headedness, numbness and headaches.     Objective:     Vital Signs (Most Recent):  Temp: 98.2 °F (36.8 °C) (06/20/25 1125)  Pulse: 65 (06/20/25 1125)  Resp: 17 (06/20/25 1236)  BP: 120/81 (06/20/25 1125)  SpO2: 97 % (06/20/25 1125) Vital Signs (24h Range):  Temp:  [98.1 °F (36.7 °C)-99.9 °F (37.7 °C)] 98.2 °F (36.8 °C)  Pulse:  [63-93] 65  Resp:  [17-20] 17  SpO2:  [94 %-97 %] 97 %  BP: (107-134)/(71-83) 120/81     Weight: 72.5 kg (159 lb 13.3 oz)  Body mass index is 25.8 kg/m².    Intake/Output Summary (Last 24 hours) at 6/20/2025 1423  Last data filed at 6/20/2025 1135  Gross per 24 hour   Intake 427.58 ml   Output --   Net 427.58 ml         Physical Exam  Vitals and nursing note reviewed.   Constitutional:       Appearance: He is not ill-appearing.   Eyes:      Pupils: Pupils are equal, round, and reactive to light.   Cardiovascular:      Rate and Rhythm: Normal rate and regular rhythm.      Pulses: Normal pulses.      Heart sounds: Normal heart sounds.   Pulmonary:      Effort: Pulmonary effort is normal.      Breath sounds: Normal breath sounds.   Abdominal:      General: Bowel sounds are normal. There is no distension.      Palpations: Abdomen is soft.      Tenderness: There is abdominal tenderness in the right upper quadrant. There is guarding.   Musculoskeletal:      Right lower leg: No edema.      Left lower leg: No edema.   Skin:     General: Skin is warm and dry.      Capillary Refill: Capillary refill takes less than 2 seconds.    Neurological:      Mental Status: He is alert and oriented to person, place, and time.      GCS: GCS eye subscore is 4. GCS verbal subscore is 5. GCS motor subscore is 6.               Significant Labs: All pertinent labs within the past 24 hours have been reviewed.  A1C:   Recent Labs   Lab 02/08/25  0311   HGBA1C 5.8     A  Bilirubin:   Recent Labs   Lab 06/17/25  1453   BILITOT 0.6     BMP:   Recent Labs   Lab 06/20/25  0336         K 3.8      CO2 27   BUN 15   CREATININE 0.9   CALCIUM 9.2     CBC:   Recent Labs   Lab 06/19/25  0440 06/20/25  0336   WBC 19.83* 16.30*   HGB 14.9 14.0   HCT 45.4 43.5    199     CMP:   Recent Labs   Lab 06/19/25  0440 06/20/25  0336    137   K 3.8 3.8    100   CO2 23 27   * 102   BUN 14 15   CREATININE 0.8 0.9   CALCIUM 9.5 9.2   ANIONGAP 10 10     POCT Glucose:   Recent Labs   Lab 06/20/25  0725 06/20/25  1120 06/20/25  1501   POCTGLUCOSE 134* 115* 129*         Significant Imaging: I have reviewed all pertinent imaging results/findings within the past 24 hours.    Upper GI endoscopy  Order: [id number]   Status: Final result       Next appt: 06/23/2025 at 01:45 PM in Cardiology ([facility] Diagnostics Echocardiography/Vascular Ultrasound)    Test Result Released: Yes (not seen)    0 Result Notes        Narrative  Performed by: [employer]  Patient Name: Sanjeev Britt  Procedure Date: 6/18/2025 11:23 AM  MRN: [id number]  Account Number: [id number]  YOB: 1965  Age: 59  Room: 1  Gender: Male  Attending MD: Silvia Fuller MD, [id number]  Procedure:             Upper GI endoscopy  Indications:           Epigastric abdominal pain, Hematemesis  Providers:             Silvia Fuller MD  Referring MD:          Silvia Fuller MD  Complications:         No immediate complications.  Medicines:             Propofol per Anesthesia  Procedure:             Pre-Anesthesia Assessment:                         - ASA  Grade Assessment: II - A patient with mild                         systemic disease.                         After obtaining informed consent, the endoscope                         was passed under direct vision. Throughout the                         procedure, the patient's blood pressure, pulse,                         and oxygen saturations were monitored                         continuously. The Olympus GIF-H190 ([id number]) was                         introduced through the mouth, and advanced to the                         second part of duodenum.  Findings:       LA Grade A (one or more mucosal breaks less than 5 mm, not extending       between tops of 2 mucosal folds) esophagitis with no bleeding was       found 37 cm from the incisors. Biopsies were taken with a cold       forceps for histology.       Striped mildly erythematous mucosa without bleeding was found in the       gastric antrum. Biopsies were taken with a cold forceps for       Helicobacter pylori testing.       The examined duodenum was normal.  Impression:            - LA Grade A erosive esophagitis with no bleeding.                         Biopsied.                         - Erythematous mucosa in the antrum. Biopsied.                         - Normal examined duodenum.  Recommendation:        - Await pathology results.                         - Use Protonix (pantoprazole) 40 mg PO daily for 3                         months.                         - [facility].  Silvia Fuller MD  6/20/2025 8:00:24 AM  This report has been verified and signed electronically.  Dear patient,  As a result of recent federal legislation (The Federal Cures Act), you may  receive lab or pathology results from your procedure in your [facility]  account before your physician is able to contact you. Your physician or  their representative will relay the results to you with their  recommendations at their soonest  availability.  Thank you,  Number of Addenda: 0  Note Initiated On: 6/18/2025 11:23 AM  Estimated Blood Loss:  Estimated blood loss: none.       This report has been verified and signed electronically.   Specimen Collected: 06/18/25 11:23 CDT Last Resulted: 06/20/25 08:02 CDT             Assessment & Plan  GI bleed  Patient's hemorrhage is due to gastrointestinal bleed, Patients most recent Hgb, Hct, platelets, and INR are listed below.  Recent Labs     06/18/25  0451 06/18/25  0759 06/18/25  1625 06/19/25  0440 06/20/25  0336   HGB 14.6   < > 15.1 14.9 14.0   HCT 45.4   < > 46.3 45.4 43.5      < > 265 235 199   INR 1.0  --   --   --   --     < > = values in this interval not displayed.     Plan  - Will trend hemoglobin/hematocrit Every 8 hours  - Will monitor and correct any coagulation defects  - Will transfuse if Hgb is <7g/dl (<8g/dl in cases of active ACS) or if patient has rapid bleeding leading to hemodynamic instability  - On GI bleed pathway  - Pantoprazole gtt transitioned to daily  - GI consulted, appreciate recs  - EGD revealed linear grade a erosive esophagitis and mild gastropathy but no slava bleeding.  -Protonix 40 mg daily for 3 months   - Holding VTE prophylaxis as well as Brilinta and aspirin, resume once appropriate  Cholelithiasis  - Presented to the ED with chief complaint of right upper quadrant pain that radiates to midsternal chest  - Right upper quadrant ultrasound with cholelithiasis and hepatic steatosis  - ED provider discussed case with Dr. Lerner who recommended HIDA scan, HIDA scan positive  - PRN analgesia  - Symptomatic care  - Holding VTE prophylaxis as well as home aspirin and Plavix, resume once appropriate  -HIDA positive   -CLD  -NPO p midnight  -Lap anirudh scheduled for 2:00 p.m.; clear bicarb  CAD (coronary artery disease) status post PCI  Patient with known CAD s/p stent placement, which is controlled Will continue Statin and monitor for S/Sx of angina/ACS.  Continue to monitor on telemetry.     - Holding home Brilinta and aspirin in setting of possible GI bleed, resume once appropriate  Essential hypertension  Chronic, controlled.  Latest blood pressure and vitals reviewed-     Temp:  [97.2 °F (36.2 °C)-99.9 °F (37.7 °C)]   Pulse:  [63-93]   Resp:  [14-22]   BP: (107-137)/(70-84)   SpO2:  [90 %-98 %] .   Home meds for hypertension were reviewed and noted below-  Hypertension Medications              amLODIPine (NORVASC) 5 MG tablet Take 1 tablet (5 mg total) by mouth once daily.    losartan (COZAAR) 50 MG Tab Take 1 tablet (50 mg total) by mouth once daily.    metoprolol succinate (TOPROL-XL) 25 MG 24 hr tablet Take 1 tablet (25 mg total) by mouth once daily.            While in the hospital, will manage blood pressure as follows; Continue home antihypertensive regimen    Will utilize p.r.n. blood pressure medication only if patient's blood pressure greater than 180/110 and he develops symptoms such as worsening chest pain or shortness of breath.    Sleep apnea  - Not using CPAP    DMII (diabetes mellitus, type 2)  Patient's FSGs are controlled on current medication regimen.  Last A1c reviewed-   Lab Results   Component Value Date    HGBA1C 5.8 02/08/2025     Most recent fingerstick glucose reviewed-   Recent Labs   Lab 06/19/25  1929 06/20/25  0725 06/20/25  1120 06/20/25  1501   POCTGLUCOSE 126* 134* 115* 129*     Current correctional scale  Medium  Maintain anti-hyperglycemic dose as follows-   Antihyperglycemics (From admission, onward)      Start     Stop Route Frequency Ordered    06/18/25 0412  insulin aspart U-100 pen 0-5 Units         -- SubQ Every 6 hours PRN 06/18/25 0312          Hold Oral hypoglycemics while patient is in the hospital.  Chronic systolic CHF (congestive heart failure)  Patient has Systolic (HFrEF) heart failure that is Chronic. On presentation their CHF was well compensated. Most recent BNP and echo results are listed below.  No results for  "input(s): "BNP" in the last 72 hours.  Latest ECHO  Results for orders placed during the hospital encounter of 02/07/25    Echo    Interpretation Summary    Left Ventricle: The left ventricle is normal in size. Normal wall thickness. There is concentric remodeling. Moderate hypokinesis of the inferior and the remaining walls appear to move fairly well There is mildly reduced systolic function with a visually estimated ejection fraction of 45 - 50%. There is normal diastolic function.    Right Ventricle: Normal right ventricular cavity size. Wall thickness is normal. Systolic function is normal.    Aortic Valve: The aortic valve is structurally normal.    Tricuspid Valve: The tricuspid valve is structurally normal.    Pulmonary Artery: The estimated pulmonary artery systolic pressure is 22 mmHg.    IVC/SVC: Intermediate venous pressure at 8 mmHg.    Current Heart Failure Medications  losartan tablet 25 mg, Daily, Oral  metoprolol succinate (TOPROL-XL) 24 hr tablet 25 mg, Daily, Oral    Plan  - Monitor strict I&Os and daily weights.    - Place on telemetry  - Low sodium diet  - Place on fluid restriction of 1.5 L.   - Cardiology has been consulted  - The patient's volume status is at their baseline  - Resume home metoprolol and losartan  NSVT (nonsustained ventricular tachycardia)  - Hx noted    Nicotine dependence  Dangers of smokeless tobacco were reviewed with patient in detail. Patient was Counseled for 3-10 minutes. Nicotine replacement options were discussed. Nicotine replacement was discussed- prescribed  Schwannoma  Confirmed by MRI  Findings discussed with the patient and spouse  We will need follow up with [place] [facility]    VTE Risk Mitigation (From admission, onward)           Ordered     IP VTE HIGH RISK PATIENT  Once         06/17/25 2034     Place sequential compression device  Until discontinued         06/17/25 2034     Reason for No Pharmacological VTE Prophylaxis  Once        Question Answer " Comment   Reasons: Active Bleeding    Reasons: Risk of Bleeding        06/17/25 2034                    Discharge Planning   BARRON: 6/21/2025     Code Status: Full Code   Medical Readiness for Discharge Date:   Discharge Plan A: Home with family          SDOH Screening:  The patient declined to be screened for utility difficulties, food insecurity, transport difficulties, housing insecurity, and interpersonal safety, so no concerns could be identified this admission.                      Tosha Kearney DNP  Department of Hospital Medicine   [facility]

## 2025-06-20 NOTE — NURSING
Patient transferred to surgery via hospital bed, patient NPO, family at bedside.  Patients antibiotics sent with patient to surgery.  Report given to surgery nurse.  Patient on RA.

## 2025-06-20 NOTE — PLAN OF CARE
Patient taken to room via bed at this time. Awake and alert not distressful. Leigh, floor nurse at bedside to assume care of patient

## 2025-06-20 NOTE — ANESTHESIA PROCEDURE NOTES
Intubation    Date/Time: 6/20/2025 1:54 PM    Performed by: Miguel Fuentes CRNA  Authorized by: Miguel Fuentes CRNA    Intubation:     Induction:  Intravenous    Intubated:  Postinduction    Mask Ventilation:  Easy mask    Attempts:  1    Attempted By:  CRNA    Method of Intubation:  Video laryngoscopy    Blade:  Toribio 3    Laryngeal View Grade: Grade I - full view of cords      Difficult Airway Encountered?: No      Complications:  None    Airway Device:  Oral endotracheal tube    Airway Device Size:  7.5    Style/Cuff Inflation:  Cuffed (inflated to minimal occlusive pressure)    Tube secured:  21    Secured at:  The lips    Placement Verified By:  Capnometry    Complicating Factors:  None    Findings Post-Intubation:  BS equal bilateral and atraumatic/condition of teeth unchanged

## 2025-06-20 NOTE — ANESTHESIA PREPROCEDURE EVALUATION
06/20/2025  Sanjeev Britt is a 59 y.o., male.      Results for orders placed or performed during the hospital encounter of 06/17/25   EKG 12-lead    Collection Time: 06/17/25  2:20 PM   Result Value Ref Range    QRS Duration 92 ms    [facility] QTC Calculation 470 ms    Narrative    Test Reason : R07.9,    Vent. Rate :  63 BPM     Atrial Rate :  63 BPM     P-R Int : 158 ms          QRS Dur :  92 ms      QT Int : 460 ms       P-R-T Axes :  63  42   7 degrees    QTcB Int : 470 ms    Normal sinus rhythm with sinus arrhythmia  Possible Inferior infarct ,age undetermined  Possible Anterior infarct ,age undetermined  Abnormal ECG  No previous ECGs available  Confirmed by Onofre Mcguire ([id number]) on 6/19/2025 9:00:14 PM    Referred By:            Confirmed By: Onofre Mcguire               Lab Results   Component Value Date    WBC 16.30 (H) 06/20/2025    HGB 14.0 06/20/2025    HCT 43.5 06/20/2025    MCV 87 06/20/2025     06/20/2025     BMP  Lab Results   Component Value Date     06/20/2025    K 3.8 06/20/2025     06/20/2025    CO2 27 06/20/2025    BUN 15 06/20/2025    CREATININE 0.9 06/20/2025    CALCIUM 9.2 06/20/2025    ANIONGAP 10 06/20/2025     06/20/2025     (H) 06/19/2025     (H) 06/18/2025       Results for orders placed during the hospital encounter of 02/07/25    Echo    Interpretation Summary    Left Ventricle: The left ventricle is normal in size. Normal wall thickness. There is concentric remodeling. Moderate hypokinesis of the inferior and the remaining walls appear to move fairly well There is mildly reduced systolic function with a visually estimated ejection fraction of 45 - 50%. There is normal diastolic function.    Right Ventricle: Normal right ventricular cavity size. Wall thickness is normal. Systolic function is normal.    Aortic Valve: The aortic valve is  structurally normal.    Tricuspid Valve: The tricuspid valve is structurally normal.    Pulmonary Artery: The estimated pulmonary artery systolic pressure is 22 mmHg.    IVC/SVC: Intermediate venous pressure at 8 mmHg.              Pre-op Assessment    I have reviewed the Patient Summary Reports.     I have reviewed the Nursing Notes. I have reviewed the NPO Status.   I have reviewed the Medications.     Review of Systems  Anesthesia Hx:  No problems with previous Anesthesia             Denies Family Hx of Anesthesia complications.    Denies Personal Hx of Anesthesia complications.                    Social:  Non-Smoker, No Alcohol Use       Hematology/Oncology:                   Hematology Comments: History of pulmonary embolism  Brilinta therapy  Aspirin therapy                    EENT/Dental:   Chronic pansinusitis          Cardiovascular:     Hypertension, well controlled  Past MI (hx of STEMI with cardiac arrest and CPR  02/2025) CAD (hx of PCI)   CABG/stent (ELIDA placed 02/2025) Dysrhythmias (hx of non-sustained V tach, hx SVT - on chronic amiodarone)   Denies Angina. CHF (EF 45-50% on most recent ECHO)       ECG has been reviewed. History of cardiac arrest  Coronary artery stent in place  Patient denies angina following stent placement  Chronic CHF moderate hypokinesis ejection fraction 45%  Patient follow-up with Dr. Mcguire seen this morning - Cleared for surgery tomorrow with moderate cardiac risks. Resume DAPT as soon as ok per surgeon. Patient on beta blockers Beta blocker taken in last 24 hours                         Pulmonary:        Sleep Apnea (not using CPAP) History of pulmonary embolism          Education provided regarding risk of obstructive sleep apnea            Hepatic/GI:        Calculus of gallbladder with acute cholecystitis without obstruction             Musculoskeletal:         Spine Disorders: cervical and lumbar Chronic Pain           Neurological:  Neurology Normal                                       Endocrine:  Diabetes, well controlled, type 2   Pre diabetes        Psych:  Psychiatric History                  Physical Exam  General: Well nourished, Cooperative, Alert and Oriented    Airway:  Mallampati: III   Mouth Opening: Normal  TM Distance: Normal  Tongue: Normal  Neck ROM: Normal ROM    Dental:  Periodontal disease, Partial Dentures  Chipped lower front teeth  Partial upper dentures  Chest/Lungs:  Clear to auscultation, Normal Respiratory Rate    Heart:  Rate: Normal  Rhythm: Regular Rhythm  Sounds: Normal    Abdomen:  Normal, Soft, Nontender        Anesthesia Plan  Type of Anesthesia, risks & benefits discussed:    Anesthesia Type: Gen ETT  Intra-op Monitoring Plan: Standard ASA Monitors  Post Op Pain Control Plan: multimodal analgesia and IV/PO Opioids PRN  Induction:  IV and rapid sequence  Airway Plan: Video, Post-Induction  Informed Consent: Informed consent signed with the Patient and all parties understand the risks and agree with anesthesia plan.  All questions answered. Patient consented to blood products? No  ASA Score: 3  Anesthesia Plan Notes:       GETA  RSI  No Decadron   No Ofirmev   Zofran 4mg iv, Pepcid 20mg iv   Sugammadex   JUDY Precautions: Extubate patient awake with HOB elevated and oral airway in place     Ready For Surgery From Anesthesia Perspective.     .

## 2025-06-20 NOTE — ANESTHESIA POSTPROCEDURE EVALUATION
Anesthesia Post Evaluation    Patient: Sanjeev Britt    Procedure(s) Performed: Procedure(s) (LRB):  CHOLECYSTECTOMY, LAPAROSCOPIC (N/A)    Final Anesthesia Type: general      Patient location during evaluation: PACU  Patient participation: Yes- Able to Participate  Level of consciousness: awake and alert, oriented and awake  Post-procedure vital signs: reviewed and stable  Pain management: adequate  Airway patency: patent  JUDY mitigation strategies: Multimodal analgesia, Extubation while patient is awake, Verification of full reversal of neuromuscular block and Extubation and recovery carried out in lateral, semiupright, or other nonsupine position  PONV status at discharge: No PONV  Anesthetic complications: no      Cardiovascular status: blood pressure returned to baseline, hemodynamically stable and stable  Respiratory status: unassisted, spontaneous ventilation and room air  Hydration status: euvolemic  Follow-up not needed.              Vitals Value Taken Time   /83 06/20/25 15:30   Temp 36.6 °C (97.8 °F) 06/20/25 15:30   Pulse 77 06/20/25 15:36   Resp 22 06/20/25 15:32   SpO2 95 % 06/20/25 15:36   Vitals shown include unfiled device data.      No case tracking events are documented in the log.      Pain/Teresa Score: Pain Rating Prior to Med Admin: 8 (6/20/2025  3:30 PM)  Pain Rating Post Med Admin: 3 (6/20/2025  8:44 AM)  Teresa Score: 10 (6/20/2025  3:30 PM)

## 2025-06-20 NOTE — ASSESSMENT & PLAN NOTE
- Presented to the ED with chief complaint of right upper quadrant pain that radiates to midsternal chest  - Right upper quadrant ultrasound with cholelithiasis and hepatic steatosis  - ED provider discussed case with Dr. Lerner who recommended HIDA scan, HIDA scan positive  - PRN analgesia  - Symptomatic care  - Holding VTE prophylaxis as well as home aspirin and Plavix, resume once appropriate  -HIDA positive   -CLD  -NPO p midnight  -Lap anirudh scheduled for 2:00 p.m.; clear bicarb

## 2025-06-20 NOTE — ASSESSMENT & PLAN NOTE
Patient's hemorrhage is due to gastrointestinal bleed, Patients most recent Hgb, Hct, platelets, and INR are listed below.  Recent Labs     06/18/25  0451 06/18/25  0759 06/18/25  1625 06/19/25  0440 06/20/25  0336   HGB 14.6   < > 15.1 14.9 14.0   HCT 45.4   < > 46.3 45.4 43.5      < > 265 235 199   INR 1.0  --   --   --   --     < > = values in this interval not displayed.     Plan  - Will trend hemoglobin/hematocrit Every 8 hours  - Will monitor and correct any coagulation defects  - Will transfuse if Hgb is <7g/dl (<8g/dl in cases of active ACS) or if patient has rapid bleeding leading to hemodynamic instability  - On GI bleed pathway  - Pantoprazole gtt transitioned to daily  - GI consulted, appreciate recs  - EGD revealed linear grade a erosive esophagitis and mild gastropathy but no slava bleeding.  -Protonix 40 mg daily for 3 months   - Holding VTE prophylaxis as well as Brilinta and aspirin, resume once appropriate

## 2025-06-20 NOTE — PLAN OF CARE
OR today. BARRON 6/21. Spouse will provide transportation home. Post-op appointment requested via in basket.        06/19/25 1431   Medicare Message   Important Message from Medicare regarding Discharge Appeal Rights Given to patient/caregiver;Explained to patient/caregiver;Signed/date by patient/caregiver   Date IMM was signed 06/19/25   Time IMM was signed [id number]

## 2025-06-21 ENCOUNTER — NURSE TRIAGE (OUTPATIENT)
Dept: ADMINISTRATIVE | Facility: CLINIC | Age: 60
End: 2025-06-21
Payer: MEDICAID

## 2025-06-21 VITALS
DIASTOLIC BLOOD PRESSURE: 71 MMHG | HEART RATE: 76 BPM | SYSTOLIC BLOOD PRESSURE: 113 MMHG | BODY MASS INDEX: 25.51 KG/M2 | RESPIRATION RATE: 18 BRPM | OXYGEN SATURATION: 97 % | WEIGHT: 158.75 LBS | HEIGHT: 66 IN | TEMPERATURE: 99 F

## 2025-06-21 DIAGNOSIS — K80.20 SYMPTOMATIC CHOLELITHIASIS: ICD-10-CM

## 2025-06-21 PROBLEM — K80.00 CALCULUS OF GALLBLADDER WITH ACUTE CHOLECYSTITIS WITHOUT OBSTRUCTION: Status: RESOLVED | Noted: 2025-06-21 | Resolved: 2025-06-21

## 2025-06-21 PROBLEM — R07.9 CHEST PAIN: Status: RESOLVED | Noted: 2019-09-10 | Resolved: 2025-06-21

## 2025-06-21 PROBLEM — D36.10 SCHWANNOMA: Status: RESOLVED | Noted: 2025-06-19 | Resolved: 2025-06-21

## 2025-06-21 PROBLEM — K80.00 CALCULUS OF GALLBLADDER WITH ACUTE CHOLECYSTITIS WITHOUT OBSTRUCTION: Status: ACTIVE | Noted: 2025-06-21

## 2025-06-21 PROBLEM — K92.2 GI BLEED: Status: RESOLVED | Noted: 2025-06-17 | Resolved: 2025-06-21

## 2025-06-21 PROBLEM — I47.29 NSVT (NONSUSTAINED VENTRICULAR TACHYCARDIA): Status: RESOLVED | Noted: 2025-02-28 | Resolved: 2025-06-21

## 2025-06-21 LAB
ABSOLUTE EOSINOPHIL (SMH): 0 K/UL
ABSOLUTE MONOCYTE (SMH): 1.23 K/UL (ref 0.3–1)
ABSOLUTE NEUTROPHIL COUNT (SMH): 9.3 K/UL (ref 1.8–7.7)
ANION GAP (SMH): 13 MMOL/L (ref 8–16)
BASOPHILS # BLD AUTO: 0.02 K/UL
BASOPHILS NFR BLD AUTO: 0.2 %
BUN SERPL-MCNC: 15 MG/DL (ref 8–23)
CALCIUM SERPL-MCNC: 9.1 MG/DL (ref 8.7–10.5)
CHLORIDE SERPL-SCNC: 101 MMOL/L (ref 95–110)
CO2 SERPL-SCNC: 24 MMOL/L (ref 23–29)
CREAT SERPL-MCNC: 0.8 MG/DL (ref 0.5–1.4)
ERYTHROCYTE [DISTWIDTH] IN BLOOD BY AUTOMATED COUNT: 15 % (ref 11.5–14.5)
GFR SERPLBLD CREATININE-BSD FMLA CKD-EPI: >60 ML/MIN/1.73/M2
GLUCOSE SERPL-MCNC: 119 MG/DL (ref 70–110)
HCT VFR BLD AUTO: 42 % (ref 40–54)
HGB BLD-MCNC: 13.6 GM/DL (ref 14–18)
IMM GRANULOCYTES # BLD AUTO: 0.07 K/UL (ref 0–0.04)
IMM GRANULOCYTES NFR BLD AUTO: 0.6 % (ref 0–0.5)
LYMPHOCYTES # BLD AUTO: 0.47 K/UL (ref 1–4.8)
MCH RBC QN AUTO: 28 PG (ref 27–31)
MCHC RBC AUTO-ENTMCNC: 32.4 G/DL (ref 32–36)
MCV RBC AUTO: 87 FL (ref 82–98)
NUCLEATED RBC (/100WBC) (SMH): 0 /100 WBC
PLATELET # BLD AUTO: 226 K/UL (ref 150–450)
PMV BLD AUTO: 11.5 FL (ref 9.2–12.9)
POCT GLUCOSE: 147 MG/DL (ref 70–110)
POCT GLUCOSE: 148 MG/DL (ref 70–110)
POTASSIUM SERPL-SCNC: 3.4 MMOL/L (ref 3.5–5.1)
RBC # BLD AUTO: 4.85 M/UL (ref 4.6–6.2)
RELATIVE EOSINOPHIL (SMH): 0 % (ref 0–8)
RELATIVE LYMPHOCYTE (SMH): 4.2 % (ref 18–48)
RELATIVE MONOCYTE (SMH): 11.1 % (ref 4–15)
RELATIVE NEUTROPHIL (SMH): 83.9 % (ref 38–73)
SODIUM SERPL-SCNC: 138 MMOL/L (ref 136–145)
WBC # BLD AUTO: 11.1 K/UL (ref 3.9–12.7)

## 2025-06-21 PROCEDURE — 80048 BASIC METABOLIC PNL TOTAL CA: CPT

## 2025-06-21 PROCEDURE — 99900035 HC TECH TIME PER 15 MIN (STAT)

## 2025-06-21 PROCEDURE — 85025 COMPLETE CBC W/AUTO DIFF WBC: CPT

## 2025-06-21 PROCEDURE — 25000003 PHARM REV CODE 250: Performed by: INTERNAL MEDICINE

## 2025-06-21 PROCEDURE — 36415 COLL VENOUS BLD VENIPUNCTURE: CPT

## 2025-06-21 PROCEDURE — 94761 N-INVAS EAR/PLS OXIMETRY MLT: CPT

## 2025-06-21 PROCEDURE — 25000003 PHARM REV CODE 250

## 2025-06-21 PROCEDURE — S4991 NICOTINE PATCH NONLEGEND: HCPCS

## 2025-06-21 PROCEDURE — 63600175 PHARM REV CODE 636 W HCPCS: Performed by: INTERNAL MEDICINE

## 2025-06-21 PROCEDURE — 0FT44ZZ RESECTION OF GALLBLADDER, PERCUTANEOUS ENDOSCOPIC APPROACH: ICD-10-PCS | Performed by: SURGERY

## 2025-06-21 PROCEDURE — 63600175 PHARM REV CODE 636 W HCPCS

## 2025-06-21 PROCEDURE — 25000003 PHARM REV CODE 250: Performed by: NURSE PRACTITIONER

## 2025-06-21 RX ORDER — TRAZODONE HYDROCHLORIDE 150 MG/1
150 TABLET ORAL NIGHTLY PRN
Qty: 28 TABLET | Refills: 0 | Status: SHIPPED | OUTPATIENT
Start: 2025-06-21 | End: 2026-06-21

## 2025-06-21 RX ORDER — LOSARTAN POTASSIUM 25 MG/1
25 TABLET ORAL DAILY
Qty: 90 TABLET | Refills: 3 | Status: SHIPPED | OUTPATIENT
Start: 2025-06-22 | End: 2026-06-22

## 2025-06-21 RX ORDER — PANTOPRAZOLE SODIUM 40 MG/1
40 TABLET, DELAYED RELEASE ORAL
Status: DISCONTINUED | OUTPATIENT
Start: 2025-06-22 | End: 2025-06-21 | Stop reason: HOSPADM

## 2025-06-21 RX ORDER — IBUPROFEN 200 MG
1 TABLET ORAL DAILY
Qty: 30 PATCH | Refills: 0 | Status: CANCELLED | OUTPATIENT
Start: 2025-06-21

## 2025-06-21 RX ORDER — METOPROLOL SUCCINATE 25 MG/1
25 TABLET, EXTENDED RELEASE ORAL DAILY
Qty: 90 TABLET | Refills: 3 | Status: SHIPPED | OUTPATIENT
Start: 2025-06-22 | End: 2026-06-22

## 2025-06-21 RX ORDER — DEXTROMETHORPHAN POLISTIREX 30 MG/5 ML
1 SUSPENSION, EXTENDED RELEASE 12 HR ORAL ONCE
Status: COMPLETED | OUTPATIENT
Start: 2025-06-21 | End: 2025-06-21

## 2025-06-21 RX ORDER — HYDROCODONE BITARTRATE AND ACETAMINOPHEN 5; 325 MG/1; MG/1
1 TABLET ORAL EVERY 6 HOURS PRN
Qty: 20 TABLET | Refills: 0 | Status: SHIPPED | OUTPATIENT
Start: 2025-06-21

## 2025-06-21 RX ORDER — NICOTINE 7MG/24HR
1 PATCH, TRANSDERMAL 24 HOURS TRANSDERMAL DAILY
Qty: 30 PATCH | Refills: 3 | Status: SHIPPED | OUTPATIENT
Start: 2025-06-21

## 2025-06-21 RX ORDER — HYDROCODONE BITARTRATE AND ACETAMINOPHEN 5; 325 MG/1; MG/1
1 TABLET ORAL EVERY 6 HOURS PRN
Qty: 15 TABLET | Refills: 0 | Status: SHIPPED | OUTPATIENT
Start: 2025-06-21 | End: 2025-06-21

## 2025-06-21 RX ADMIN — LOSARTAN POTASSIUM 25 MG: 25 TABLET, FILM COATED ORAL at 08:06

## 2025-06-21 RX ADMIN — NICOTINE 1 PATCH: 21 PATCH, EXTENDED RELEASE TRANSDERMAL at 08:06

## 2025-06-21 RX ADMIN — PIPERACILLIN SODIUM AND TAZOBACTAM SODIUM 4.5 G: 4; .5 INJECTION, POWDER, LYOPHILIZED, FOR SOLUTION INTRAVENOUS at 04:06

## 2025-06-21 RX ADMIN — MINERAL OIL 1 ENEMA: 100 ENEMA RECTAL at 01:06

## 2025-06-21 RX ADMIN — POTASSIUM BICARBONATE 35 MEQ: 391 TABLET, EFFERVESCENT ORAL at 04:06

## 2025-06-21 RX ADMIN — AMIODARONE HYDROCHLORIDE 100 MG: 100 TABLET ORAL at 08:06

## 2025-06-21 RX ADMIN — BUSPIRONE HYDROCHLORIDE 7.5 MG: 5 TABLET ORAL at 08:06

## 2025-06-21 RX ADMIN — PANTOPRAZOLE SODIUM 40 MG: 40 INJECTION, POWDER, FOR SOLUTION INTRAVENOUS at 08:06

## 2025-06-21 RX ADMIN — HYDROCODONE BITARTRATE AND ACETAMINOPHEN 1 TABLET: 5; 325 TABLET ORAL at 08:06

## 2025-06-21 RX ADMIN — THERA TABS 1 TABLET: TAB at 08:06

## 2025-06-21 RX ADMIN — SENNOSIDES AND DOCUSATE SODIUM 1 TABLET: 50; 8.6 TABLET ORAL at 08:06

## 2025-06-21 RX ADMIN — METOPROLOL SUCCINATE 25 MG: 25 TABLET, EXTENDED RELEASE ORAL at 08:06

## 2025-06-21 RX ADMIN — HYDROCODONE BITARTRATE AND ACETAMINOPHEN 1 TABLET: 5; 325 TABLET ORAL at 01:06

## 2025-06-21 RX ADMIN — ATORVASTATIN CALCIUM 80 MG: 40 TABLET, FILM COATED ORAL at 08:06

## 2025-06-21 RX ADMIN — AMLODIPINE BESYLATE 10 MG: 5 TABLET ORAL at 08:06

## 2025-06-21 RX ADMIN — GABAPENTIN 300 MG: 300 CAPSULE ORAL at 08:06

## 2025-06-21 RX ADMIN — PIPERACILLIN SODIUM AND TAZOBACTAM SODIUM 4.5 G: 4; .5 INJECTION, POWDER, LYOPHILIZED, FOR SOLUTION INTRAVENOUS at 01:06

## 2025-06-21 RX ADMIN — POTASSIUM BICARBONATE 35 MEQ: 391 TABLET, EFFERVESCENT ORAL at 08:06

## 2025-06-21 NOTE — OP NOTE
DATE OF PROCEDURE: 06/21/2025    PREOPERATIVE DIAGNOSIS: Acute cholecystitis  History of recent MI on Brilinta    POSTOPERATIVE DIAGNOSIS: Acute cholecystitis with empyema of the gallbladder    PROCEDURE: Laparoscopic cholecystectomy    SURGEON: Xavier Lerner M.D    ASSISTANT: None    ANESTHESIA: General endotracheal    ESTIMATED BLOOD LOSS: 20 cc    SPECIMEN: Gallbladder    CONDITION: Stable    COMPLICATIONS: None    FINDINGS:   1. Gallbladder very inflamed with empyema of the gallbladder  2. Critical view of safety achieved  3. Spillage of purulent bile but no stones    INDICATIONS: The patient is a 59 y.o. male admitted with severe right upper quadrant abdominal pain.  Had recent history of MI status post stents on Brilinta. This has cleared by Cardiology.  HIDA scan confirmed acute cholecystitis.The patient was counseled on their surgical options and desired surgical intervention. The risks of the procedure were described to the patient including pain, infection, bleeding, scarring, wound complications, injury to local structures such as bile duct, liver or intestine, warranting more extensive surgery, retained common bile duct stone or need for further intervention. The patient demonstrated understanding of these risks and a consent form was obtained.    PROCEDURE IN DETAIL: PROCEDURE IN DETAIL: The patient was identified in the Preoperative Unit and taken back to the Operating Room and laid supine on the operating room table. IV antibiotics were administered and general anesthesia was induced without complication. The patient was then prepped and draped in the standard sterile fashion. A timeout was performed in accordance with hospital protocol.  A Veress needle was introduced into the abdominal cavity and insufflation was attached. We had appropriate initial pressures and pneumoperitoneum was achieved. Local anesthetic was injected then a stab incision was sharply made just above the umbilicus. A 5 mm  Optiview trocar was introduced into the peritoneal cavity under direct visualization.  We examined the trocar and Veress needle insertion sites and no obvious injury was identified. An 11 mm trocar was then placed in subxiphoid region under direct visualization after injecting local anesthetic.  Two additional 5 mm trocars were placed in the right mid and right lateral abdomen under direct visualization again after injecting local anesthetic. The gallbladder was identified and found to very inflamed with dense adhesions. This has were taken down through a combination of blunt and sharp dissection.  Gallbladder could not be grasped therefore needle decompression was performed.  Initially some blood-tinged bile was removed but then it became apparent there was some purulence consistent with empyema. Even after removal of the fluid the gallbladder was still difficult to grasp due to the thickness of the wall. Dissection was difficult but we did it and through a combination of blunt and sharp dissection. We are able to isolate 2 structures entering the gallbladder consistent with cystic duct and cystic artery.The cystic duct and artery were doubly clipped proximally and once distally and then divided. The gallbladder was then dissected off the gallbladder fossa using electrocautery and blunt dissection.  An additional posterior branch entering into the wall of the gallbladder was clipped. Once dissection was completed, the gallbladder was placed into an EndoCatch bag and removed through the subxiphoid port which had to be significantly extended due to the size of the gallbladder and numerous stones. The right upper quadrant was then irrigated and then suctioned. The dissection field was inspected for hemostasis, bile leak and to confirmed clips were in place. I used Jean Carlos hemostatic powder in the liver bed fossa. Additional local anesthetic was injected around the port sites under direct visualization.  The subxiphoid  fascial incision was closed with a 0 Vicryl suture. The abdomen was desufflated and ports removed. Additional local anesthetic was injected and all the skin incisions were closed using a 4-0 Monocryl subcuticular stitch. Dermabond was then applied. The patient was awakened from general anesthesia without complication and returned to the Postoperative Recovery Unit in stable condition. At the end of the case, sponge, instrument and needle counts were correct on 2 occasions. I was present and scrubbed throughout the entirety of the case.

## 2025-06-21 NOTE — PLAN OF CARE
Problem: Gastrointestinal Bleeding  Goal: Optimal Coping with Acute Illness  Outcome: Met  Goal: Hemostasis  Outcome: Met     Problem: Adult Inpatient Plan of Care  Goal: Plan of Care Review  Outcome: Met  Goal: Patient-Specific Goal (Individualized)  Outcome: Met  Goal: Absence of Hospital-Acquired Illness or Injury  Outcome: Met  Goal: Optimal Comfort and Wellbeing  Outcome: Met  Goal: Readiness for Transition of Care  Outcome: Met     Problem: Diabetes Comorbidity  Goal: Blood Glucose Level Within Targeted Range  Outcome: Met     Problem: Diabetes Comorbidity  Goal: Blood Glucose Level Within Targeted Range  Outcome: Met     Problem: Wound  Goal: Optimal Coping  Outcome: Met  Goal: Optimal Functional Ability  Outcome: Met  Goal: Absence of Infection Signs and Symptoms  Outcome: Met  Goal: Improved Oral Intake  Outcome: Met  Goal: Optimal Pain Control and Function  Outcome: Met  Goal: Skin Health and Integrity  Outcome: Met  Goal: Optimal Wound Healing  Outcome: Met

## 2025-06-21 NOTE — PLAN OF CARE
Patient cleared for discharge from case management standpoint.    Chart and discharge orders reviewed.  Patient discharged with no further case management needs.      06/21/25 1601   Final Note   Assessment Type Final Discharge Note   Anticipated Discharge Disposition Home   What phone number can be called within the next 1-3 days to see how you are doing after discharge? [phone]   Post-Acute Status   Discharge Delays None known at this time

## 2025-06-21 NOTE — ASSESSMENT & PLAN NOTE
Confirmed by MRI  Findings discussed with the patient and spouse  We will need follow up [place] [facility] [place]

## 2025-06-21 NOTE — ASSESSMENT & PLAN NOTE
Chronic, controlled.  Latest blood pressure and vitals reviewed-     Temp:  [97.2 °F (36.2 °C)-100.8 °F (38.2 °C)]   Pulse:  [74-93]   Resp:  [14-22]   BP: (113-148)/(70-85)   SpO2:  [90 %-98 %] .   Home meds for hypertension were reviewed and noted below-  Hypertension Medications              amLODIPine (NORVASC) 5 MG tablet Take 1 tablet (5 mg total) by mouth once daily.    losartan (COZAAR) 50 MG Tab Take 1 tablet (50 mg total) by mouth once daily.    metoprolol succinate (TOPROL-XL) 25 MG 24 hr tablet Take 1 tablet (25 mg total) by mouth once daily.            While in the hospital, will manage blood pressure as follows; Continue home antihypertensive regimen    Will utilize p.r.n. blood pressure medication only if patient's blood pressure greater than 180/110 and he develops symptoms such as worsening chest pain or shortness of breath.

## 2025-06-21 NOTE — NURSING
Patient is discharged. IV is discontinued. Telemetry monitor is discontinued. Patient is stable. No acute distress noted.

## 2025-06-21 NOTE — ASSESSMENT & PLAN NOTE
Patient with known CAD s/p stent placement, which is controlled Will continue Statin and monitor for S/Sx of angina/ACS. Continue to monitor on telemetry.     - resume home Brilinta and aspirin on DC

## 2025-06-21 NOTE — ASSESSMENT & PLAN NOTE
Patient taken to surgery on 06/20/2025.  Patient's is cleared for DC and resumption of aspirin and Brilinta when returning home  Follow up with general surgeon in 1 week

## 2025-06-21 NOTE — DISCHARGE SUMMARY
[facility] Medicine  Discharge Summary      Patient Name: Sanjeev Britt  MRN: [id number]  [facility]: [id number]  Patient Class: IP- Inpatient  Admission Date: 6/17/2025  Hospital Length of Stay: 3 days  Discharge Date and Time: 06/21/2025 1:02 PM  Attending Physician: Sathya Hall DO   Discharging Provider: Ariana Monroe NP  Primary Care Provider: Katt Fuentes NP    Primary Care Team: Networked reference to record PCT     HPI:   Mr. Britt is a 59 yr old male with a hx of CAD s/p PCI, HTN, sleep apnea not on CPAP, DM type 2, SVT, bilateral PE, spontaneous pneumothorax, ED, chronic pansinusitis, deviated nasal septum, STEMI, cardiac arrest, bradycardia, heart failure with reduced EF who presents to the ED with a chief complaint of abdominal pain.  Patient endorses 10+/10 sharp right upper quadrant abdominal pain that began around 10:00 a.m. this morning and radiates to his midsternal chest.  He states that he ate spaghetti last night and began having emesis today that contained remnants of spaghetti with the last episode being around 4:00 p.m..  He also endorses he has had 3 black tarry looking stools.  He denies having any black tarry stools while in the ED and denies any other active bleeding.  He also endorses subjective fever, chills, and lightheadedness.  He has taken Mylanta, Zofran, Phenergan at home without relief.  He states he goes can of dip every 2 days, occasionally drinks alcohol, and denies recreational drug use.  He denies dyspnea, chest pain, dysuria, hematuria, dizziness, and syncope.    Upon arrival to ED, patient afebrile, HR of 69, RR of 18, BP of 151/88, satting 100% on RA.  Workup in the ED reveals CO2 of 20, glucose of 118, initial troponin of 16.2, repeat troponin of 14.6.  Remainder of labs unremarkable.  CT AP with IV contrast shows no acute intra-abdominal abnormality.  49 x 40 x 39 mm hypodense soft tissue mass interposed between the right gluteus  minimus and medius muscles.  Sarcoma is not excluded.  Recommend MRI with and without contrast.  12 mm noncalcified nodule opacity left lung base.  Bibasilar atelectasis.  Follow-up chest CT in 3 months.  Right upper quadrant ultrasound shows cholelithiasis and hepatic steatosis.  CXR shows no acute cardiopulmonary abnormality.  Patient was given hydromorphone 1 mg IV x2, morphine 4 mg IV, ondansetron 4 mg IV, and pantoprazole 80 mg IV, and initiated on pantoprazole gtt while in the ED. ED provider discussed case with General surgery Dr. Lerner recommended HIDA scan, NPO at midnight, and will see patient in AM. case discussed with ED provider and patient will be placed under observation for further management.    Procedure(s) (LRB):  CHOLECYSTECTOMY, LAPAROSCOPIC (N/A)      Hospital Course:   Sanjeev Britt 59 y.o. male was closely monitored while in the hospital.  Patient was admitted to Hospital Medicine for GI bleed.  Brilinta and aspirin were held on admission. Gastroenterology was consulted on admission and patient underwent EGD that showed linear grade a erosive esophagitis and mild gastropathy but no slava bleeding.  Protonix 40 mg daily  3 months was recommended.  Right upper quadrant ultrasound revealed cholelithiasis and hepatic steatosis.  Neurosurgery was consulted in the HIDA scan was recommended.  HIDA scan positive.  Cardiology care patient is to go for laparoscopic cholecystectomy.  Patient maintained on anticoagulation with full-dose Lovenox.  Protonix drip positioned to IV Protonix.  CTA abdomen and pelvis revealed incidental finding of 49 x 40 x 39 mm hypodense soft tissue mass interposed between the right gluteus minimus and medius muscles. Sarcoma is not excluded.  Follow up MRI showed Schwannoma.  Ambulatory referral to Oncology surgery at [facility] was placed.    Patient had a  lap anirudh on 6/20/2025 and tolerated procedure well.  Patient's incision sites are clean dry and intact patient  tolerating well up and moving.  Patient was able to tolerate diet.  Patient we will be cleared for DC after BM with resumption of his aspirin and Brilinta on discharge.     Goals of Care Treatment Preferences:  Code Status: Full Code    Health care agent: Karla Montalvo / Genet Britt  [facility] care agent number:  /                       Consults:   Consults (From admission, onward)          Status Ordering Provider     Inpatient consult to Cardiology  Once        Provider:  Onofre Mcguire MD    Completed DHARMESH DEUTSCH     Inpatient consult to Gastroenterology  Once        Provider:  Silvia Fuller MD    Completed LENNIE BRADLEY     Inpatient consult to General Surgery  Once        Provider:  Xavier Lerner Jr., MD    Completed LENNIE BRADLEY            Assessment & Plan  CAD (coronary artery disease) status post PCI  Patient with known CAD s/p stent placement, which is controlled Will continue Statin and monitor for S/Sx of angina/ACS. Continue to monitor on telemetry.     - resume home Brilinta and aspirin on DC  Essential hypertension  Chronic, controlled.  Latest blood pressure and vitals reviewed-     Temp:  [97.2 °F (36.2 °C)-100.8 °F (38.2 °C)]   Pulse:  [74-93]   Resp:  [14-22]   BP: (113-148)/(70-85)   SpO2:  [90 %-98 %] .   Home meds for hypertension were reviewed and noted below-  Hypertension Medications              amLODIPine (NORVASC) 5 MG tablet Take 1 tablet (5 mg total) by mouth once daily.    losartan (COZAAR) 50 MG Tab Take 1 tablet (50 mg total) by mouth once daily.    metoprolol succinate (TOPROL-XL) 25 MG 24 hr tablet Take 1 tablet (25 mg total) by mouth once daily.            While in the hospital, will manage blood pressure as follows; Continue home antihypertensive regimen    Will utilize p.r.n. blood pressure medication only if patient's blood pressure greater than 180/110 and he develops symptoms such as worsening chest pain or shortness  "of breath.    Sleep apnea  - Not using CPAP    DMII (diabetes mellitus, type 2)  Patient's FSGs are controlled on current medication regimen.  Last A1c reviewed-   Lab Results   Component Value Date    HGBA1C 5.8 02/08/2025     Most recent fingerstick glucose reviewed-   Recent Labs   Lab 06/20/25  1501 06/20/25 2004 06/21/25  0724 06/21/25  1143   POCTGLUCOSE 129* 96 148* 147*     Hold Oral hypoglycemics while patient is in the hospital.  Patient we will DC home and manage diabetes with diet.  Follow up with PCP to determine continued need for insulin versus oral hypoglycemic.  Chronic systolic CHF (congestive heart failure)  Patient has Systolic (HFrEF) heart failure that is Chronic. On presentation their CHF was well compensated. Most recent BNP and echo results are listed below.  No results for input(s): "BNP" in the last 72 hours.  Latest ECHO  Results for orders placed during the hospital encounter of 02/07/25    Echo    Interpretation Summary    Left Ventricle: The left ventricle is normal in size. Normal wall thickness. There is concentric remodeling. Moderate hypokinesis of the inferior and the remaining walls appear to move fairly well There is mildly reduced systolic function with a visually estimated ejection fraction of 45 - 50%. There is normal diastolic function.    Right Ventricle: Normal right ventricular cavity size. Wall thickness is normal. Systolic function is normal.    Aortic Valve: The aortic valve is structurally normal.    Tricuspid Valve: The tricuspid valve is structurally normal.    Pulmonary Artery: The estimated pulmonary artery systolic pressure is 22 mmHg.    IVC/SVC: Intermediate venous pressure at 8 mmHg.    Current Heart Failure Medications  losartan tablet 25 mg, Daily, Oral  metoprolol succinate (TOPROL-XL) 24 hr tablet 25 mg, Daily, Oral    Plan  - Monitor strict I&Os and daily weights.    - Place on telemetry  - Low sodium diet  - Place on fluid restriction of 1.5 L.   - " Cardiology has been consulted  - The patient's volume status is at their baseline  - Resume home metoprolol and losartan  Nicotine dependence  Dangers of smokeless tobacco were reviewed with patient in detail. Patient was Counseled for 3-10 minutes. Nicotine replacement options were discussed. Nicotine replacement was discussed- prescribed  Schwannoma  Confirmed by MRI  Findings discussed with the patient and spouse  We will need follow up with Oncology surgery at [facility]    Chest pain  Status post stent placement 4 months ago  Aspirin and Brilinta on hold secondary to GI bleed  Cardiology following  Abdominal pain that radiates to just  EGD revealed ulcers    Calculus of gallbladder with acute cholecystitis without obstruction  Patient taken to surgery on 06/20/2025.  Patient's is cleared for DC and resumption of aspirin and Brilinta when returning home  Follow up with general surgeon in 1 week    Final Active Diagnoses:    Diagnosis Date Noted POA    Calculus of gallbladder with acute cholecystitis without obstruction [K80.00] 06/21/2025 Yes    Schwannoma [D36.10] 06/19/2025 Yes    Nicotine dependence [F17.200] 06/17/2025 Yes    Chronic systolic CHF (congestive heart failure) [I50.22] 02/28/2025 Yes    DMII (diabetes mellitus, type 2) [E11.9] 02/08/2025 Yes    Sleep apnea [G47.30] 04/10/2023 Yes    Essential hypertension [I10] 08/15/2021 Yes     Chronic    CAD (coronary artery disease) status post PCI [I25.10] 08/15/2021 Yes     Chronic    Chest pain [R07.9] 09/10/2019 Yes      Problems Resolved During this Admission:    Diagnosis Date Noted Date Resolved POA    PRINCIPAL PROBLEM:  GI bleed [K92.2] 06/17/2025 06/21/2025 Yes    Cholelithiasis [K80.20] 06/17/2025 06/21/2025 Yes    NSVT (nonsustained ventricular tachycardia) [I47.29] 02/28/2025 06/21/2025 Yes       Discharged Condition: good    Disposition:     Follow Up:   Follow-up Information       Xavier Lerner Jr., MD Follow up.    Specialty: General  Surgery  Why: Office will call to schedule post-op appointment  Contact information:  [address]  [address]  [place] [address]  [phone]               Katt Fuentes NP Follow up in 1 week(s).    Specialty: Family Medicine  Why: Please call the office to schedule hospital follow up  Contact information:  [place] [address]  [phone]                           Patient Instructions:   No discharge procedures on file.    Significant Diagnostic Studies: Endoscopy: Gastroscopy: 06/18/2025 per dr lao.  RECOMMEND PROTONIX 40 DAILY FOR 3 MONTHS.    Pending Diagnostic Studies:       Procedure Component Value Units Date/Time    CBC auto differential [[id number]]     Order Status: Sent Lab Status: No result     Specimen: Blood     Narrative:      The following orders were created for panel order CBC auto differential.  Procedure                               Abnormality         Status                     ---------                               -----------         ------                     CBC with Differential[[id number]]                                                        Please view results for these tests on the individual orders.    CBC with Differential [[id number]]     Order Status: Sent Lab Status: No result     Specimen: Blood     EXTRA TUBES [[id number]] Collected: 06/18/25 1625    Order Status: Sent Lab Status: In process Updated: 06/18/25 1633    Specimen: Blood, Venous     Narrative:      The following orders were created for panel order EXTRA TUBES.  Procedure                               Abnormality         Status                     ---------                               -----------         ------                     Light Blue Top Hold[[id number]]                             In process                   Please view results for these tests on the individual orders.    EXTRA TUBES [[id number]] Collected: 06/18/25 0759    Order Status: Sent Lab Status: In process Updated:  06/18/25 0831    Specimen: Blood, Venous     Narrative:      The following orders were created for panel order EXTRA TUBES.  Procedure                               Abnormality         Status                     ---------                               -----------         ------                     Light Green Top Hold[[id number]]                            In process                   Please view results for these tests on the individual orders.    EXTRA TUBES [[id number]] Collected: 06/17/25 1450    Order Status: Sent Lab Status: In process Updated: 06/17/25 1457    Specimen: Blood, Venous     Narrative:      The following orders were created for panel order EXTRA TUBES.  Procedure                               Abnormality         Status                     ---------                               -----------         ------                     Lavender Top Hold[[id number]]                               In process                 Gold Top Hold[[id number]]                                   In process                   Please view results for these tests on the individual orders.    Occult blood x 1, stool [[id number]]     Order Status: Sent Lab Status: No result     Specimen: Stool     Specimen to Pathology - Surgery [[id number]] Collected: 06/20/25 1416    Order Status: Sent Lab Status: No result     Specimen: Tissue            Medications:  Reconciled Home Medications:      Medication List        ASK your doctor about these medications      amiodarone 100 MG Tab  Commonly known as: PACERONE  Take 1 tablet (100 mg total) by mouth once daily.     amLODIPine 5 MG tablet  Commonly known as: NORVASC  Take 1 tablet (5 mg total) by mouth once daily.     aspirin 81 MG EC tablet  Commonly known as: ECOTRIN  Take 81 mg by mouth once daily.     atorvastatin 80 MG tablet  Commonly known as: LIPITOR  Take 1 tablet (80 mg total) by mouth once daily.     busPIRone 7.5 MG tablet  Commonly known as: BUSPAR  Take 7.5 mg by mouth 2  (two) times a day.     CENTRUM SILVER MEN ORAL  Take 1 tablet by mouth once daily.     gabapentin 300 MG capsule  Commonly known as: NEURONTIN  Take 300 mg by mouth 2 (two) times daily.     HYDROcodone-acetaminophen 5-325 mg per tablet  Commonly known as: NORCO  Take 1 tablet by mouth every 6 (six) hours as needed for Pain.     ibuprofen 600 MG tablet  Commonly known as: ADVIL,MOTRIN  Take 600 mg by mouth 3 (three) times daily.     INV losartan 50 MG Tab  Commonly known as: COZAAR  Take 1 tablet (50 mg total) by mouth once daily.     JARDIANCE 10 mg tablet  Generic drug: empagliflozin  Take 10 mg by mouth once daily.     loratadine 10 mg tablet  Commonly known as: CLARITIN  Take 10 mg by mouth once daily.     metoprolol succinate 25 MG 24 hr tablet  Commonly known as: TOPROL-XL  Take 1 tablet (25 mg total) by mouth once daily.     pregabalin 75 MG capsule  Commonly known as: LYRICA  Take 1 capsule by mouth 2 (two) times daily.     ticagrelor 90 mg tablet  Commonly known as: BRILINTA  Take 1 tablet (90 mg total) by mouth 2 (two) times daily.     traZODone 150 MG tablet  Commonly known as: DESYREL  Take 1 tablet by mouth once daily.              Indwelling Lines/Drains at time of discharge:   Lines/Drains/Airways       None                       Time spent on the discharge of patient: 35 minutes         Ariana Monroe NP  Department of Hospital Medicine  [facility]

## 2025-06-21 NOTE — ASSESSMENT & PLAN NOTE
Patient's FSGs are controlled on current medication regimen.  Last A1c reviewed-   Lab Results   Component Value Date    HGBA1C 5.8 02/08/2025     Most recent fingerstick glucose reviewed-   Recent Labs   Lab 06/20/25  1501 06/20/25 2004 06/21/25  0724 06/21/25  1143   POCTGLUCOSE 129* 96 148* 147*     Hold Oral hypoglycemics while patient is in the hospital.  Patient we will DC home and manage diabetes with diet.  Follow up with PCP to determine continued need for insulin versus oral hypoglycemic.

## 2025-06-21 NOTE — DISCHARGE INSTRUCTIONS
Discharge Instructions, [facility] Medicine    Thank you for choosing [facility] for your medical care. The primary doctor who is taking care of you at the time of your discharge is Sathya Hall DO.     You were admitted to the hospital with GI bleed.     Please note your discharge instructions, including diet/activity restrictions, follow-up appointments, and medication changes.  If you have any questions about your medical issues, prescriptions, or any other questions, please feel free to contact the [facility] Medicine Dept at [phone] and we will help.    If you are previously with Home health, outpatient PT/OT or under a therapy program, you are cleared to return to those programs.    Please direct all long term medication refills and follow up to your primary care provider, Katt Fuentes NP. Thank you again for letting us take care of your health care needs.    Please note the following discharge instructions per your discharging Nurse Practicioner  Ariana Monroe NP

## 2025-06-22 NOTE — TELEPHONE ENCOUNTER
Girlfriend states pt had his gallbladder removed yesterday by Dr Lerner and was discharged today. His pain medicine was sent to a pharmacy that is now closed. They are wanting his prescription to be sent to another pharmacy.    New Pharmacy: [id number] - [place], [place] - [address] AT [address]     On call provider, Dr Xavier Lerner Jr, MD, notified. MD states he will send it in now. Pt/ family informed and verbalizes understanding.    Called family back and verified they were able to get medication.  Reason for Disposition   [1] Caller has URGENT medicine question about med that primary care doctor (or NP/PA) or specialist prescribed AND [2] triager unable to answer question    Additional Information   Negative: [1] Intentional drug overdose AND [2] suicidal thoughts or ideas   Negative: MORE THAN A DOUBLE DOSE of a prescription or over-the-counter (OTC) drug   Negative: [1] DOUBLE DOSE (an extra dose or lesser amount) of prescription drug AND [2] any symptoms (e.g., dizziness, nausea, pain, sleepiness)   Negative: [1] DOUBLE DOSE (an extra dose or lesser amount) of over-the-counter (OTC) drug AND [2] any symptoms (e.g., dizziness, nausea, pain, sleepiness)   Negative: Took another person's prescription drug   Negative: [1] DOUBLE DOSE (an extra dose or lesser amount) of prescription drug AND [2] NO symptoms  (Exception: A double dose of antibiotics.)   Negative: Diabetes drug error or overdose (e.g., took wrong type of insulin or took extra dose)   Negative: [1] Prescription not at pharmacy AND [2] was prescribed by doctor (or NP/PA) recently  (Exception: Triager has access to EMR and prescription is recorded there. Go to Home Care and confirm for pharmacy.)   Negative: [1] Pharmacy calling with prescription question AND [2] triager unable to answer question    Protocols used: Medication Question Call-A-

## 2025-06-23 ENCOUNTER — HOSPITAL ENCOUNTER (OUTPATIENT)
Dept: CARDIOLOGY | Facility: HOSPITAL | Age: 60
Discharge: HOME | End: 2025-06-23
Payer: MEDICAID

## 2025-06-23 DIAGNOSIS — I50.21 ACUTE HFREF (HEART FAILURE WITH REDUCED EJECTION FRACTION): ICD-10-CM

## 2025-06-23 RX ORDER — AMLODIPINE BESYLATE 5 MG/1
5 TABLET ORAL DAILY
Qty: 90 TABLET | Refills: 3 | Status: SHIPPED | OUTPATIENT
Start: 2025-06-23

## 2025-07-02 ENCOUNTER — OFFICE VISIT (OUTPATIENT)
Dept: SURGERY | Facility: CLINIC | Age: 60
End: 2025-07-02
Payer: MEDICAID

## 2025-07-02 VITALS — TEMPERATURE: 98 F | DIASTOLIC BLOOD PRESSURE: 66 MMHG | SYSTOLIC BLOOD PRESSURE: 113 MMHG | HEART RATE: 77 BPM

## 2025-07-02 DIAGNOSIS — Z90.49 S/P LAPAROSCOPIC CHOLECYSTECTOMY: Primary | ICD-10-CM

## 2025-07-02 DIAGNOSIS — K80.20 SYMPTOMATIC CHOLELITHIASIS: ICD-10-CM

## 2025-07-02 PROCEDURE — 99213 OFFICE O/P EST LOW 20 MIN: CPT | Mod: PBBFAC,PN | Performed by: SURGERY

## 2025-07-02 PROCEDURE — 3044F HG A1C LEVEL LT 7.0%: CPT | Mod: CPTII,,, | Performed by: SURGERY

## 2025-07-02 PROCEDURE — 99999 PR PBB SHADOW E&M-EST. PATIENT-LVL III: CPT | Mod: PBBFAC,,, | Performed by: SURGERY

## 2025-07-02 PROCEDURE — 3074F SYST BP LT 130 MM HG: CPT | Mod: CPTII,,, | Performed by: SURGERY

## 2025-07-02 PROCEDURE — 3078F DIAST BP <80 MM HG: CPT | Mod: CPTII,,, | Performed by: SURGERY

## 2025-07-02 PROCEDURE — 4010F ACE/ARB THERAPY RXD/TAKEN: CPT | Mod: CPTII,,, | Performed by: SURGERY

## 2025-07-02 PROCEDURE — 99024 POSTOP FOLLOW-UP VISIT: CPT | Mod: ,,, | Performed by: SURGERY

## 2025-07-10 ENCOUNTER — OFFICE VISIT (OUTPATIENT)
Dept: CARDIOLOGY | Facility: CLINIC | Age: 60
End: 2025-07-10
Payer: MEDICAID

## 2025-07-10 VITALS
HEART RATE: 67 BPM | BODY MASS INDEX: 26.03 KG/M2 | OXYGEN SATURATION: 99 % | DIASTOLIC BLOOD PRESSURE: 71 MMHG | SYSTOLIC BLOOD PRESSURE: 113 MMHG | WEIGHT: 162 LBS | HEIGHT: 66 IN

## 2025-07-10 DIAGNOSIS — E11.69 TYPE 2 DIABETES MELLITUS WITH OTHER SPECIFIED COMPLICATION, UNSPECIFIED WHETHER LONG TERM INSULIN USE: ICD-10-CM

## 2025-07-10 DIAGNOSIS — I50.21 ACUTE HFREF (HEART FAILURE WITH REDUCED EJECTION FRACTION): ICD-10-CM

## 2025-07-10 DIAGNOSIS — E78.5 HYPERLIPIDEMIA, UNSPECIFIED HYPERLIPIDEMIA TYPE: ICD-10-CM

## 2025-07-10 DIAGNOSIS — I50.22 HEART FAILURE WITH MILDLY REDUCED EJECTION FRACTION (HFMREF): Primary | ICD-10-CM

## 2025-07-10 DIAGNOSIS — I25.10 CORONARY ARTERY DISEASE INVOLVING NATIVE CORONARY ARTERY OF NATIVE HEART WITHOUT ANGINA PECTORIS: Chronic | ICD-10-CM

## 2025-07-10 DIAGNOSIS — E87.6 HYPOKALEMIA: ICD-10-CM

## 2025-07-10 DIAGNOSIS — D36.10 SCHWANNOMA: ICD-10-CM

## 2025-07-10 DIAGNOSIS — Z90.49 S/P LAPAROSCOPIC CHOLECYSTECTOMY: ICD-10-CM

## 2025-07-10 DIAGNOSIS — I10 ESSENTIAL HYPERTENSION: Chronic | ICD-10-CM

## 2025-07-10 DIAGNOSIS — I50.22 CHRONIC SYSTOLIC CHF (CONGESTIVE HEART FAILURE): ICD-10-CM

## 2025-07-10 DIAGNOSIS — Z86.74 HISTORY OF CARDIAC ARREST: ICD-10-CM

## 2025-07-10 DIAGNOSIS — R91.1 PULMONARY NODULE: ICD-10-CM

## 2025-07-10 PROCEDURE — 1159F MED LIST DOCD IN RCRD: CPT | Mod: CPTII,,,

## 2025-07-10 PROCEDURE — 1160F RVW MEDS BY RX/DR IN RCRD: CPT | Mod: CPTII,,,

## 2025-07-10 PROCEDURE — 99213 OFFICE O/P EST LOW 20 MIN: CPT | Mod: PBBFAC,PN

## 2025-07-10 PROCEDURE — 99214 OFFICE O/P EST MOD 30 MIN: CPT | Mod: S$PBB,,,

## 2025-07-10 PROCEDURE — 3008F BODY MASS INDEX DOCD: CPT | Mod: CPTII,,,

## 2025-07-10 PROCEDURE — 1111F DSCHRG MED/CURRENT MED MERGE: CPT | Mod: CPTII,,,

## 2025-07-10 PROCEDURE — 3078F DIAST BP <80 MM HG: CPT | Mod: CPTII,,,

## 2025-07-10 PROCEDURE — 3074F SYST BP LT 130 MM HG: CPT | Mod: CPTII,,,

## 2025-07-10 PROCEDURE — 3044F HG A1C LEVEL LT 7.0%: CPT | Mod: CPTII,,,

## 2025-07-10 PROCEDURE — 4010F ACE/ARB THERAPY RXD/TAKEN: CPT | Mod: CPTII,,,

## 2025-07-10 PROCEDURE — 99999 PR PBB SHADOW E&M-EST. PATIENT-LVL III: CPT | Mod: PBBFAC,,,

## 2025-07-10 RX ORDER — AMIODARONE HYDROCHLORIDE 100 MG/1
100 TABLET ORAL DAILY
Qty: 90 TABLET | Refills: 3 | Status: SHIPPED | OUTPATIENT
Start: 2025-07-10

## 2025-07-10 RX ORDER — METOPROLOL SUCCINATE 25 MG/1
25 TABLET, EXTENDED RELEASE ORAL DAILY
Qty: 90 TABLET | Refills: 3 | Status: SHIPPED | OUTPATIENT
Start: 2025-07-10 | End: 2026-07-10

## 2025-07-10 RX ORDER — AMLODIPINE BESYLATE 5 MG/1
5 TABLET ORAL DAILY
Qty: 90 TABLET | Refills: 3 | Status: SHIPPED | OUTPATIENT
Start: 2025-07-10

## 2025-07-10 RX ORDER — EMPAGLIFLOZIN 10 MG/1
10 TABLET, FILM COATED ORAL DAILY
Qty: 90 TABLET | Refills: 3 | Status: SHIPPED | OUTPATIENT
Start: 2025-07-10

## 2025-07-10 RX ORDER — TICAGRELOR 90 MG/1
90 TABLET, FILM COATED ORAL 2 TIMES DAILY
Qty: 180 TABLET | Refills: 3 | Status: SHIPPED | OUTPATIENT
Start: 2025-07-10 | End: 2026-07-10

## 2025-07-10 RX ORDER — ATORVASTATIN CALCIUM 80 MG/1
80 TABLET, FILM COATED ORAL DAILY
Qty: 90 TABLET | Refills: 3 | Status: SHIPPED | OUTPATIENT
Start: 2025-07-10 | End: 2026-07-10

## 2025-07-10 RX ORDER — LOSARTAN POTASSIUM 25 MG/1
25 TABLET ORAL DAILY
Qty: 90 TABLET | Refills: 3 | Status: SHIPPED | OUTPATIENT
Start: 2025-07-10 | End: 2026-07-10

## 2025-07-10 NOTE — ASSESSMENT & PLAN NOTE
Patient plans to follow up with oncology surgery at [facility]  MRI 6/2025  Impression:     Large right gluteus medius intramuscular enhancing mass which shows minimal interval growth compared to 2020.  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  This would be amenable to CT guided biopsy if indicated.

## 2025-07-10 NOTE — PROGRESS NOTES
" Subjective:    Patient ID:  Sanjeev Britt is a 59 y.o. male patient here for evaluation Hospital Follow Up (Lil soreness ) and Fatigue      History of Present Illness    CHIEF COMPLAINT:  Patient presents today for follow-up after recent gallbladder surgery and ED visit.    RECENT GALLBLADDER SURGERY:  Patient was admitted to Hospital Medicine for GI bleed on 6/17/25. Brilinta and aspirin were held on admission. Gastroenterology was consulted on admission and patient underwent EGD that showed linear grade a erosive esophagitis and mild gastropathy but no slava bleeding. Protonix 40 mg daily 3 months was recommended. Right upper quadrant ultrasound revealed cholelithiasis and hepatic steatosis. HIDA scan positive. Cardiology care patient is to go for laparoscopic cholecystectomy. Patient maintained on anticoagulation with full-dose Lovenox. Patient had a lap cholecystectomy on 6/20/2025 and tolerated procedure well.     Currently, he takes 4-5 ibuprofen tablets for pain management despite recommendation to transition to Tylenol. He reports minimal residual post-op soreness and denies current severe pain, noting only minor discomfort.    SCHWANNOMA:  A schwannoma was discovered near the tail end of the spinal cord in the right gluteus medius region. The lesion was initially identified in 2020 but was not previously communicated to him. The tumor has shown slight growth since initial discovery.  PER hospital NOTE- CTA abdomen and pelvis revealed incidental finding of 49 x 40 x 39 mm hypodense soft tissue mass interposed between the right gluteus minimus and medius muscles. Sarcoma is not excluded. Follow up MRI showed Schwannoma. Ambulatory referral to Oncology surgery at [facility] was placed.     MENTAL HEALTH:  He reports recurrent nightmares about dying related to his current medical situation, causing significant sleep disturbance. He expresses feeling fundamentally changed, stating he is "not the same person I used " "to be." He has started contact with his insurance company to obtain counseling services.    MEDICATIONS:  He takes Brilinta and Aspirin, which were temporarily discontinued for 4 days prior to surgery. He has resumed both medications and notes reduced bleeding tendency since resumption.    GI SYMPTOMS:  He experienced a small amount of blood in stool prior to surgery, which has since resolved.    LABS:  Low potassium was noted during recent hospital stay, which was treated with oral potassium supplementation.      ROS:  General: -fever, -chills, -fatigue, -weight gain, -weight loss  Cardiovascular: -chest pain, -palpitations, -lower extremity edema  Respiratory: -cough, -shortness of breath  Gastrointestinal: -abdominal pain, -nausea, -vomiting, -diarrhea, -constipation, -blood in stool  Genitourinary: -dysuria, -hematuria, -frequency  Musculoskeletal: -joint pain, -muscle pain, +limb pain  Skin: -rash, -lesion  Neurological: -headache, -dizziness, -numbness, -tingling  Psychiatric: -anxiety, -depression, +sleep difficulty, +nightmares, +sleep disturbances                    Review of patient's allergies indicates:   Allergen Reactions    Levaquin [levofloxacin]        Past Medical History:   Diagnosis Date    Acute coronary syndrome 09/09/2019    Coronary artery disease     Diabetes mellitus     Hypertension     Sleep apnea      Past Surgical History:   Procedure Laterality Date    ANGIOGRAM, CORONARY, WITH LEFT HEART CATHETERIZATION N/A 2/8/2025    Procedure: Angiogram, Coronary, with Left Heart Cath;  Surgeon: Mo Levi MD;  Location: [facility] CATH/EP LAB;  Service: Cardiology;  Laterality: N/A;    ESOPHAGOGASTRODUODENOSCOPY N/A 6/18/2025    Procedure: EGD (ESOPHAGOGASTRODUODENOSCOPY);  Surgeon: Silvia Fuller MD;  Location: [facility] ENDO;  Service: Endoscopy;  Laterality: N/A;    FESS, USING COMPUTER-ASSISTED NAVIGATION, WITH NASAL TURBINATE REDUCTION N/A 6/7/2024    Procedure: FESS, USING " COMPUTER-ASSISTED NAVIGATION, WITH NASAL TURBINATE REDUCTION;  Surgeon: Andres Abraham MD;  Location[facility];  Service: ENT;  Laterality: N/A;    IVUS, CORONARY  2/8/2025    Procedure: IVUS, Coronary;  Surgeon: Mo Levi MD;  Location: [facility] CATH/EP LAB;  Service: Cardiology;;    IVUS, CORONARY  2/11/2025    Procedure: IVUS, Coronary;  Surgeon: Mo Levi MD;  Location: [facility] CATH/EP LAB;  Service: Cardiology;;    LAPAROSCOPIC CHOLECYSTECTOMY N/A 6/20/2025    Procedure: CHOLECYSTECTOMY, LAPAROSCOPIC;  Surgeon: Xavier Lerner Jr., MD;  Location: [facility] OR;  Service: General;  Laterality: N/A;    NASAL SEPTOPLASTY N/A 6/7/2024    Procedure: SEPTOPLASTY, NOSE;  Surgeon: Andres Abraham MD;  Location: [facility] OR;  Service: ENT;  Laterality: N/A;    NASAL TURBINATE REDUCTION Bilateral 6/7/2024    Procedure: REDUCTION, NASAL TURBINATE;  Surgeon: Andres Abraham MD;  Location: [facility] OR;  Service: ENT;  Laterality: Bilateral;    PERCUTANEOUS CORONARY INTERVENTION, ARTERY N/A 2/8/2025    Procedure: Percutaneous coronary intervention;  Surgeon: Mo Levi MD;  Location: [facility] CATH/EP LAB;  Service: Cardiology;  Laterality: N/A;    PERCUTANEOUS CORONARY INTERVENTION, ARTERY N/A 2/11/2025    Procedure: Percutaneous coronary intervention;  Surgeon: Mo Levi MD;  Location: [facility] CATH/EP LAB;  Service: Cardiology;  Laterality: N/A;    VASCULAR SURGERY       Social History[1]                Objective        Vitals:    07/10/25 1115   BP: 113/71   Pulse: 67       LIPIDS - LAST 2   Lab Results   Component Value Date    CHOL 180 02/08/2025    CHOL 164 01/22/2024    HDL 63 02/08/2025    HDL 55 01/22/2024    LDLCALC 103.6 02/08/2025    LDLCALC 98.2 01/22/2024    TRIG 67 02/08/2025    TRIG 54 01/22/2024    CHOLHDL 35.0 02/08/2025    CHOLHDL 33.5 01/22/2024       CBC - LAST 2  Lab Results   Component Value Date    WBC 11.10 06/21/2025    WBC 16.30 (H) 06/20/2025     RBC 4.85 06/21/2025    RBC 5.01 06/20/2025    HGB 13.6 (L) 06/21/2025    HGB 14.0 06/20/2025    HCT 42.0 06/21/2025    HCT 43.5 06/20/2025    MCV 87 06/21/2025    MCV 87 06/20/2025    MCH 28.0 06/21/2025    MCH 27.9 06/20/2025    MCHC 32.4 06/21/2025    MCHC 32.2 06/20/2025    RDW 15.0 (H) 06/21/2025    RDW 15.1 (H) 06/20/2025     06/21/2025     06/20/2025    MPV 11.5 06/21/2025    MPV 11.5 06/20/2025    GRAN 5.3 02/12/2025    GRAN 70.0 02/12/2025    LYMPH 1.0 02/12/2025    LYMPH 13.3 (L) 02/12/2025    MONO 1.0 02/12/2025    MONO 13.3 02/12/2025    BASO 0.02 02/12/2025    BASO 0.04 02/11/2025    NRBC 0 06/21/2025    NRBC 0 06/20/2025       CHEMISTRY & LIVER FUNCTION - LAST 2  Lab Results   Component Value Date     06/21/2025     06/20/2025    K 3.4 (L) 06/21/2025    K 3.8 06/20/2025     06/21/2025     06/20/2025    CO2 24 06/21/2025    CO2 27 06/20/2025    ANIONGAP 13 06/21/2025    ANIONGAP 10 06/20/2025    BUN 15 06/21/2025    BUN 15 06/20/2025    CREATININE 0.8 06/21/2025    CREATININE 0.9 06/20/2025     (H) 06/21/2025     06/20/2025    CALCIUM 9.1 06/21/2025    CALCIUM 9.2 06/20/2025    PH 7.485 (H) 02/08/2025    MG 2.1 02/12/2025    MG 2.0 02/11/2025    ALBUMIN 5.0 06/17/2025    ALBUMIN 4.0 02/12/2025    PROT 7.9 06/17/2025    PROT 6.7 02/12/2025    ALKPHOS 100 06/17/2025    ALKPHOS 80 02/12/2025    ALT 21 06/17/2025    ALT 39 02/12/2025    AST 17 06/17/2025    AST 52 (H) 02/12/2025    BILITOT 0.6 06/17/2025    BILITOT 0.7 02/12/2025        CARDIAC PROFILE - LAST 2  Lab Results   Component Value Date     (H) 02/07/2025    BNP 32 09/09/2019     08/16/2021     (H) 08/15/2021     (H) 08/15/2021    TROPONINI 0.033 08/15/2021    TROPONINI <0.030 07/26/2021    TROPONINIHS 57236.2 (HH) 02/12/2025    TROPONINIHS 43967.4 (HH) 02/08/2025        COAGULATION - LAST 2  Lab Results   Component Value Date    LABPT 12.8 07/26/2021    INR 1.0  06/18/2025    INR 1.0 06/17/2025    APTT 26.5 06/18/2025       ENDOCRINE & PSA - LAST 2  Lab Results   Component Value Date    HGBA1C 5.8 02/08/2025    HGBA1C 6.1 01/22/2024    TSH 1.899 01/22/2024        ECHOCARDIOGRAM RESULTS  Results for orders placed during the hospital encounter of 06/17/25    Echo    Interpretation Summary    Left Ventricle: The left ventricle is normal in size. Normal wall thickness. There is low normal systolic function with a visually estimated ejection fraction of 50 - 55%. Grade I diastolic dysfunction. E/A ratio is 0.72.    Right Ventricle: The right ventricle is normal in size Wall thickness is normal. Systolic function is reduced.TAPSE is 1.8 cm.    Left Atrium: The left atrium is mildly dilated    Aortic Valve: The aortic valve is a trileaflet valve. There is mild aortic valve sclerosis.    Mitral Valve: There is mild regurgitation.    Pulmonic Valve: Not well visualized due to poor acoustic window.    IVC/SVC: Normal venous pressure at 3 mmHg.      CURRENT/PREVIOUS VISIT EKG  Results for orders placed or performed during the hospital encounter of 06/17/25   EKG 12-lead    Collection Time: 06/17/25  2:20 PM   Result Value Ref Range    QRS Duration 92 ms    OHS QTC Calculation 470 ms    Narrative    Test Reason : R07.9,    Vent. Rate :  63 BPM     Atrial Rate :  63 BPM     P-R Int : 158 ms          QRS Dur :  92 ms      QT Int : 460 ms       P-R-T Axes :  63  42   7 degrees    QTcB Int : 470 ms    Normal sinus rhythm with sinus arrhythmia  Possible Inferior infarct ,age undetermined  Possible Anterior infarct ,age undetermined  Abnormal ECG  No previous ECGs available  Confirmed by Onofre Mcguire ([id number]) on 6/19/2025 9:00:14 PM    Referred By:            Confirmed By: Onofre Mcguire     No valid procedures specified.   Results for orders placed during the hospital encounter of 04/20/23    Nuclear Stress - Cardiology Interpreted    Interpretation Summary    Normal myocardial perfusion  scan. There is no evidence of myocardial ischemia or infarction.    There is trivial apical thinning which is a normal variant.    The gated perfusion images showed an ejection fraction of 69% at rest. The gated perfusion images showed an ejection fraction of 71% post stress. Normal ejection fraction is greater than 53%.    There is normal wall motion at rest and post stress.    LV cavity size is normal at rest and normal at stress.    The ECG portion of the study is negative for ischemia.    The patient reported no chest pain during the stress test.    There were no arrhythmias during stress.    No valid procedures specified.    Physical Exam    General: No acute distress. Well-developed. Well-nourished.  Eyes: EOMI. Sclerae anicteric.  HENT: Normocephalic. Atraumatic.   Cardiovascular: Regular rate. Regular rhythm. No murmurs. No rubs. No gallops. Normal S1, S2. Tenderness in left arm vein.  Respiratory: Normal respiratory effort. Clear to auscultation bilaterally. No rales. No rhonchi. No wheezing.  Abdomen: Soft. Non-tender. Non-distended. Normoactive bowel sounds.  Musculoskeletal: No  obvious deformity.  Extremities: No lower extremity edema.  Neurological: Alert & oriented x3. No slurred speech. Normal gait.  Psychiatric: Normal mood. Normal affect. Good insight. Good judgment.  Skin: Warm. Dry. No rash.         I HAVE REVIEWED :    The vital signs, nurses notes, and all the pertinent radiology and labs.        Current Outpatient Medications   Medication Instructions    amiodarone (PACERONE) 100 mg, Oral, Daily    amLODIPine (NORVASC) 5 mg, Oral, Daily    aspirin (ECOTRIN) 81 mg, Daily    atorvastatin (LIPITOR) 80 mg, Oral, Daily    busPIRone (BUSPAR) 7.5 mg, 2 times daily    gabapentin (NEURONTIN) 300 mg, 2 times daily    [Paused] ibuprofen (ADVIL,MOTRIN) 600 mg, 3 times daily    JARDIANCE 10 mg, Oral, Daily    loratadine (CLARITIN) 10 mg, Daily    losartan (COZAAR) 25 mg, Oral, Daily    metoprolol succinate  (TOPROL-XL) 25 mg, Oral, Daily    multivitamin Tab 1 tablet, Oral, Daily    nicotine (NICODERM CQ) 7 mg/24 hr 1 patch, Transdermal, Daily    ticagrelor (BRILINTA) 90 mg, Oral, 2 times daily    traZODone (DESYREL) 150 mg, Oral, Nightly PRN          Assessment & Plan   Assessment & Plan    E11.69 Type 2 diabetes mellitus with other specified complication, unspecified whether long term insulin use  I50.21 Acute HFrEF (heart failure with reduced EF)  I50.22 Heart failure with mildly reduced EF (HFmrEF)  I10 Essential hypertension  I25.10 Coronary artery disease involving native coronary artery of native heart without angina pectoris  Z86.74 History of cardiac arrest  E87.6 Hypokalemia  E78.5 Hyperlipidemia, unspecified hyperlipidemia type    PLAN SUMMARY:  - Ordered fasting lipid panel  - Ordered lab work to recheck potassium levels  - Continued Jardiance  - Continued Brilinta and aspirin as current anticoagulants  - Discontinued ibuprofen and Advil  - Started Tylenol for pain as needed  - Waiting for Oncology appt for evaluation of Schwannoma  - Sent all medication prescriptions to pharmacy for 90-day supplies  - Gradually return to normal activities, avoid lifting over 50 lbs for 2 weeks post-op  - Incorporate more green vegetables into diet, low sodium heart healthy diet  - Contact office if oncology referral is not received  - Follow up in 3 months (around August 8th)    TYPE 2 DIABETES MELLITUS WITH OTHER SPECIFIED COMPLICATION, UNSPECIFIED WHETHER LONG TERM INSULIN USE:  - Continued Jardiance.  - Incorporate more green vegetables into diet.      CORONARY ARTERY DISEASE INVOLVING NATIVE CORONARY ARTERY OF NATIVE HEART WITHOUT ANGINA PECTORIS:  - No changes in EKG from previous.  - Explained increased bleeding risk associated with Brilinta compared to Plavix.  - Continued Brilinta and aspirin as current anticoagulants.    HYPOKALEMIA:  - Ordered lab work to recheck potassium levels.    HYPERLIPIDEMIA, UNSPECIFIED  HYPERLIPIDEMIA TYPE:  - Ordered fasting lipid panel.  - Incorporate more green vegetables into diet          CAD (coronary artery disease) status post PCI  Hx STEMI and cardiac arrest February 2025-   Status post successful IVUS guided staged PCI of diffuse 70% stenosis in mid circumflex with 2 overlapping drug-eluting stents 2.5 x 24 and 3.0 x 32 and post dilated with 3.5 noncompliant balloon     Medical management of the residual LAD disease at this point (There was a short segment  of distal LAD with bridge collaterals and 70% In-Stent restenosis in the proximal portion of the diagonal (small-caliber distal vessel)  Continue DAPT and statin therapy  Low sodium heart healthy diet  No acute ST-T wave changes on EKG today in office  Continue amiodarone 100 mg daily    Chronic systolic CHF (congestive heart failure)  Euvolemic in office.  Continue metoprolol succinate 25 mg daily, losartan 25 mg daily.  Continue Jardiance 10 mg daily.  Low sodium heart healthy diet. 2 g salt restriction. 1.5 L fluid restriction.  Daily weights. Recommend stopping NSAIDS if able and use OTC tylenol PRN for pain.      Latest ECHO  Results for orders placed during the hospital encounter of 06/17/25    Echo    Interpretation Summary    Left Ventricle: The left ventricle is normal in size. Normal wall thickness. There is low normal systolic function with a visually estimated ejection fraction of 50 - 55%. Grade I diastolic dysfunction. E/A ratio is 0.72.    Right Ventricle: The right ventricle is normal in size Wall thickness is normal. Systolic function is reduced.TAPSE is 1.8 cm.    Left Atrium: The left atrium is mildly dilated    Aortic Valve: The aortic valve is a trileaflet valve. There is mild aortic valve sclerosis.    Mitral Valve: There is mild regurgitation.    Pulmonic Valve: Not well visualized due to poor acoustic window.    IVC/SVC: Normal venous pressure at 3 mmHg.          Essential hypertension  Stable. Continue  amlodipine 5 mg daily  Continue Jardiance 10 mg daily  Continue losartan 25 mg daily and metoprolol succinate 25 mg daily  Low sodium heart healthy diet    Schwannoma  Patient plans to follow up with oncology surgery at [facility]  MRI 6/2025  Impression:     Large right gluteus medius intramuscular enhancing mass which shows minimal interval growth compared to 2020.  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  This would be amenable to CT guided biopsy if indicated.    DMII (diabetes mellitus, type 2)  Continue Jardiance 10 mg daily  HGBA1C 5.8, stable    S/P laparoscopic cholecystectomy  Patient was recently hospitalized for acute severe abdominal pain with mild blood in stool. Found to have erosive esophagitis and mid gastritis on EGD.  Remains on DAPT for recent PCI.  Underwent lap anirudh during hospitalization. Doing well post-op.    Pulmonary nodule  12 mm noncalcified nodule opacity left lung base. Bibasilar atelectasis. Follow-up chest CT in 3 months.     3 month follow up    This note was generated with the assistance of ambient listening technology. Verbal consent was obtained by the patient and accompanying visitor(s) for the recording of patient appointment to facilitate this note. I attest to having reviewed and edited the generated note for accuracy, though some syntax or spelling errors may persist. Please contact the author of this note for any clarification.             [1]   Social History  Tobacco Use    Smoking status: Never    Smokeless tobacco: Current     Types: Snuff   Substance Use Topics    Alcohol use: Not Currently    Drug use: Never

## 2025-07-10 NOTE — ASSESSMENT & PLAN NOTE
Euvolemic in office.  Continue metoprolol succinate 25 mg daily, losartan 25 mg daily.  Continue Jardiance 10 mg daily.  Low sodium heart healthy diet. 2 g salt restriction. 1.5 L fluid restriction.  Daily weights. Recommend stopping NSAIDS if able and use OTC tylenol PRN for pain.      Latest ECHO  Results for orders placed during the hospital encounter of 06/17/25    Echo    Interpretation Summary    Left Ventricle: The left ventricle is normal in size. Normal wall thickness. There is low normal systolic function with a visually estimated ejection fraction of 50 - 55%. Grade I diastolic dysfunction. E/A ratio is 0.72.    Right Ventricle: The right ventricle is normal in size Wall thickness is normal. Systolic function is reduced.TAPSE is 1.8 cm.    Left Atrium: The left atrium is mildly dilated    Aortic Valve: The aortic valve is a trileaflet valve. There is mild aortic valve sclerosis.    Mitral Valve: There is mild regurgitation.    Pulmonic Valve: Not well visualized due to poor acoustic window.    IVC/SVC: Normal venous pressure at 3 mmHg.

## 2025-07-10 NOTE — ASSESSMENT & PLAN NOTE
12 mm noncalcified nodule opacity left lung base. Bibasilar atelectasis. Follow-up chest CT in 3 months.

## 2025-07-10 NOTE — ASSESSMENT & PLAN NOTE
Stable. Continue amlodipine 5 mg daily  Continue Jardiance 10 mg daily  Continue losartan 25 mg daily and metoprolol succinate 25 mg daily  Low sodium heart healthy diet

## 2025-07-10 NOTE — ASSESSMENT & PLAN NOTE
Hx STEMI and cardiac arrest February 2025-   Status post successful IVUS guided staged PCI of diffuse 70% stenosis in mid circumflex with 2 overlapping drug-eluting stents 2.5 x 24 and 3.0 x 32 and post dilated with 3.5 noncompliant balloon     Medical management of the residual LAD disease at this point (There was a short segment  of distal LAD with bridge collaterals and 70% In-Stent restenosis in the proximal portion of the diagonal (small-caliber distal vessel)  Continue DAPT and statin therapy  Low sodium heart healthy diet  No acute ST-T wave changes on EKG today in office  Continue amiodarone 100 mg daily

## 2025-07-30 NOTE — PROGRESS NOTES
GENERAL SURGERY  POST-OP PROGRESS NOTE    HPI: Sanjeev Britt is a 59 y.o. male status post laparoscopic cholecystectomy for acute cholecystitis with empyema of the gallbladder during recent admission. Doing well since discharge.  Afebrile.  Tolerating diet.    VITALS:  Vitals:    07/02/25 0903   BP: 113/66   Pulse: 77   Temp: 97.8 °F (36.6 °C)       PHYSICAL EXAM:  Port incision sites healing well without signs of infection or breakdown    PATHOLOGY:  GALLBLADDER, CHOLECYSTECTOMY     - ACUTE CHOLECYSTITIS     - CHOLELITHIASIS     ASSESSMENT & PLAN:  59 y.o. male s/p laparoscopic cholecystectomy  -pathology benign  -healing well  -remains on blood thinners  -return to clinic p.r.n.

## (undated) DEVICE — Device

## (undated) DEVICE — IRRIGATOR SUCTION W/TIP

## (undated) DEVICE — SUT PLAIN 4-0 SC-1 18IN

## (undated) DEVICE — SET TUBE PNEUMOCLEAR SE HI FLO

## (undated) DEVICE — KIT PROCEDURE STER INLET CLOSU

## (undated) DEVICE — INFLATOR ADVANTAGE ENCORE 26

## (undated) DEVICE — CONTRAST VISIPAQUE 150ML

## (undated) DEVICE — POWDER ARISTA AH 3G

## (undated) DEVICE — CATH EMERGE MR 15 X 2.75

## (undated) DEVICE — GLOVE SENSICARE PI ALOE 7.5

## (undated) DEVICE — CATH EMERGE MR 12 X 2.50

## (undated) DEVICE — ADHESIVE DERMABOND ADVANCED

## (undated) DEVICE — SUT 5-0 CHROMIC GUT / P-3

## (undated) DEVICE — GUIDEWIRE RUNTHROUGH EF 180CM

## (undated) DEVICE — CATH EMERGE MR 12 X 2.00

## (undated) DEVICE — PACK EENT SURG II ECLIPSE

## (undated) DEVICE — SUT MCRYL PLUS 4-0 PS2 27IN

## (undated) DEVICE — ELECTRODE L HOOK 13IN 33M

## (undated) DEVICE — GUIDE WIRE BMW 014 X190

## (undated) DEVICE — COVER CAMERA OPERATING ROOM

## (undated) DEVICE — NDL PNEUMOPERITONEUM 150MM

## (undated) DEVICE — CANNULA ENDOPATH XCEL 5X100MM

## (undated) DEVICE — SOL NACL IRR 3000ML

## (undated) DEVICE — CATH NC EMERGE MR 3.5X8X143

## (undated) DEVICE — CATH EMERGE MR 20 X 2.50

## (undated) DEVICE — SEE L#120831

## (undated) DEVICE — SYS SEE SHARP SCP ANTIFG LNG

## (undated) DEVICE — CATH NC EMERGE MR 3.5X15MM

## (undated) DEVICE — TROCAR ENDOPATH XCEL 5X100MM

## (undated) DEVICE — DRESSING EYE OVAL LF

## (undated) DEVICE — BAG TISS RETRV MONARCH 10MM

## (undated) DEVICE — APPLICATOR SURGICAL ARISTA XL

## (undated) DEVICE — APPLIER CLIP ENDO LIGAMAX 5MM

## (undated) DEVICE — TROCAR KII FIOS ZTHREAD 12X100

## (undated) DEVICE — CATH DXTERITY JL35 100CM 5FR

## (undated) DEVICE — SUT VICRYL+ 27 UR-6 VIOL

## (undated) DEVICE — SPONGE PATTY SURGICAL .5X3IN

## (undated) DEVICE — SYS LABEL CORRECT MED

## (undated) DEVICE — CATH EMERGE MR 15 X 2.00

## (undated) DEVICE — SOL NACL IRR 1000ML BTL

## (undated) DEVICE — GLOVE SENSICARE PI SURG 7

## (undated) DEVICE — DRESSING NASOPORE 8CM BIORSRBL

## (undated) DEVICE — CATH EAGLE EYE PLATINUM

## (undated) DEVICE — CATH LNCHR JR40 6F 110CM

## (undated) DEVICE — ELECTRODE REM PLYHSV RETURN 9

## (undated) DEVICE — SYR 30CC LUER LOCK

## (undated) DEVICE — COVER LIGHT HANDLE 80/CA

## (undated) DEVICE — GUIDEWIRE INQWIRE SE 3MM JTIP

## (undated) DEVICE — SCISSOR 5MMX35CM DIRECT DRIVE

## (undated) DEVICE — SYR 10CC LUER LOCK

## (undated) DEVICE — BLADE SURG #15 CARBON STEEL

## (undated) DEVICE — TUBING SUCTION STR .25INX6FT

## (undated) DEVICE — GUIDE LAUNCHER 6FR EBU 3.5

## (undated) DEVICE — HEMOSTAT VASC BAND REG 24CM

## (undated) DEVICE — DISSECTOR KITTNER 5MM T 45CM S

## (undated) DEVICE — CORD MPLR STR PLUG DISP 10FT

## (undated) DEVICE — NDL 27G X 1 1/4

## (undated) DEVICE — BLANKET FULL BODY 85.8X50IN

## (undated) DEVICE — KIT GLIDESHEATH SLEND 6FR 10CM

## (undated) DEVICE — CATH IMPULSE 5F 100CM FR4

## (undated) DEVICE — KIT ANTIFOG W/SPONG & FLUID